# Patient Record
Sex: FEMALE | Race: WHITE | Employment: PART TIME | URBAN - METROPOLITAN AREA
[De-identification: names, ages, dates, MRNs, and addresses within clinical notes are randomized per-mention and may not be internally consistent; named-entity substitution may affect disease eponyms.]

---

## 2017-02-16 ENCOUNTER — HOSPITAL ENCOUNTER (OUTPATIENT)
Dept: VASCULAR SURGERY | Age: 62
Discharge: HOME | End: 2017-02-16
Attending: PODIATRIST
Payer: COMMERCIAL

## 2017-02-16 DIAGNOSIS — I73.9 PERIPHERAL VASCULAR DISEASE, UNSPECIFIED (HCC): ICD-10-CM

## 2017-02-16 PROCEDURE — 93923 UPR/LXTR ART STDY 3+ LVLS: CPT

## 2017-02-16 NOTE — PROCEDURES
[facility]  *** FINAL REPORT ***    Name: Anna Roque  MRN: [id number]    Outpatient  : 05 Aug 1955  HIS Order #: [id number]  [id number] [facility] Visit #: [id number]  Date: 2017    TYPE OF TEST: Peripheral Arterial Testing    REASON FOR TEST  Peripheral vascular disease. Right Leg  Segmentals: Normal                     mmHg  Brachial         150  High thigh  Low thigh        179  Calf             213  Posterior tibial 191  Dorsalis pedis  Peroneal  Metatarsal  Toe pressure     112  Doppler:  PVR:        Normal  Ankle/Brachial: 1.24    Left Leg  Segmentals:                     mmHg  Brachial         154  High thigh  Low thigh        177  Calf  Posterior tibial  Dorsalis pedis  Peroneal  Metatarsal  Toe pressure     106  Doppler:  PVR:        Normal  Ankle/Brachial:    INTERPRETATION/FINDINGS  PROCEDURE:  Evaluation of lower extremity arteries with multilevel  systolic blood pressure measurements and pulse volume recording (PVR)  plethysmography. Includes calculation of the ankle/brachial pressure  indices (MARIAH's). FINDINGS:  The right ankle anterior tibial is not compressible and  arteries at the left calf and ankle are not compressible, consistent  with arterial calcification. Right MARIAH, based on the posterior  tibial, is normal.  Left MARIAH is indeterminate. PVR waveforms on the  right are normal at the thigh, calf, and ankle. PVR waveforms on the  left are normal at the thigh, calf, and ankle. Photoplethysmography  waveforms are normal at the great toe bilaterally. Great toe -  brachial index is 0.73 on the right and 0.69 on the left. Waveform  amplitude at the left calf, ankle, and great toe is high in comparison   to the right, suggesting hyperemia. IMPRESSION:  No evidence of hemodynamically significant lower  extremity arterial obstruction. ADDITIONAL COMMENTS    I have personally reviewed the data relevant to the interpretation of  this  study.     TECHNOLOGIST: Angelina Randle, RVT  Signed: 02/16/2017 04:34 PM    PHYSICIAN: Maxine Robles MD  Signed: 02/17/2017 08:18 AM

## 2017-02-17 ENCOUNTER — HOSPITAL ENCOUNTER (OUTPATIENT)
Dept: VASCULAR SURGERY | Age: 62
Discharge: HOME | End: 2017-02-17
Attending: PODIATRIST

## 2017-02-17 DIAGNOSIS — I73.9 PERIPHERAL VASCULAR DISEASE, UNSPECIFIED (HCC): ICD-10-CM

## 2017-04-20 PROBLEM — N18.30 CKD (CHRONIC KIDNEY DISEASE), STAGE III (HCC): Status: ACTIVE | Noted: 2017-04-20

## 2017-08-21 PROBLEM — E11.59 HYPERTENSION COMPLICATING DIABETES (HCC): Status: ACTIVE | Noted: 2017-08-21

## 2017-08-21 PROBLEM — I15.2 HYPERTENSION COMPLICATING DIABETES (HCC): Status: ACTIVE | Noted: 2017-08-21

## 2017-10-19 ENCOUNTER — OFFICE VISIT (OUTPATIENT)
Dept: SLEEP MEDICINE | Age: 62
End: 2017-10-19

## 2017-10-19 VITALS
BODY MASS INDEX: 33.97 KG/M2 | DIASTOLIC BLOOD PRESSURE: 84 MMHG | SYSTOLIC BLOOD PRESSURE: 155 MMHG | HEART RATE: 71 BPM | OXYGEN SATURATION: 99 % | WEIGHT: 237.3 LBS | HEIGHT: 70 IN

## 2017-10-19 DIAGNOSIS — E11.59 HYPERTENSION COMPLICATING DIABETES (HCC): ICD-10-CM

## 2017-10-19 DIAGNOSIS — G47.33 OSA (OBSTRUCTIVE SLEEP APNEA): Primary | ICD-10-CM

## 2017-10-19 DIAGNOSIS — I15.2 HYPERTENSION COMPLICATING DIABETES (HCC): ICD-10-CM

## 2017-10-19 NOTE — PROGRESS NOTES
[address] [facility] [place]. [place], [address]  Tel.  [phone]  Fax. [address] [facility] 60  [place], [address] [facility]  Tel.  [phone]  Fax. [phone] [address]   Tel.  [phone]  Fax. [phone]         Subjective:      Waylon Ortiz is an 65 y.o. female referred for evaluation for a sleep disorder. She complains of snoring associated with snorting, excessive daytime sleepiness. Symptoms began several years ago, unchanged since that time. She usually can fall asleep in 5 minutes. Family or house members note snoring. She denies completely or partially paralyzed while falling asleep or waking up. Waylon Ortiz does wake up frequently at night. She   bothered by waking up too early and left unable to get back to sleep. She actually sleeps about   hours at night and wakes up about 3 times during the night. She does work shifts:  First Shift. Lola Lopez indicates she   get too little sleep at night. Her bedtime is  . She awakens at  . She does not take naps. She has the following observed behaviors: Loud snoring, Sleep talking, Grinding teeth;  . Other remarks: waking with a gasp or snort    [place] Sleepiness Score: 24 which reflect severe daytime drowsiness. No Known Allergies      Current Outpatient Prescriptions:     atorvastatin (LIPITOR) 40 mg tablet, Take 1 Tab by mouth daily. , Disp: 90 Tab, Rfl: 11    bumetanide (BUMEX) 1 mg tablet, Take 2 Tabs by mouth daily. , Disp: 180 Tab, Rfl: 11    traMADol (ULTRAM) 50 mg tablet, Take 1 Tab by mouth every six (6) hours as needed for Pain. Max Daily Amount: 200 mg., Disp: 100 Tab, Rfl: 5    ONE TOUCH DELICA 33 gauge misc, USE TO TEST BLOOD SUGAR EVERY DAY, Disp: 30 Lancet, Rfl: 11    metFORMIN (GLUCOPHAGE) 500 mg tablet, Take 500 mg by mouth daily (with breakfast). , Disp: , Rfl:     ONETOUCH ULTRA TEST strip, TEST BLOOD SUGAR TWICE A DAY, Disp: 50 Strip, Rfl: 10   levomefolate-B6-meB12-algal oil 3 mg-35 mg-2 mg -90.314 mg cap, Take 2 Caps by mouth daily. , Disp: , Rfl:     CHILDREN'S ASPIRIN 81 mg chewable tablet, CHEW 1 TABLET BY MOUTH EVERY DAY, Disp: 30 Tab, Rfl: 10    acetaminophen (TYLENOL) 325 mg tablet, Take 2 Tabs by mouth every four (4) hours as needed. , Disp: 30 Tab, Rfl: 11     She  has a past medical history of Cancer ([facility] 75.); CKD (chronic kidney disease), stage III (3/1/2016); CKD (chronic kidney disease), stage IV ([facility] (3/1/2016); Diabetes ([facility].); HTN (hypertension) (11/2/2015); Hypertension; LBP (low back pain) (11/2/2015); Microalbuminuria (11/21/2015); Obesity (11/2/2015); PAD (peripheral artery disease) ([facility].) (9/29/2016); and Venous insufficiency (11/2/2015). She  has a past surgical history that includes back surgery; appendectomy; and tonsillectomy. She family history includes Arthritis-rheumatoid in her mother; Dementia in her father. She  reports that she has quit smoking. She has never used smokeless tobacco. She reports that she drinks alcohol. Review of Systems:  Constitutional:  No significant weight loss or weight gain. Eyes:  No blurred vision. CVS:  No significant chest pain  Pulm:  No significant shortness of breath  GI:  No significant nausea or vomiting  :  No significant nocturia  Musculoskeletal:  No significant joint pain at night  Skin:  No significant rashes  Neuro:  No significant dizziness   Psych:  No active mood issues    Sleep Review of Systems: notable for no difficulty falling asleep; frequent awakenings at night;  regular dreaming noted; no nightmares ; no early morning headaches; memory problems; no concentration issues; no history of any automobile or occupational accidents due to daytime drowsiness.       Objective:     Visit Vitals    /84    Pulse 71    Ht 5' 9.8\" (1.773 m)    Wt 237 lb 4.8 oz (107.6 kg)    SpO2 99%    BMI 34.24 kg/m2         General:   Not in acute distress   Eyes:  Anicteric sclerae, no obvious strabismus   Nose:  No obvious nasal septum deviation    Oropharynx:   Class 4 oropharyngeal outlet, thick tongue base, uvula could not be seen due to low-lying soft palate, narrow tonsilo-pharyngeal pilars   Tonsils:   tonsils are not seen due to low-lying soft palate   Neck:   Neck circ. in \"inches\": 16; midline trachea   Chest/Lungs:  Equal lung expansion, clear on auscultation    CVS:  Normal rate, regular rhythm; no JVD   Skin:  Warm to touch; no obvious rashes   Neuro:  No focal deficits ; no obvious tremor    Psych:  Normal affect,  normal countenance;          Assessment:       ICD-10-CM ICD-9-CM    1. LUIZA (obstructive sleep apnea) G47.33 327.23 SLEEP STUDY UNATTENDED, 4 CHANNEL   2. Hypertension complicating diabetes ([facility] E11.59 250.80     I10 401.9    3. BMI 34.0-34.9,adult Z68.34 V85.34          Plan:     * The patient currently has a Moderate Risk for having sleep apnea. STOP-BANG score 5.  * Sleep testing was ordered for initial evaluation. * She was provided information on sleep apnea including coresponding risk factors and the importance of proper treatment. * Treatment options if indicated were reviewed today. Patient agrees to a trial of OAT Dahl SARIKA José) / PAP therapy if indicated. * Counseling was provided regarding proper sleep hygiene (including effect of light on sleep), paradoxical intention, stimulus control, sleep environment safety and safe driving. * Effect of sleep disturbance on weight was reviewed. We have recommended a dedicated weight loss through appropriate diet and an exercise regiment as significant weight reduction has been shown to reduce severity of obstructive sleep apnea.      * Patient agrees to telephone [phone]  follow-up by myself or lead sleep technologist shortly after sleep study to review results and plan final management.     (patient has given permission for a message to be left regarding test results and further management if patient cannot be cannot be reached directly). Thank you for allowing us to participate in your patient's medical care. We'll keep you updated on these investigations. Lauren Rosales MD, FAASM  Electronically signed.  10/19/17

## 2017-10-19 NOTE — PATIENT INSTRUCTIONS
[address] [facility] [place]. [place], [address]  Tel.  [phone]  Fax. 100 [facility] 60  [place], [address] [facility]  Tel.  [phone]  Fax. [phone] [address]  Tel.  [phone]  Fax. [phone]     Sleep Apnea: After Your Visit  Your Care Instructions  Sleep apnea occurs when you frequently stop breathing for 10 seconds or longer during sleep. It can be mild to severe, based on the number of times per hour that you stop breathing or have slowed breathing. Blocked or narrowed airways in your nose, mouth, or throat can cause sleep apnea. Your airway can become blocked when your throat muscles and tongue relax during sleep. Sleep apnea is common, occurring in 1 out of 20 individuals. Individuals having any of the following characteristics should be evaluated and treated right away due to high risk and detrimental consequences from untreated sleep apnea:  1. Obesity  2. Congestive Heart failure  3. Atrial Fibrillation  4. Uncontrolled Hypertension  5. Type II Diabetes  6. Night-time Arrhythmias  7. Stroke  8. Pulmonary Hypertension  9. High-risk Driving Populations (pilots, truck drivers, etc.)  10. Patients Considering Weight-loss Surgery    How do you know you have sleep apnea? You probably have sleep apnea if you answer 'yes' to 3 or more of the following questions:  S - Have you been told that you Snore? T - Are you often Tired during the day? O - Has anyone Observed you stop breathing while sleeping? P- Do you have (or are being treated for) high blood Pressure? B - Are you obese (Body Mass Index > 35)? A - Is your Age 48years old or older? N - Is your Neck size greater than 16 inches? G - Are you male Gender? A sleep physician can prescribe a breathing device that prevents tissues in the throat from blocking your airway.  Or your doctor may recommend using a dental device (oral breathing device) to help keep your airway open. In some cases, surgery may be needed to remove enlarged tissues in the throat. Follow-up care is a key part of your treatment and safety. Be sure to make and go to all appointments, and call your doctor if you are having problems. It's also a good idea to know your test results and keep a list of the medicines you take. How can you care for yourself at home? · Lose weight, if needed. It may reduce the number of times you stop breathing or have slowed breathing. · Go to bed at the same time every night. · Sleep on your side. It may stop mild apnea. If you tend to roll onto your back, sew a pocket in the back of your pajama top. Put a tennis ball into the pocket, and stitch the pocket shut. This will help keep you from sleeping on your back. · Avoid alcohol and medicines such as sleeping pills and sedatives before bed. · Do not smoke. Smoking can make sleep apnea worse. If you need help quitting, talk to your doctor about stop-smoking programs and medicines. These can increase your chances of quitting for good. · Prop up the head of your bed 4 to 6 inches by putting bricks under the legs of the bed. · Treat breathing problems, such as a stuffy nose, caused by a cold or allergies. · Use a continuous positive airway pressure (CPAP) breathing machine if lifestyle changes do not help your apnea and your doctor recommends it. The machine keeps your airway from closing when you sleep. · If CPAP does not help you, ask your doctor whether you should try other breathing machines. A bilevel positive airway pressure machine has two types of air pressureâone for breathing in and one for breathing out. Another device raises or lowers air pressure as needed while you breathe. · If your nose feels dry or bleeds when using one of these machines, talk with your doctor about increasing moisture in the air. A humidifier may help.   · If your nose is runny or stuffy from using a breathing machine, talk with your doctor about using decongestants or a corticosteroid nasal spray. When should you call for help? Watch closely for changes in your health, and be sure to contact your doctor if:  · You still have sleep apnea even though you have made lifestyle changes. · You are thinking of trying a device such as CPAP. · You are having problems using a CPAP or similar machine. Where can you learn more? Go to RETAIL PRO. Enter O341 in the search box to learn more about \"Sleep Apnea: After Your Visit. \"   © 6065-0410 Healthwise, Incorporated. Care instructions adapted under license by [facility] [employer] (which disclaims liability or warranty for this information). This care instruction is for use with your licensed healthcare professional. If you have questions about a medical condition or this instruction, always ask your healthcare professional. Marsha Rice any warranty or liability for your use of this information. PROPER SLEEP HYGIENE    What to avoid  · Do not have drinks with caffeine, such as coffee or black tea, for 8 hours before bed. · Do not smoke or use other types of tobacco near bedtime. Nicotine is a stimulant and can keep you awake. · Avoid drinking alcohol late in the evening, because it can cause you to wake in the middle of the night. · Do not eat a big meal close to bedtime. If you are hungry, eat a light snack. · Do not drink a lot of water close to bedtime, because the need to urinate may wake you up during the night. · Do not read or watch TV in bed. Use the bed only for sleeping and sexual activity. What to try  · Go to bed at the same time every night, and wake up at the same time every morning. Do not take naps during the day. · Keep your bedroom quiet, dark, and cool. · Get regular exercise, but not within 3 to 4 hours of your bedtime. .  · Sleep on a comfortable pillow and mattress.   · If watching the clock makes you anxious, turn it facing away from you so you cannot see the time. · If you worry when you lie down, start a worry book. Well before bedtime, write down your worries, and then set the book and your concerns aside. · Try meditation or other relaxation techniques before you go to bed. · If you cannot fall asleep, get up and go to another room until you feel sleepy. Do something relaxing. Repeat your bedtime routine before you go to bed again. · Make your house quiet and calm about an hour before bedtime. Turn down the lights, turn off the TV, log off the computer, and turn down the volume on music. This can help you relax after a busy day. Drowsy Driving  The [address] [place] [address] Road Traffic Safety Administration cites drowsiness as a causing factor in more than 603,693 police reported crashes annually, resulting in 76,000 injuries and 1,500 deaths. Other surveys suggest 55% of people polled have driven while drowsy in the past year, 23% had fallen asleep but not crashed, 3% crashed, and 2% had and accident due to drowsy driving. Who is at risk? Young Drivers: One study of drowsy driving accidents states that 55% of the drivers were under 25 years. Of those, 75% were male. Shift Workers and Travelers: People who work overnight or travel across time zones frequently are at higher risk of experiencing Circadian Rhythm Disorders. They are trying to work and function when their body is programed to sleep. Sleep Deprived: Lack of sleep has a serious impact on your ability to pay attention or focus on a task. Consistently getting less than the average of 8 hours your body needs creates partial or cumulative sleep deprivation. Untreated Sleep Disorders: Sleep Apnea, Narcolepsy, R.L.S., and other sleep disorders (untreated) prevent a person from getting enough restful sleep. This leads to excessive daytime sleepiness and increases the risk for drowsy driving accidents by up to 7 times.   Medications / Alcohol: Even over the counter medications can cause drowsiness. Medications that impair a drivers attention should have a warning label. Alcohol naturally makes you sleepy and on its own can cause accidents. Combined with excessive drowsiness its effects are amplified. Signs of Drowsy Driving:   * You don't remember driving the last few miles   * You may drift out of your zacarias   * You are unable to focus and your thoughts wander   * You may yawn more often than normal   * You have difficulty keeping your eyes open / nodding off   * Missing traffic signs, speeding, or tailgating  Prevention-   Good sleep hygiene, lifestyle and behavioral choices have the most impact on drowsy driving. There is no substitute for sleep and the average person requires 8 hours nightly. If you find yourself driving drowsy, stop and sleep. Consider the sleep hygiene tips provided during your visit as well. Medication Refill Policy: Refills for all medications require 1 week advance notice. Please have your pharmacy fax a refill request. We are unable to fax, or call in \"controled substance\" medications and you will need to pick these prescriptions up from our office. [employer] Activation    Thank you for requesting access to [employer]. Please follow the instructions below to securely access and download your online medical record. [employer] allows you to send messages to your doctor, view your test results, renew your prescriptions, schedule appointments, and more. How Do I Sign Up? 1. In your internet browser, go to [email or web address]. 2. Click on the First Time User? Click Here link in the Sign In box. You will see the New Member Sign Up page. 3. Enter your [employer] Access Code exactly as it appears below. You will not need to use this code after youve completed the sign-up process. If you do not sign up before the expiration date, you must request a new code. [employer] Access Code:  Activation code not generated  Current [employer] Status: Active (This is the date your [employer] access code will )    4. Enter the last four digits of your Social Security Number (xxxx) and Date of Birth (mm/dd/yyyy) as indicated and click Submit. You will be taken to the next sign-up page. 5. Create a StoneRivert ID. This will be your [employer] login ID and cannot be changed, so think of one that is secure and easy to remember. 6. Create a [employer] password. You can change your password at any time. 7. Enter your Password Reset Question and Answer. This can be used at a later time if you forget your password. 8. Enter your e-mail address. You will receive e-mail notification when new information is available in [address] 9. Click Sign Up. You can now view and download portions of your medical record. 10. Click the Download Summary menu link to download a portable copy of your medical information. Additional Information    If you have questions, please call [phone]. Remember, [employer] is NOT to be used for urgent needs. For medical emergencies, dial 911.

## 2017-10-25 ENCOUNTER — TELEPHONE (OUTPATIENT)
Dept: SLEEP MEDICINE | Age: 62
End: 2017-10-25

## 2017-10-25 DIAGNOSIS — G47.419 NARCOLEPSY WITHOUT CATAPLEXY: Primary | ICD-10-CM

## 2017-10-25 NOTE — TELEPHONE ENCOUNTER
HSAT Returned - [facility]    Date of Study: 10/19/17    Patient did not complete study log

## 2017-10-26 NOTE — TELEPHONE ENCOUNTER
Patient is to be contacted by lead sleep technologist regarding results of Sleep Testing which was indicative of an average AHI of 0.7 per hour with an SpO2 carl of 92% and SpO2 of < 88% being 0 minutes. Attended night and day testing is recommended to assess the complaint of excessive daytime sleepiness reported by the patient at the time of initial visit and as implied by an [place] Sleepiness Score of 24. Encounter Diagnosis   Name Primary?     Narcolepsy without cataplexy Yes       Orders Placed This Encounter    MULTIPLE SLEEP LATENCY TEST     Standing Status:   Future     Standing Expiration Date:   4/24/2018    7-DRUG SCREEN, UR     Standing Status:   Future     Standing Expiration Date:   4/24/2018    POLYSOMNOGRAPHY 1 NIGHT     Standing Status:   Future     Standing Expiration Date:   4/25/2018     Order Specific Question:   Reason for Exam     Answer:   Narcolepsy

## 2017-10-27 NOTE — TELEPHONE ENCOUNTER
Reviewed sleep study results with patient. She expressed understanding. Patient requests to verify out of pocket costs for PSG / MSLT study with insurance prior to scheduling. She will call back to schedule.

## 2018-02-19 PROBLEM — E11.21 TYPE 2 DIABETES WITH NEPHROPATHY (HCC): Status: ACTIVE | Noted: 2018-02-19

## 2018-03-02 ENCOUNTER — HOSPITAL ENCOUNTER (OUTPATIENT)
Dept: MAMMOGRAPHY | Age: 63
Discharge: HOME | End: 2018-03-02
Attending: INTERNAL MEDICINE
Payer: COMMERCIAL

## 2018-03-02 DIAGNOSIS — Z12.31 VISIT FOR SCREENING MAMMOGRAM: ICD-10-CM

## 2018-03-02 PROCEDURE — 77067 SCR MAMMO BI INCL CAD: CPT

## 2018-07-10 ENCOUNTER — HOSPITAL ENCOUNTER (OUTPATIENT)
Dept: GENERAL RADIOLOGY | Age: 63
Discharge: HOME | End: 2018-07-10
Attending: INTERNAL MEDICINE
Payer: COMMERCIAL

## 2018-07-10 DIAGNOSIS — R05.9 COUGH: ICD-10-CM

## 2018-07-10 PROCEDURE — 71046 X-RAY EXAM CHEST 2 VIEWS: CPT

## 2018-09-18 ENCOUNTER — HOSPITAL ENCOUNTER (OUTPATIENT)
Dept: CT IMAGING | Age: 63
Discharge: HOME | End: 2018-09-18
Attending: OTOLARYNGOLOGY
Payer: COMMERCIAL

## 2018-09-18 DIAGNOSIS — H90.41 SENSORINEURAL HEARING LOSS (SNHL) OF RIGHT EAR WITH UNRESTRICTED HEARING OF LEFT EAR: ICD-10-CM

## 2018-09-18 DIAGNOSIS — H91.21 SUDDEN HEARING LOSS, RIGHT: ICD-10-CM

## 2018-09-18 PROCEDURE — 74011636320 HC RX REV CODE- 636/320: Performed by: OTOLARYNGOLOGY

## 2018-09-18 PROCEDURE — 74011000258 HC RX REV CODE- 258: Performed by: OTOLARYNGOLOGY

## 2018-09-18 PROCEDURE — 70470 CT HEAD/BRAIN W/O & W/DYE: CPT

## 2018-09-18 RX ORDER — SODIUM CHLORIDE 0.9 % (FLUSH) 0.9 %
10 SYRINGE (ML) INJECTION
Status: COMPLETED | OUTPATIENT
Start: 2018-09-18 | End: 2018-09-18

## 2018-09-18 RX ADMIN — Medication 10 ML: at 09:43

## 2018-09-18 RX ADMIN — SODIUM CHLORIDE 100 ML: 900 INJECTION, SOLUTION INTRAVENOUS at 09:43

## 2018-09-18 RX ADMIN — IOPAMIDOL 100 ML: 612 INJECTION, SOLUTION INTRAVENOUS at 09:43

## 2019-03-19 ENCOUNTER — OFFICE VISIT (OUTPATIENT)
Dept: CARDIOLOGY CLINIC | Age: 64
End: 2019-03-19

## 2019-03-19 VITALS
WEIGHT: 231.6 LBS | BODY MASS INDEX: 33.16 KG/M2 | SYSTOLIC BLOOD PRESSURE: 130 MMHG | HEART RATE: 66 BPM | HEIGHT: 70 IN | RESPIRATION RATE: 16 BRPM | DIASTOLIC BLOOD PRESSURE: 80 MMHG

## 2019-03-19 DIAGNOSIS — R07.89 OTHER CHEST PAIN: ICD-10-CM

## 2019-03-19 DIAGNOSIS — E78.2 MIXED HYPERLIPIDEMIA: ICD-10-CM

## 2019-03-19 DIAGNOSIS — E11.8 DM TYPE 2, CONTROLLED, WITH COMPLICATION (HCC): ICD-10-CM

## 2019-03-19 DIAGNOSIS — I20.0 UNSTABLE ANGINA (HCC): Primary | ICD-10-CM

## 2019-03-19 DIAGNOSIS — Z87.891 HISTORY OF TOBACCO USE: ICD-10-CM

## 2019-03-19 DIAGNOSIS — I10 BENIGN ESSENTIAL HTN: ICD-10-CM

## 2019-03-19 DIAGNOSIS — N18.30 CKD (CHRONIC KIDNEY DISEASE) STAGE 3, GFR 30-59 ML/MIN (HCC): ICD-10-CM

## 2019-03-19 RX ORDER — POTASSIUM CHLORIDE 750 MG/1
1 TABLET, FILM COATED, EXTENDED RELEASE ORAL DAILY
COMMUNITY

## 2019-03-19 NOTE — LETTER
3/20/2019 11:02 AM 
 
Patient:  Kaylen Cruz YOB: 1955 Date of Visit: 3/19/2019 Dear Jennifer Mcnair MD 
[address] [place] [id number] VIA Facsimile: [phone] 
 : Thank you for referring Ms. Neda Sandifer to me for evaluation/treatment. Below are the relevant portions of my assessment and plan of care. Ms. Elisa Baxter is a 60 yo F with essential hypertension, type 2 diabetes, mixed hyperlipidemia, history of tobacco use, chronic kidney disease stage 3, referred by Dr. Altagracia Perkins for cardiac evaluation. She is here due to recent chest pain and shortness of breath over the last few months, can happen for a few days at a time, sometimes lasting minutes to hours, can happen with rest or exertion. She walks with two canes, so she is limited with her activity. She denies any palpitations, lightheadedness, dizziness or syncope. She did have issues with hearing loss in her right ear back in 06/2018 and was on steroids and blood sugar has been hard to control since then. She had an EKG at Dr. Altagracia Perkins' office that noted normal sinus rhythm. There was a question of ectopic atrial rhythm, but this was due to baseline artifact. She did have Q-waves in the inferior leads, possibly old inferior myocardial infarction and we discussed this. She is compensated on exam with clear lungs. She has trace lower extremity edema. Blood pressure is 130/80 with a heart rate of 66. Soc hx. Tobacco use from 15 to 32 yo; quit many yrs ago. Fam hx. Mom passed 45 arthritis and complications. Paternal GM, possible with father. Assessment and Plan: 1. Unstable angina. Chest pain and shortness of breath with typical and atypical features and multiple risk factors; will proceed with an echocardiogram and a stress test for further evaluation. She walks with a cane and cannot fully complete a treadmill and will do this Lexiscan. 2. History of tobacco use. Quit many years ago. 3. Essential hypertension. Blood pressure is controlled. 4. Type 2 diabetes. 5. Chronic kidney disease, stage 3. She is going to see nephrology. 6. Essential hypertension. Blood pressure is controlled. 7. Abnormal EKG. Echocardiogram and stress test.   
 
If you have questions, please do not hesitate to call me. Sincerely, Scooby Bran MD

## 2019-03-19 NOTE — PROGRESS NOTES
[facility] [place]: Nickie Leija  [id number]    History of Present Illness:   Ms. Lazarus Llamas is a 58 yo F with essential hypertension, type 2 diabetes, mixed hyperlipidemia, history of tobacco use, chronic kidney disease stage 3, referred by Dr. Rich Brown for cardiac evaluation. She is here due to recent chest pain and shortness of breath over the last few months, can happen for a few days at a time, sometimes lasting minutes to hours, can happen with rest or exertion. She walks with two canes, so she is limited with her activity. She denies any palpitations, lightheadedness, dizziness or syncope. She did have issues with hearing loss in her right ear back in 06/2018 and was on steroids and blood sugar has been hard to control since then. She had an EKG at Dr. Rich Brown' office that noted normal sinus rhythm. There was a question of ectopic atrial rhythm, but this was due to baseline artifact. She did have Q-waves in the inferior leads, possibly old inferior myocardial infarction and we discussed this. She is compensated on exam with clear lungs. She has trace lower extremity edema. Blood pressure is 130/80 with a heart rate of 66. Soc hx. Tobacco use from 15 to 32 yo; quit many yrs ago. Fam hx. Mom passed 45 arthritis and complications. Paternal GM, possible with father. Assessment and Plan:   1. Unstable angina. Chest pain and shortness of breath with typical and atypical features and multiple risk factors; will proceed with an echocardiogram and a stress test for further evaluation. She walks with a cane and cannot fully complete a treadmill and will do this Lexiscan. 2. History of tobacco use. Quit many years ago. 3. Essential hypertension. Blood pressure is controlled. 4. Type 2 diabetes. 5. Chronic kidney disease, stage 3. She is going to see nephrology. 6. Essential hypertension. Blood pressure is controlled. 7. Abnormal EKG.   Echocardiogram and stress test.        She  has a past medical history of Cancer [facility]), CKD (chronic kidney disease), stage III ([facility] (3/1/2016), CKD (chronic kidney disease), stage IV ([facility] (3/1/2016), Diabetes ([facility] HTN (hypertension) (11/2/2015), Hypertension, LBP (low back pain) (11/2/2015), Menopause, Microalbuminuria (11/21/2015), Obesity (11/2/2015), PAD (peripheral artery disease) ([facility] (9/29/2016), and Venous insufficiency (11/2/2015). All other systems negative except as above. PE  Vitals:    03/19/19 1447   BP: 130/80   Pulse: 66   Resp: 16   Weight: 231 lb 9.6 oz (105.1 kg)   Height: 5' 9.8\" (1.773 m)    Body mass index is 33.42 kg/m².    General appearance - alert, well appearing, and in no distress  Mental status - affect appropriate to mood  Eyes - sclera anicteric, moist mucous membranes  Neck - supple, no JVD  Chest - clear to auscultation, no wheezes, rales or rhonchi  Heart - normal rate, regular rhythm, normal S1, S2, I/VI systolic murmur LUSB  Abdomen - soft, nontender, nondistended, no masses or organomegaly  Neurological -no focal deficit  Extremities - peripheral pulses normal, no pedal edema      Recent Labs:  Lab Results   Component Value Date/Time    Cholesterol, total 165 09/29/2016 10:02 AM    HDL Cholesterol 34 09/29/2016 10:02 AM    LDL, calculated 91.2 09/29/2016 10:02 AM    Triglyceride 199 (H) 09/29/2016 10:02 AM    CHOL/HDL Ratio 4.9 09/29/2016 10:02 AM     Lab Results   Component Value Date/Time    Creatinine 1.29 (H) 09/21/2018 10:20 AM     Lab Results   Component Value Date/Time    BUN 19 09/21/2018 10:20 AM     Lab Results   Component Value Date/Time    Potassium 4.4 09/21/2018 10:20 AM     Lab Results   Component Value Date/Time    Hemoglobin A1c 5.9 09/29/2016 10:02 AM     Lab Results   Component Value Date/Time    HGB (POC) 12.0 07/10/2018 03:14 PM    HGB 9.6 (L) 09/29/2016 10:02 AM     Lab Results   Component Value Date/Time    PLATELET 959 31/11/0405 10:02 AM       Reviewed:  Past Medical History:   Diagnosis Date    Cancer ([facility]     melanoma back stage II 2005    CKD (chronic kidney disease), stage III ([facility] 3/1/2016    CKD (chronic kidney disease), stage IV ([facility] 3/1/2016    Diabetes ([facility]     HTN (hypertension) 11/2/2015    Hypertension     LBP (low back pain) 11/2/2015    Menopause     Microalbuminuria 11/21/2015    Obesity 11/2/2015    PAD (peripheral artery disease) ([facility] 9/29/2016    Venous insufficiency 11/2/2015     Social History     Tobacco Use   Smoking Status Former Smoker   Smokeless Tobacco Never Used     Social History     Substance and Sexual Activity   Alcohol Use Yes    Alcohol/week: 0.0 oz    Frequency: Monthly or less    Drinks per session: 1 or 2    Binge frequency: Never    Comment: occassion     No Known Allergies    Current Outpatient Medications   Medication Sig    potassium chloride SR (KLOR-CON 10) 10 mEq tablet Take 1 mEq by mouth daily.  atorvastatin (LIPITOR) 40 mg tablet TAKE 1 TABLET BY MOUTH EVERY DAY    bumetanide (BUMEX) 2 mg tablet Take 1 Tab by mouth two (2) times daily as needed.  metFORMIN (GLUCOPHAGE) 500 mg tablet TAKE 1 TABLET BY MOUTH TWICE A DAY WITH MEALS    ONETOUCH ULTRA TEST strip TEST BLOOD SUGAR TWICE A DAY    ONE TOUCH DELICA 33 gauge misc USE TO TEST BLOOD SUGAR EVERY DAY    ONETOUCH ULTRA TEST strip TEST BLOOD SUGAR TWICE A DAY    levomefolate-B6-meB12-algal oil 3 mg-35 mg-2 mg -90.314 mg cap Take 2 Caps by mouth daily.  CHILDREN'S ASPIRIN 81 mg chewable tablet CHEW 1 TABLET BY MOUTH EVERY DAY    acetaminophen (TYLENOL) 325 mg tablet Take 2 Tabs by mouth every four (4) hours as needed.  traMADol (ULTRAM) 50 mg tablet Take 1 Tab by mouth every six (6) hours as needed for Pain. Max Daily Amount: 200 mg. No current facility-administered medications for this visit.         Ana Guillory MD  [employer] [facility] [place]  [address]  [facility], [address]

## 2019-04-03 ENCOUNTER — TELEPHONE (OUTPATIENT)
Dept: CARDIOLOGY CLINIC | Age: 64
End: 2019-04-03

## 2019-04-03 NOTE — TELEPHONE ENCOUNTER
2 pt identifiers used  Pt. Reports she spoke to nurse yesterday concerning her meds but can not remember if she is to hold any. Pt. Informed writer would confirm and call her back. Pt. Verbalized understanding. 2 pt identifiers used  Pt. Called & informed she is OK to take all her meds.  She verbalized her understanding

## 2019-04-04 ENCOUNTER — TELEPHONE (OUTPATIENT)
Dept: CARDIOLOGY CLINIC | Age: 64
End: 2019-04-04

## 2019-04-04 NOTE — TELEPHONE ENCOUNTER
----- Message from Renato Tang MD sent at 4/3/2019  5:34 PM EDT -----  Please let pt know echo was overall normal, mild aortic stenosis. I'll read the stress test momentarily.  thx  ----- Message -----  From: Pedro Shipley MD  Sent: 4/3/2019   5:34 PM  To: Renato Tang MD

## 2019-04-04 NOTE — TELEPHONE ENCOUNTER
----- Message from Purnima Carrizales MD sent at 4/3/2019  5:59 PM EDT -----  Please let pt know stress test was also normal. thx  ----- Message -----  From: Bryant Boudreaux MD  Sent: 4/3/2019   5:59 PM  To: Purnima Carrizales MD

## 2019-04-04 NOTE — TELEPHONE ENCOUNTER
Called patient. Verified patient's identity with two identifiers. Notified patient of stress test and echo results, including Dr. Carroll Garcia comments. Patient verbalized understanding and denied further questions or concerns.

## 2019-04-10 ENCOUNTER — HOSPITAL ENCOUNTER (OUTPATIENT)
Dept: MAMMOGRAPHY | Age: 64
Discharge: HOME | End: 2019-04-10
Attending: FAMILY MEDICINE
Payer: COMMERCIAL

## 2019-04-10 DIAGNOSIS — Z12.39 BREAST SCREENING: ICD-10-CM

## 2019-04-10 PROCEDURE — 77067 SCR MAMMO BI INCL CAD: CPT

## 2019-11-06 ENCOUNTER — HOSPITAL ENCOUNTER (OUTPATIENT)
Dept: GENERAL RADIOLOGY | Age: 64
Discharge: HOME | End: 2019-11-06
Attending: INTERNAL MEDICINE
Payer: COMMERCIAL

## 2019-11-06 DIAGNOSIS — D64.9 ANEMIA, UNSPECIFIED: ICD-10-CM

## 2019-11-06 PROCEDURE — 77075 RADEX OSSEOUS SURVEY COMPL: CPT

## 2020-06-03 ENCOUNTER — HOSPITAL ENCOUNTER (OUTPATIENT)
Dept: MAMMOGRAPHY | Age: 65
Discharge: HOME | End: 2020-06-03
Attending: FAMILY MEDICINE
Payer: COMMERCIAL

## 2020-06-03 ENCOUNTER — HOSPITAL ENCOUNTER (OUTPATIENT)
Dept: VASCULAR SURGERY | Age: 65
Discharge: HOME | End: 2020-06-03
Attending: FAMILY MEDICINE
Payer: COMMERCIAL

## 2020-06-03 DIAGNOSIS — R09.89 RIGHT CAROTID BRUIT: ICD-10-CM

## 2020-06-03 DIAGNOSIS — Z12.31 ENCOUNTER FOR SCREENING MAMMOGRAM FOR BREAST CANCER: ICD-10-CM

## 2020-06-03 PROCEDURE — 93880 EXTRACRANIAL BILAT STUDY: CPT

## 2020-06-03 PROCEDURE — 77067 SCR MAMMO BI INCL CAD: CPT

## 2020-06-04 LAB
LEFT CCA DIST DIAS: 18.5 CM/S
LEFT CCA DIST SYS: 68.6 CM/S
LEFT CCA PROX DIAS: 16.6 CM/S
LEFT CCA PROX SYS: 74.2 CM/S
LEFT ECA DIAS: 4.3 CM/S
LEFT ECA SYS: 49.7 CM/S
LEFT ICA DIST DIAS: 36.4 CM/S
LEFT ICA DIST SYS: 105.1 CM/S
LEFT ICA MID DIAS: 21.6 CM/S
LEFT ICA MID SYS: 75.7 CM/S
LEFT ICA PROX DIAS: 22.5 CM/S
LEFT ICA PROX SYS: 74.5 CM/S
LEFT ICA/CCA SYS: 1.5
LEFT VERTEBRAL DIAS: 14.9 CM/S
LEFT VERTEBRAL SYS: 41.3 CM/S
RIGHT CCA DIST DIAS: 16.4 CM/S
RIGHT CCA DIST SYS: 77.6 CM/S
RIGHT CCA PROX DIAS: 15.1 CM/S
RIGHT CCA PROX SYS: 82.9 CM/S
RIGHT ECA DIAS: 11.2 CM/S
RIGHT ECA SYS: 84.2 CM/S
RIGHT ICA DIST DIAS: 32 CM/S
RIGHT ICA DIST SYS: 88.1 CM/S
RIGHT ICA MID DIAS: 26.8 CM/S
RIGHT ICA MID SYS: 85.4 CM/S
RIGHT ICA PROX DIAS: 17.7 CM/S
RIGHT ICA PROX SYS: 66.6 CM/S
RIGHT ICA/CCA SYS: 1.1
RIGHT VERTEBRAL DIAS: 12.8 CM/S
RIGHT VERTEBRAL SYS: 47.8 CM/S

## 2021-01-13 ENCOUNTER — HOSPITAL ENCOUNTER (OUTPATIENT)
Dept: LAB | Age: 66
Discharge: HOME | End: 2021-01-13
Payer: COMMERCIAL

## 2021-01-16 LAB
BACTERIA SPEC CULT: ABNORMAL
BACTERIA SPEC CULT: ABNORMAL
GRAM STN SPEC: ABNORMAL
SERVICE CMNT-IMP: ABNORMAL

## 2021-02-03 ENCOUNTER — HOSPITAL ENCOUNTER (INPATIENT)
Age: 66
LOS: 15 days | Discharge: NURSING, REHAB OR LONG-TERM CARE | DRG: 378 | End: 2021-02-19
Attending: EMERGENCY MEDICINE | Admitting: INTERNAL MEDICINE
Payer: COMMERCIAL

## 2021-02-03 DIAGNOSIS — K52.9 PROCTOCOLITIS: ICD-10-CM

## 2021-02-03 DIAGNOSIS — N17.9 AKI (ACUTE KIDNEY INJURY) (HCC): Primary | ICD-10-CM

## 2021-02-03 DIAGNOSIS — M79.2 NEUROPATHIC PAIN: ICD-10-CM

## 2021-02-03 DIAGNOSIS — R19.7 DIARRHEA, UNSPECIFIED TYPE: ICD-10-CM

## 2021-02-03 DIAGNOSIS — D72.825 BANDEMIA: ICD-10-CM

## 2021-02-03 DIAGNOSIS — E86.0 DEHYDRATION: ICD-10-CM

## 2021-02-03 PROCEDURE — 94760 N-INVAS EAR/PLS OXIMETRY 1: CPT

## 2021-02-03 PROCEDURE — 51701 INSERT BLADDER CATHETER: CPT

## 2021-02-03 PROCEDURE — 99285 EMERGENCY DEPT VISIT HI MDM: CPT

## 2021-02-04 ENCOUNTER — APPOINTMENT (OUTPATIENT)
Dept: MRI IMAGING | Age: 66
DRG: 378 | End: 2021-02-04
Attending: INTERNAL MEDICINE
Payer: COMMERCIAL

## 2021-02-04 ENCOUNTER — APPOINTMENT (OUTPATIENT)
Dept: CT IMAGING | Age: 66
DRG: 378 | End: 2021-02-04
Attending: PHYSICIAN ASSISTANT
Payer: COMMERCIAL

## 2021-02-04 ENCOUNTER — APPOINTMENT (OUTPATIENT)
Dept: GENERAL RADIOLOGY | Age: 66
DRG: 378 | End: 2021-02-04
Attending: PHYSICIAN ASSISTANT
Payer: COMMERCIAL

## 2021-02-04 ENCOUNTER — APPOINTMENT (OUTPATIENT)
Dept: VASCULAR SURGERY | Age: 66
DRG: 378 | End: 2021-02-04
Attending: INTERNAL MEDICINE
Payer: COMMERCIAL

## 2021-02-04 ENCOUNTER — APPOINTMENT (OUTPATIENT)
Dept: CT IMAGING | Age: 66
DRG: 378 | End: 2021-02-04
Attending: INTERNAL MEDICINE
Payer: COMMERCIAL

## 2021-02-04 PROBLEM — R55 SYNCOPE: Status: ACTIVE | Noted: 2021-02-04

## 2021-02-04 LAB
ALBUMIN SERPL-MCNC: 3.4 G/DL (ref 3.5–5)
ALBUMIN/GLOB SERPL: 0.8 {RATIO} (ref 1.1–2.2)
ALP SERPL-CCNC: 112 U/L (ref 45–117)
ALT SERPL-CCNC: 79 U/L (ref 12–78)
ANION GAP SERPL CALC-SCNC: 17 MMOL/L (ref 5–15)
APPEARANCE UR: ABNORMAL
AST SERPL-CCNC: 267 U/L (ref 15–37)
ATRIAL RATE: 91 BPM
BACTERIA URNS QL MICRO: NEGATIVE /HPF
BASOPHILS # BLD: 0 K/UL (ref 0–0.1)
BASOPHILS NFR BLD: 0 % (ref 0–1)
BILIRUB SERPL-MCNC: 0.6 MG/DL (ref 0.2–1)
BILIRUB UR QL CFM: NEGATIVE
BUN SERPL-MCNC: 97 MG/DL (ref 6–20)
BUN/CREAT SERPL: 23 (ref 12–20)
C DIFF GDH STL QL: NEGATIVE
C DIFF TOX A+B STL QL IA: NEGATIVE
CALCIUM SERPL-MCNC: 9.9 MG/DL (ref 8.5–10.1)
CALCULATED R AXIS, ECG10: -11 DEGREES
CALCULATED T AXIS, ECG11: 47 DEGREES
CHLORIDE SERPL-SCNC: 110 MMOL/L (ref 97–108)
CK SERPL-CCNC: 6603 U/L (ref 26–192)
CK SERPL-CCNC: 7042 U/L (ref 26–192)
CO2 SERPL-SCNC: 13 MMOL/L (ref 21–32)
COLOR UR: ABNORMAL
COMMENT, HOLDF: NORMAL
COVID-19 RAPID TEST, COVR: NOT DETECTED
CREAT SERPL-MCNC: 4.26 MG/DL (ref 0.55–1.02)
DIAGNOSIS, 93000: NORMAL
DIFFERENTIAL METHOD BLD: ABNORMAL
EOSINOPHIL # BLD: 0 K/UL (ref 0–0.4)
EOSINOPHIL NFR BLD: 0 % (ref 0–7)
EPITH CASTS URNS QL MICRO: ABNORMAL /LPF
ERYTHROCYTE [DISTWIDTH] IN BLOOD BY AUTOMATED COUNT: 17.3 % (ref 11.5–14.5)
ETHANOL SERPL-MCNC: <10 MG/DL
GLOBULIN SER CALC-MCNC: 4.2 G/DL (ref 2–4)
GLUCOSE BLD STRIP.AUTO-MCNC: 101 MG/DL (ref 65–100)
GLUCOSE BLD STRIP.AUTO-MCNC: 103 MG/DL (ref 65–100)
GLUCOSE BLD STRIP.AUTO-MCNC: 105 MG/DL (ref 65–100)
GLUCOSE BLD STRIP.AUTO-MCNC: 88 MG/DL (ref 65–100)
GLUCOSE SERPL-MCNC: 118 MG/DL (ref 65–100)
GLUCOSE UR STRIP.AUTO-MCNC: NEGATIVE MG/DL
HCT VFR BLD AUTO: 30.8 % (ref 35–47)
HGB BLD-MCNC: 10 G/DL (ref 11.5–16)
HGB UR QL STRIP: ABNORMAL
IMM GRANULOCYTES # BLD AUTO: 0 K/UL
IMM GRANULOCYTES NFR BLD AUTO: 0 %
INTERPRETATION: NORMAL
KETONES UR QL STRIP.AUTO: NEGATIVE MG/DL
LACTATE SERPL-SCNC: 1.6 MMOL/L (ref 0.4–2)
LEUKOCYTE ESTERASE UR QL STRIP.AUTO: NEGATIVE
LIPASE SERPL-CCNC: 201 U/L (ref 73–393)
LYMPHOCYTES # BLD: 0.6 K/UL (ref 0.8–3.5)
LYMPHOCYTES NFR BLD: 3 % (ref 12–49)
MAGNESIUM SERPL-MCNC: 2.9 MG/DL (ref 1.6–2.4)
MCH RBC QN AUTO: 31.1 PG (ref 26–34)
MCHC RBC AUTO-ENTMCNC: 32.5 G/DL (ref 30–36.5)
MCV RBC AUTO: 95.7 FL (ref 80–99)
MONOCYTES # BLD: 1.8 K/UL (ref 0–1)
MONOCYTES NFR BLD: 9 % (ref 5–13)
NEUTS BAND NFR BLD MANUAL: 21 % (ref 0–6)
NEUTS SEG # BLD: 17.6 K/UL (ref 1.8–8)
NEUTS SEG NFR BLD: 67 % (ref 32–75)
NITRITE UR QL STRIP.AUTO: NEGATIVE
NRBC # BLD: 0 K/UL (ref 0–0.01)
NRBC BLD-RTO: 0 PER 100 WBC
PH UR STRIP: 5 [PH] (ref 5–8)
PHOSPHATE SERPL-MCNC: 6.5 MG/DL (ref 2.6–4.7)
PLATELET # BLD AUTO: 145 K/UL (ref 150–400)
PMV BLD AUTO: 10.6 FL (ref 8.9–12.9)
POTASSIUM SERPL-SCNC: 5.2 MMOL/L (ref 3.5–5.1)
PROT SERPL-MCNC: 7.6 G/DL (ref 6.4–8.2)
PROT UR STRIP-MCNC: 30 MG/DL
Q-T INTERVAL, ECG07: 358 MS
QRS DURATION, ECG06: 70 MS
QTC CALCULATION (BEZET), ECG08: 440 MS
RBC # BLD AUTO: 3.22 M/UL (ref 3.8–5.2)
RBC #/AREA URNS HPF: ABNORMAL /HPF (ref 0–5)
RBC MORPH BLD: ABNORMAL
SAMPLES BEING HELD,HOLD: NORMAL
SARS-COV-2, COV2: NORMAL
SERVICE CMNT-IMP: ABNORMAL
SERVICE CMNT-IMP: NORMAL
SODIUM SERPL-SCNC: 140 MMOL/L (ref 136–145)
SOURCE, COVRS: NORMAL
SP GR UR REFRACTOMETRY: 1.02 (ref 1–1.03)
TROPONIN I SERPL-MCNC: <0.05 NG/ML
TROPONIN I SERPL-MCNC: <0.05 NG/ML
UR CULT HOLD, URHOLD: NORMAL
UROBILINOGEN UR QL STRIP.AUTO: 0.2 EU/DL (ref 0.2–1)
VENTRICULAR RATE, ECG03: 91 BPM
WBC # BLD AUTO: 20 K/UL (ref 3.6–11)
WBC URNS QL MICRO: ABNORMAL /HPF (ref 0–4)

## 2021-02-04 PROCEDURE — 70551 MRI BRAIN STEM W/O DYE: CPT

## 2021-02-04 PROCEDURE — 71045 X-RAY EXAM CHEST 1 VIEW: CPT

## 2021-02-04 PROCEDURE — 72125 CT NECK SPINE W/O DYE: CPT

## 2021-02-04 PROCEDURE — 84484 ASSAY OF TROPONIN QUANT: CPT

## 2021-02-04 PROCEDURE — 74011250636 HC RX REV CODE- 250/636: Performed by: PHYSICIAN ASSISTANT

## 2021-02-04 PROCEDURE — 83690 ASSAY OF LIPASE: CPT

## 2021-02-04 PROCEDURE — 87324 CLOSTRIDIUM AG IA: CPT

## 2021-02-04 PROCEDURE — 74011250636 HC RX REV CODE- 250/636: Performed by: INTERNAL MEDICINE

## 2021-02-04 PROCEDURE — 74011250636 HC RX REV CODE- 250/636: Performed by: FAMILY MEDICINE

## 2021-02-04 PROCEDURE — 85025 COMPLETE CBC W/AUTO DIFF WBC: CPT

## 2021-02-04 PROCEDURE — 82550 ASSAY OF CK (CPK): CPT

## 2021-02-04 PROCEDURE — 87635 SARS-COV-2 COVID-19 AMP PRB: CPT

## 2021-02-04 PROCEDURE — 80053 COMPREHEN METABOLIC PANEL: CPT

## 2021-02-04 PROCEDURE — 93005 ELECTROCARDIOGRAM TRACING: CPT

## 2021-02-04 PROCEDURE — 84100 ASSAY OF PHOSPHORUS: CPT

## 2021-02-04 PROCEDURE — 96374 THER/PROPH/DIAG INJ IV PUSH: CPT

## 2021-02-04 PROCEDURE — 65660000001 HC RM ICU INTERMED STEPDOWN

## 2021-02-04 PROCEDURE — 96361 HYDRATE IV INFUSION ADD-ON: CPT

## 2021-02-04 PROCEDURE — 77010033678 HC OXYGEN DAILY

## 2021-02-04 PROCEDURE — 74011250637 HC RX REV CODE- 250/637: Performed by: INTERNAL MEDICINE

## 2021-02-04 PROCEDURE — 36415 COLL VENOUS BLD VENIPUNCTURE: CPT

## 2021-02-04 PROCEDURE — 81001 URINALYSIS AUTO W/SCOPE: CPT

## 2021-02-04 PROCEDURE — 83605 ASSAY OF LACTIC ACID: CPT

## 2021-02-04 PROCEDURE — 82077 ASSAY SPEC XCP UR&BREATH IA: CPT

## 2021-02-04 PROCEDURE — 70450 CT HEAD/BRAIN W/O DYE: CPT

## 2021-02-04 PROCEDURE — 87040 BLOOD CULTURE FOR BACTERIA: CPT

## 2021-02-04 PROCEDURE — 70544 MR ANGIOGRAPHY HEAD W/O DYE: CPT

## 2021-02-04 PROCEDURE — 74011000258 HC RX REV CODE- 258: Performed by: PHYSICIAN ASSISTANT

## 2021-02-04 PROCEDURE — 71250 CT THORAX DX C-: CPT

## 2021-02-04 PROCEDURE — 82962 GLUCOSE BLOOD TEST: CPT

## 2021-02-04 PROCEDURE — 74176 CT ABD & PELVIS W/O CONTRAST: CPT

## 2021-02-04 PROCEDURE — 83735 ASSAY OF MAGNESIUM: CPT

## 2021-02-04 PROCEDURE — 93880 EXTRACRANIAL BILAT STUDY: CPT

## 2021-02-04 RX ORDER — GUAIFENESIN 100 MG/5ML
81 LIQUID (ML) ORAL DAILY
Status: DISCONTINUED | OUTPATIENT
Start: 2021-02-04 | End: 2021-02-19 | Stop reason: HOSPADM

## 2021-02-04 RX ORDER — ACETAMINOPHEN 650 MG/1
650 SUPPOSITORY RECTAL
Status: DISCONTINUED | OUTPATIENT
Start: 2021-02-04 | End: 2021-02-19 | Stop reason: HOSPADM

## 2021-02-04 RX ORDER — SODIUM CHLORIDE 0.9 % (FLUSH) 0.9 %
5-40 SYRINGE (ML) INJECTION EVERY 8 HOURS
Status: DISCONTINUED | OUTPATIENT
Start: 2021-02-04 | End: 2021-02-19 | Stop reason: HOSPADM

## 2021-02-04 RX ORDER — ONDANSETRON 2 MG/ML
4 INJECTION INTRAMUSCULAR; INTRAVENOUS
Status: DISCONTINUED | OUTPATIENT
Start: 2021-02-04 | End: 2021-02-19 | Stop reason: HOSPADM

## 2021-02-04 RX ORDER — METRONIDAZOLE 500 MG/100ML
500 INJECTION, SOLUTION INTRAVENOUS EVERY 8 HOURS
Status: DISCONTINUED | OUTPATIENT
Start: 2021-02-04 | End: 2021-02-05

## 2021-02-04 RX ORDER — LEVOFLOXACIN 5 MG/ML
750 INJECTION, SOLUTION INTRAVENOUS ONCE
Status: COMPLETED | OUTPATIENT
Start: 2021-02-04 | End: 2021-02-04

## 2021-02-04 RX ORDER — INSULIN LISPRO 100 [IU]/ML
INJECTION, SOLUTION INTRAVENOUS; SUBCUTANEOUS
Status: DISCONTINUED | OUTPATIENT
Start: 2021-02-04 | End: 2021-02-19 | Stop reason: HOSPADM

## 2021-02-04 RX ORDER — METRONIDAZOLE 500 MG/100ML
500 INJECTION, SOLUTION INTRAVENOUS EVERY 12 HOURS
Status: DISCONTINUED | OUTPATIENT
Start: 2021-02-04 | End: 2021-02-04

## 2021-02-04 RX ORDER — ACETAMINOPHEN 325 MG/1
650 TABLET ORAL
Status: DISCONTINUED | OUTPATIENT
Start: 2021-02-04 | End: 2021-02-19 | Stop reason: HOSPADM

## 2021-02-04 RX ORDER — LEVOFLOXACIN 5 MG/ML
500 INJECTION, SOLUTION INTRAVENOUS
Status: DISCONTINUED | OUTPATIENT
Start: 2021-02-06 | End: 2021-02-07

## 2021-02-04 RX ORDER — MAGNESIUM SULFATE 100 %
4 CRYSTALS MISCELLANEOUS AS NEEDED
Status: DISCONTINUED | OUTPATIENT
Start: 2021-02-04 | End: 2021-02-19 | Stop reason: HOSPADM

## 2021-02-04 RX ORDER — HEPARIN SODIUM 5000 [USP'U]/ML
5000 INJECTION, SOLUTION INTRAVENOUS; SUBCUTANEOUS EVERY 8 HOURS
Status: DISCONTINUED | OUTPATIENT
Start: 2021-02-04 | End: 2021-02-19 | Stop reason: HOSPADM

## 2021-02-04 RX ORDER — SODIUM CHLORIDE 0.9 % (FLUSH) 0.9 %
5-40 SYRINGE (ML) INJECTION AS NEEDED
Status: DISCONTINUED | OUTPATIENT
Start: 2021-02-04 | End: 2021-02-19 | Stop reason: HOSPADM

## 2021-02-04 RX ORDER — DEXTROSE 50 % IN WATER (D50W) INTRAVENOUS SYRINGE
12.5-25 AS NEEDED
Status: DISCONTINUED | OUTPATIENT
Start: 2021-02-04 | End: 2021-02-19 | Stop reason: HOSPADM

## 2021-02-04 RX ORDER — PROMETHAZINE HYDROCHLORIDE 25 MG/1
12.5 TABLET ORAL
Status: DISCONTINUED | OUTPATIENT
Start: 2021-02-04 | End: 2021-02-19 | Stop reason: HOSPADM

## 2021-02-04 RX ORDER — SODIUM CHLORIDE 9 MG/ML
150 INJECTION, SOLUTION INTRAVENOUS CONTINUOUS
Status: DISCONTINUED | OUTPATIENT
Start: 2021-02-04 | End: 2021-02-05

## 2021-02-04 RX ORDER — SODIUM POLYSTYRENE SULFONATE 15 G/60ML
15 SUSPENSION ORAL; RECTAL
Status: COMPLETED | OUTPATIENT
Start: 2021-02-04 | End: 2021-02-04

## 2021-02-04 RX ADMIN — PIPERACILLIN AND TAZOBACTAM 3.38 G: 3; .375 INJECTION, POWDER, LYOPHILIZED, FOR SOLUTION INTRAVENOUS at 02:13

## 2021-02-04 RX ADMIN — Medication 1 CAPSULE: at 08:50

## 2021-02-04 RX ADMIN — Medication 10 ML: at 22:18

## 2021-02-04 RX ADMIN — HEPARIN SODIUM 5000 UNITS: 5000 INJECTION INTRAVENOUS; SUBCUTANEOUS at 07:31

## 2021-02-04 RX ADMIN — HEPARIN SODIUM 5000 UNITS: 5000 INJECTION INTRAVENOUS; SUBCUTANEOUS at 22:05

## 2021-02-04 RX ADMIN — LEVOFLOXACIN 750 MG: 5 INJECTION, SOLUTION INTRAVENOUS at 07:31

## 2021-02-04 RX ADMIN — Medication 10 ML: at 06:00

## 2021-02-04 RX ADMIN — METRONIDAZOLE 500 MG: 500 INJECTION, SOLUTION INTRAVENOUS at 16:02

## 2021-02-04 RX ADMIN — SODIUM POLYSTYRENE SULFONATE 15 G: 15 SUSPENSION ORAL; RECTAL at 09:37

## 2021-02-04 RX ADMIN — SODIUM CHLORIDE 1000 ML: 9 INJECTION, SOLUTION INTRAVENOUS at 01:25

## 2021-02-04 RX ADMIN — SODIUM CHLORIDE 150 ML/HR: 9 INJECTION, SOLUTION INTRAVENOUS at 09:38

## 2021-02-04 RX ADMIN — Medication 10 ML: at 16:03

## 2021-02-04 RX ADMIN — METRONIDAZOLE 500 MG: 500 INJECTION, SOLUTION INTRAVENOUS at 07:31

## 2021-02-04 RX ADMIN — ACETAMINOPHEN 650 MG: 325 TABLET ORAL at 22:10

## 2021-02-04 RX ADMIN — SODIUM CHLORIDE 1000 ML: 9 INJECTION, SOLUTION INTRAVENOUS at 02:36

## 2021-02-04 RX ADMIN — HEPARIN SODIUM 5000 UNITS: 5000 INJECTION INTRAVENOUS; SUBCUTANEOUS at 16:02

## 2021-02-04 RX ADMIN — ASPIRIN 81 MG: 81 TABLET, CHEWABLE ORAL at 08:44

## 2021-02-04 RX ADMIN — ACETAMINOPHEN 650 MG: 325 TABLET ORAL at 16:02

## 2021-02-04 NOTE — CONSULTS
[address]    Name:  Steven Franco  MR#:  [id number]  :  1955  ACCOUNT #:  [de-identified]  DATE OF SERVICE:  2021      REASON FOR CONSULTATION:  Seen for PRABHU. Thanks for the consult. HISTORY OF PRESENT ILLNESS:  We had the pleasure of seeing the patient. She was seen briefly in the vascular lab. History was taken from the chart and from the RN on the floor. She is a 80-year-old female admitted yesterday to the emergency room. She had many medical problems, chronic kidney disease. We are looking at the older records for the patient. Her creatinine in 2018 was 1.2. We do not have anything in between. Came to the emergency room with abdominal pain, massive diarrhea, confused. On the floor, the patient has, as per RN, nonstop diarrhea. Started on IV fluids. As far as looking at her labs, her CK is elevated on admission at 7000, very acidotic. Her albumin level is 3.4; her gap is 17, corrected will be 18, 19, so it is mixed metabolic acidosis. Started on 150 mL of normal saline. CAT scan was done on the patient. Chest CT is still pending. CT scan abdomen and pelvis on admission showed moderate-to-severe proctocolitis. As per CAT scan report, no hydronephrosis for the kidneys. Looking at her CBC, WBC was 20,000, hemoglobin 10, platelets 019 on admission. PAST MEDICAL HISTORY:  Includes:  1. History of melanoma. 2.  CKD, at worst stage IIIA from previous labs in 2018. 3.  Hypertension. 4. PAD. 5.  Venous insufficiency. 6.  Multiple abdominal surgeries including appendectomy. SOCIAL HISTORY:  Reviewed. FAMILY HISTORY:  Reviewed. REVIEW OF SYSTEMS:  Look at the HPI, rest of it is negative. MEDICATIONS AS INPATIENT:  Reviewed, are as follow:  1. Aspirin. 2.  Hep subcu. 3.  Insulin. 4.  Levaquin. 5.  Flagyl. 6.  Zosyn. 7.  Normal saline 150 mL an hour. PHYSICAL EXAMINATION:  As per chart, BP 94/59, satting 97%.   Urine output not recorded. No Romero catheter. The patient seen in the Radiology Department in the [place]. LABORATORY DATA:  As follow, WBC 20,000, hemoglobin 10, platelets 225. UA:  High specific gravity, +1 protein, no rbc, no wbc. Sodium 140, potassium 5.2, bicarb 13. Anion gap 17, not corrected; corrected will be close to 18. BUN 97, creatinine 4.2, calcium is 9.9, phosphorus 6.5, magnesium is 2.9. Globulin is elevated. CK 7000, improved to 6,600, repeated after 12 hours. Brain MRI was done, no intracranial process. IMPRESSION:  1. Acute kidney injury in the setting of rhabdomyolysis and profound diarrhea, definitely at risk of an acute tubular necrosis but cannot rule out prerenal.  2.  Rhabdomyolysis. The patient was on the floor for many hours at home. 3.  Mixed metabolic acidosis, high anion gap, and non-anion gap acidosis in the setting of diarrhea and acute kidney injury. 4.  Severe profound diarrhea with CAT scan showing severe proctocolitis. 5.  Hypotension. RECOMMENDATIONS:  As follow:  1. Aggressive fluid resuscitation. She needs fluid in view of her lower blood pressure. 2.  Repeat labs. 3.  CK improving. 4.  Antibiotics. 5.  If her blood pressure better and still acidotic, might give her some IV bicarb. 6.  Hopefully, we will avoid talking about dialysis in case her kidney function will not improve. 7.  We will follow up with you jad.       MD LLOYD [id number]  D:  02/04/2021 14:16  T:  02/04/2021 17:17  JOB #:  [id number]

## 2021-02-04 NOTE — ED PROVIDER NOTES
69-year-old female history of CKD stage III/IV, hypertension, type 2 diabetes not on medication, PAD presenting to the ED for generalized weakness. Patient reports that at around 2 or 3 AM on Tuesday morning she woke up from sleep with generalized abdominal cramping and fecal urgency, notes that she got up and tried to quickly go to the bathroom but was very lightheaded and passed out, hit her head. Patient is then somewhat confused on the events of the next 24 to 36 hours. EMS noted that her brother found her at home on the floor covered in urine and stool, patient openly admits that she is unclear on how long she was there. States that she has felt warm at times, denies fever. Has had nausea but no vomiting. Reports mild, generalized abdominal cramping. Specifically denies chest pain or shortness of breath. States that she has had a cough for about 2 months, notes that she has been tested for Covid multiple times and has always been negative. Patient is status post partial toe amputation early in January as well as some oral surgery at the end of January. Patient is not anticoagulated. Past medical history: As above    Full history, PE, ROS difficult to obtain due to confusion    The history is provided by the patient. Fall  Pertinent negatives include no fever and no vomiting. Diarrhea   Associated symptoms include diarrhea and back pain. Pertinent negatives include no fever, no vomiting and no chest pain.         Past Medical History:   Diagnosis Date    Cancer [facility])     melanoma back stage II 2005    CKD (chronic kidney disease), stage III 3/1/2016    CKD (chronic kidney disease), stage IV ([address] 3/1/2016    [facility] ([facility]     HTN (hypertension) 11/2/2015    Hypertension     LBP (low back pain) 11/2/2015    Menopause     Microalbuminuria 11/21/2015    Obesity 11/2/2015    PAD (peripheral artery disease) ([address] 9/29/2016    Venous insufficiency 11/2/2015       Past Surgical History: Procedure Laterality Date    HX APPENDECTOMY      HX BACK SURGERY      HX BREAST BIOPSY      unsure of which breast or when. Negative results    HX TONSILLECTOMY           Family History:   Problem Relation Age of Onset   [address] [facility] [address] Arthritis-rheumatoid Mother     Dementia Father        Social History     Socioeconomic History    Marital status: SINGLE     Spouse name: Not on file    Number of children: Not on file    Years of education: Not on file    Highest education level: Not on file   Occupational History    Not on file   Social Needs    Financial resource strain: Not on file    Food insecurity     Worry: Not on file     Inability: Not on file    Transportation needs     Medical: Not on file     Non-medical: Not on file   Tobacco Use    Smoking status: Former Smoker    Smokeless tobacco: Never Used   Substance and Sexual Activity    Alcohol use: Yes     Alcohol/week: 0.0 standard drinks     Frequency: Monthly or less     Drinks per session: 1 or 2     Binge frequency: Never     Comment: occassion    Drug use: No    Sexual activity: Not on file   Lifestyle    Physical activity     Days per week: Not on file     Minutes per session: Not on file    Stress: Not on file   Relationships    Social connections     Talks on phone: Not on file     Gets together: Not on file     Attends Sikhism service: Not on file     Active member of club or organization: Not on file     Attends meetings of clubs or organizations: Not on file     Relationship status: Not on file    Intimate partner violence     Fear of current or ex partner: Not on file     Emotionally abused: Not on file     Physically abused: Not on file     Forced sexual activity: Not on file   Other Topics Concern    Not on file   Social History Narrative    Not on file         ALLERGIES: Patient has no known allergies. Review of Systems   Unable to perform ROS: Mental status change   Constitutional: Negative for fever.    Respiratory: Negative for shortness of breath. Cardiovascular: Negative for chest pain. Gastrointestinal: Positive for diarrhea. Negative for vomiting. Musculoskeletal: Positive for back pain and neck pain. Neurological: Positive for syncope. Vitals:    02/04/21 0000   BP: 104/65   Pulse: 91   Temp: 96.9 °F (36.1 °C)   SpO2: 100%            Physical Exam  Vitals signs and nursing note reviewed. Constitutional:       General: She is not in acute distress. Appearance: She is well-developed. Comments: Pleasant white female, alert, talkative, oriented to person, day of the week, location, however very unclear on the events of the last 48 hours   HENT:      Head: Normocephalic. Comments: Large bruise noted to the left forehead     Right Ear: External ear normal.      Left Ear: External ear normal.      Mouth/Throat:      Mouth: Mucous membranes are dry. Eyes:      General: No scleral icterus. Conjunctiva/sclera: Conjunctivae normal.   Neck:      Musculoskeletal: Neck supple. Trachea: No tracheal deviation. Comments: Midline and also paraspinal cervical tenderness  No midline thoracic tenderness  + Tenderness across the lower back  Cardiovascular:      Rate and Rhythm: Normal rate and regular rhythm. Heart sounds: Normal heart sounds. No murmur. No friction rub. No gallop. Pulmonary:      Effort: Pulmonary effort is normal. No respiratory distress. Breath sounds: Normal breath sounds. No stridor. No wheezing. Abdominal:      General: There is no distension. Palpations: Abdomen is soft. Comments: Mild generalized tenderness   Musculoskeletal: Normal range of motion. Comments: No pelvis tenderness  Left foot: Second toe with well healed partial amputation, no open wound or signs of infection   Skin:     General: Skin is warm and dry. Neurological:      Mental Status: She is alert and oriented to person, place, and time.       Cranial Nerves: No cranial nerve deficit (grossly intact). Comments: + RLE weakness   Psychiatric:         Behavior: Behavior normal.          MDM  Number of Diagnoses or Management Options  PRABHU (acute kidney injury) ([facility]  Bandemia  Dehydration  Diarrhea, unspecified type  Proctocolitis  Diagnosis management comments: 71-year-old female presenting to the ED for generalized weakness, somewhat poor historian over the last 48 hours but it sounds like she may be had a fall around 2 or 3 AM on Tuesday, hit her head, has been lying on the floor since then. Patient reports diarrhea at home. EMS found patient covered in urine and stool. Mucous membranes dry. Differential diagnosis is broad including infectious/metabolic/cardiac/neurologic etiology. Will initiate sepsis work-up given weakness and confusion as well as possible diarrhea as source of infection, start hydration, anticipate admission. Labs remarkable for a white blood cell count of 20 with 21% bands, creatinine of 4.26, CK greater than 7000. CT of the abdomen/pelvis showing severe proctocolitis. Given leukocytosis and CT findings, will empirically start Zosyn. Medicine consulted for admission. Amount and/or Complexity of Data Reviewed  Clinical lab tests: ordered and reviewed  Tests in the radiology section of CPT®: ordered and reviewed  Discuss the patient with other providers: yes (Dr. Fab Chi, ED attending. Hospitalist.)    Risk of Complications, Morbidity, and/or Mortality  General comments: Total critical care time spent exclusive of procedures:  50 minutes -patient confused, history obtained from brother, acute kidney injury, metabolic acidosis, rhabdomyolysis    Patient Progress  Patient progress: stable         Procedures               Had lengthy discussion with patients brother on the phone. Brother notes that patient had a follow-up appointment with her podiatrist on Monday, was cleared. Texted with her that night and she was fine.   Notes that for the last 48 hours or so no one has heard from her, a coworker try to get in touch with her and was unable to, neighbors noted that lights were off in the house. When he went over to see her he found the patient lying in the hallway just outside of the bathroom covered in stool, somewhat confused. LEMUEL Ivory  12:54 AM    Patient reassessed, reports overall feeling better. Examined with RN at bedside, areas of skin breakdown noted in between legs and also under the pannus. LEMUEL Ivory  1:55 AM        Perfect Serve Consult for Admission  2:05 AM    ED Room Number: ER12/12  Patient Name and age:  Efren Canada 60 y.o.  female  Working Diagnosis:   1. PRABHU (acute kidney injury) ([facility]    2. Proctocolitis    3. Diarrhea, unspecified type    4. Dehydration    5.  Bandemia        COVID-19 Suspicion:  yes  Sepsis present:  yes Reassessment needed: no  Code Status:  Full Code  Readmission: no  Isolation Requirements:  yes  Recommended Level of Care:  telemetry  Department:[facility] Adult ED - 21   Other:  60year old - family found her down on the ground after not hearing from her since Monday night - covered in stool - hit her head - she thinks she got up Tuesday AM and had syncopal event - she is oriented to person/place/time but is confused about the last 48 hours - labs remarkable for WBC 20 with 21% bands - Cr 4.26 - lactic, BP, HR are okay - also in rhabdo, CK>7000 - CT showing proctocolitis - started zosyn

## 2021-02-04 NOTE — PROGRESS NOTES
Day #1 of Levaquin  Indication:  intra-abdominal infection  Current regimen:  750 mg iv q24h  Abx regimen: metronidazole and levaquin IV  Recent Labs     21  0022   WBC 20.0*   CREA 4.26*   BUN 97*     Est CrCl: 15 ml/min  Temp (24hrs), Av.3 °F (36.3 °C), Min:96.9 °F (36.1 °C), Max:97.6 °F (36.4 °C)    Cultures: blood, urine on hold    Plan: Change to 750 mg IV x1 then 500 mg IV q48h

## 2021-02-04 NOTE — ED NOTES
Bedside shift change report given to [address] by Mckayla Mahoney. Report included the following information SBAR, ED Summary, MAR and Recent Results.

## 2021-02-04 NOTE — ED TRIAGE NOTES
Patient arrives via EMS from home. Patient was found on the floor in her home by her brother today. Unknown how long patient was down for. Patient had a large amount of urine and stool on an around patient per EMS. Patient was unable to stand for EMS. Patient states she fell and hit her head on Monday \"but I'm not sure what happened today\". Bruising noted to forehead.

## 2021-02-04 NOTE — PROGRESS NOTES
[address] [facility] Adult  Hospitalist Group                                                                                          Hospitalist Progress Note  Rell Liang MD  Answering service: [phone] OR [phone] from in house phone              Progress Note    Patient: Stevenson Perez MRN: [id number]  SSN: [id number]    YOB: 1955  Age: 60 y.o. Sex: female      Admit Date: 2/3/2021    LOS: 0 days     Subjective:     Patient admitted after a syncopal episode. She has no acute complaint today. Objective:     Vitals:    02/04/21 0545 02/04/21 0800 02/04/21 1035 02/04/21 1442   BP: (!) 111/94  (!) 94/59 93/64   Pulse: 100  92 94   Resp: 21 19 22   Temp: 98.1 °F (36.7 °C)  98.2 °F (36.8 °C) 98.1 °F (36.7 °C)   SpO2: 97% 97% 97% 95%   Weight: 92.9 kg (204 lb 12.9 oz)      Height: 5' 5.5\" (1.664 m)           Intake and Output:  Current Shift: No intake/output data recorded. Last three shifts: 02/02 1901 - 02/04 0700  In: 1000 [I.V.:1000]  Out: -     Physical Exam:   GENERAL: alert, cooperative, no distress, appears stated age  THROAT & NECK: normal and no erythema or exudates noted. LUNG: clear to auscultation bilaterally  HEART: regular rate and rhythm, S1, S2 normal, no murmur, click, rub or gallop  ABDOMEN: soft, non-tender. Bowel sounds normal. No masses,  no organomegaly  EXTREMITIES:  extremities normal, atraumatic, no cyanosis or edema  SKIN: no rash or abnormalities  NEUROLOGIC: AOx3. PSYCHIATRIC: non focal    Lab/Data Review: All lab results for the last 24 hours reviewed.      Recent Results (from the past 24 hour(s))   EKG, 12 LEAD, INITIAL    Collection Time: 02/04/21 12:10 AM   Result Value Ref Range    Ventricular Rate 91 BPM    Atrial Rate 91 BPM    QRS Duration 70 ms    Q-T Interval 358 ms    QTC Calculation (Bezet) 440 ms    Calculated R Axis -11 degrees    Calculated T Axis 47 degrees    Diagnosis       ** Poor data quality, interpretation may be adversely affected  sinus rhythm  Low voltage QRS  No previous ECGs available  Confirmed by Wilma Deleon MD, Suzy Ferrera ([id number]) on 2/4/2021 11:41:41 AM     CBC WITH AUTOMATED DIFF    Collection Time: 02/04/21 12:22 AM   Result Value Ref Range    WBC 20.0 (H) 3.6 - 11.0 K/uL    RBC 3.22 (L) 3.80 - 5.20 M/uL    HGB 10.0 (L) 11.5 - 16.0 g/dL    HCT 30.8 (L) 35.0 - 47.0 %    MCV 95.7 80.0 - 99.0 FL    MCH 31.1 26.0 - 34.0 PG    MCHC 32.5 30.0 - 36.5 g/dL    RDW 17.3 (H) 11.5 - 14.5 %    PLATELET 824 (L) 421 - 400 K/uL    MPV 10.6 8.9 - 12.9 FL    NRBC 0.0 0  WBC    ABSOLUTE NRBC 0.00 0.00 - 0.01 K/uL    NEUTROPHILS 67 32 - 75 %    BAND NEUTROPHILS 21 (H) 0 - 6 %    LYMPHOCYTES 3 (L) 12 - 49 %    MONOCYTES 9 5 - 13 %    EOSINOPHILS 0 0 - 7 %    BASOPHILS 0 0 - 1 %    IMMATURE GRANULOCYTES 0 %    ABS. NEUTROPHILS 17.6 (H) 1.8 - 8.0 K/UL    ABS. LYMPHOCYTES 0.6 (L) 0.8 - 3.5 K/UL    ABS. MONOCYTES 1.8 (H) 0.0 - 1.0 K/UL    ABS. EOSINOPHILS 0.0 0.0 - 0.4 K/UL    ABS. BASOPHILS 0.0 0.0 - 0.1 K/UL    ABS. IMM. GRANS. 0.0 K/UL    DF MANUAL      RBC COMMENTS ANISOCYTOSIS  1+        RBC COMMENTS MACROCYTOSIS  1+        RBC COMMENTS OVALOCYTES  1+        RBC COMMENTS POLYCHROMASIA  PRESENT       METABOLIC PANEL, COMPREHENSIVE    Collection Time: 02/04/21 12:22 AM   Result Value Ref Range    Sodium 140 136 - 145 mmol/L    Potassium 5.2 (H) 3.5 - 5.1 mmol/L    Chloride 110 (H) 97 - 108 mmol/L    CO2 13 (LL) 21 - 32 mmol/L    Anion gap 17 (H) 5 - 15 mmol/L    Glucose 118 (H) 65 - 100 mg/dL    BUN 97 (H) 6 - 20 MG/DL    Creatinine 4.26 (H) 0.55 - 1.02 MG/DL    BUN/Creatinine ratio 23 (H) 12 - 20      GFR est AA 13 (L) >60 ml/min/1.73m2    GFR est non-AA 10 (L) >60 ml/min/1.73m2    Calcium 9.9 8.5 - 10.1 MG/DL    Bilirubin, total 0.6 0.2 - 1.0 MG/DL    ALT (SGPT) 79 (H) 12 - 78 U/L    AST (SGOT) 267 (H) 15 - 37 U/L    Alk.  phosphatase 112 45 - 117 U/L    Protein, total 7.6 6.4 - 8.2 g/dL    Albumin 3.4 (L) 3.5 - 5.0 g/dL    Globulin 4.2 (H) 2.0 - 4.0 g/dL    A-G Ratio 0.8 (L) 1.1 - 2.2     SAMPLES BEING HELD    Collection Time: 02/04/21 12:22 AM   Result Value Ref Range    SAMPLES BEING HELD 1RED,1BLU     COMMENT        Add-on orders for these samples will be processed based on acceptable specimen integrity and analyte stability, which may vary by analyte. LACTIC ACID    Collection Time: 02/04/21 12:22 AM   Result Value Ref Range    Lactic acid 1.6 0.4 - 2.0 MMOL/L   TROPONIN I    Collection Time: 02/04/21 12:22 AM   Result Value Ref Range    Troponin-I, Qt. <0.05 <0.05 ng/mL   CK    Collection Time: 02/04/21 12:22 AM   Result Value Ref Range    CK 7,042 (H) 26 - 192 U/L   ETHYL ALCOHOL    Collection Time: 02/04/21 12:22 AM   Result Value Ref Range    ALCOHOL(ETHYL),SERUM <10 <10 MG/DL   LIPASE    Collection Time: 02/04/21 12:22 AM   Result Value Ref Range    Lipase 201 73 - 393 U/L   MAGNESIUM    Collection Time: 02/04/21 12:22 AM   Result Value Ref Range    Magnesium 2.9 (H) 1.6 - 2.4 mg/dL   PHOSPHORUS    Collection Time: 02/04/21 12:22 AM   Result Value Ref Range    Phosphorus 6.5 (H) 2.6 - 4.7 MG/DL   URINALYSIS W/MICROSCOPIC    Collection Time: 02/04/21  2:16 AM   Result Value Ref Range    Color DARK YELLOW      Appearance CLOUDY (A) CLEAR      Specific gravity 1.021 1.003 - 1.030      pH (UA) 5.0 5.0 - 8.0      Protein 30 (A) NEG mg/dL    Glucose Negative NEG mg/dL    Ketone Negative NEG mg/dL    Blood SMALL (A) NEG      Urobilinogen 0.2 0.2 - 1.0 EU/dL    Nitrites Negative NEG      Leukocyte Esterase Negative NEG      WBC 0-4 0 - 4 /hpf    RBC 0-5 0 - 5 /hpf    Epithelial cells FEW FEW /lpf    Bacteria Negative NEG /hpf   URINE CULTURE HOLD SAMPLE    Collection Time: 02/04/21  2:16 AM    Specimen: Serum; Urine   Result Value Ref Range    Urine culture hold        Urine on hold in Microbiology dept for 2 days. If unpreserved urine is submitted, it cannot be used for addtional testing after 24 hours, recollection will be required.    BILIRUBIN, CONFIRM    Collection Time: 02/04/21  2:16 AM   Result Value Ref Range    Bilirubin UA, confirm Negative NEG     SARS-COV-2    Collection Time: 02/04/21  2:52 AM   Result Value Ref Range    SARS-CoV-2 Please find results under separate order     COVID-19 RAPID TEST    Collection Time: 02/04/21  2:52 AM   Result Value Ref Range    Specimen source Nasopharyngeal      COVID-19 rapid test Not detected NOTD     GLUCOSE, POC    Collection Time: 02/04/21  8:43 AM   Result Value Ref Range    Glucose (POC) 88 65 - 100 mg/dL    Performed by ADRIANO Jones    TROPONIN I    Collection Time: 02/04/21 12:19 PM   Result Value Ref Range    Troponin-I, Qt. <0.05 <0.05 ng/mL   CK    Collection Time: 02/04/21 12:19 PM   Result Value Ref Range    CK 6,603 (H) 26 - 192 U/L   GLUCOSE, POC    Collection Time: 02/04/21 12:34 PM   Result Value Ref Range    Glucose (POC) 105 (H) 65 - 100 mg/dL    Performed by ADRIANO Jones    DUPLEX CAROTID BILATERAL    Collection Time: 02/04/21  2:08 PM   Result Value Ref Range    Right cca dist sys 79.0 cm/s    Right CCA dist mcrae 13.5 cm/s    Right CCA prox sys 106.7 cm/s    Right CCA prox mcrae 15.0 cm/s    Right eca sys 114.3 cm/s    RIGHT EXTERNAL CAROTID ARTERY D 12.03 cm/s    Right ICA dist sys 74.8 cm/s    Right ICA dist mcrae 24.0 cm/s    Right ICA mid sys 59.2 cm/s    Right ICA mid mcrae 18.2 cm/s    Right ICA prox sys 70.3 cm/s    Right ICA prox mcrae 14.6 cm/s    Right vertebral sys 49.7 cm/s    RIGHT VERTEBRAL ARTERY D 12.39 cm/s    Right ICA/CCA sys 1.0     Left CCA dist sys 91.0 cm/s    Left CCA dist mcrae 12.0 cm/s    Left CCA prox sys 97.3 cm/s    Left CCA prox mcrae 17.4 cm/s    Left ECA sys 57.5 cm/s    LEFT EXTERNAL CAROTID ARTERY D 4.73 cm/s    Left ICA dist sys 49.6 cm/s    Left ICA dist mcrae 14.6 cm/s    Left ICA mid sys 67.7 cm/s    Left ICA mid mcrae 17.2 cm/s    Left ICA prox sys 154.0 cm/s    Left ICA prox mcrae 33.2 cm/s    Left vertebral sys 72.9 cm/s    LEFT VERTEBRAL ARTERY D 17.21 cm/s     Left ICA/CCA sys 1.69    GLUCOSE, POC    Collection Time: 02/04/21  4:43 PM   Result Value Ref Range    Glucose (POC) 103 (H) 65 - 100 mg/dL    Performed by Debi Jack         Imaging:    Mra Brain Wo Cont    Result Date: 2/4/2021  CLINICAL HISTORY: Syncope INDICATION: syncope. COMPARISON:  CT 9/8/2018 TECHNIQUE: MR examination of the brain includes axial and sagittal T1, axial T2, axial FLAIR, axial gradient echo, axial DWI, coronal T2. Coronal T2 Next, 3-D time-of-flight MRA of the brain was performed. Multiplanar reconstructions were obtained. FINDINGS: There is no intracranial mass, hemorrhage or evidence of acute infarction. Left maxillary sinus disease. There is ethmoid and frontal sinus disease as well. IACs are symmetric in size. The left vertebral artery is dominant. No skull base mass. Minimal scattered foci of increased T2 signal intensity in the cerebral white matter. There is no Chiari or syrinx. The pituitary and infundibulum are grossly unremarkable. There is no skull base mass. Cerebellopontine angles are grossly unremarkable. The major intracranial vascular flow-voids are unremarkable. The cavernous sinuses are symmetric. Optic chiasm and infundibulum grossly unremarkable. Orbits are grossly symmetric. Dural venous sinuses are grossly patent. The brain architecture is normal. There is no evidence of midline shift or mass-effect. There are no extra-axial fluid collections. A 2 A2 segments are within normal limits. There are A1 segments bilaterally. M1 segments are patent. M2 segments demonstrate symmetric arborization. Posterior communicating artery on the left P1 vessels are patent proximally. Posterior communicating artery on the left. The basilar artery and its branches are normal. The internal carotid, anterior cerebral, and middle cerebral arteries are patent. There is no flow-limiting intracranial stenosis. There is no aneurysm.  .     No intracranial mass, hemorrhage or evidence of acute infarction. No aneurysm, dissection or evidence of hemodynamically significant stenosis. Left maxillary sinus disease with ethmoid and frontal sinus disease as well. Mri Brain Wo Cont    Result Date: 2/4/2021  CLINICAL HISTORY: Syncope INDICATION: syncope. COMPARISON:  CT 9/8/2018 TECHNIQUE: MR examination of the brain includes axial and sagittal T1, axial T2, axial FLAIR, axial gradient echo, axial DWI, coronal T2. Coronal T2 Next, 3-D time-of-flight MRA of the brain was performed. Multiplanar reconstructions were obtained. FINDINGS: There is no intracranial mass, hemorrhage or evidence of acute infarction. Left maxillary sinus disease. There is ethmoid and frontal sinus disease as well. IACs are symmetric in size. The left vertebral artery is dominant. No skull base mass. Minimal scattered foci of increased T2 signal intensity in the cerebral white matter. There is no Chiari or syrinx. The pituitary and infundibulum are grossly unremarkable. There is no skull base mass. Cerebellopontine angles are grossly unremarkable. The major intracranial vascular flow-voids are unremarkable. The cavernous sinuses are symmetric. Optic chiasm and infundibulum grossly unremarkable. Orbits are grossly symmetric. Dural venous sinuses are grossly patent. The brain architecture is normal. There is no evidence of midline shift or mass-effect. There are no extra-axial fluid collections. A 2 A2 segments are within normal limits. There are A1 segments bilaterally. M1 segments are patent. M2 segments demonstrate symmetric arborization. Posterior communicating artery on the left P1 vessels are patent proximally. Posterior communicating artery on the left. The basilar artery and its branches are normal. The internal carotid, anterior cerebral, and middle cerebral arteries are patent. There is no flow-limiting intracranial stenosis. There is no aneurysm. .     No intracranial mass, hemorrhage or evidence of acute infarction.  No aneurysm, dissection or evidence of hemodynamically significant stenosis. Left maxillary sinus disease with ethmoid and frontal sinus disease as well. Ct Head Wo Cont    Result Date: 2/4/2021  CLINICAL HISTORY: fall head trauma INDICATION: fall head trauma COMPARISON: 9/18/2018. CT dose reduction was achieved through use of a standardized protocol tailored for this examination and automatic exposure control for dose modulation. TECHNIQUE: Serial axial images with a collimation of 5 mm were obtained from the skull base through the vertex  FINDINGS: The sulci and ventricles are within normal limits for patient age. There is no evidence of an acute infarction, hemorrhage, or mass-effect. There is no evidence of midline shift or hydrocephalus. Posterior fossa structures are unremarkable. No extra-axial collections are seen. Mastoid air cells are well pneumatized and clear. Left maxillary sinus disease     No acute intracranial process. Ct Chest Wo Cont    Result Date: 2/4/2021  INDICATION: Generalized weakness, syncope, fall, acute rhabdomyolysis, diabetes, acute on chronic kidney disease. COMPARISON: Portable chest earlier today. Chest views on 7/10/2018. No comparison CT chest. CONTRAST: None. TECHNIQUE:  5 mm axial images were obtained through the chest. Coronal and sagittal reformats were generated. CT dose reduction was achieved through use of a standardized protocol tailored for this examination and automatic exposure control for dose modulation. The absence of intravenous contrast reduces the sensitivity for evaluation of the mediastinum, constance, vasculature, and upper abdominal organs. FINDINGS: CHEST WALL: No mass or axillary lymphadenopathy. THYROID: No nodule. MEDIASTINUM: No lymphadenopathy. 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 CONSTANCE: No evidence of mass. THORACIC AORTA: Atherosclerosis without aneurysm. MAIN PULMONARY ARTERY: Normal in caliber.  TRACHEA/BRONCHI: Patent. ESOPHAGUS: Small sliding-type hiatal hernia. HEART: Normal cardiac size. Extensive mitral valve calcifications. Coronary artery calcific atherosclerosis is at least mild. PLEURA: No effusion or pneumothorax. LUNGS: No nodule, mass, or airspace disease. INCIDENTALLY IMAGED UPPER ABDOMEN: No significant abnormality in the incidentally imaged upper abdomen. BONES: No destructive bone lesion. No acute process on noncontrast CT chest.    Ct Spine Cerv Wo Cont    Result Date: 2/4/2021  EXAM: CT SPINE CERV WO CONT CLINICAL HISTORY: fall neck pain INDICATION: fall neck pain COMPARISON:  None TECHNIQUE:  Axial neck CT was performed. Noncontrast imaging obtained. Coronal and sagittal reconstructions were performed. CT dose reduction was achieved through use of a standardized protocol tailored for this examination and automatic exposure control for dose modulation. Osseous/bone algorithm was utilized. FINDINGS: The vertebral bodies are anatomically aligned. There is no evidence of fracture or subluxation. The prevertebral soft tissues are grossly within normal limits. The atlantodental interval is within normal limits. The craniocervical junction is intact. Multilevel loss of disc height. Multilevel foraminal stenoses. No pneumothorax. No apical nodule. Carotid atherosclerotic change. There is no acute fracture or dislocation identified. Ct Abd Pelv Wo Cont    Result Date: 2/4/2021  CLINICAL HISTORY: WBC 20, diarrhea, PRABHU INDICATION: WBC 20, diarrhea, PRABHU COMPARISON: None CONTRAST:  None. TECHNIQUE: Thin axial images were obtained through the abdomen and pelvis. Coronal and sagittal reformats were generated. Oral contrast was not administered. CT dose reduction was achieved through use of a standardized protocol tailored for this examination and automatic exposure control for dose modulation.  The absence of intravenous contrast material reduces the sensitivity for evaluation of visceral organs and vasculature including presence of small mass lesions, hemodynamically significant stenoses, dissections, mucosal abnormalities etc. FINDINGS: LOWER THORAX: Coronary artery disease. Small hiatal hernia. LIVER/GALLBLADDER: No mass. Cholelithiasis is suggested CBD is not dilated. SPLEEN/PANCREAS:  within normal limits. ADRENALS/KIDNEYS: Unremarkable. No calculus or hydronephrosis. STOMACH: Unremarkable. SMALL BOWEL/COLON: There is circumferential rectal wall thickening. There is inflammatory stranding adjacent to the rectum. There is presacral edema. Sigmoid colonic wall thickening as well. Sigmoid colonic diverticula. There is no obstruction. APPENDIX: Absent PERITONEUM: Trace ascites. RETROPERITONEUM: No lymphadenopathy or aortic aneurysm. REPRODUCTIVE ORGANS: Unremarkable URINARY BLADDER: No mass or calculus. BONES: Extensive multilevel degenerative change. ADDITIONAL COMMENTS: N/A     Imaging findings consistent with a moderate to severe proctocolitis. Consider infectious, inflammatory and vascular etiologies. Other incidental/nonemergent findings are as described above. Xr Chest Port    Result Date: 2/4/2021  Clinical history: Eval for Infiltrate INDICATION:   Eval for Infiltrate COMPARISON: 7/10/2018 FINDINGS: AP portable upright view of the chest demonstrates a stable  cardiopericardial silhouette. There is no pleural effusion. .There is no focal consolidation. .There is no pneumothorax. .     No acute process identified. Assessment and Plan:     Syncope  -MRI shows no acute hemorrhage or infarction.   MRA shows no aneurysm, dissection or evidence of hemodynamically significant stenosis  -Carotic duplex shows 56 9% stenosis of left internal carotic artery  -Will order echo in a.m. to complete work-up  -Her syncope is likely due to probable orthostasis given her volume contracted status    Fall  -As a result of her syncope  -Negative work-up for acute fracture or injury    Proctocolitis  -Evident on CT abdomen and pelvis  -Follow stool studies  -continue empiric Zosyn and Flagyl    Acute kidney injury  -In the setting of rhabdo and volume depletion from diarrhea  -Nephrology evaluating the patient  -Continue IV fluid and monitor renal function    Rhabdomyolysis  -As a result of fall  -Continue IV fluid    Metabolic acidosis  -Due to alcoholic loss from GI tract as well as acute kidney injury  -Nephrology evaluating patient    Thrombocytosis  -Likely due to acute reaction to infectious process  -Monitor CBC    Abnormal LFTs  -Also likely related to infectious process  -CT abdomen and pelvis without any acute liver or gallbladder pathology    Anemia  -Continue monitor H&H  -Unclear chronicity    Hypertension  -Hold antihypertensives for now as patient initially hypotensive  -Monitor blood pressure    Diabetes  -Continue insulin sliding scale coverage    Dyslipidemia  -Holding statin due to rhabdomyolysis    Discharge plan: Still needs inpatient treatment and monitoring. Plan PT evaluation in the next 1 to 2 days. Anticipated disposition is likely more than 48 hours.     Signed By: Kayce Woodruff MD     February 4, 2021

## 2021-02-04 NOTE — CONSULTS
Seen   Thanks for the consult  A/P;  PRABHU,suspicion of prerenal vs ATN(rhabdo,sepsis)  rhabdo  Mixed AG and non AG met acidosis  Hypotension  Severe diarrheA ,proctocolitis on CTscan    IVF NS needed for low BP  Urine testing  CK improving  Repeat labs   At risk of decompensation

## 2021-02-04 NOTE — ROUTINE PROCESS
Bedside and Verbal shift change report given to [address] [place] [address] (oncoming nurse) by Brandan Chi (offgoing nurse). Report included the following information SBAR, Kardex, ED Summary, Intake/Output, MAR, Accordion, Recent Results, Cardiac Rhythm NSR, Alarm Parameters , Quality Measures and Dual Neuro Assessment.

## 2021-02-04 NOTE — H&P
[address]  HISTORY AND PHYSICAL    Name:  Shelia Islas  MR#:  [id number]  :  1955  ACCOUNT #:  [de-identified]  ADMIT DATE:  2021      The patient was seen, evaluated and admitted by me on 2021. PRIMARY CARE PHYSICIAN:  Raven Osborne MD    SOURCE OF INFORMATION:  The patient and review of ED and old electronic medical records. CHIEF COMPLAINT:  Fall. HISTORY OF PRESENT ILLNESS:  This is a 71-year-old woman with a past medical history significant for chronic kidney disease, dyslipidemia, type 2 diabetes, hypertension, peripheral artery disease, was in her usual state of health until the day of her presentation at the emergency room when the patient stated that she fell at home. The patient complained of generalized weakness, which is progressive. Also complained of diarrhea. According to the patient, she woke up from sleep, was trying to go to the bathroom, when all of a sudden, the patient felt dizzy and passed out. She has no recollection of the event. Family member was unable to reach the patient on the phone. Her brother finally went into the house to check on her and found the patient on the floor close to the bathroom covered with feces and urine. EMS was called. When the EMS arrived at the scene, the patient was not able to stand on her own. The patient was brought to the emergency room for further evaluation. When the patient arrived at the emergency room, the patient was found to have significant leukocytosis. CT scan of the abdomen and pelvis was obtained that shows acute proctocolitis. She was started on antibiotics and referred to the hospitalist service for evaluation for admission. The patient was last admitted to the hospital from 2016 to 10/05/2016. The patient was admitted and treated for osteoarthritis of the left first toe. The left first toe was subsequently amputated. The patient denies fever, rigors, or chills.     PAST MEDICAL HISTORY:  Chronic kidney disease, dyslipidemia, type 2 diabetes, hypertension, peripheral artery disease. ALLERGIES:  NO KNOWN DRUG ALLERGIES. MEDICATIONS:  Tylenol 325 mg 2 tablets every 4 hours as needed for pain, Lipitor 40 mg daily, Bumex 2 mg twice daily as needed, Glucophage 500 mg twice daily, potassium chloride 10 mEq daily, tramadol 50 mg every 6 hours as needed for pain. FAMILY HISTORY:  This was reviewed. Her mother had rheumatoid arthritis. Her father had dementia. PAST SURGICAL HISTORY:  This is significant for appendectomy, back surgery, tonsillectomy, resection of left first toe. SOCIAL HISTORY:  No history of alcohol or tobacco abuse. REVIEW OF SYSTEMS:  HEAD, EYES, EARS, NOSE AND THROAT:  No headache, no dizziness, no blurring of vision, no photophobia. RESPIRATORY SYSTEM:  No cough, no shortness of breath, no hemoptysis. CARDIOVASCULAR SYSTEM:  No chest pain, no orthopnea, no palpitation. GASTROINTESTINAL SYSTEM:  No nausea or vomiting, no diarrhea, no constipation. GENITOURINARY SYSTEM:  No dysuria, no urgency, and no frequency. All other systems are reviewed and they are negative. PHYSICAL EXAMINATION:  GENERAL APPEARANCE:  The patient appeared ill, in moderate distress. VITAL SIGNS:  On arrival at the emergency room, temperature 96.9, pulse 91, respiratory rate 20, blood pressure 104/65, oxygen saturation 100% on room air. HEENT:  Head:  Normocephalic, atraumatic. Eyes:  Normal eye movement. No redness, no drainage, no discharge. Ears:  Normal external ears with no evidence of drainage. Nose:  No deformity, no drainage. Mouth and Throat:  No visible oral lesion. Dry oral mucosa. NECK:  Neck is supple. No JVD, no thyromegaly. CHEST:  Clear breath sounds. No wheezing, no crackles. HEART:  Normal S1 and S2, regular. No clinically appreciable murmur. ABDOMEN:  Soft, obese, nontender. Normal bowel sounds. CNS:  Alert, oriented x3.   No gross focal neurological deficit. EXTREMITIES:  No edema. Pulses 2+ bilaterally. MUSCULOSKELETAL SYSTEM:  No evidence of joint deformity or swelling. SKIN:  No active skin lesions seen in the exposed part of the body. PSYCHIATRY:  Normal mood and affect. LYMPHATIC SYSTEM:  No cervical lymphadenopathy. DIAGNOSTIC DATA:  EKG shows sinus rhythm, no significant ST or T-wave abnormalities. Chest x-ray, no acute process identified. CT scan of the cervical spine without contrast negative for fracture. CT scan of the head without contrast, no acute intracranial process. CT scan of the abdomen and pelvis without contrast shows findings consistent with moderate-to-severe proctocolitis. LABORATORY DATA:  Lactic acid level 1.6. Chemistry:  Sodium 140, potassium 5.2, chloride 110, CO2 13, glucose 118, BUN 97, creatinine 4.26, calcium 9.9, total bilirubin 0.6, ALT 79, , alkaline phosphatase 112, total protein 7.6, albumin level 3.4, globulin 4.2. Hematology:  WBC 20.0, hemoglobin 10.0, hematocrit 30.8, platelets 155. CK level 7042. Urinalysis: This is significant for small blood, negative nitrite, negative leukocyte esterase, negative bacteria. ASSESSMENT:  1. Syncope. 2.  Fall. 3.  Acute rhabdomyolysis. 4.  Acute-on-chronic kidney disease, stage V.  5.  Acute proctocolitis. 6.  Dyslipidemia. 7.  Type 2 diabetes. 8.  Hypertension. 9.  Anemia. 10.  Thrombocytosis. 11.  Metabolic acidosis. 12.  Hyperkalemia. 13.  Abnormal liver function tests. 14.  Leukocytosis. PLAN:  1. Syncope. We will admit the patient for further evaluation and treatment. We will carry out orthostatic vital signs. We will obtain MRI/MRA of the brain, carotid Doppler of the neck and echocardiogram to evaluate the patient for stroke as a possible cause of syncope. We will check cardiac markers to rule out acute myocardial infarction as a possible cause of syncope.   We will check D-dimer to evaluate the patient for thromboembolism as a possible cause of syncope. 2.  Fall at home. CT scan of the head is negative. CT scan of the cervical spine, abdomen and pelvis did not show any evidence of fracture. The patient will most likely require PT, OT evaluation and treatment once she is medically stable. 3.  Acute rhabdomyolysis. This is as a result of the patient lying down on the floor of her house for many hours. We will carry out fluid therapy and trend the patient's CK level. We will also check a TSH level. 4.  Acute-on-chronic kidney disease, stage V. There is an element of volume depletion contributing to the patient's acute-on-chronic kidney disease. We will carry out fluid therapy. CT scan of the abdomen and pelvis did not show any obstructive lesion. Nephrology consult will be requested to assist in further evaluation and treatment. 5.  Acute proctocolitis. We will start the patient on Flagyl and Levaquin. Gastroenterology consult will be requested to assist in further evaluation and treatment. We will also carry out stool studies including checking stool for C. Diff antigen. 6.  Dyslipidemia. We will hold home medication because of the abnormal liver function tests and check a lipid profile. 7.  Type 2 diabetes. The patient will be placed on sliding scale with insulin coverage. We will check hemoglobin A1c level. 8.  Hypertension. We will hold antihypertensive medication in the setting of significant volume depletion and monitor the patient's blood pressure closely. 9.  Anemia. This is most likely due to chronic disease. We will carry out anemia workup including checking a stool guaiac to rule out occult GI bleed. 10.  Thrombocytosis. This is most likely reactive thrombocytosis. We will monitor the patient's platelet counts. 11.  Metabolic acidosis. This is most likely due to the patient's rhabdomyolysis and acute-on-chronic kidney disease. We will carry out fluid therapy.   We will await further recommendation from the nephrologist.  12.  Hyperkalemia. We will treat the patient with Kayexalate and repeat potassium level. 13.  Abnormal liver function tests. We will check lipase level. We will monitor the patient's liver function tests. We will await further recommendation from the gastroenterologist.  14.  Leukocytosis. This is most likely due to the acute proctocolitis. We will check CT scan of the chest to evaluate the patient for any other sources of infection. Lactic acid level is within normal limits. OTHER ISSUES:  Code status: The patient is a full code. We will place the patient on heparin for DVT prophylaxis. FUNCTIONAL STATUS PRIOR TO ADMISSION:  The patient came from home. The patient is ambulatory with a walker. COVID PRECAUTION:  The patient was wearing a face mask. I was wearing a face mask and gloves for this patient's encounter. The patient was tested for COVID-19 virus infection in the emergency room. We will await test results.         Carolina Martin MD      [id number]  D:  02/04/2021 5:47  T:  02/04/2021 7:28  JOB #:  [id number]  CC:  Carlton Rayn MD

## 2021-02-04 NOTE — ROUTINE PROCESS
TRANSFER - OUT REPORT: 
 
Verbal report given to Bert Hansen RN (name) on Elysia Contreras  being transferred to [address] [place] [address] (unit) for routine progression of care Report consisted of patients Situation, Background, Assessment and  
Recommendations(SBAR). Information from the following report(s) SBAR, ED Summary, Intake/Output, MAR and Recent Results was reviewed with the receiving nurse. Lines:  
Venous Access Device Medcomp Matthew Catheter 25 cm  implanted, external length 1 cm  10/03/16 Upper chest (subclavicular area, right (Active) Peripheral IV 02/04/21 Left Antecubital (Active) Opportunity for questions and clarification was provided. Patient transported with: 
 Rhomania

## 2021-02-04 NOTE — CONSULTS
[facility].  [address] [address] [address], [address]  [phone]                     GI CONSULTATION NOTE      NAME:  Silvia Randolph   :   1955   MRN:   [id number]     Consult Date: 2021     Chief Complaint: left sided colitis     History of Present Illness:  Patient is a 60 y.o. F with history of PAD, CKD, DM admitted after she had a ground level fall while feeling dizzy and fatigued. She tried to go to the restroom in the middle of the night and felt dizzy and had a syncopal event. She laid on the floor for over 36 hours as she was too weak to get up. Pt notes she has intermittent diarrhea at baseline for many years : BMs vary - she has 0-3 BMs per day, sometimes constipated and sometimes loose. On admission WBC 20, Hb 10, Cr 4.2, CK 7000, lactate 1.6. + Metabolic acidosis. Rapid COVID negative. CT scan abd pelvis revealed left sided colonic wall thickening. Blood cx and C diff pending. Pt is stable and afebrile. She is receiving flagyl and levofloxacin. PT does note having a maroon tinged BM today. She has had colonoscopies previously with \"large polyps\" with Dr Selene Nunn. She reports she is not due for a colonoscopy for a few more years. She has also seen Dr Jorge Godoy of [facility] for history of esophageal ulcers and PUD. She has also seen GI at [address] [place] [address] (Dr. Sarita Kulkarni) for esophageal ulcers.      PMH:  Past Medical History:   Diagnosis Date    Cancer [facility])     melanoma back stage II     CKD (chronic kidney disease), stage III 3/1/2016    CKD (chronic kidney disease), stage IV ([address] 3/1/2016    Diabetes [facility] [address]     HTN (hypertension) 2015    Hypertension     LBP (low back pain) 2015    Menopause     Microalbuminuria 2015    Obesity 2015    PAD (peripheral artery disease) ([facility] [address] 2016    Venous insufficiency 2015       PSH:  Past Surgical History:   Procedure Laterality Date    HX APPENDECTOMY      HX BACK SURGERY      HX BREAST BIOPSY      unsure of which breast or when. Negative results    HX TONSILLECTOMY         Allergies:  No Known Allergies    Home Medications:  Prior to Admission Medications   Prescriptions Last Dose Informant Patient Reported? Taking? CHILDREN'S ASPIRIN 81 mg chewable tablet Not Taking at Unknown time  No No   Sig: CHEW 1 TABLET BY MOUTH EVERY DAY   ONE TOUCH DELICA 33 gauge misc   No No   Sig: USE TO TEST BLOOD SUGAR EVERY DAY   ONETOUCH ULTRA TEST strip   No No   Sig: TEST BLOOD SUGAR TWICE A DAY   ONETOUCH ULTRA TEST strip   No No   Sig: TEST BLOOD SUGAR TWICE A DAY   acetaminophen (TYLENOL) 325 mg tablet 2/2  No No   Sig: Take 2 Tabs by mouth every four (4) hours as needed. atorvastatin (LIPITOR) 40 mg tablet 2/2  No No   Sig: TAKE 1 TABLET BY MOUTH EVERY DAY   bumetanide (BUMEX) 2 mg tablet   No No   Sig: Take 1 Tab by mouth two (2) times daily as needed. Patient taking differently: Take  by mouth daily. Per patient  Indications: visible water retention   levomefolate-B6-meB12-algal oil 3 mg-35 mg-2 mg -90.314 mg cap 1/28/2021 at Unknown time  Yes Yes   Sig: Take 2 Caps by mouth daily. metFORMIN (GLUCOPHAGE) 500 mg tablet Not Taking at Unknown time  No No   Sig: TAKE 1 TABLET BY MOUTH TWICE A DAY WITH MEALS   metFORMIN (GLUCOPHAGE) 500 mg tablet Not Taking at Unknown time  No No   Sig: TAKE 1 TABLET BY MOUTH TWICE A DAY WITH MEALS   potassium chloride SR (KLOR-CON 10) 10 mEq tablet Not Taking at Unknown time  Yes No   Sig: Take 1 mEq by mouth daily. traMADol (ULTRAM) 50 mg tablet Unknown at Unknown time  No No   Sig: Take 1 Tab by mouth every six (6) hours as needed for Pain. Max Daily Amount: 200 mg.       Facility-Administered Medications: None       Hospital Medications:  Current Facility-Administered Medications   Medication Dose Route Frequency    aspirin chewable tablet 81 mg  81 mg Oral DAILY    sodium chloride (NS) flush 5-40 mL  5-40 mL IntraVENous Q8H    sodium chloride (NS) flush 5-40 mL  5-40 mL IntraVENous PRN    acetaminophen (TYLENOL) tablet 650 mg  650 mg Oral Q6H PRN    Or    acetaminophen (TYLENOL) suppository 650 mg  650 mg Rectal Q6H PRN    promethazine (PHENERGAN) tablet 12.5 mg  12.5 mg Oral Q6H PRN    Or    ondansetron (ZOFRAN) injection 4 mg  4 mg IntraVENous Q6H PRN    heparin (porcine) injection 5,000 Units  5,000 Units SubCUTAneous Q8H    L.acidophilus-paracasei-S.thermophil-bifidobacter (RISAQUAD) 8 billion cell capsule  1 Cap Oral DAILY    metroNIDAZOLE (FLAGYL) IVPB premix 500 mg  500 mg IntraVENous Q12H    insulin lispro (HUMALOG) injection   SubCUTAneous AC&HS    glucose chewable tablet 16 g  4 Tab Oral PRN    dextrose (D50W) injection syrg 12.5-25 g  12.5-25 g IntraVENous PRN    glucagon (GLUCAGEN) injection 1 mg  1 mg IntraMUSCular PRN    0.9% sodium chloride infusion  150 mL/hr IntraVENous CONTINUOUS    [START ON 2/6/2021] levoFLOXacin (LEVAQUIN) 500 mg in D5W IVPB  500 mg IntraVENous Q48H       Social History:  Social History     Tobacco Use    Smoking status: Former Smoker    Smokeless tobacco: Never Used   Substance Use Topics    Alcohol use:  Yes     Alcohol/week: 0.0 standard drinks     Frequency: Monthly or less     Drinks per session: 1 or 2     Binge frequency: Never     Comment: occassion       Family History:  Family History   Problem Relation Age of Onset   White Arthritis-rheumatoid Mother     Dementia Father        Review of Systems:  Constitutional: Negative for fever,  chills, or weight loss  Eyes:   Negative for visual changes  ENT:   Negative for sore throat, tongue or lip swelling  Respiratory:  Negative for cough, negative dyspnea  Cards:   Negative for chest pain, palpitations, lower extremity edema  GI:   See HPI  :  Negative for frequency, dysuria  Integument:  Negative for rash and pruritus  Heme:  Negative for easy bruising and epistaxis  Musculoskel: + weakness  Neuro:  Negative for headaches, dizziness, vertigo  Psych:  Negative for feelings of anxiety, depression      Objective:     Patient Vitals for the past 8 hrs:   BP Temp Pulse Resp SpO2   02/04/21 1035 (!) 94/59 98.2 °F (36.8 °C) 92 19 97 %   02/04/21 0800     97 %     No intake/output data recorded. PHYSICAL EXAM:  Gen: NAD, obese F  HEENT: PEERLA, EOMI  Neck: supple  Chest: CTA bilaterally  CV: RRR, no m/g/c/r  Abd: soft, obese, nontender    Data Review   Recent Labs     02/04/21 0022   WBC 20.0*   HGB 10.0*   HCT 30.8*   *     Recent Labs     02/04/21 0022      K 5.2*   *   CO2 13*   BUN 97*   CREA 4.26*   *   PHOS 6.5*   CA 9.9     Recent Labs     02/04/21 0022      TP 7.6   ALB 3.4*   GLOB 4.2*   LPSE 201     No results for input(s): INR, PTP, APTT, INREXT in the last 72 hours. Assessment:   61 yo F with history of PAD, CKD, DM admitted with rhabdo and PRABHU after being down for 36 hours. We are asked to see her re: L sided colonic wall thickening on CT. Plan:     I suspect this is ischemic colitis. She is currently stable. Continue flagyl/levoflox and IVF.      She should follow up with Dr Radha Fried or Dr Janette Contreras (primary GIs) after discharge

## 2021-02-04 NOTE — PROGRESS NOTES
Attempted to see the patient, currently off the floor. Will try again as able.      [facility], MSW

## 2021-02-04 NOTE — ED NOTES
Patient left unit for MRI. Called [facility] to give patient report, spoke with charge FRANK Fonseca. She stated that she cannot take patient report without patient having covid PCR results available. She will speak with [place] April and will call back. Armando indicated rapid covid was negative and PCR results will not be available right now.

## 2021-02-05 LAB
ALBUMIN SERPL-MCNC: 2.6 G/DL (ref 3.5–5)
ALBUMIN SERPL-MCNC: 2.9 G/DL (ref 3.5–5)
ALBUMIN/GLOB SERPL: 0.7 {RATIO} (ref 1.1–2.2)
ALBUMIN/GLOB SERPL: 0.8 {RATIO} (ref 1.1–2.2)
ALP SERPL-CCNC: 78 U/L (ref 45–117)
ALP SERPL-CCNC: 85 U/L (ref 45–117)
ALT SERPL-CCNC: 79 U/L (ref 12–78)
ALT SERPL-CCNC: 90 U/L (ref 12–78)
ANION GAP SERPL CALC-SCNC: 15 MMOL/L (ref 5–15)
ANION GAP SERPL CALC-SCNC: 17 MMOL/L (ref 5–15)
AST SERPL-CCNC: 191 U/L (ref 15–37)
AST SERPL-CCNC: 219 U/L (ref 15–37)
BASOPHILS # BLD: 0 K/UL (ref 0–0.1)
BASOPHILS NFR BLD: 0 % (ref 0–1)
BILIRUB SERPL-MCNC: 0.3 MG/DL (ref 0.2–1)
BILIRUB SERPL-MCNC: 0.4 MG/DL (ref 0.2–1)
BUN SERPL-MCNC: 116 MG/DL (ref 6–20)
BUN SERPL-MCNC: 121 MG/DL (ref 6–20)
BUN/CREAT SERPL: 29 (ref 12–20)
BUN/CREAT SERPL: 29 (ref 12–20)
CALCIUM SERPL-MCNC: 9 MG/DL (ref 8.5–10.1)
CALCIUM SERPL-MCNC: 9.2 MG/DL (ref 8.5–10.1)
CHLORIDE SERPL-SCNC: 112 MMOL/L (ref 97–108)
CHLORIDE SERPL-SCNC: 114 MMOL/L (ref 97–108)
CK SERPL-CCNC: 2652 U/L (ref 26–192)
CO2 SERPL-SCNC: 10 MMOL/L (ref 21–32)
CO2 SERPL-SCNC: 13 MMOL/L (ref 21–32)
CREAT SERPL-MCNC: 4.01 MG/DL (ref 0.55–1.02)
CREAT SERPL-MCNC: 4.16 MG/DL (ref 0.55–1.02)
DIFFERENTIAL METHOD BLD: ABNORMAL
EOSINOPHIL # BLD: 0 K/UL (ref 0–0.4)
EOSINOPHIL NFR BLD: 0 % (ref 0–7)
ERYTHROCYTE [DISTWIDTH] IN BLOOD BY AUTOMATED COUNT: 17.1 % (ref 11.5–14.5)
GLOBULIN SER CALC-MCNC: 3.5 G/DL (ref 2–4)
GLOBULIN SER CALC-MCNC: 3.6 G/DL (ref 2–4)
GLUCOSE BLD STRIP.AUTO-MCNC: 107 MG/DL (ref 65–100)
GLUCOSE BLD STRIP.AUTO-MCNC: 115 MG/DL (ref 65–100)
GLUCOSE BLD STRIP.AUTO-MCNC: 122 MG/DL (ref 65–100)
GLUCOSE BLD STRIP.AUTO-MCNC: 97 MG/DL (ref 65–100)
GLUCOSE BLD STRIP.AUTO-MCNC: 98 MG/DL (ref 65–100)
GLUCOSE SERPL-MCNC: 134 MG/DL (ref 65–100)
GLUCOSE SERPL-MCNC: 92 MG/DL (ref 65–100)
HCT VFR BLD AUTO: 24.7 % (ref 35–47)
HGB BLD-MCNC: 7.9 G/DL (ref 11.5–16)
IMM GRANULOCYTES # BLD AUTO: 0 K/UL
IMM GRANULOCYTES NFR BLD AUTO: 0 %
LEFT CCA DIST DIAS: 12 CM/S
LEFT CCA DIST SYS: 91 CM/S
LEFT CCA PROX DIAS: 17.4 CM/S
LEFT CCA PROX SYS: 97.3 CM/S
LEFT ECA DIAS: 4.73 CM/S
LEFT ECA SYS: 57.5 CM/S
LEFT ICA DIST DIAS: 14.6 CM/S
LEFT ICA DIST SYS: 49.6 CM/S
LEFT ICA MID DIAS: 17.2 CM/S
LEFT ICA MID SYS: 67.7 CM/S
LEFT ICA PROX DIAS: 33.2 CM/S
LEFT ICA PROX SYS: 154 CM/S
LEFT ICA/CCA SYS: 1.69
LEFT VERTEBRAL DIAS: 17.21 CM/S
LEFT VERTEBRAL SYS: 72.9 CM/S
LYMPHOCYTES # BLD: 0.7 K/UL (ref 0.8–3.5)
LYMPHOCYTES NFR BLD: 6 % (ref 12–49)
MAGNESIUM SERPL-MCNC: 3.1 MG/DL (ref 1.6–2.4)
MCH RBC QN AUTO: 31.3 PG (ref 26–34)
MCHC RBC AUTO-ENTMCNC: 32 G/DL (ref 30–36.5)
MCV RBC AUTO: 98 FL (ref 80–99)
MONOCYTES # BLD: 0.6 K/UL (ref 0–1)
MONOCYTES NFR BLD: 5 % (ref 5–13)
NEUTS BAND NFR BLD MANUAL: 13 % (ref 0–6)
NEUTS SEG # BLD: 10.9 K/UL (ref 1.8–8)
NEUTS SEG NFR BLD: 76 % (ref 32–75)
NRBC # BLD: 0 K/UL (ref 0–0.01)
NRBC BLD-RTO: 0 PER 100 WBC
PHOSPHATE SERPL-MCNC: 6.9 MG/DL (ref 2.6–4.7)
PLATELET # BLD AUTO: 129 K/UL (ref 150–400)
POTASSIUM SERPL-SCNC: 4 MMOL/L (ref 3.5–5.1)
POTASSIUM SERPL-SCNC: 4.3 MMOL/L (ref 3.5–5.1)
PROT SERPL-MCNC: 6.1 G/DL (ref 6.4–8.2)
PROT SERPL-MCNC: 6.5 G/DL (ref 6.4–8.2)
RBC # BLD AUTO: 2.52 M/UL (ref 3.8–5.2)
RBC MORPH BLD: ABNORMAL
RIGHT CCA DIST DIAS: 13.5 CM/S
RIGHT CCA DIST SYS: 79 CM/S
RIGHT CCA PROX DIAS: 15 CM/S
RIGHT CCA PROX SYS: 106.7 CM/S
RIGHT ECA DIAS: 12.03 CM/S
RIGHT ECA SYS: 114.3 CM/S
RIGHT ICA DIST DIAS: 24 CM/S
RIGHT ICA DIST SYS: 74.8 CM/S
RIGHT ICA MID DIAS: 18.2 CM/S
RIGHT ICA MID SYS: 59.2 CM/S
RIGHT ICA PROX DIAS: 14.6 CM/S
RIGHT ICA PROX SYS: 70.3 CM/S
RIGHT ICA/CCA SYS: 1
RIGHT VERTEBRAL DIAS: 12.39 CM/S
RIGHT VERTEBRAL SYS: 49.7 CM/S
SERVICE CMNT-IMP: ABNORMAL
SERVICE CMNT-IMP: NORMAL
SERVICE CMNT-IMP: NORMAL
SODIUM SERPL-SCNC: 139 MMOL/L (ref 136–145)
SODIUM SERPL-SCNC: 142 MMOL/L (ref 136–145)
WBC # BLD AUTO: 12.2 K/UL (ref 3.6–11)

## 2021-02-05 PROCEDURE — 84100 ASSAY OF PHOSPHORUS: CPT

## 2021-02-05 PROCEDURE — 87506 IADNA-DNA/RNA PROBE TQ 6-11: CPT

## 2021-02-05 PROCEDURE — 97161 PT EVAL LOW COMPLEX 20 MIN: CPT

## 2021-02-05 PROCEDURE — 36415 COLL VENOUS BLD VENIPUNCTURE: CPT

## 2021-02-05 PROCEDURE — 74011250637 HC RX REV CODE- 250/637: Performed by: INTERNAL MEDICINE

## 2021-02-05 PROCEDURE — 82550 ASSAY OF CK (CPK): CPT

## 2021-02-05 PROCEDURE — 82962 GLUCOSE BLOOD TEST: CPT

## 2021-02-05 PROCEDURE — 85025 COMPLETE CBC W/AUTO DIFF WBC: CPT

## 2021-02-05 PROCEDURE — P9045 ALBUMIN (HUMAN), 5%, 250 ML: HCPCS | Performed by: INTERNAL MEDICINE

## 2021-02-05 PROCEDURE — 74011250636 HC RX REV CODE- 250/636: Performed by: INTERNAL MEDICINE

## 2021-02-05 PROCEDURE — 80053 COMPREHEN METABOLIC PANEL: CPT

## 2021-02-05 PROCEDURE — 97530 THERAPEUTIC ACTIVITIES: CPT

## 2021-02-05 PROCEDURE — P9047 ALBUMIN (HUMAN), 25%, 50ML: HCPCS | Performed by: INTERNAL MEDICINE

## 2021-02-05 PROCEDURE — 97116 GAIT TRAINING THERAPY: CPT

## 2021-02-05 PROCEDURE — 74011000250 HC RX REV CODE- 250: Performed by: NURSE PRACTITIONER

## 2021-02-05 PROCEDURE — 97165 OT EVAL LOW COMPLEX 30 MIN: CPT

## 2021-02-05 PROCEDURE — 74011250636 HC RX REV CODE- 250/636: Performed by: FAMILY MEDICINE

## 2021-02-05 PROCEDURE — 83735 ASSAY OF MAGNESIUM: CPT

## 2021-02-05 PROCEDURE — 74011000250 HC RX REV CODE- 250: Performed by: INTERNAL MEDICINE

## 2021-02-05 PROCEDURE — 74011000258 HC RX REV CODE- 258: Performed by: NURSE PRACTITIONER

## 2021-02-05 PROCEDURE — 97535 SELF CARE MNGMENT TRAINING: CPT

## 2021-02-05 PROCEDURE — 65660000001 HC RM ICU INTERMED STEPDOWN

## 2021-02-05 RX ORDER — ALBUMIN HUMAN 50 G/1000ML
12.5 SOLUTION INTRAVENOUS ONCE
Status: COMPLETED | OUTPATIENT
Start: 2021-02-05 | End: 2021-02-05

## 2021-02-05 RX ORDER — METRONIDAZOLE 500 MG/100ML
500 INJECTION, SOLUTION INTRAVENOUS EVERY 12 HOURS
Status: COMPLETED | OUTPATIENT
Start: 2021-02-05 | End: 2021-02-13

## 2021-02-05 RX ORDER — ALBUMIN HUMAN 250 G/1000ML
25 SOLUTION INTRAVENOUS EVERY 6 HOURS
Status: COMPLETED | OUTPATIENT
Start: 2021-02-05 | End: 2021-02-06

## 2021-02-05 RX ORDER — CHOLESTYRAMINE 4 G/4.8G
4 POWDER, FOR SUSPENSION ORAL
Status: DISCONTINUED | OUTPATIENT
Start: 2021-02-05 | End: 2021-02-19 | Stop reason: HOSPADM

## 2021-02-05 RX ADMIN — ALBUMIN (HUMAN) 12.5 G: 12.5 INJECTION, SOLUTION INTRAVENOUS at 18:31

## 2021-02-05 RX ADMIN — HEPARIN SODIUM 5000 UNITS: 5000 INJECTION INTRAVENOUS; SUBCUTANEOUS at 06:55

## 2021-02-05 RX ADMIN — METRONIDAZOLE 500 MG: 500 INJECTION, SOLUTION INTRAVENOUS at 01:25

## 2021-02-05 RX ADMIN — HEPARIN SODIUM 5000 UNITS: 5000 INJECTION INTRAVENOUS; SUBCUTANEOUS at 14:43

## 2021-02-05 RX ADMIN — ALBUMIN (HUMAN) 25 G: 0.25 INJECTION, SOLUTION INTRAVENOUS at 18:31

## 2021-02-05 RX ADMIN — CHOLESTYRAMINE 4 G: 4 POWDER, FOR SUSPENSION ORAL at 18:31

## 2021-02-05 RX ADMIN — Medication 10 ML: at 06:58

## 2021-02-05 RX ADMIN — SODIUM BICARBONATE: 84 INJECTION, SOLUTION INTRAVENOUS at 19:06

## 2021-02-05 RX ADMIN — ASPIRIN 81 MG: 81 TABLET, CHEWABLE ORAL at 09:00

## 2021-02-05 RX ADMIN — HEPARIN SODIUM 5000 UNITS: 5000 INJECTION INTRAVENOUS; SUBCUTANEOUS at 21:10

## 2021-02-05 RX ADMIN — Medication 10 ML: at 21:10

## 2021-02-05 RX ADMIN — SODIUM BICARBONATE: 84 INJECTION, SOLUTION INTRAVENOUS at 06:56

## 2021-02-05 RX ADMIN — ACETAMINOPHEN 650 MG: 325 TABLET ORAL at 15:24

## 2021-02-05 RX ADMIN — Medication 1 CAPSULE: at 09:00

## 2021-02-05 RX ADMIN — METRONIDAZOLE 500 MG: 500 INJECTION, SOLUTION INTRAVENOUS at 20:32

## 2021-02-05 NOTE — PROGRESS NOTES
[facility]  PROGRESS NOTE        Subjective:   STILL ++ diarrhea;switch to IVF + bic            Objective:   VITALS SIGNS:    Visit Vitals  /61 (BP 1 Location: Right upper arm, BP Patient Position: At rest)   Pulse 80   Temp 98.5 °F (36.9 °C)   Resp 16   Ht 5' 5.5\" (1.664 m)   Wt 94.3 kg (207 lb 14.3 oz)   SpO2 100%   BMI 34.07 kg/m²       O2 Device: Room air       Temp (24hrs), Av.3 °F (36.8 °C), Min:98.1 °F (36.7 °C), Max:98.5 °F (36.9 °C)         PHYSICAL EXAM:  NAD  Alert  Trace edema    DATA REVIEW:     INTAKE / OUTPUT:   Last shift:      No intake/output data recorded. Last 3 shifts:  190 -  0700  In: 1000 [I.V.:1000]  Out: -   No intake or output data in the 24 hours ending 21 1412      LABS:   Recent Labs     21  0130 21  0022   WBC 12.2* 20.0*   HGB 7.9* 10.0*   HCT 24.7* 30.8*   * 145*     Recent Labs     21  0130 21  0022    140   K 4.3 5.2*   * 110*   CO2 10* 13*   GLU 92 118*   * 97*   CREA 4.16* 4.26*   CA 9.0 9.9   MG  --  2.9*   PHOS  --  6.5*   ALB 2.6* 3.4*   TBILI 0.3 0.6   ALT 79* 79*           Assessment:   PRABHU,suspicion of prerenal vs ATN(rhabdo,sepsis)  CKD-?  Recent baseline  rhabdo  Mixed AG and non AG met acidosis  Hypotension  High BUN,anemia;worry about GI bleed  Severe diarrheA ,proctocolitis on CTscan      Plan:   Clinically better than her labs  On IV bic  Repeat labs today,repeat CK  Hope to see improvement  She told me today she sees dr Rodriguez Ran as OP,i told dr Kianna Munoz about the patient and they will round on her tomorrow  Discussed wuth her and RN  Shantal Vale MD

## 2021-02-05 NOTE — PROGRESS NOTES
Problem: Self Care Deficits Care Plan (Adult)  Goal: *Acute Goals and Plan of Care (Insert Text)  Description:   FUNCTIONAL STATUS PRIOR TO ADMISSION: Patient was modified independent using a veronique SPCs for functional mobility, was modified independent for basic and most instrumental ADLs, working at [employer] in [employer]. HOME SUPPORT: The patient lived alone with family in the area to provide assistance. Occupational Therapy Goals  Initiated 2/5/2021  1. Patient will perform grooming at the sink with supervision/set-up within 7 day(s). 2.  Patient will perform bathing with minimal assistance/contact guard assist within 7 day(s). 3.  Patient will perform lower body dressing with minimal assistance/contact guard assist and AE PRN within 7 day(s). 4.  Patient will perform toilet transfers with supervision/set-up within 7 day(s). 5.  Patient will perform all aspects of toileting with minimal assistance/contact guard assist within 7 day(s). 6.  Patient will participate in upper extremity therapeutic exercise/activities with supervision/set-up for 5 minutes within 7 day(s). 7.  Patient will utilize energy conservation techniques during functional activities with verbal and visual cues within 7 day(s). Outcome: Not Met     OCCUPATIONAL THERAPY EVALUATION  Patient: Hemal Rebolledo (67 y.o. female)  Date: 2/5/2021  Primary Diagnosis: Syncope [R55]       Precautions: Fall    ASSESSMENT  Based on the objective data described below, the patient presents with decreased balance, strength, activity tolerance, bowel/bladder continence, safety awareness, lower body access, BLE ROM, and coordination s/p admission for GLF, down 2-3 days, admitted and found to have rhabdomyeolosis. PTA, patient Mod I with veronique SPCs, IND-Mod I for ADLs and most IADLs, living alone, working.  She now presents well below her baseline, requiring up to [address] A for mobility with RW support, however poor lower body access and BLE ROM d/t pain, therefore requiring up to Max A for ADLs with SOB noted with brief bathroom mobility. Recommend d/c to SNF as she is unsafe to return home alone. Current Level of Function Impacting Discharge (ADLs/self-care): Min-Max A for ADLs, CGA-Min A for mobility with RW    Functional Outcome Measure: The patient scored Total: 45/100 on the Barthel Index outcome measure which is indicative of 55% impaired ability to care for basic self needs/dependency on others; inferred 100% dependency on others for instrumental ADLs. Other factors to consider for discharge: fall risk, lives alone, high PLOF     Patient will benefit from skilled therapy intervention to address the above noted impairments. PLAN :  Recommendations and Planned Interventions: self care training, functional mobility training, therapeutic exercise, balance training, therapeutic activities, endurance activities, patient education, home safety training and family training/education    Frequency/Duration: Patient will be followed by occupational therapy 5 times a week to address goals. Recommendation for discharge: (in order for the patient to meet his/her long term goals)  Therapy up to 5 days/week in SNF setting    This discharge recommendation:  A follow-up discussion with the attending provider and/or case management is planned    IF patient discharges home will need the following DME: TBD pending progress--may benefit from sock aid and LH shoe horn, new RW?, will continue to assess but would require physical assist of 1       SUBJECTIVE:   Patient stated A walker won't fit in my bathroom.     OBJECTIVE DATA SUMMARY:   HISTORY:   Past Medical History:   Diagnosis Date    Cancer ([facility]     melanoma back stage II 2005    CKD (chronic kidney disease), stage III 3/1/2016    CKD (chronic kidney disease), stage IV ([address] 3/1/2016    Diabetes ([facility]     HTN (hypertension) 11/2/2015    Hypertension     LBP (low back pain) 11/2/2015    Menopause     Microalbuminuria 11/21/2015    Obesity 11/2/2015    PAD (peripheral artery disease) ([facility] 9/29/2016    Venous insufficiency 11/2/2015     Past Surgical History:   Procedure Laterality Date    HX APPENDECTOMY      HX BACK SURGERY      HX BREAST BIOPSY      unsure of which breast or when. Negative results    HX TONSILLECTOMY         Expanded or extensive additional review of patient history:     Home Situation  Home Environment: Private residence  # Steps to Enter: 4  Rails to Enter: Yes  One/Two Story Residence: Two story, live on 1st floor  Living Alone: Yes  Support Systems: Family member(s)  Current DME Used/Available at Home: Cane, straight, Walker, rolling, Tub transfer bench, Raised toilet seat(reacher, dressing stick)  Tub or Shower Type: Tub/Shower combination    Hand dominance: Right    EXAMINATION OF PERFORMANCE DEFICITS:  Cognitive/Behavioral Status:  Neurologic State: Alert  Orientation Level: Oriented X4  Cognition: Appropriate for age attention/concentration; Follows commands  Perception: Appears intact  Perseveration: No perseveration noted  Safety/Judgement: Awareness of environment; Fall prevention    Skin: Appears intact, some bruising    Edema: None    Hearing:       Vision/Perceptual:    Tracking: Able to track stimulus in all quadrants w/o difficulty                 Diplopia: No    Acuity: Impaired near vision    Corrective Lenses: Reading glasses    Range of Motion:  AROM: Generally decreased, functional  PROM: Within functional limits                      Strength:  Strength: Generally decreased, functional                Coordination:  Coordination: Within functional limits  Fine Motor Skills-Upper: Left Intact; Right Intact(slight shaking, not limiting activity)    Gross Motor Skills-Upper: Left Intact; Right Intact    Tone & Sensation:  Tone: Normal  Sensation: Impaired(neuropathy at baseline)                      Balance:  Sitting: Intact  Standing: Impaired; With support(RW)  Standing - Static: Good;Constant support  Standing - Dynamic : Fair;Constant support    Functional Mobility and Transfers for ADLs:  Bed Mobility:  Supine to Sit: Moderate assistance; Additional time  Sit to Supine: Moderate assistance  Scooting: Contact guard assistance;Minimum assistance    Transfers:  Sit to Stand: Minimum assistance  Stand to Sit: Minimum assistance  Bathroom Mobility: Contact guard assistance  Toilet Transfer : Minimum assistance  Assistive Device : Gait Belt;Walker, rolling    ADL Assessment:  Feeding: Modified independent    Oral Facial Hygiene/Grooming: Contact guard assistance    Bathing: Maximum assistance    Upper Body Dressing: Supervision    Lower Body Dressing: Maximum assistance    Toileting: Moderate assistance                ADL Intervention and task modifications:       Grooming  Grooming Assistance: Contact guard assistance  Position Performed: Standing(at sink)  Washing Hands: Contact guard assistance  Cues: Verbal cues provided                   Lower Body Dressing Assistance  Dressing Assistance: Maximum assistance  Pants With Elastic Waist: Maximum assistance(simulated)  Socks: Total assistance (dependent)  Leg Crossed Method Used: No(unable d/t pain, baseline tailor sits for lower body ADLs)  Position Performed: Seated edge of bed;Standing  Cues: Tactile cues provided;Physical assistance;Verbal cues provided;Visual cues provided    Toileting  Toileting Assistance: Moderate assistance(attempted then no urgency in bathroom)  Bowel Hygiene: Minimum assistance  Clothing Management: Maximum assistance(simulated)  Cues: Tactile cues provided;Verbal cues provided;Visual cues provided  Adaptive Equipment: Grab bars; Walker    Cognitive Retraining  Safety/Judgement: Awareness of environment; Fall prevention    Functional Measure:  Barthel Index:    Bathin  Bladder: 5  Bowels: 5  Groomin  Dressin  Feeding: 10  Mobility: 0  Stairs: 0  Toilet Use: 5  Transfer (Bed to Chair and Back): 10  Total: 45/100        The Barthel ADL Index: Guidelines  1. The index should be used as a record of what a patient does, not as a record of what a patient could do. 2. The main aim is to establish degree of independence from any help, physical or verbal, however minor and for whatever reason. 3. The need for supervision renders the patient not independent. 4. A patient's performance should be established using the best available evidence. Asking the patient, friends/relatives and nurses are the usual sources, but direct observation and common sense are also important. However direct testing is not needed. 5. Usually the patient's performance over the preceding 24-48 hours is important, but occasionally longer periods will be relevant. 6. Middle categories imply that the patient supplies over 50 per cent of the effort. 7. Use of aids to be independent is allowed. Jonathon Power., Barthel, D.W. (2060). Functional evaluation: the Barthel Index. [facility] (14)2. NICOLETTE Severino, Varinder Vora., Krista Oseguera., [address] (1999). Measuring the change indisability after inpatient rehabilitation; comparison of the responsiveness of the Barthel Index and Functional [place] Measure. Journal of Neurology, Neurosurgery, and Psychiatry, 66(4), 210-186. SHE Vargas.A, AMBAR Sy, & Lawanda Vanegas M.A. (2004.) Assessment of post-stroke quality of life in cost-effectiveness studies: The usefulness of the Barthel Index and the EuroQoL-5D.  Quality of Life Research, 15, 334-80         Occupational Therapy Evaluation Charge Determination   History Examination Decision-Making   LOW Complexity : Brief history review  LOW Complexity : 1-3 performance deficits relating to physical, cognitive , or psychosocial skils that result in activity limitations and / or participation restrictions  LOW Complexity : No comorbidities that affect functional and no verbal or physical assistance needed to complete eval tasks       Based on the above components, the patient evaluation is determined to be of the following complexity level: LOW   Pain Rating:  Generalized pain reported    Activity Tolerance:   Fair and requires rest breaks    After treatment patient left in no apparent distress:    Supine in bed, Call bell within reach and Side rails x 3    COMMUNICATION/EDUCATION:   The patients plan of care was discussed with: Physical therapist and Registered nurse. Home safety education was provided and the patient/caregiver indicated understanding., Patient/family have participated as able in goal setting and plan of care. and Patient/family agree to work toward stated goals and plan of care. This patients plan of care is appropriate for delegation to JIMMY.     Thank you for this referral.  ARIEL Marquez, OTR/L  Time Calculation: 33 mins

## 2021-02-05 NOTE — CONSULTS
CKD3b patient of Dr Andre Mojica w/ baseline GFR of 37 ml/min   CKD 2/2 HTN and DM and has macro albuminuria at baseline   Dr Khalida Jensen has seen the patient for today   My colleagues will see her over the weekend  Changed Bicarb gtt to Isotonic Bicarb gtt   Labs in          [facility]  Office :[phone]  Fax: [phone]

## 2021-02-05 NOTE — PROGRESS NOTES
Problem: Mobility Impaired (Adult and Pediatric)  Goal: *Acute Goals and Plan of Care (Insert Text)  Description: FUNCTIONAL STATUS PRIOR TO ADMISSION: Patient was modified independent using bilateral single point cane for functional mobility. HOME SUPPORT PRIOR TO ADMISSION: The patient lived alone with family to provide assistance. Physical Therapy Goals  Initiated 2/5/2021  1. Patient will move from supine to sit and sit to supine  and scoot up and down in bed with modified independence within 7 day(s). 2.  Patient will transfer from bed to chair and chair to bed with modified independence using the least restrictive device within 7 day(s). 3.  Patient will perform sit to stand with modified independence within 7 day(s). 4.  Patient will ambulate with modified independence for 150 feet with the least restrictive device within 7 day(s). 5.  Patient will ascend/descend 4 stairs with handrail(s) with modified independence within 7 day(s). Outcome: Progressing Towards Goal     PHYSICAL THERAPY EVALUATION  Patient: Adonis Willard (67 y.o. female)  Date: 2/5/2021  Primary Diagnosis: Syncope [R55]        Precautions:        ASSESSMENT  Based on the objective data described below, the patient presents with impaired functional mobility as compared to baseline level 2* generalized weakness, c/o pain/stiffness, and impaired balance following admission for GLF, down for 2-3 days, and now dx with rhabdomyolysis. Head CT and MRI negative for acute process. Abdominal CT did show proctocolitis. At baseline, she reports that she lived at home alone and was Mod I with ADLs/mobility using bilateral SPCs. Received pt sitting EOB - alert, appropriate, and agreeable to participation. She was able to complete several sit<>stands from EOB and BSC with increased time and up to min A - cues for hand placement sequencing.  Able to transfer to [facility] with min A and then progress fwd gait training in room for ~25ft with RW and CGA/min A - demos slow and cautious pattern but no major LOB or knee buckle. Remained sitting EOB and set up for lunch at end of session, NAD. At this time, she is below her baseline level and reports that she does not feel able to care for herself at home alone - recommend discharge to SNF level rehab once medically cleared - pt in agreement. Will follow. For the weekend, recommend patient to complete as able in order to maintain strength, endurance and independence with nursing: OOB to chair 3x/day with assist x1 and ambulating with Rw and assist x1. PT to follow-up with patient after the weekend. Current Level of Function Impacting Discharge (mobility/balance): min A with RW    Functional Outcome Measure: The patient scored 12/28 on the Tinetti outcome measure which is indicative of high falls risk. Other factors to consider for discharge: lives alone     Patient will benefit from skilled therapy intervention to address the above noted impairments. PLAN :  Recommendations and Planned Interventions: bed mobility training, transfer training, gait training, therapeutic exercises, neuromuscular re-education, patient and family training/education, and therapeutic activities      Frequency/Duration: Patient will be followed by physical therapy:  5 times a week to address goals. Recommendation for discharge: (in order for the patient to meet his/her long term goals)  Therapy up to 5 days/week in SNF setting    This discharge recommendation:  A follow-up discussion with the attending provider and/or case management is planned    IF patient discharges home will need the following DME: possibly a RW         SUBJECTIVE:   Patient stated No I need to go to rehab before I go home.     OBJECTIVE DATA SUMMARY:   HISTORY:    Past Medical History:   Diagnosis Date    Cancer ([facility] [address]     melanoma back stage II 2005    CKD (chronic kidney disease), stage III 3/1/2016    CKD (chronic kidney disease), stage IV ([facility] 3/1/2016    Diabetes ([facility]     HTN (hypertension) 11/2/2015    Hypertension     LBP (low back pain) 11/2/2015    Menopause     Microalbuminuria 11/21/2015    Obesity 11/2/2015    PAD (peripheral artery disease) ([facility] 9/29/2016    Venous insufficiency 11/2/2015     Past Surgical History:   Procedure Laterality Date    HX APPENDECTOMY      HX BACK SURGERY      HX BREAST BIOPSY      unsure of which breast or when.  Negative results    HX TONSILLECTOMY         Personal factors and/or comorbidities impacting plan of care: PMHx    Home Situation  Home Environment: Private residence  # Steps to Enter: 4  Rails to Enter: Yes  One/Two Story Residence: Two story, live on 1st floor  Living Alone: Yes  Support Systems: Family member(s)  Current DME Used/Available at Home: [employer], straight, Walker, rolling    EXAMINATION/PRESENTATION/DECISION MAKING:   Critical Behavior:  Neurologic State: Alert  Orientation Level: Oriented X4  Cognition: Appropriate decision making, Follows commands  Safety/Judgement: Awareness of environment, Insight into deficits  Hearing:     Skin:    Edema:   Range Of Motion:  AROM: Generally decreased, functional  PROM: Within functional limits     Strength:    Strength: Generally decreased, functional        Tone & Sensation:   Tone: Normal  Sensation: Impaired(neuropathy at baseline)      Coordination:  Coordination: Within functional limits  Vision:      Functional Mobility:  Bed Mobility:  Scooting: Supervision    Transfers:  Sit to Stand: Minimum assistance  Stand to Sit: Minimum assistance     Balance:   Sitting: Intact  Standing: Impaired  Standing - Static: Good;Constant support  Standing - Dynamic : Fair;Constant support  Ambulation/Gait Training:  Distance (ft): 25 Feet (ft)  Assistive Device: Gait belt;Walker, rolling  Ambulation - Level of Assistance: Minimal assistance;Contact guard assistance  Gait Description (WDL): Exceptions to WDL  Gait Abnormalities: Decreased step clearance;Shuffling gait  Base of Support: Widened  Speed/Karen: Slow;Shuffled  Step Length: Right shortened;Left shortened    Functional Measure:  Tinetti test:    Sitting Balance: 1  Arises: 0  Attempts to Rise: 0  Immediate Standing Balance: 1  Standing Balance: 1  Nudged: 0  Eyes Closed: 0  Turn 360 Degrees - Continuous/Discontinuous: 0  Turn 360 Degrees - Steady/Unsteady: 0  Sitting Down: 1  Balance Score: 4 Balance total score  Indication of Gait: 1  R Step Length/Height: 1  L Step Length/Height: 1  R Foot Clearance: 1  L Foot Clearance: 1  Step Symmetry: 1  Step Continuity: 1  Path: 1  Trunk: 0  Walking Time: 0  Gait Score: 8 Gait total score  Total Score: 12/28 Overall total score         Tinetti Tool Score Risk of Falls  <19 = High Fall Risk  19-24 = Moderate Fall Risk  25-28 = Low Fall Risk  Tinetti ME. Performance-Oriented Assessment of Mobility Problems in Elderly Patients. Hart 66; [id number].  (Scoring Description: PT Bulletin Feb. 10, 1993)    Older adults: Rosalva Stock et al, 2009; n = 1000 [facility] elderly evaluated with ABC, SOPHIA, ADL, and IADL)  · Mean SOPHIA score for males aged 69-68 years = 26.21(3.40)  · Mean SOPHIA score for females age 69-68 years = 25.16(4.30)  · Mean SOPHIA score for males over 80 years = 23.29(6.02)  · Mean SOPHIA score for females over 80 years = 17.20(8.32)            Physical Therapy Evaluation Charge Determination   History Examination Presentation Decision-Making   HIGH Complexity :3+ comorbidities / personal factors will impact the outcome/ POC  MEDIUM Complexity : 3 Standardized tests and measures addressing body structure, function, activity limitation and / or participation in recreation  LOW Complexity : Stable, uncomplicated  HIGH Complexity : FOTO score of 1- 25       Based on the above components, the patient evaluation is determined to be of the following complexity level: LOW     Activity Tolerance:   Good and requires rest breaks    After treatment patient left in no apparent distress:   Call bell within reach and Side rails x 3    COMMUNICATION/EDUCATION:   The patients plan of care was discussed with: Registered nurse. Fall prevention education was provided and the patient/caregiver indicated understanding., Patient/family have participated as able in goal setting and plan of care. , and Patient/family agree to work toward stated goals and plan of care.     Thank you for this referral.  Nubia Grove, PT, DPT   Time Calculation: 28 mins

## 2021-02-05 NOTE — PROGRESS NOTES
Transition of Care Plan   RUR- 16 % Low  Risks    DISPOSITION: TBD, likely home     Transport: friends      Reason for Admission: admitted from home via EMS, patient was found on the floor in her home by her brother. RUR Score:  16 %                    Plan for utilizing home health:   TBD pending recommendation      PCP: First and Last name:  Rolo Green MD    Name of Practice: [address] [address]    Are you a current patient: Yes/No: yes    Approximate date of last visit: December 2020    Can you participate in a virtual visit with your PCP: YES                     Current Advanced Directive/Advance Care Plan: Full Code                          Transition of Care Plan: TBD, likely home when ready, pending therapy recommendation. Reviewed chart for transitions of care,and discussed in rounds. CM met with patient at bedside to explain role and offer support. Patient is alert and oriented x4, and confirmed demographics. Baseline: ambulates with a cane   ADLs/IDALS: independent   Previous Home Health: none   Previous SNF: [facility] in 2016   ER Contact: brother- Ga Osborne [phone]    Patient lives in a single house with 4 steps to enter. Patient is independent with ADLs/IDALs. Patient uses DMEs a cane at baseline to ambulate. Patient stated that she has two canes and uses mostly for imbalance issue. Patient's preferred pharmacy is [facility] located on [address] [address]. Patient's friends/family is expected to transport at discharge. Care Management Interventions  PCP Verified by CM: Yes(Dec. 2020)  Palliative Care Criteria Met (RRAT>21 & CHF Dx)?: No  Mode of Transport at Discharge:  Other (see comment)  Transition of Care Consult (CM Consult): Discharge Planning  MyChart Signup: Yes  Discharge Durable Medical Equipment: No  Health Maintenance Reviewed: Yes  Physical Therapy Consult: Yes  Occupational Therapy Consult: Yes  Speech Therapy Consult: No  Current Support Network: Own Home, Lives Alone  Confirm Follow Up Transport: Friends  The Patient and/or Patient Representative was Provided with a Choice of Provider and Agrees with the Discharge Plan?: Yes  Freedom of Choice List was Provided with Basic Dialogue that Supports the Patient's Individualized Plan of Care/Goals, Treatment Preferences and Shares the Quality Data Associated with the Providers?: Yes  [employer] Information Provided?: No  Discharge Location  Discharge Placement: Home    HOLLY Isidro

## 2021-02-05 NOTE — PROGRESS NOTES
Hospitalist Cross Coverage NP     Name: Susanne De Paz  YOB: 1955  MRN: [id number]  Admission Date: 2/3/2021 11:49 PM    Date of service: 2/5/2021 3:31 AM                                Overnight update:        Paged by RN - serum bicarb 10, down from 15 yesterday.   - BP improving with hydration, 105/55  - sCr 4.16, minimal improvement from yesterday  - Bicarb gtt ordered     Nahid Born FNP-C, PAIvánC  [phone] or TigerText

## 2021-02-05 NOTE — PROGRESS NOTES
Clinical Pharmacy Note: Metronidazole Dosing    Please note that the metronidazole dose for Aimee Tabor has been changed to 500 mg q12h per [facility]-approved protocol. Please contact the pharmacy with any questions.     Zohaib Machuca

## 2021-02-05 NOTE — PROGRESS NOTES
[address] [facility] Adult  Hospitalist Group                                                                                          Hospitalist Progress Note  Milan Almanzar MD  Answering service: [phone] OR [phone] from in house phone              Progress Note    Patient: Chapis Collins MRN: [id number]  SSN: [id number]    YOB: 1955  Age: 60 y.o. Sex: female      Admit Date: 2/3/2021    LOS: 1 day     Subjective:     Patient admitted after a syncopal episode. She is still having diarrhea. Denies abdominal pain no vomiting. Objective:     Vitals:    02/04/21 2200 02/05/21 0200 02/05/21 0600 02/05/21 1025   BP: 127/64 (!) 105/55 (!) 95/56 107/61   Pulse: 89 81 84 80   Resp: 21 17 18 16   Temp: 98.3 °F (36.8 °C) 98.2 °F (36.8 °C) 98.2 °F (36.8 °C) 98.5 °F (36.9 °C)   SpO2: 99% 97% 98% 100%   Weight:  94.3 kg (207 lb 14.3 oz)     Height:            Intake and Output:  Current Shift: No intake/output data recorded. Last three shifts: 02/03 1901 - 02/05 0700  In: 1000 [I.V.:1000]  Out: -     Physical Exam:   GENERAL: alert, cooperative, no distress, appears stated age  THROAT & NECK: normal and no erythema or exudates noted. LUNG: clear to auscultation bilaterally  HEART: regular rate and rhythm, S1, S2 normal, no murmur, click, rub or gallop  ABDOMEN: soft, non-tender. Bowel sounds normal. No masses,  no organomegaly  EXTREMITIES:  extremities normal, atraumatic, no cyanosis or edema  SKIN: no rash or abnormalities  NEUROLOGIC: AOx3. PSYCHIATRIC: non focal    Lab/Data Review: All lab results for the last 24 hours reviewed.      Recent Results (from the past 24 hour(s))   DUPLEX CAROTID BILATERAL    Collection Time: 02/04/21  2:08 PM   Result Value Ref Range    Right cca dist sys 79.0 cm/s    Right CCA dist mcrae 13.5 cm/s    Right CCA prox sys 106.7 cm/s    Right CCA prox mcrae 15.0 cm/s    Right eca sys 114.3 cm/s    RIGHT EXTERNAL CAROTID ARTERY D 12.03 cm/s    Right ICA dist sys 74.8 cm/s    Right ICA dist mcrae 24.0 cm/s    Right ICA mid sys 59.2 cm/s    Right ICA mid mcrae 18.2 cm/s    Right ICA prox sys 70.3 cm/s    Right ICA prox mcrae 14.6 cm/s    Right vertebral sys 49.7 cm/s    RIGHT VERTEBRAL ARTERY D 12.39 cm/s    Right ICA/CCA sys 1.0     Left CCA dist sys 91.0 cm/s    Left CCA dist mcrae 12.0 cm/s    Left CCA prox sys 97.3 cm/s    Left CCA prox mcrae 17.4 cm/s    Left ECA sys 57.5 cm/s    LEFT EXTERNAL CAROTID ARTERY D 4.73 cm/s    Left ICA dist sys 49.6 cm/s    Left ICA dist mcrae 14.6 cm/s    Left ICA mid sys 67.7 cm/s    Left ICA mid mcrae 17.2 cm/s    Left ICA prox sys 154.0 cm/s    Left ICA prox mcrae 33.2 cm/s    Left vertebral sys 72.9 cm/s    LEFT VERTEBRAL ARTERY D 17.21 cm/s    Left ICA/CCA sys 1.69    GLUCOSE, POC    Collection Time: 02/04/21  4:43 PM   Result Value Ref Range    Glucose (POC) 103 (H) 65 - 100 mg/dL    Performed by Darwin Raphael    GLUCOSE, POC    Collection Time: 02/04/21 10:14 PM   Result Value Ref Range    Glucose (POC) 101 (H) 65 - 100 mg/dL    Performed by Renae Vega    METABOLIC PANEL, COMPREHENSIVE    Collection Time: 02/05/21  1:30 AM   Result Value Ref Range    Sodium 139 136 - 145 mmol/L    Potassium 4.3 3.5 - 5.1 mmol/L    Chloride 114 (H) 97 - 108 mmol/L    CO2 10 (LL) 21 - 32 mmol/L    Anion gap 15 5 - 15 mmol/L    Glucose 92 65 - 100 mg/dL     (H) 6 - 20 MG/DL    Creatinine 4.16 (H) 0.55 - 1.02 MG/DL    BUN/Creatinine ratio 29 (H) 12 - 20      GFR est AA 13 (L) >60 ml/min/1.73m2    GFR est non-AA 11 (L) >60 ml/min/1.73m2    Calcium 9.0 8.5 - 10.1 MG/DL    Bilirubin, total 0.3 0.2 - 1.0 MG/DL    ALT (SGPT) 79 (H) 12 - 78 U/L    AST (SGOT) 219 (H) 15 - 37 U/L    Alk.  phosphatase 78 45 - 117 U/L    Protein, total 6.1 (L) 6.4 - 8.2 g/dL    Albumin 2.6 (L) 3.5 - 5.0 g/dL    Globulin 3.5 2.0 - 4.0 g/dL    A-G Ratio 0.7 (L) 1.1 - 2.2     CBC WITH AUTOMATED DIFF    Collection Time: 02/05/21  1:30 AM   Result Value Ref Range    WBC 12.2 (H) 3.6 - 11.0 K/uL    RBC 2.52 (L) 3.80 - 5.20 M/uL    HGB 7.9 (L) 11.5 - 16.0 g/dL    HCT 24.7 (L) 35.0 - 47.0 %    MCV 98.0 80.0 - 99.0 FL    MCH 31.3 26.0 - 34.0 PG    MCHC 32.0 30.0 - 36.5 g/dL    RDW 17.1 (H) 11.5 - 14.5 %    PLATELET 918 (L) 346 - 400 K/uL    NRBC 0.0 0  WBC    ABSOLUTE NRBC 0.00 0.00 - 0.01 K/uL    NEUTROPHILS 76 (H) 32 - 75 %    BAND NEUTROPHILS 13 (H) 0 - 6 %    LYMPHOCYTES 6 (L) 12 - 49 %    MONOCYTES 5 5 - 13 %    EOSINOPHILS 0 0 - 7 %    BASOPHILS 0 0 - 1 %    IMMATURE GRANULOCYTES 0 %    ABS. NEUTROPHILS 10.9 (H) 1.8 - 8.0 K/UL    ABS. LYMPHOCYTES 0.7 (L) 0.8 - 3.5 K/UL    ABS. MONOCYTES 0.6 0.0 - 1.0 K/UL    ABS. EOSINOPHILS 0.0 0.0 - 0.4 K/UL    ABS. BASOPHILS 0.0 0.0 - 0.1 K/UL    ABS. IMM. GRANS. 0.0 K/UL    DF MANUAL      RBC COMMENTS ANISOCYTOSIS  1+        RBC COMMENTS MACROCYTOSIS  1+        RBC COMMENTS OVALOCYTES  1+       GLUCOSE, POC    Collection Time: 02/05/21  7:01 AM   Result Value Ref Range    Glucose (POC) 98 65 - 100 mg/dL    Performed by Dima Mcfarlane, POC    Collection Time: 02/05/21  8:58 AM   Result Value Ref Range    Glucose (POC) 97 65 - 100 mg/dL    Performed by [address] [place] [address], POC    Collection Time: 02/05/21 11:51 AM   Result Value Ref Range    Glucose (POC) 107 (H) 65 - 100 mg/dL    Performed by Radha Mclain         Imaging:    Duplex Carotid Bilateral    Result Date: 2/5/2021  · No evidence of right internal carotid artery stenosis. · Moderate, 50 to 69 percent, stenosis of the left internal carotid artery. · Vertebral arteries are patent with antegrade flow. · There is an incidental finding of a right Thyroid nodule measuring 9 by 13 millimeters. Assessment and Plan:     Syncope  -MRI shows no acute hemorrhage or infarction.   MRA shows no aneurysm, dissection or evidence of hemodynamically significant stenosis  -Carotic duplex shows 56 9% stenosis of left internal carotic artery  -Echo ordered  -Her syncope is likely due to probable orthostasis given her volume contracted status    Fall  -As a result of her syncope  -Negative work-up for acute fracture or injury    Proctocolitis  -Evident on CT abdomen and pelvis  -Follow stool studies  -continue empiric Zosyn and Flagyl    Acute kidney injury  -In the setting of rhabdo and volume depletion from diarrhea  -Patient with baseline CKD stage III/IV  -Nephrology evaluating the patient  -Continue IV fluid and monitor renal function    Rhabdomyolysis  -As a result of fall  -Continue IV fluid    Metabolic acidosis  -Due to alkaline loss from GI tract as well as acute kidney injury  -Nephrology evaluating patient  -On bicarb drip    Thrombocytosis  -Likely due to acute reaction to infectious process  -Monitor CBC    Abnormal LFTs  -Also likely related to infectious process  -CT abdomen and pelvis without any acute liver or gallbladder pathology    Anemia  -Continue monitor H&H  -Unclear chronicity    Hypertension  -Hold antihypertensives for now as patient initially hypotensive  -Monitor blood pressure    Diabetes  -Continue insulin sliding scale coverage    Dyslipidemia  -Holding statin due to rhabdomyolysis    Discharge plan: Still needs inpatient treatment and monitoring. Plan PT evaluation in the next 1 to 2 days. Anticipated disposition is likely more than 48 hours.     Signed By: Bryan Metamark GeneticsMD chao     February 5, 2021

## 2021-02-05 NOTE — PROGRESS NOTES
Bedside and Verbal shift change report given to Pati Page RN (oncoming nurse) by Christen Ye RN (offgoing nurse). Report included the following information SBAR, Kardex, Intake/Output, MAR, Recent Results, Cardiac Rhythm NSR and Quality Measures.

## 2021-02-05 NOTE — PROGRESS NOTES
[address]  [address] [facility], [address]  [phone]                GI PROGRESS NOTE      NAME:   Stevenson Perez   :    1955   MRN:    [id number]     Subjective:     Afebrile. , Cr 4.1. WBC 12 (from 20). Hb 7.9 (from 10). I spoke with RN - she is having liquid brown stools. Pt states she is having multiple watery BMs without warning today. She does not think they contain blood, though she is unsure, they have a copper color. No abdominal pain. Objective:     VITALS:   Last 24hrs VS reviewed since prior hospitalist progress note. Most recent are:  Visit Vitals  /61 (BP 1 Location: Right upper arm, BP Patient Position: At rest)   Pulse 80   Temp 98.5 °F (36.9 °C)   Resp 16   Ht 5' 5.5\" (1.664 m)   Wt 94.3 kg (207 lb 14.3 oz)   SpO2 100%   BMI 34.07 kg/m²     No intake or output data in the 24 hours ending 21 1436     PHYSICAL EXAM:  General   NAD  Pt seen walking with RN in room    Lab Data Reviewed     Medications: Reviewed    Assessment/Plan     Stevenson Perez is a 60 y.o.  female with history of PAD, CKD, DM admitted with rhabdo and PRABHU after being down for 36 hours. We are asked to see her re: L sided colonic wall thickening on CT. She has had diarrhea today, unclear if it contains blood. This is likely either ischemic or infectious colitis. Patient denies any pain or overt GI bleeding. C diff neg. Enteric panel pending.      Renal following closely  Trend CBC, transfuse as needed  Will start questran scheduled  F/u enteric panel  Continue empiric Ab    Attending Physician: Arelis Rod MD   Date/Time:  2021

## 2021-02-06 LAB
ANION GAP SERPL CALC-SCNC: 16 MMOL/L (ref 5–15)
BASOPHILS # BLD: 0 K/UL (ref 0–0.1)
BASOPHILS NFR BLD: 0 % (ref 0–1)
BUN SERPL-MCNC: 113 MG/DL (ref 6–20)
BUN/CREAT SERPL: 32 (ref 12–20)
CALCIUM SERPL-MCNC: 8.7 MG/DL (ref 8.5–10.1)
CAMPYLOBACTER SPECIES, DNA: NEGATIVE
CHLORIDE SERPL-SCNC: 110 MMOL/L (ref 97–108)
CO2 SERPL-SCNC: 15 MMOL/L (ref 21–32)
COMMENT, HOLDF: NORMAL
COMMENT, HOLDF: NORMAL
CREAT SERPL-MCNC: 3.54 MG/DL (ref 0.55–1.02)
CREAT UR-MCNC: 47.9 MG/DL
DIFFERENTIAL METHOD BLD: ABNORMAL
ENTEROTOXIGEN E COLI, DNA: NEGATIVE
EOSINOPHIL # BLD: 0 K/UL (ref 0–0.4)
EOSINOPHIL NFR BLD: 0 % (ref 0–7)
ERYTHROCYTE [DISTWIDTH] IN BLOOD BY AUTOMATED COUNT: 16.6 % (ref 11.5–14.5)
GLUCOSE BLD STRIP.AUTO-MCNC: 107 MG/DL (ref 65–100)
GLUCOSE BLD STRIP.AUTO-MCNC: 118 MG/DL (ref 65–100)
GLUCOSE BLD STRIP.AUTO-MCNC: 142 MG/DL (ref 65–100)
GLUCOSE BLD STRIP.AUTO-MCNC: 145 MG/DL (ref 65–100)
GLUCOSE SERPL-MCNC: 101 MG/DL (ref 65–100)
HCT VFR BLD AUTO: 23.5 % (ref 35–47)
HGB BLD-MCNC: 7.8 G/DL (ref 11.5–16)
IMM GRANULOCYTES # BLD AUTO: 0.1 K/UL (ref 0–0.04)
IMM GRANULOCYTES NFR BLD AUTO: 1 % (ref 0–0.5)
LYMPHOCYTES # BLD: 0.9 K/UL (ref 0.8–3.5)
LYMPHOCYTES NFR BLD: 8 % (ref 12–49)
MCH RBC QN AUTO: 31.5 PG (ref 26–34)
MCHC RBC AUTO-ENTMCNC: 33.2 G/DL (ref 30–36.5)
MCV RBC AUTO: 94.8 FL (ref 80–99)
MONOCYTES # BLD: 0.7 K/UL (ref 0–1)
MONOCYTES NFR BLD: 6 % (ref 5–13)
NEUTS SEG # BLD: 9.1 K/UL (ref 1.8–8)
NEUTS SEG NFR BLD: 85 % (ref 32–75)
NRBC # BLD: 0 K/UL (ref 0–0.01)
NRBC BLD-RTO: 0 PER 100 WBC
P SHIGELLOIDES DNA STL QL NAA+PROBE: NEGATIVE
PLATELET # BLD AUTO: 142 K/UL (ref 150–400)
PMV BLD AUTO: 10.5 FL (ref 8.9–12.9)
POTASSIUM SERPL-SCNC: 3.4 MMOL/L (ref 3.5–5.1)
RBC # BLD AUTO: 2.48 M/UL (ref 3.8–5.2)
SALMONELLA SPECIES, DNA: NEGATIVE
SAMPLES BEING HELD,HOLD: NORMAL
SAMPLES BEING HELD,HOLD: NORMAL
SERVICE CMNT-IMP: ABNORMAL
SHIGA TOXIN PRODUCING, DNA: NEGATIVE
SHIGELLA SP+EIEC IPAH STL QL NAA+PROBE: NEGATIVE
SODIUM SERPL-SCNC: 141 MMOL/L (ref 136–145)
SODIUM UR-SCNC: 29 MMOL/L
VIBRIO SPECIES, DNA: NEGATIVE
WBC # BLD AUTO: 10.7 K/UL (ref 3.6–11)
Y. ENTEROCOLITICA, DNA: NEGATIVE

## 2021-02-06 PROCEDURE — 74011250636 HC RX REV CODE- 250/636: Performed by: FAMILY MEDICINE

## 2021-02-06 PROCEDURE — 74011250636 HC RX REV CODE- 250/636: Performed by: INTERNAL MEDICINE

## 2021-02-06 PROCEDURE — P9047 ALBUMIN (HUMAN), 25%, 50ML: HCPCS | Performed by: INTERNAL MEDICINE

## 2021-02-06 PROCEDURE — 80048 BASIC METABOLIC PNL TOTAL CA: CPT

## 2021-02-06 PROCEDURE — 84300 ASSAY OF URINE SODIUM: CPT

## 2021-02-06 PROCEDURE — 85025 COMPLETE CBC W/AUTO DIFF WBC: CPT

## 2021-02-06 PROCEDURE — 74011000250 HC RX REV CODE- 250: Performed by: INTERNAL MEDICINE

## 2021-02-06 PROCEDURE — 74011250637 HC RX REV CODE- 250/637: Performed by: INTERNAL MEDICINE

## 2021-02-06 PROCEDURE — 82570 ASSAY OF URINE CREATININE: CPT

## 2021-02-06 PROCEDURE — 82962 GLUCOSE BLOOD TEST: CPT

## 2021-02-06 PROCEDURE — 74011636637 HC RX REV CODE- 636/637: Performed by: INTERNAL MEDICINE

## 2021-02-06 PROCEDURE — 65660000001 HC RM ICU INTERMED STEPDOWN

## 2021-02-06 PROCEDURE — 36415 COLL VENOUS BLD VENIPUNCTURE: CPT

## 2021-02-06 RX ADMIN — ACETAMINOPHEN 650 MG: 325 TABLET ORAL at 22:44

## 2021-02-06 RX ADMIN — ASPIRIN 81 MG: 81 TABLET, CHEWABLE ORAL at 09:08

## 2021-02-06 RX ADMIN — Medication 10 ML: at 14:40

## 2021-02-06 RX ADMIN — HEPARIN SODIUM 5000 UNITS: 5000 INJECTION INTRAVENOUS; SUBCUTANEOUS at 13:39

## 2021-02-06 RX ADMIN — ACETAMINOPHEN 650 MG: 325 TABLET ORAL at 14:40

## 2021-02-06 RX ADMIN — SODIUM BICARBONATE: 84 INJECTION, SOLUTION INTRAVENOUS at 09:08

## 2021-02-06 RX ADMIN — CHOLESTYRAMINE 4 G: 4 POWDER, FOR SUSPENSION ORAL at 13:39

## 2021-02-06 RX ADMIN — SODIUM BICARBONATE: 84 INJECTION, SOLUTION INTRAVENOUS at 01:42

## 2021-02-06 RX ADMIN — HEPARIN SODIUM 5000 UNITS: 5000 INJECTION INTRAVENOUS; SUBCUTANEOUS at 21:47

## 2021-02-06 RX ADMIN — ALBUMIN (HUMAN) 25 G: 0.25 INJECTION, SOLUTION INTRAVENOUS at 00:06

## 2021-02-06 RX ADMIN — Medication 10 ML: at 06:03

## 2021-02-06 RX ADMIN — METRONIDAZOLE 500 MG: 500 INJECTION, SOLUTION INTRAVENOUS at 09:10

## 2021-02-06 RX ADMIN — Medication 1 CAPSULE: at 09:11

## 2021-02-06 RX ADMIN — ALBUMIN (HUMAN) 25 G: 0.25 INJECTION, SOLUTION INTRAVENOUS at 06:01

## 2021-02-06 RX ADMIN — METRONIDAZOLE 500 MG: 500 INJECTION, SOLUTION INTRAVENOUS at 21:12

## 2021-02-06 RX ADMIN — CHOLESTYRAMINE 4 G: 4 POWDER, FOR SUSPENSION ORAL at 09:09

## 2021-02-06 RX ADMIN — CHOLESTYRAMINE 4 G: 4 POWDER, FOR SUSPENSION ORAL at 17:09

## 2021-02-06 RX ADMIN — Medication 10 ML: at 21:48

## 2021-02-06 RX ADMIN — HEPARIN SODIUM 5000 UNITS: 5000 INJECTION INTRAVENOUS; SUBCUTANEOUS at 06:01

## 2021-02-06 RX ADMIN — SODIUM BICARBONATE: 84 INJECTION, SOLUTION INTRAVENOUS at 22:44

## 2021-02-06 RX ADMIN — INSULIN LISPRO 2 UNITS: 100 INJECTION, SOLUTION INTRAVENOUS; SUBCUTANEOUS at 17:09

## 2021-02-06 RX ADMIN — LEVOFLOXACIN 500 MG: 5 INJECTION, SOLUTION INTRAVENOUS at 06:01

## 2021-02-06 NOTE — PROGRESS NOTES
[address] [facility] Adult  Hospitalist Group                                                                                          Hospitalist Progress Note  Leonie Andre MD  Answering service: [phone] OR [phone] from in house phone              Progress Note    Patient: Jayna Aguirre MRN: [id number]  [id number]: [id number]    YOB: 1955  Age: 60 y.o. Sex: female      Admit Date: 2/3/2021    LOS: 2 days     Subjective:     Patient admitted after a syncopal episode. Patient stools more formed. Denies abdominal pain no vomiting. Objective:     Vitals:    02/06/21 0200 02/06/21 0400 02/06/21 0600 02/06/21 0947   BP: 104/63 (!) 119/59 116/60 134/66   Pulse: 75 68 66 77   Resp: 15 18 20 19   Temp: 98.7 °F (37.1 °C) 98.7 °F (37.1 °C) 98.6 °F (37 °C) 98.1 °F (36.7 °C)   SpO2: 98% 98% 100% 100%   Weight: 98.7 kg (217 lb 9.5 oz)      Height:            Intake and Output:  Current Shift: No intake/output data recorded. Last three shifts: No intake/output data recorded. Physical Exam:   GENERAL: alert, cooperative, no distress, appears stated age  THROAT & NECK: normal and no erythema or exudates noted. LUNG: clear to auscultation bilaterally  HEART: regular rate and rhythm, S1, S2 normal, no murmur, click, rub or gallop  ABDOMEN: soft, non-tender. Bowel sounds normal. No masses,  no organomegaly  EXTREMITIES:  extremities normal, atraumatic, no cyanosis or edema  SKIN: no rash or abnormalities  NEUROLOGIC: AOx3. PSYCHIATRIC: non focal    Lab/Data Review: All lab results for the last 24 hours reviewed.      Recent Results (from the past 24 hour(s))   ENTERIC BACTERIA PANEL, DNA    Collection Time: 02/05/21  2:57 PM   Result Value Ref Range    Shigella species, DNA Negative NEG      Campylobacter species, DNA Negative NEG      Vibrio species, DNA Negative NEG      Enterotoxigen E Coli, DNA Negative NEG      Shiga toxin producing, DNA Negative NEG      Salmonella species, DNA Negative NEG P. shigelloides, DNA Negative NEG      Y. enterocolitica, DNA Negative NEG     METABOLIC PANEL, COMPREHENSIVE    Collection Time: 02/05/21  2:57 PM   Result Value Ref Range    Sodium 142 136 - 145 mmol/L    Potassium 4.0 3.5 - 5.1 mmol/L    Chloride 112 (H) 97 - 108 mmol/L    CO2 13 (LL) 21 - 32 mmol/L    Anion gap 17 (H) 5 - 15 mmol/L    Glucose 134 (H) 65 - 100 mg/dL     (H) 6 - 20 MG/DL    Creatinine 4.01 (H) 0.55 - 1.02 MG/DL    BUN/Creatinine ratio 29 (H) 12 - 20      GFR est AA 14 (L) >60 ml/min/1.73m2    GFR est non-AA 11 (L) >60 ml/min/1.73m2    Calcium 9.2 8.5 - 10.1 MG/DL    Bilirubin, total 0.4 0.2 - 1.0 MG/DL    ALT (SGPT) 90 (H) 12 - 78 U/L    AST (SGOT) 191 (H) 15 - 37 U/L    Alk.  phosphatase 85 45 - 117 U/L    Protein, total 6.5 6.4 - 8.2 g/dL    Albumin 2.9 (L) 3.5 - 5.0 g/dL    Globulin 3.6 2.0 - 4.0 g/dL    A-G Ratio 0.8 (L) 1.1 - 2.2     MAGNESIUM    Collection Time: 02/05/21  2:57 PM   Result Value Ref Range    Magnesium 3.1 (H) 1.6 - 2.4 mg/dL   PHOSPHORUS    Collection Time: 02/05/21  2:57 PM   Result Value Ref Range    Phosphorus 6.9 (H) 2.6 - 4.7 MG/DL   CK    Collection Time: 02/05/21  2:57 PM   Result Value Ref Range    CK 2,652 (H) 26 - 192 U/L   GLUCOSE, POC    Collection Time: 02/05/21  4:28 PM   Result Value Ref Range    Glucose (POC) 115 (H) 65 - 100 mg/dL    Performed by [id number] [place] [address], POC    Collection Time: 02/05/21  9:16 PM   Result Value Ref Range    Glucose (POC) 122 (H) 65 - 100 mg/dL    Performed by [address], BASIC    Collection Time: 02/06/21 12:02 AM   Result Value Ref Range    Sodium 141 136 - 145 mmol/L    Potassium 3.4 (L) 3.5 - 5.1 mmol/L    Chloride 110 (H) 97 - 108 mmol/L    CO2 15 (LL) 21 - 32 mmol/L    Anion gap 16 (H) 5 - 15 mmol/L    Glucose 101 (H) 65 - 100 mg/dL     (H) 6 - 20 MG/DL    Creatinine 3.54 (H) 0.55 - 1.02 MG/DL    BUN/Creatinine ratio 32 (H) 12 - 20      GFR est AA 16 (L) >60 ml/min/1.73m2    GFR est non-AA 13 (L) >60 ml/min/1.73m2    Calcium 8.7 8.5 - 10.1 MG/DL   CBC WITH AUTOMATED DIFF    Collection Time: 02/06/21 12:02 AM   Result Value Ref Range    WBC 10.7 3.6 - 11.0 K/uL    RBC 2.48 (L) 3.80 - 5.20 M/uL    HGB 7.8 (L) 11.5 - 16.0 g/dL    HCT 23.5 (L) 35.0 - 47.0 %    MCV 94.8 80.0 - 99.0 FL    MCH 31.5 26.0 - 34.0 PG    MCHC 33.2 30.0 - 36.5 g/dL    RDW 16.6 (H) 11.5 - 14.5 %    PLATELET 166 (L) 960 - 400 K/uL    MPV 10.5 8.9 - 12.9 FL    NRBC 0.0 0  WBC    ABSOLUTE NRBC 0.00 0.00 - 0.01 K/uL    NEUTROPHILS 85 (H) 32 - 75 %    LYMPHOCYTES 8 (L) 12 - 49 %    MONOCYTES 6 5 - 13 %    EOSINOPHILS 0 0 - 7 %    BASOPHILS 0 0 - 1 %    IMMATURE GRANULOCYTES 1 (H) 0.0 - 0.5 %    ABS. NEUTROPHILS 9.1 (H) 1.8 - 8.0 K/UL    ABS. LYMPHOCYTES 0.9 0.8 - 3.5 K/UL    ABS. MONOCYTES 0.7 0.0 - 1.0 K/UL    ABS. EOSINOPHILS 0.0 0.0 - 0.4 K/UL    ABS. BASOPHILS 0.0 0.0 - 0.1 K/UL    ABS. IMM. GRANS. 0.1 (H) 0.00 - 0.04 K/UL    DF AUTOMATED     SAMPLES BEING HELD    Collection Time: 02/06/21 12:02 AM   Result Value Ref Range    SAMPLES BEING HELD  1 blue     COMMENT        Add-on orders for these samples will be processed based on acceptable specimen integrity and analyte stability, which may vary by analyte. GLUCOSE, POC    Collection Time: 02/06/21  7:59 AM   Result Value Ref Range    Glucose (POC) 107 (H) 65 - 100 mg/dL    Performed by Avery Perez    GLUCOSE, POC    Collection Time: 02/06/21 11:37 AM   Result Value Ref Range    Glucose (POC) 118 (H) 65 - 100 mg/dL    Performed by Segundo Lizarraga         Imaging:    No results found. Assessment and Plan:     Syncope  -MRI shows no acute hemorrhage or infarction.   MRA shows no aneurysm, dissection or evidence of hemodynamically significant stenosis  -Carotic duplex shows 56 9% stenosis of left internal carotic artery  -Echo pending  -Her syncope is likely due to probable orthostasis given her volume contracted status    Fall  -As a result of her syncope  -Negative work-up for acute fracture or injury    Proctocolitis  -Evident on CT abdomen and pelvis  -Follow stool studies negative  -continue empiric Zosyn and Flagyl    Hematochezia  -Likely from proctocolitis  -Monitor H&H  -GI following    Acute kidney injury  -In the setting of rhabdo and volume depletion from diarrhea  -Patient with baseline CKD stage III/IV  -Nephrology evaluating the patient  -Continue IV fluid and monitor renal function    Rhabdomyolysis  -As a result of fall  -Continue IV fluid    Metabolic acidosis  -Due to alkaline loss from GI tract as well as acute kidney injury  -Nephrology evaluating patient  -On bicarb drip    Thrombocytosis  -Likely due to acute reaction to infectious process  -Monitor CBC    Abnormal LFTs  -Also likely related to infectious process  -CT abdomen and pelvis without any acute liver or gallbladder pathology    Anemia  -Continue monitor H&H  -Unclear chronicity    Hypertension  -Hold antihypertensives for now as patient initially hypotensive  -Monitor blood pressure    Diabetes  -Continue insulin sliding scale coverage    Dyslipidemia  -Holding statin due to rhabdomyolysis    Discharge plan: Still needs inpatient treatment and monitoring. Plan PT evaluation in the next 1 to 2 days. Anticipated disposition is likely more than 48 hours.     Signed By: Bryan Verdin MD     February 6, 2021

## 2021-02-06 NOTE — PROGRESS NOTES
Bedside and Verbal shift change report given to Marco A Morris RN (oncoming nurse) by Leeann Loyd RN (offgoing nurse). Report included the following information SBAR, Kardex, Intake/Output, MAR, Recent Results, Cardiac Rhythm NSR and Quality Measures.

## 2021-02-06 NOTE — PROGRESS NOTES
Progress Note    Assessment:   Principal Problem:    Syncope (2/4/2021)    ella and ckd  oligoanuria  Acidemia  Diarrhea    - Uo may be better difficult to doc given diarrhea  - SCr better    - cont ivf  - document I/o as best as possible   - hold RRT at present         Plan:        Time spent with patient during dialysis no      Subjective:       Complaint:  diarrhea. no nv or sob. tells me urine out is better? Current Facility-Administered Medications   Medication Dose Route Frequency    metroNIDAZOLE (FLAGYL) IVPB premix 500 mg  500 mg IntraVENous Q12H    cholestyramine-aspartame (QUESTRAN LIGHT) packet 4 g  4 g Oral TID WITH MEALS    sodium bicarbonate (8.4%) 150 mEq in sterile water 1,000 mL infusion   IntraVENous CONTINUOUS    aspirin chewable tablet 81 mg  81 mg Oral DAILY    sodium chloride (NS) flush 5-40 mL  5-40 mL IntraVENous Q8H    sodium chloride (NS) flush 5-40 mL  5-40 mL IntraVENous PRN    acetaminophen (TYLENOL) tablet 650 mg  650 mg Oral Q6H PRN    Or    acetaminophen (TYLENOL) suppository 650 mg  650 mg Rectal Q6H PRN    promethazine (PHENERGAN) tablet 12.5 mg  12.5 mg Oral Q6H PRN    Or    ondansetron (ZOFRAN) injection 4 mg  4 mg IntraVENous Q6H PRN    heparin (porcine) injection 5,000 Units  5,000 Units SubCUTAneous Q8H    L.acidophilus-paracasei-S.thermophil-bifidobacter (RISAQUAD) 8 billion cell capsule  1 Cap Oral DAILY    insulin lispro (HUMALOG) injection   SubCUTAneous AC&HS    glucose chewable tablet 16 g  4 Tab Oral PRN    dextrose (D50W) injection syrg 12.5-25 g  12.5-25 g IntraVENous PRN    glucagon (GLUCAGEN) injection 1 mg  1 mg IntraMUSCular PRN    levoFLOXacin (LEVAQUIN) 500 mg in D5W IVPB  500 mg IntraVENous Q48H       Review of Systems  Pertinent items are noted in HPI.     Objective:     Visit Vitals  /60 (BP 1 Location: Right upper arm, BP Patient Position: At rest)   Pulse 66   Temp 98.6 °F (37 °C)   Resp 20   Ht 5' 5.5\" (1.664 m)   Wt 98.7 kg (217 lb 9.5 oz)   SpO2 100%   BMI 35.66 kg/m²     Temp (24hrs), Av.5 °F (36.9 °C), Min:98.2 °F (36.8 °C), Max:98.7 °F (37.1 °C)      No intake/output data recorded. Physical Exam:    General [    ] healthy     [x] acutely ill [    ] chronically ill   [    ] critical      Skin  [x] Nl color/turgor [    ]   Eyes  [x] EOMI [    ]    ENT  [x] clear/moist [    ]     Neck  [x] supple  [    ]    Pulm  [x] clear to A/P [    ]    CV  [x] RRR  [    ]   ABD  [] Soft  [    ]      [] Romero  [    ]    MS  [no LE] Edema  [    ]     Neuro             [x] nonfocal  [    ]    Psyche           [x] Nl   [    ]       Data Review:     LABS:   Recent Results (from the past 24 hour(s))   GLUCOSE, POC    Collection Time: 21 11:51 AM   Result Value Ref Range    Glucose (POC) 107 (H) 65 - 100 mg/dL    Performed by Louis Farrell    METABOLIC PANEL, COMPREHENSIVE    Collection Time: 21  2:57 PM   Result Value Ref Range    Sodium 142 136 - 145 mmol/L    Potassium 4.0 3.5 - 5.1 mmol/L    Chloride 112 (H) 97 - 108 mmol/L    CO2 13 (LL) 21 - 32 mmol/L    Anion gap 17 (H) 5 - 15 mmol/L    Glucose 134 (H) 65 - 100 mg/dL     (H) 6 - 20 MG/DL    Creatinine 4.01 (H) 0.55 - 1.02 MG/DL    BUN/Creatinine ratio 29 (H) 12 - 20      GFR est AA 14 (L) >60 ml/min/1.73m2    GFR est non-AA 11 (L) >60 ml/min/1.73m2    Calcium 9.2 8.5 - 10.1 MG/DL    Bilirubin, total 0.4 0.2 - 1.0 MG/DL    ALT (SGPT) 90 (H) 12 - 78 U/L    AST (SGOT) 191 (H) 15 - 37 U/L    Alk.  phosphatase 85 45 - 117 U/L    Protein, total 6.5 6.4 - 8.2 g/dL    Albumin 2.9 (L) 3.5 - 5.0 g/dL    Globulin 3.6 2.0 - 4.0 g/dL    A-G Ratio 0.8 (L) 1.1 - 2.2     MAGNESIUM    Collection Time: 21  2:57 PM   Result Value Ref Range    Magnesium 3.1 (H) 1.6 - 2.4 mg/dL   PHOSPHORUS    Collection Time: 21  2:57 PM   Result Value Ref Range    Phosphorus 6.9 (H) 2.6 - 4.7 MG/DL   CK    Collection Time: 21  2:57 PM   Result Value Ref Range    CK 2,652 (H) 26 - 192 U/L GLUCOSE, POC    Collection Time: 02/05/21  4:28 PM   Result Value Ref Range    Glucose (POC) 115 (H) 65 - 100 mg/dL    Performed by Maddi Haney    GLUCOSE, POC    Collection Time: 02/05/21  9:16 PM   Result Value Ref Range    Glucose (POC) 122 (H) 65 - 100 mg/dL    Performed by Corey Ansari , BASIC    Collection Time: 02/06/21 12:02 AM   Result Value Ref Range    Sodium 141 136 - 145 mmol/L    Potassium 3.4 (L) 3.5 - 5.1 mmol/L    Chloride 110 (H) 97 - 108 mmol/L    CO2 15 (LL) 21 - 32 mmol/L    Anion gap 16 (H) 5 - 15 mmol/L    Glucose 101 (H) 65 - 100 mg/dL     (H) 6 - 20 MG/DL    Creatinine 3.54 (H) 0.55 - 1.02 MG/DL    BUN/Creatinine ratio 32 (H) 12 - 20      GFR est AA 16 (L) >60 ml/min/1.73m2    GFR est non-AA 13 (L) >60 ml/min/1.73m2    Calcium 8.7 8.5 - 10.1 MG/DL   CBC WITH AUTOMATED DIFF    Collection Time: 02/06/21 12:02 AM   Result Value Ref Range    WBC 10.7 3.6 - 11.0 K/uL    RBC 2.48 (L) 3.80 - 5.20 M/uL    HGB 7.8 (L) 11.5 - 16.0 g/dL    HCT 23.5 (L) 35.0 - 47.0 %    MCV 94.8 80.0 - 99.0 FL    MCH 31.5 26.0 - 34.0 PG    MCHC 33.2 30.0 - 36.5 g/dL    RDW 16.6 (H) 11.5 - 14.5 %    PLATELET 100 (L) 157 - 400 K/uL    MPV 10.5 8.9 - 12.9 FL    NRBC 0.0 0  WBC    ABSOLUTE NRBC 0.00 0.00 - 0.01 K/uL    NEUTROPHILS 85 (H) 32 - 75 %    LYMPHOCYTES 8 (L) 12 - 49 %    MONOCYTES 6 5 - 13 %    EOSINOPHILS 0 0 - 7 %    BASOPHILS 0 0 - 1 %    IMMATURE GRANULOCYTES 1 (H) 0.0 - 0.5 %    ABS. NEUTROPHILS 9.1 (H) 1.8 - 8.0 K/UL    ABS. LYMPHOCYTES 0.9 0.8 - 3.5 K/UL    ABS. MONOCYTES 0.7 0.0 - 1.0 K/UL    ABS. EOSINOPHILS 0.0 0.0 - 0.4 K/UL    ABS. BASOPHILS 0.0 0.0 - 0.1 K/UL    ABS. IMM.  GRANS. 0.1 (H) 0.00 - 0.04 K/UL    DF AUTOMATED     SAMPLES BEING HELD    Collection Time: 02/06/21 12:02 AM   Result Value Ref Range    SAMPLES BEING HELD  1 blue     COMMENT        Add-on orders for these samples will be processed based on acceptable specimen integrity and analyte stability, which may vary by analyte. GLUCOSE, POC    Collection Time: 02/06/21  7:59 AM   Result Value Ref Range    Glucose (POC) 107 (H) 65 - 100 mg/dL    Performed by Jose Helton                  Signed By: Rogelio Romero MD, [place]                      February 6, 2021                      [email or web address]

## 2021-02-07 LAB
ANION GAP SERPL CALC-SCNC: 10 MMOL/L (ref 5–15)
BASOPHILS # BLD: 0 K/UL (ref 0–0.1)
BASOPHILS NFR BLD: 0 % (ref 0–1)
BUN SERPL-MCNC: 98 MG/DL (ref 6–20)
BUN/CREAT SERPL: 36 (ref 12–20)
CALCIUM SERPL-MCNC: 8.5 MG/DL (ref 8.5–10.1)
CHLORIDE SERPL-SCNC: 105 MMOL/L (ref 97–108)
CO2 SERPL-SCNC: 25 MMOL/L (ref 21–32)
CREAT SERPL-MCNC: 2.7 MG/DL (ref 0.55–1.02)
DIFFERENTIAL METHOD BLD: ABNORMAL
EOSINOPHIL # BLD: 0.1 K/UL (ref 0–0.4)
EOSINOPHIL NFR BLD: 1 % (ref 0–7)
ERYTHROCYTE [DISTWIDTH] IN BLOOD BY AUTOMATED COUNT: 16.2 % (ref 11.5–14.5)
GLUCOSE BLD STRIP.AUTO-MCNC: 115 MG/DL (ref 65–100)
GLUCOSE BLD STRIP.AUTO-MCNC: 132 MG/DL (ref 65–100)
GLUCOSE BLD STRIP.AUTO-MCNC: 142 MG/DL (ref 65–100)
GLUCOSE BLD STRIP.AUTO-MCNC: 93 MG/DL (ref 65–100)
GLUCOSE SERPL-MCNC: 111 MG/DL (ref 65–100)
HCT VFR BLD AUTO: 22.4 % (ref 35–47)
HGB BLD-MCNC: 7.4 G/DL (ref 11.5–16)
IMM GRANULOCYTES # BLD AUTO: 0.1 K/UL (ref 0–0.04)
IMM GRANULOCYTES NFR BLD AUTO: 2 % (ref 0–0.5)
LYMPHOCYTES # BLD: 1.6 K/UL (ref 0.8–3.5)
LYMPHOCYTES NFR BLD: 19 % (ref 12–49)
MCH RBC QN AUTO: 31.1 PG (ref 26–34)
MCHC RBC AUTO-ENTMCNC: 33 G/DL (ref 30–36.5)
MCV RBC AUTO: 94.1 FL (ref 80–99)
MONOCYTES # BLD: 0.9 K/UL (ref 0–1)
MONOCYTES NFR BLD: 11 % (ref 5–13)
NEUTS SEG # BLD: 5.7 K/UL (ref 1.8–8)
NEUTS SEG NFR BLD: 67 % (ref 32–75)
NRBC # BLD: 0 K/UL (ref 0–0.01)
NRBC BLD-RTO: 0 PER 100 WBC
PLATELET # BLD AUTO: 125 K/UL (ref 150–400)
PMV BLD AUTO: 10.3 FL (ref 8.9–12.9)
POTASSIUM SERPL-SCNC: 3.2 MMOL/L (ref 3.5–5.1)
RBC # BLD AUTO: 2.38 M/UL (ref 3.8–5.2)
SERVICE CMNT-IMP: ABNORMAL
SERVICE CMNT-IMP: NORMAL
SODIUM SERPL-SCNC: 140 MMOL/L (ref 136–145)
WBC # BLD AUTO: 8.4 K/UL (ref 3.6–11)

## 2021-02-07 PROCEDURE — 74011250637 HC RX REV CODE- 250/637: Performed by: INTERNAL MEDICINE

## 2021-02-07 PROCEDURE — 74011636637 HC RX REV CODE- 636/637: Performed by: INTERNAL MEDICINE

## 2021-02-07 PROCEDURE — 74011250637 HC RX REV CODE- 250/637: Performed by: FAMILY MEDICINE

## 2021-02-07 PROCEDURE — 74011250636 HC RX REV CODE- 250/636: Performed by: FAMILY MEDICINE

## 2021-02-07 PROCEDURE — 82962 GLUCOSE BLOOD TEST: CPT

## 2021-02-07 PROCEDURE — 74011250636 HC RX REV CODE- 250/636: Performed by: INTERNAL MEDICINE

## 2021-02-07 PROCEDURE — 74011000250 HC RX REV CODE- 250: Performed by: INTERNAL MEDICINE

## 2021-02-07 PROCEDURE — 80048 BASIC METABOLIC PNL TOTAL CA: CPT

## 2021-02-07 PROCEDURE — 65660000001 HC RM ICU INTERMED STEPDOWN

## 2021-02-07 PROCEDURE — 36415 COLL VENOUS BLD VENIPUNCTURE: CPT

## 2021-02-07 PROCEDURE — 77030037877 HC DRSG MEPILEX >48IN BORD MOLN -A

## 2021-02-07 PROCEDURE — 85025 COMPLETE CBC W/AUTO DIFF WBC: CPT

## 2021-02-07 RX ORDER — LEVOFLOXACIN 5 MG/ML
750 INJECTION, SOLUTION INTRAVENOUS
Status: COMPLETED | OUTPATIENT
Start: 2021-02-08 | End: 2021-02-12

## 2021-02-07 RX ORDER — LOPERAMIDE HYDROCHLORIDE 2 MG/1
4 CAPSULE ORAL
Status: COMPLETED | OUTPATIENT
Start: 2021-02-07 | End: 2021-02-08

## 2021-02-07 RX ORDER — POTASSIUM CHLORIDE 7.45 MG/ML
10 INJECTION INTRAVENOUS
Status: COMPLETED | OUTPATIENT
Start: 2021-02-07 | End: 2021-02-07

## 2021-02-07 RX ADMIN — SODIUM BICARBONATE: 84 INJECTION, SOLUTION INTRAVENOUS at 10:22

## 2021-02-07 RX ADMIN — POTASSIUM CHLORIDE 10 MEQ: 7.46 INJECTION, SOLUTION INTRAVENOUS at 10:22

## 2021-02-07 RX ADMIN — Medication 1 CAPSULE: at 08:50

## 2021-02-07 RX ADMIN — POTASSIUM CHLORIDE 10 MEQ: 7.46 INJECTION, SOLUTION INTRAVENOUS at 11:50

## 2021-02-07 RX ADMIN — Medication 10 ML: at 14:30

## 2021-02-07 RX ADMIN — Medication 10 ML: at 07:23

## 2021-02-07 RX ADMIN — LOPERAMIDE HYDROCHLORIDE 4 MG: 2 CAPSULE ORAL at 18:17

## 2021-02-07 RX ADMIN — Medication 10 ML: at 22:00

## 2021-02-07 RX ADMIN — INSULIN LISPRO 2 UNITS: 100 INJECTION, SOLUTION INTRAVENOUS; SUBCUTANEOUS at 11:50

## 2021-02-07 RX ADMIN — METRONIDAZOLE 500 MG: 500 INJECTION, SOLUTION INTRAVENOUS at 20:37

## 2021-02-07 RX ADMIN — HEPARIN SODIUM 5000 UNITS: 5000 INJECTION INTRAVENOUS; SUBCUTANEOUS at 07:23

## 2021-02-07 RX ADMIN — METRONIDAZOLE 500 MG: 500 INJECTION, SOLUTION INTRAVENOUS at 08:50

## 2021-02-07 RX ADMIN — LOPERAMIDE HYDROCHLORIDE 4 MG: 2 CAPSULE ORAL at 10:29

## 2021-02-07 RX ADMIN — ACETAMINOPHEN 650 MG: 325 TABLET ORAL at 22:47

## 2021-02-07 RX ADMIN — CHOLESTYRAMINE 4 G: 4 POWDER, FOR SUSPENSION ORAL at 08:50

## 2021-02-07 RX ADMIN — HEPARIN SODIUM 5000 UNITS: 5000 INJECTION INTRAVENOUS; SUBCUTANEOUS at 22:10

## 2021-02-07 RX ADMIN — SODIUM BICARBONATE: 84 INJECTION, SOLUTION INTRAVENOUS at 07:23

## 2021-02-07 RX ADMIN — CHOLESTYRAMINE 4 G: 4 POWDER, FOR SUSPENSION ORAL at 11:51

## 2021-02-07 RX ADMIN — HEPARIN SODIUM 5000 UNITS: 5000 INJECTION INTRAVENOUS; SUBCUTANEOUS at 14:30

## 2021-02-07 RX ADMIN — CHOLESTYRAMINE 4 G: 4 POWDER, FOR SUSPENSION ORAL at 17:07

## 2021-02-07 RX ADMIN — ACETAMINOPHEN 650 MG: 325 TABLET ORAL at 14:36

## 2021-02-07 RX ADMIN — ASPIRIN 81 MG: 81 TABLET, CHEWABLE ORAL at 08:50

## 2021-02-07 NOTE — PROGRESS NOTES
Problem: Falls - Risk of  Goal: *Absence of Falls  Description: Document Ector Cea Fall Risk and appropriate interventions in the flowsheet.   Outcome: Progressing Towards Goal  Note: Fall Risk Interventions:  Mobility Interventions: Bed/chair exit alarm, Communicate number of staff needed for ambulation/transfer, OT consult for ADLs, Patient to call before getting OOB, PT Consult for mobility concerns, Utilize walker, cane, or other assistive device              Elimination Interventions: Call light in reach, Patient to call for help with toileting needs, Toileting schedule/hourly rounds    History of Falls Interventions: Bed/chair exit alarm, Door open when patient unattended, Room close to nurse's station, Vital signs minimum Q4HRs X 24 hrs (comment for end date)         Problem: Patient Education: Go to Patient Education Activity  Goal: Patient/Family Education  Outcome: Progressing Towards Goal

## 2021-02-07 NOTE — PROGRESS NOTES
Problem: Falls - Risk of  Goal: *Absence of Falls  Description: Document Suresh Schrader Fall Risk and appropriate interventions in the flowsheet. Outcome: Progressing Towards Goal  Note: Fall Risk Interventions:  Mobility Interventions: Bed/chair exit alarm, Patient to call before getting OOB    Elimination Interventions: Call light in reach, Elevated toilet seat, Stay With Me (per policy)    History of Falls Interventions: Consult care management for discharge planning, Investigate reason for fall         Problem: Pressure Injury - Risk of  Goal: *Prevention of pressure injury  Description: Document Ra Scale and appropriate interventions in the flowsheet.   Outcome: Progressing Towards Goal  Note: Pressure Injury Interventions:       Moisture Interventions: Apply protective barrier, creams and emollients, Minimize layers, Maintain skin hydration (lotion/cream)    Activity Interventions: Chair cushion, Increase time out of bed, Pressure redistribution bed/mattress(bed type)    Mobility Interventions: Float heels, HOB 30 degrees or less, Pressure redistribution bed/mattress (bed type)    Nutrition Interventions: Document food/fluid/supplement intake    Friction and Shear Interventions: Lift sheet, Minimize layers, Apply protective barrier, creams and emollients

## 2021-02-07 NOTE — PROGRESS NOTES
[address], [address] [facility]    GI PROGRESS NOTE    NAME: Dionisio Jo   :  1955   MRN:  [id number]       Subjective:     Doing better      Objective:     VITALS:   Last 24hrs VS reviewed since prior progress note. Most recent are:  Visit Vitals  BP 90/60 (BP 1 Location: Right upper arm, BP Patient Position: At rest)   Pulse 67   Temp 98.8 °F (37.1 °C)   Resp 11   Ht 5' 5.5\" (1.664 m)   Wt 98.7 kg (217 lb 9.5 oz)   SpO2 99%   BMI 35.66 kg/m²       Intake/Output Summary (Last 24 hours) at 2021 1709  Last data filed at 2021 0120  Gross per 24 hour   Intake 750 ml   Output 800 ml   Net -50 ml     PHYSICAL EXAM:  General: WD, WN. Alert, cooperative, no acute distress    HEENT: NC, Atraumatic. PERRLA, EOMI. Anicteric sclerae. Lungs:  CTA Bilaterally. No Wheezing/Rhonchi/Rales. Heart:  Regular  rhythm,  No murmur (), No Rubs, No Gallops  Abdomen: Soft, Non distended, Non tender.  +Bowel sounds, no HSM  Extremities: No c/c/e  Neurologic:  CN 2-12 gi, Alert and oriented X 3. No acute neurological distress   Psych:   Good insight. Not anxious nor agitated.     Lab Data Reviewed:   Recent Labs     21  0104 21  0002   WBC 8.4 10.7   HGB 7.4* 7.8*   HCT 22.4* 23.5*   * 142*     Recent Labs     21  0104 21  0002 21  1457    141 142   K 3.2* 3.4* 4.0    110* 112*   CO2 25 15* 13*   BUN 98* 113* 116*   CREA 2.70* 3.54* 4.01*   * 101* 134*   PHOS  --   --  6.9*   CA 8.5 8.7 9.2     Recent Labs     21  1457 21  0130   AP 85 78   TP 6.5 6.1*   ALB 2.9* 2.6*   GLOB 3.6 3.5       ________________________________________________________________________       Assessment:        Patient Active Problem List   Diagnosis Code    Obesity E66.9    Venous insufficiency I87.2    LBP (low back pain) M54.5    Type 2 diabetes mellitus with neurologic complication (HCC) A32.64    Diabetic foot ulcer (HCC) E11.621, L97.509    Hypokalemia E87.6    PAD (peripheral artery disease) (HCC) I73.9    Osteomyelitis of foot, left, acute (HCC) M86.172    CKD (chronic kidney disease), stage III N18.30    [facility]ating [facility] ([facility] E11.59, I10    Type 2 diabetes with nephropathy ([facility] E11.21    Syncope R55     Plan:   · Continue on supportive care  · Dr Arnulfo Khan will follow in am      Signed By: Duarte Avalos MD     2/7/2021  5:09 PM

## 2021-02-07 NOTE — ROUTINE PROCESS
Bedside shift change report given to [address] (oncoming nurse) by Yisel Hare RN (offgoing nurse). Report included the following information SBAR, Kardex, ED Summary, Intake/Output, MAR, Cardiac Rhythm NSR and Dual Neuro Assessment.

## 2021-02-07 NOTE — PROGRESS NOTES
[facility] Adult  Hospitalist Group                                                                                          Hospitalist Progress Note  Lesia Davila MD  Answering service: [phone] OR [phone] from in house phone              Progress Note    Patient: Brendan Pickett MRN: [id number]  SSN: [id number]    YOB: 1955  Age: 60 y.o. Sex: female      Admit Date: 2/3/2021    LOS: 3 days     Subjective:     Patient admitted after a syncopal episode. Patient stools more formed. Denies abdominal pain no vomiting. Objective:     Vitals:    02/06/21 1436 02/06/21 1822 02/06/21 2200 02/07/21 0600   BP: 126/86 (!) 109/56 118/60 (!) 122/53   Pulse: 64 70 63 66   Resp: 16 14 20 18   Temp: 98.1 °F (36.7 °C) 98.2 °F (36.8 °C) 98.1 °F (36.7 °C) 98 °F (36.7 °C)   SpO2: 100% 100% 99% 98%   Weight:       Height:            Intake and Output:  Current Shift: No intake/output data recorded. Last three shifts: 02/05 1901 - 02/07 0700  In: 750 [P.O.:360; I.V.:390]  Out: 1225 [Urine:1225]    Physical Exam:   GENERAL: alert, cooperative, no distress, appears stated age  THROAT & NECK: normal and no erythema or exudates noted. LUNG: clear to auscultation bilaterally  HEART: regular rate and rhythm, S1, S2 normal, no murmur, click, rub or gallop  ABDOMEN: soft, non-tender. Bowel sounds normal. No masses,  no organomegaly  EXTREMITIES:  extremities normal, atraumatic, no cyanosis or edema  SKIN: no rash or abnormalities  NEUROLOGIC: AOx3. PSYCHIATRIC: non focal    Lab/Data Review: All lab results for the last 24 hours reviewed.      Recent Results (from the past 24 hour(s))   GLUCOSE, POC    Collection Time: 02/06/21 11:37 AM   Result Value Ref Range    Glucose (POC) 118 (H) 65 - 100 mg/dL    Performed by SHANNA Crews, RANDOM    Collection Time: 02/06/21  2:45 PM   Result Value Ref Range    Creatinine, urine 47.90 mg/dL   SAMPLES BEING HELD    Collection Time: 02/06/21 2:45 PM   Result Value Ref Range    SAMPLES BEING HELD 1[facility]     COMMENT        Add-on orders for these samples will be processed based on acceptable specimen integrity and analyte stability, which may vary by analyte. SODIUM, UR, RANDOM    Collection Time: 02/06/21  2:45 PM   Result Value Ref Range    Sodium,urine random 29 MMOL/L   GLUCOSE, POC    Collection Time: 02/06/21  4:11 PM   Result Value Ref Range    Glucose (POC) 145 (H) 65 - 100 mg/dL    Performed by Citlaly, POC    Collection Time: 02/06/21  9:46 PM   Result Value Ref Range    Glucose (POC) 142 (H) 65 - 100 mg/dL    Performed by [facility]    METABOLIC PANEL, BASIC    Collection Time: 02/07/21  1:04 AM   Result Value Ref Range    Sodium 140 136 - 145 mmol/L    Potassium 3.2 (L) 3.5 - 5.1 mmol/L    Chloride 105 97 - 108 mmol/L    CO2 25 21 - 32 mmol/L    Anion gap 10 5 - 15 mmol/L    Glucose 111 (H) 65 - 100 mg/dL    BUN 98 (H) 6 - 20 MG/DL    Creatinine 2.70 (H) 0.55 - 1.02 MG/DL    BUN/Creatinine ratio 36 (H) 12 - 20      GFR est AA 22 (L) >60 ml/min/1.73m2    GFR est non-AA 18 (L) >60 ml/min/1.73m2    Calcium 8.5 8.5 - 10.1 MG/DL   CBC WITH AUTOMATED DIFF    Collection Time: 02/07/21  1:04 AM   Result Value Ref Range    WBC 8.4 3.6 - 11.0 K/uL    RBC 2.38 (L) 3.80 - 5.20 M/uL    HGB 7.4 (L) 11.5 - 16.0 g/dL    HCT 22.4 (L) 35.0 - 47.0 %    MCV 94.1 80.0 - 99.0 FL    MCH 31.1 26.0 - 34.0 PG    MCHC 33.0 30.0 - 36.5 g/dL    RDW 16.2 (H) 11.5 - 14.5 %    PLATELET 257 (L) 580 - 400 K/uL    MPV 10.3 8.9 - 12.9 FL    NRBC 0.0 0  WBC    ABSOLUTE NRBC 0.00 0.00 - 0.01 K/uL    NEUTROPHILS 67 32 - 75 %    LYMPHOCYTES 19 12 - 49 %    MONOCYTES 11 5 - 13 %    EOSINOPHILS 1 0 - 7 %    BASOPHILS 0 0 - 1 %    IMMATURE GRANULOCYTES 2 (H) 0.0 - 0.5 %    ABS. NEUTROPHILS 5.7 1.8 - 8.0 K/UL    ABS. LYMPHOCYTES 1.6 0.8 - 3.5 K/UL    ABS. MONOCYTES 0.9 0.0 - 1.0 K/UL    ABS. EOSINOPHILS 0.1 0.0 - 0.4 K/UL    ABS.  BASOPHILS 0.0 0.0 - 0.1 K/UL    ABS. IMM. GRANS. 0.1 (H) 0.00 - 0.04 K/UL    DF AUTOMATED     GLUCOSE, POC    Collection Time: 02/07/21  7:21 AM   Result Value Ref Range    Glucose (POC) 93 65 - 100 mg/dL    Performed by Eleonora Montalvo         Imaging:    No results found. Assessment and Plan:     Syncope  -MRI shows no acute hemorrhage or infarction. MRA shows no aneurysm, dissection or evidence of hemodynamically significant stenosis  -Carotic duplex shows 56 9% stenosis of left internal carotic artery  -Echo pending  -Her syncope is likely due to probable orthostasis given her volume contracted status    Fall  -As a result of her syncope  -Negative work-up for acute fracture or injury    Proctocolitis  -Evident on CT abdomen and pelvis  -Follow stool studies negative  -continue empiric Zosyn and Flagyl  -Add immodium prn for loose stool    Hematochezia  -Likely from proctocolitis  -Monitor H&H  -GI following    Acute kidney injury  -In the setting of rhabdo and volume depletion from diarrhea  -Patient with baseline CKD stage III/IV  -Nephrology evaluating the patient  -Continue IV fluid and monitor renal function, overall improving    Rhabdomyolysis  -As a result of fall  -Continue IV fluid    Metabolic acidosis  -Due to alkaline loss from GI tract as well as acute kidney injury  -Nephrology evaluating patient  -On bicarb drip    Thrombocytosis  -Likely due to acute reaction to infectious process  -Monitor CBC    Abnormal LFTs  -Also likely related to infectious process  -CT abdomen and pelvis without any acute liver or gallbladder pathology    Anemia  -Continue monitor H&H  -Unclear chronicity    Hypertension  -Hold antihypertensives for now as patient initially hypotensive  -Monitor blood pressure    Diabetes  -Continue insulin sliding scale coverage    Dyslipidemia  -Holding statin due to rhabdomyolysis    Discharge plan: Still needs inpatient treatment and monitoring.   Plan PT evaluation in the next 1 to 2 days.    Anticipated disposition is likely more than 48 hours.     Signed By: Lex Paul MD     February 7, 2021

## 2021-02-07 NOTE — PROGRESS NOTES
Progress Note    Assessment:   Principal Problem:    Syncope (2/4/2021)        ella and ckd  oligoanuria  Acidemia  Diarrhea     Feels better  Urine >1000ml  tCO2 ok on NaHCO3  K+ a little low  Still w/ diarrhea    - KCl peripherally - does not need CVC for this alone  - decr NaHCO3 iv  Creat better as is Urine out          Plan:        Time spent with patient during dialysis no      Subjective:       Complaint:  as above      Current Facility-Administered Medications   Medication Dose Route Frequency    sodium bicarbonate (8.4%) 100 mEq in sterile water 1,000 mL infusion   IntraVENous CONTINUOUS    potassium chloride 20 mEq in 50 ml IVPB  20 mEq IntraVENous ONCE    metroNIDAZOLE (FLAGYL) IVPB premix 500 mg  500 mg IntraVENous Q12H    cholestyramine-aspartame (QUESTRAN LIGHT) packet 4 g  4 g Oral TID WITH MEALS    aspirin chewable tablet 81 mg  81 mg Oral DAILY    sodium chloride (NS) flush 5-40 mL  5-40 mL IntraVENous Q8H    sodium chloride (NS) flush 5-40 mL  5-40 mL IntraVENous PRN    acetaminophen (TYLENOL) tablet 650 mg  650 mg Oral Q6H PRN    Or    acetaminophen (TYLENOL) suppository 650 mg  650 mg Rectal Q6H PRN    promethazine (PHENERGAN) tablet 12.5 mg  12.5 mg Oral Q6H PRN    Or    ondansetron (ZOFRAN) injection 4 mg  4 mg IntraVENous Q6H PRN    heparin (porcine) injection 5,000 Units  5,000 Units SubCUTAneous Q8H    L.acidophilus-paracasei-S.thermophil-bifidobacter (RISAQUAD) 8 billion cell capsule  1 Cap Oral DAILY    insulin lispro (HUMALOG) injection   SubCUTAneous AC&HS    glucose chewable tablet 16 g  4 Tab Oral PRN    dextrose (D50W) injection syrg 12.5-25 g  12.5-25 g IntraVENous PRN    glucagon (GLUCAGEN) injection 1 mg  1 mg IntraMUSCular PRN    levoFLOXacin (LEVAQUIN) 500 mg in D5W IVPB  500 mg IntraVENous Q48H       Review of Systems  Pertinent items are noted in HPI. Objective:     Visit Vitals  BP (!) 122/53 (BP 1 Location: Right upper arm, BP Patient Position:  At rest)   Pulse 66   Temp 98 °F (36.7 °C)   Resp 18   Ht 5' 5.5\" (1.664 m)   Wt 98.7 kg (217 lb 9.5 oz)   SpO2 98%   BMI 35.66 kg/m²     Temp (24hrs), Av.1 °F (36.7 °C), Min:98 °F (36.7 °C), Max:98.2 °F (36.8 °C)      No intake/output data recorded. Physical Exam:    General [    ] healthy     [x] acutely ill [    ] chronically ill   [    ] critical      Skin  [x] Nl color/turgor [    ]   Eyes  [x] EOMI [    ]    ENT  [x] clear/moist [    ]     Neck  [x] supple  [    ]    Pulm  [x] no distress [    ]    CV  [] RRR  [    ]   ABD  [] Soft  [    ]      [] Romero  [    ]    MS  [] Edema  [    ]     Neuro             [x] nonfocal  [    ]    Psyche           [x] Nl   [    ]       Data Review:     LABS:   Recent Results (from the past 24 hour(s))   GLUCOSE, POC    Collection Time: 21 11:37 AM   Result Value Ref Range    Glucose (POC) 118 (H) 65 - 100 mg/dL    Performed by SHANNA Ramos, RANDOM    Collection Time: 21  2:45 PM   Result Value Ref Range    Creatinine, urine 47.90 mg/dL   SAMPLES BEING HELD    Collection Time: 21  2:45 PM   Result Value Ref Range    SAMPLES BEING HELD 1[facility]     COMMENT        Add-on orders for these samples will be processed based on acceptable specimen integrity and analyte stability, which may vary by analyte.    SODIUM, UR, RANDOM    Collection Time: 21  2:45 PM   Result Value Ref Range    Sodium,urine random 29 MMOL/L   GLUCOSE, POC    Collection Time: 21  4:11 PM   Result Value Ref Range    Glucose (POC) 145 (H) 65 - 100 mg/dL    Performed by Citlaly, POC    Collection Time: 21  9:46 PM   Result Value Ref Range    Glucose (POC) 142 (H) 65 - 100 mg/dL    Performed by  E   BASIC    Collection Time: 21  1:04 AM   Result Value Ref Range    Sodium 140 136 - 145 mmol/L    Potassium 3.2 (L) 3.5 - 5.1 mmol/L    Chloride 105 97 - 108 mmol/L    CO2 25 21 - 32 mmol/L    Anion gap 10 5 - 15 mmol/L    Glucose 111 (H) 65 - 100 mg/dL    BUN 98 (H) 6 - 20 MG/DL    Creatinine 2.70 (H) 0.55 - 1.02 MG/DL    BUN/Creatinine ratio 36 (H) 12 - 20      GFR est AA 22 (L) >60 ml/min/1.73m2    GFR est non-AA 18 (L) >60 ml/min/1.73m2    Calcium 8.5 8.5 - 10.1 MG/DL   CBC WITH AUTOMATED DIFF    Collection Time: 02/07/21  1:04 AM   Result Value Ref Range    WBC 8.4 3.6 - 11.0 K/uL    RBC 2.38 (L) 3.80 - 5.20 M/uL    HGB 7.4 (L) 11.5 - 16.0 g/dL    HCT 22.4 (L) 35.0 - 47.0 %    MCV 94.1 80.0 - 99.0 FL    MCH 31.1 26.0 - 34.0 PG    MCHC 33.0 30.0 - 36.5 g/dL    RDW 16.2 (H) 11.5 - 14.5 %    PLATELET 972 (L) 762 - 400 K/uL    MPV 10.3 8.9 - 12.9 FL    NRBC 0.0 0  WBC    ABSOLUTE NRBC 0.00 0.00 - 0.01 K/uL    NEUTROPHILS 67 32 - 75 %    LYMPHOCYTES 19 12 - 49 %    MONOCYTES 11 5 - 13 %    EOSINOPHILS 1 0 - 7 %    BASOPHILS 0 0 - 1 %    IMMATURE GRANULOCYTES 2 (H) 0.0 - 0.5 %    ABS. NEUTROPHILS 5.7 1.8 - 8.0 K/UL    ABS. LYMPHOCYTES 1.6 0.8 - 3.5 K/UL    ABS. MONOCYTES 0.9 0.0 - 1.0 K/UL    ABS. EOSINOPHILS 0.1 0.0 - 0.4 K/UL    ABS. BASOPHILS 0.0 0.0 - 0.1 K/UL    ABS. IMM. GRANS. 0.1 (H) 0.00 - 0.04 K/UL    DF AUTOMATED     GLUCOSE, POC    Collection Time: 02/07/21  7:21 AM   Result Value Ref Range    Glucose (POC) 93 65 - 100 mg/dL    Performed by Eleonora Montalvo                  Signed By: Solis Arriaga MD, LISSAN                      February 7, 2021                      [email or web address]

## 2021-02-07 NOTE — PROGRESS NOTES
Pharmacy Renal Dosing/Monitoring  Medication: levofloxacin   Current regimen:  500 mg every 48 hr  Recent Labs     02/07/21  0104 02/06/21  0002 02/05/21  1457   CREA 2.70* 3.54* 4.01*   BUN 98* 113* 116*     Estimated CrCl:  25 ml/min  Plan: Change to 750 mg q48h for crcl 20-49 ml/min

## 2021-02-08 ENCOUNTER — APPOINTMENT (OUTPATIENT)
Dept: NON INVASIVE DIAGNOSTICS | Age: 66
DRG: 378 | End: 2021-02-08
Attending: FAMILY MEDICINE
Payer: COMMERCIAL

## 2021-02-08 LAB
ANION GAP SERPL CALC-SCNC: 8 MMOL/L (ref 5–15)
BUN SERPL-MCNC: 78 MG/DL (ref 6–20)
BUN/CREAT SERPL: 34 (ref 12–20)
CALCIUM SERPL-MCNC: 8.2 MG/DL (ref 8.5–10.1)
CHLORIDE SERPL-SCNC: 102 MMOL/L (ref 97–108)
CO2 SERPL-SCNC: 30 MMOL/L (ref 21–32)
CREAT SERPL-MCNC: 2.3 MG/DL (ref 0.55–1.02)
GLUCOSE BLD STRIP.AUTO-MCNC: 103 MG/DL (ref 65–100)
GLUCOSE BLD STRIP.AUTO-MCNC: 119 MG/DL (ref 65–100)
GLUCOSE BLD STRIP.AUTO-MCNC: 122 MG/DL (ref 65–100)
GLUCOSE BLD STRIP.AUTO-MCNC: 129 MG/DL (ref 65–100)
GLUCOSE SERPL-MCNC: 94 MG/DL (ref 65–100)
POTASSIUM SERPL-SCNC: 3.6 MMOL/L (ref 3.5–5.1)
SERVICE CMNT-IMP: ABNORMAL
SODIUM SERPL-SCNC: 140 MMOL/L (ref 136–145)

## 2021-02-08 PROCEDURE — 74011250636 HC RX REV CODE- 250/636: Performed by: INTERNAL MEDICINE

## 2021-02-08 PROCEDURE — 80048 BASIC METABOLIC PNL TOTAL CA: CPT

## 2021-02-08 PROCEDURE — 74011250637 HC RX REV CODE- 250/637: Performed by: INTERNAL MEDICINE

## 2021-02-08 PROCEDURE — 74011250636 HC RX REV CODE- 250/636: Performed by: FAMILY MEDICINE

## 2021-02-08 PROCEDURE — 94760 N-INVAS EAR/PLS OXIMETRY 1: CPT

## 2021-02-08 PROCEDURE — 97530 THERAPEUTIC ACTIVITIES: CPT

## 2021-02-08 PROCEDURE — 74011250637 HC RX REV CODE- 250/637: Performed by: FAMILY MEDICINE

## 2021-02-08 PROCEDURE — 36415 COLL VENOUS BLD VENIPUNCTURE: CPT

## 2021-02-08 PROCEDURE — 82962 GLUCOSE BLOOD TEST: CPT

## 2021-02-08 PROCEDURE — 97535 SELF CARE MNGMENT TRAINING: CPT

## 2021-02-08 PROCEDURE — 97116 GAIT TRAINING THERAPY: CPT

## 2021-02-08 PROCEDURE — 74011000250 HC RX REV CODE- 250: Performed by: INTERNAL MEDICINE

## 2021-02-08 PROCEDURE — 65660000001 HC RM ICU INTERMED STEPDOWN

## 2021-02-08 RX ORDER — SODIUM CHLORIDE 9 MG/ML
100 INJECTION, SOLUTION INTRAVENOUS CONTINUOUS
Status: DISCONTINUED | OUTPATIENT
Start: 2021-02-08 | End: 2021-02-09

## 2021-02-08 RX ADMIN — LOPERAMIDE HYDROCHLORIDE 4 MG: 2 CAPSULE ORAL at 15:53

## 2021-02-08 RX ADMIN — LOPERAMIDE HYDROCHLORIDE 4 MG: 2 CAPSULE ORAL at 08:44

## 2021-02-08 RX ADMIN — SODIUM BICARBONATE: 84 INJECTION, SOLUTION INTRAVENOUS at 01:22

## 2021-02-08 RX ADMIN — ACETAMINOPHEN 650 MG: 325 TABLET ORAL at 11:21

## 2021-02-08 RX ADMIN — METRONIDAZOLE 500 MG: 500 INJECTION, SOLUTION INTRAVENOUS at 08:44

## 2021-02-08 RX ADMIN — ONDANSETRON 4 MG: 2 INJECTION INTRAMUSCULAR; INTRAVENOUS at 08:44

## 2021-02-08 RX ADMIN — ASPIRIN 81 MG: 81 TABLET, CHEWABLE ORAL at 08:44

## 2021-02-08 RX ADMIN — SODIUM CHLORIDE 500 ML: 9 INJECTION, SOLUTION INTRAVENOUS at 12:20

## 2021-02-08 RX ADMIN — CHOLESTYRAMINE 4 G: 4 POWDER, FOR SUSPENSION ORAL at 11:21

## 2021-02-08 RX ADMIN — CHOLESTYRAMINE 4 G: 4 POWDER, FOR SUSPENSION ORAL at 08:44

## 2021-02-08 RX ADMIN — Medication 10 ML: at 06:00

## 2021-02-08 RX ADMIN — HEPARIN SODIUM 5000 UNITS: 5000 INJECTION INTRAVENOUS; SUBCUTANEOUS at 06:19

## 2021-02-08 RX ADMIN — ACETAMINOPHEN 650 MG: 325 TABLET ORAL at 17:35

## 2021-02-08 RX ADMIN — LEVOFLOXACIN 750 MG: 5 INJECTION, SOLUTION INTRAVENOUS at 06:18

## 2021-02-08 RX ADMIN — ACETAMINOPHEN 650 MG: 325 TABLET ORAL at 04:27

## 2021-02-08 RX ADMIN — Medication 1 CAPSULE: at 08:44

## 2021-02-08 RX ADMIN — SODIUM CHLORIDE 100 ML/HR: 9 INJECTION, SOLUTION INTRAVENOUS at 11:21

## 2021-02-08 RX ADMIN — CHOLESTYRAMINE 4 G: 4 POWDER, FOR SUSPENSION ORAL at 17:00

## 2021-02-08 RX ADMIN — HEPARIN SODIUM 5000 UNITS: 5000 INJECTION INTRAVENOUS; SUBCUTANEOUS at 21:46

## 2021-02-08 RX ADMIN — METRONIDAZOLE 500 MG: 500 INJECTION, SOLUTION INTRAVENOUS at 20:43

## 2021-02-08 RX ADMIN — HEPARIN SODIUM 5000 UNITS: 5000 INJECTION INTRAVENOUS; SUBCUTANEOUS at 14:03

## 2021-02-08 RX ADMIN — ONDANSETRON 4 MG: 2 INJECTION INTRAMUSCULAR; INTRAVENOUS at 17:02

## 2021-02-08 NOTE — PROGRESS NOTES
Transition of Care Plan   RUR- 20 % Medium Risks    DISPOSITION: SNF- pending referral at [facility] and [address] Transport: [facility]       Reviewed chart for transitions of care,and discussed in rounds. Discussed the case with Dr. Shante Plata. Transition of Care Plan:     The Plan for Transition of Care is related to the following treatment goals: SNF     The Patient and/or patient representative was provided with a choice of provider and agrees  with the discharge plan. Yes [x] No []    A Freedom of choice list was provided with basic dialogue that supports the patient's individualized plan of care/goals and shares the quality data associated with the providers. Yes [x] No []     A referral has been sent to [facility] via cc link, pending acceptance and auth. [facility] accepts rapid covid test on the day of discharge.      HOLLY Carbajal

## 2021-02-08 NOTE — PROGRESS NOTES
[address] Adult  Hospitalist Group                                                                                          Hospitalist Progress Note  Rolando Cisneros MD  Answering service: [phone] OR [phone] from in house phone              Progress Note    Patient: Gaurav Dempsey MRN: [id number]  SSN: [id number]    YOB: 1955  Age: 60 y.o. Sex: female      Admit Date: 2/3/2021    LOS: 4 days     Subjective:     Patient admitted after a syncopal episode. Patient stools more formed. Denies abdominal pain no vomiting. Objective:     Vitals:    02/07/21 2211 02/08/21 0600 02/08/21 1015 02/08/21 1220   BP: 107/65 (!) 92/47 (!) 80/52 (!) 94/58   Pulse: 71 64 63 63   Resp: 16 10 18    Temp: 98.2 °F (36.8 °C) 98 °F (36.7 °C) 98.1 °F (36.7 °C)    SpO2: 97% 97% 100%    Weight:       Height:            Intake and Output:  Current Shift: 02/08 0701 - 02/08 1900  In: 220 [P.O.:220]  Out: -   Last three shifts: 02/06 1901 - 02/08 0700  In: 750 [P.O.:360; I.V.:390]  Out: 800 [Urine:800]    Physical Exam:   GENERAL: alert, cooperative, no distress, appears stated age  THROAT & NECK: normal and no erythema or exudates noted. LUNG: clear to auscultation bilaterally  HEART: regular rate and rhythm, S1, S2 normal, no murmur, click, rub or gallop  ABDOMEN: soft, non-tender. Bowel sounds normal. No masses,  no organomegaly  EXTREMITIES:  extremities normal, atraumatic, no cyanosis or edema  SKIN: no rash or abnormalities  NEUROLOGIC: AOx3. PSYCHIATRIC: non focal    Lab/Data Review: All lab results for the last 24 hours reviewed.      Recent Results (from the past 24 hour(s))   GLUCOSE, POC    Collection Time: 02/07/21  4:59 PM   Result Value Ref Range    Glucose (POC) 132 (H) 65 - 100 mg/dL    Performed by Johan Caldwell, POC    Collection Time: 02/07/21 10:26 PM   Result Value Ref Range    Glucose (POC) 115 (H) 65 - 100 mg/dL    Performed by [address] [place] [address] Collection Time: 02/08/21  3:01 AM   Result Value Ref Range    Sodium 140 136 - 145 mmol/L    Potassium 3.6 3.5 - 5.1 mmol/L    Chloride 102 97 - 108 mmol/L    CO2 30 21 - 32 mmol/L    Anion gap 8 5 - 15 mmol/L    Glucose 94 65 - 100 mg/dL    BUN 78 (H) 6 - 20 MG/DL    Creatinine 2.30 (H) 0.55 - 1.02 MG/DL    BUN/Creatinine ratio 34 (H) 12 - 20      GFR est AA 26 (L) >60 ml/min/1.73m2    GFR est non-AA 21 (L) >60 ml/min/1.73m2    Calcium 8.2 (L) 8.5 - 10.1 MG/DL   GLUCOSE, POC    Collection Time: 02/08/21  6:15 AM   Result Value Ref Range    Glucose (POC) 103 (H) 65 - 100 mg/dL    Performed by Hector Riojas    GLUCOSE, POC    Collection Time: 02/08/21 11:13 AM   Result Value Ref Range    Glucose (POC) 129 (H) 65 - 100 mg/dL    Performed by Chelly Moran         Imaging:    No results found. Assessment and Plan:     Syncope  -MRI shows no acute hemorrhage or infarction.   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  -Echo pending  -Her syncope is likely due to probable orthostasis given her volume contracted status    Fall  -As a result of her syncope  -Negative work-up for acute fracture or injury    Proctocolitis  -Evident on CT abdomen and pelvis  -Follow stool studies negative  -continue empiric Zosyn and Flagyl  -Continue immodium prn for loose stool    Hematochezia  -Likely from proctocolitis  -Monitor H&H  -GI following    Acute kidney injury  -In the setting of rhabdo and volume depletion from diarrhea  -Patient with baseline CKD stage III/IV  -Nephrology evaluating the patient  -Continue IV fluid and monitor renal function, overall improving    Rhabdomyolysis  -As a result of fall  -Continue IV fluid    Metabolic acidosis  -Due to alkaline loss from GI tract as well as acute kidney injury  -Now resolved  -Nephrology evaluating patient    Thrombocytosis  -Likely due to acute reaction to infectious process  -Monitor CBC    Abnormal LFTs  -Also likely related to infectious process  -CT abdomen and pelvis without any acute liver or gallbladder pathology    Anemia  -Continue monitor H&H  -Unclear chronicity    Hypertension  -Hold antihypertensives for now as patient initially hypotensive  -Patient is borderline hypotensive but asymptomatic this a.m.  -Ordered normal saline 500 mL bolus    Diabetes  -Continue insulin sliding scale coverage    Dyslipidemia  -Holding statin due to rhabdomyolysis    Discharge plan: Still needs inpatient treatment and monitoring. Plan PT evaluation in the next 1 to 2 days. Anticipated disposition is likely more than 48 hours.     Signed By: Karmen Mayorga MD     February 8, 2021

## 2021-02-08 NOTE — PROGRESS NOTES
Problem: Mobility Impaired (Adult and Pediatric)  Goal: *Acute Goals and Plan of Care (Insert Text)  Description: FUNCTIONAL STATUS PRIOR TO ADMISSION: Patient was modified independent using bilateral single point cane for functional mobility. HOME SUPPORT PRIOR TO ADMISSION: The patient lived alone with family to provide assistance. Physical Therapy Goals  Initiated 2/5/2021  1. Patient will move from supine to sit and sit to supine  and scoot up and down in bed with modified independence within 7 day(s). 2.  Patient will transfer from bed to chair and chair to bed with modified independence using the least restrictive device within 7 day(s). 3.  Patient will perform sit to stand with modified independence within 7 day(s). 4.  Patient will ambulate with modified independence for 150 feet with the least restrictive device within 7 day(s). 5.  Patient will ascend/descend 4 stairs with handrail(s) with modified independence within 7 day(s). Outcome: Progressing Towards Goal     PHYSICAL THERAPY TREATMENT  Patient: Chapis Collins (47 y.o. female)  Date: 2/8/2021  Diagnosis: Syncope [R55] Syncope       Precautions: Fall  Chart, physical therapy assessment, plan of care and goals were reviewed. ASSESSMENT  Patient continues with skilled PT services and is progressing towards goals - remains most limited by decreased tolerance to activity, c/o neck pain/stiffness, and frequent uncontrolled loose stools leading to impaired gait/balance. Received pt up in chair, agreeable to participation in session. Participated in progressive activity, requiring less physical assist - including several sit<>stands and ambulation x40ft in room with RW. Reagan very slow marie and minor instabilities with turns/obstacle avoidance. Demos fair standing balance while performing functional based tasks at sink. Remained up in chair at end of session, NAD.     She remains below her baseline level and a high falls risk - continue to recommend discharge to SNF. Will follow. Current Level of Function Impacting Discharge (mobility/balance): CGA with RW    Other factors to consider for discharge: lives alone; below baseline; high falls risk         PLAN :  Patient continues to benefit from skilled intervention to address the above impairments. Continue treatment per established plan of care. to address goals. Recommendation for discharge: (in order for the patient to meet his/her long term goals)  Therapy up to 5 days/week in SNF setting    This discharge recommendation:  Has been made in collaboration with the attending provider and/or case management    IF patient discharges home will need the following DME: rolling walker       SUBJECTIVE:   Patient stated I'm still so weak.     OBJECTIVE DATA SUMMARY:   Critical Behavior:  Neurologic State: Alert  Orientation Level: Oriented to person, Oriented to place, Oriented to situation, Oriented to time  Cognition: Appropriate decision making, Appropriate for age attention/concentration, Appropriate safety awareness  Safety/Judgement: Awareness of environment, Insight into deficits  Functional Mobility Training:  Bed Mobility:     Transfers:  Sit to Stand: Contact guard assistance  Stand to Sit: Contact guard assistance        Balance:  Sitting: Intact  Standing: Impaired; With support  Standing - Static: Good;Constant support  Standing - Dynamic : Fair;Constant support  Ambulation/Gait Training:  Distance (ft): 40 Feet (ft)  Assistive Device: Gait belt;Walker, rolling  Ambulation - Level of Assistance: Contact guard assistance  Gait Abnormalities: Decreased step clearance;Shuffling gait  Base of Support: Widened  Speed/Karen: Slow;Shuffled      Activity Tolerance:   Good and requires frequent rest breaks    After treatment patient left in no apparent distress:   Sitting in chair and Call bell within reach    COMMUNICATION/COLLABORATION:   The patients plan of care was discussed with: Registered nurse.      Rekha Melchor PT, DPT   Time Calculation: 23 mins

## 2021-02-08 NOTE — PROGRESS NOTES
Problem: Self Care Deficits Care Plan (Adult)  Goal: *Acute Goals and Plan of Care (Insert Text)  Description:   FUNCTIONAL STATUS PRIOR TO ADMISSION: Patient was modified independent using a veronique SPCs for functional mobility, was modified independent for basic and most instrumental ADLs, working at [address] [place] [address] in [employer]. HOME SUPPORT: The patient lived alone with family in the area to provide assistance. Occupational Therapy Goals  Initiated 2/5/2021  1. Patient will perform grooming at the sink with supervision/set-up within 7 day(s). 2.  Patient will perform bathing with minimal assistance/contact guard assist within 7 day(s). 3.  Patient will perform lower body dressing with minimal assistance/contact guard assist and AE PRN within 7 day(s). 4.  Patient will perform toilet transfers with supervision/set-up within 7 day(s). 5.  Patient will perform all aspects of toileting with minimal assistance/contact guard assist within 7 day(s). 6.  Patient will participate in upper extremity therapeutic exercise/activities with supervision/set-up for 5 minutes within 7 day(s). 7.  Patient will utilize energy conservation techniques during functional activities with verbal and visual cues within 7 day(s). Outcome: Progressing Towards Goal    OCCUPATIONAL THERAPY TREATMENT  Patient: Danielle Stone (56 y.o. female)  Date: 2/8/2021  Diagnosis: Syncope [R55] Syncope       Precautions: Fall  Chart, occupational therapy assessment, plan of care, and goals were reviewed. ASSESSMENT  Patient continues with skilled OT services and is progressing towards goals. Continues to be limited by decreased balance, strength, activity tolerance, bowel/bladder continence, safety awareness, lower body access, BLE ROM, and coordination this session. Requiring up to [facility] for mobility and grooming at sink this session. /59 post sit>stand, 110/64  post ambulation.  Continue to recommend d/c to SNF as she is unsafe to return home. Current Level of Function Impacting Discharge (ADLs): Up to min A for mobility, up to mod A for ADLs    Other factors to consider for discharge: Lives alone, fall risk         PLAN :  Patient continues to benefit from skilled intervention to address the above impairments. Continue treatment per established plan of care. to address goals. Recommend with staff: Recommend with nursing, ADLs with setup-mod A, OOB to chair 3x/day and toileting via functional mobility to and from bathroom pending BP, CGA assist and walker. Thank you for completing as able in order to maintain patient strength, endurance and independence. Recommendation for discharge: (in order for the patient to meet his/her long term goals)  Therapy up to 5 days/week in SNF setting    This discharge recommendation:  Has been made in collaboration with the attending provider and/or case management    IF patient discharges home will need the following DME: TBD pending progress - may benefit from sock aid and LH shoe horn, new RW?, will continue to assess but would require physical assist of 1       SUBJECTIVE:   Patient stated once I get up I have to go immediately.     OBJECTIVE DATA SUMMARY:   Cognitive/Behavioral Status:  Neurologic State: Alert  Orientation Level: Oriented to person;Oriented to place;Oriented to situation;Oriented to time  Cognition: Appropriate decision making; Appropriate for age attention/concentration; Appropriate safety awareness  Perception: Appears intact  Perseveration: No perseveration noted  Safety/Judgement: Awareness of environment; Insight into deficits    Functional Mobility and Transfers for ADLs:  Bed Mobility:       Transfers:  Sit to Stand: Contact guard assistance  Functional Transfers  Bathroom Mobility: Contact guard assistance  Toilet Transfer : Contact guard assistance  Cues: Verbal cues provided       Balance:  Sitting: Intact  Standing: Impaired; With support  Standing - Static: Good;Constant support  Standing - Dynamic : Fair;Constant support    ADL Intervention:       Grooming  Grooming Assistance: Contact guard assistance  Washing Hands: Contact guard assistance  Brushing/Combing Hair: Contact guard assistance                        Toileting  Toileting Assistance: Minimum assistance  Bowel Hygiene: Minimum assistance  Cues: Verbal cues provided  Adaptive Equipment: (BSC)    Cognitive Retraining  Safety/Judgement: Awareness of environment; Insight into deficits    Pain:  Patient reports neck pain - applying heat to manage    Activity Tolerance:   Good    After treatment patient left in no apparent distress:   Sitting in chair, Call bell within reach, and Bed / chair alarm activated    COMMUNICATION/COLLABORATION:   The patients plan of care was discussed with: Physical therapist, Registered Nurse    BERNA Hartley  Time Calculation: 30 mins    Regarding student involvement in patient care:  A student participated in this treatment session. Per CMS Medicare statements and AOTA guidelines I certify that the following was true:  1. I was present and directly observed the entire session. 2. I made all skilled judgments and clinical decisions regarding care. 3. I am the practitioner responsible for assessment, treatment, and documentation.

## 2021-02-08 NOTE — WOUND CARE
Wound Care Note:  
 
New consult placed by nurse request for left foot wounds and bottom excoriated Chart shows: 
Admitted for syncope Past Medical History:  
Diagnosis Date  Cancer ([facility] [address] melanoma back stage II 2005  CKD (chronic kidney disease), stage III 3/1/2016  CKD (chronic kidney disease), stage IV ([facility] 3/1/2016  Diabetes ([facility]  HTN (hypertension) 11/2/2015  Hypertension  LBP (low back pain) 11/2/2015  Menopause  Microalbuminuria 11/21/2015  Obesity 11/2/2015  PAD (peripheral artery disease) ([facility] 9/29/2016  Venous insufficiency 11/2/2015 WBC = 8.4 on 2/7/21 Admitted from home Assessment:  
Patient is A&O x 4, communicative, incontinent with no assistance needed in repositioning. [facility] Diet: Diabetic consistent carb regular Patient reports neck pain, no number given using warm compress. Bilateral heels,right buttock, and sacral skin intact and without erythema. Palpable DP pulses bilaterally. 1. POA left 2nd toe amputation site with small crusted area on the tip of the toe, no erythema, no drainage, sepideh-wound intact. Left open to air. Patient saw Dr. Stew Avila on Monday and he was happy with recent partial toe amputation site. 2.  POA left lateral foot with small abrasion approximately 0.6 cm x 0.8 cm, crusted, no drainage, sepideh-wound without erythema. Left open to air. 3.  POA left lower buttock with an abrasion approximately 4 cm x 4 cm x 0.1 cm, wound bed is pink, scant serous drainage, wound edges are open. sepideh-wound intact. Z guard paste and Optifoam Gentle applied. 4.  Patient have frequent loose stools. Z guard paste applied to sepideh-anal skin. Patient up in chair. Recommendations:   
Left buttock and sepideh-anal skin- Every 12 hours and as needed apply Z guard paste. Skin Care & Pressure Prevention: Minimize layers of linen/pads under patient to optimize support surface. Turn/reposition approximately every 2 hours and offload heels. Manage incontinence / promote continence Nourishing Skin Cream to dry skin, minimize use of briefs when able Discussed above plan with patient & Edrick Halsted, RN Transition of Care: Plan to follow as needed while admitted to hospital. 
 
DONTRELL Harper, RN, [facility], [facility]. 
office [phone] 
pager [phone] or call  to page

## 2021-02-08 NOTE — PROGRESS NOTES
Nephrology Progress Note  Al Last  Date of Admission : 2/3/2021    CC: Follow up for PRABHU on CKD       Assessment and Plan     PRABHU on CKD:  - presumed 2/2 volume depletion  - Cr improving  - d/c bicarb drip and change to NS  - daily labs    CKD IIIb:  - f/b Dr. Nicolasa Cohn  - recent baseline 1.5    Hypokalemia:  - replete PRN    Hypovolemia:  - improving  - reduce IVF rate    Acidosis:  - improving  - d/c bicarb infusion    Colitis:  - on levo and flagyl    Diarrhea:  - improving    HTN:  - BP stable           Interval History:  Seen and examined. Ongoing nausea, diarrhea improving. BP stable, borderline low. No cp or sob reported. Current Medications: all current  Medications have been eviewed in EPIC  Review of Systems: Pertinent items are noted in HPI. Objective:  Vitals:    Vitals:    02/07/21 1415 02/07/21 1815 02/07/21 2211 02/08/21 0600   BP: 90/60 (!) 104/59 107/65 (!) 92/47   Pulse: 67 67 71 64   Resp: 11 18 16 10   Temp: 98.8 °F (37.1 °C) 98 °F (36.7 °C) 98.2 °F (36.8 °C) 98 °F (36.7 °C)   SpO2: 99% 99% 97% 97%   Weight:       Height:         Intake and Output:  No intake/output data recorded. 02/06 1901 - 02/08 0700  In: 750 [P.O.:360; I.V.:390]  Out: 800 [Urine:800]    Physical Examination:  General: NAD,Conversant   Neck:  Supple, no mass  Resp:  Lungs CTA B/L, no wheezing , normal respiratory effort  CV:  RRR,  no murmur or rub, no LE edema  GI:  Soft, NT, + Bowel sounds, no hepatosplenomegaly  Neurologic:  Non focal  Psych:             AAO x 3 appropriate affect   Skin:  No Rash  :  No austin    []    High complexity decision making was performed  []    Patient is at high-risk of decompensation with multiple organ involvement    Lab Data Personally Reviewed: I have reviewed all the pertinent labs, microbiology data and radiology studies during assessment.     Recent Labs     02/08/21  0301 02/07/21  0104 02/06/21  0002 02/05/21  1457    140 141 142   K 3.6 3.2* 3.4* 4.0    105 110* 112*   CO2 30 25 15* 13*   GLU 94 111* 101* 134*   BUN 78* 98* 113* 116*   CREA 2.30* 2.70* 3.54* 4.01*   CA 8.2* 8.5 8.7 9.2   MG  --   --   --  3.1*   PHOS  --   --   --  6.9*   ALB  --   --   --  2.9*   ALT  --   --   --  90*     Recent Labs     02/07/21  0104 02/06/21  0002   WBC 8.4 10.7   HGB 7.4* 7.8*   HCT 22.4* 23.5*   * 142*     No results found for: SDES  Lab Results   Component Value Date/Time    Culture result: NO GROWTH 4 DAYS 02/04/2021 12:22 AM    Culture result: NO GROWTH ON SOLID MEDIA AT 2 DAYS 01/13/2021 10:00 AM    Culture result: (A) 01/13/2021 10:00 AM     STAPHYLOCOCCUS SPECIES, COAGULASE NEGATIVE ISOLATED FROM THIO BROTH ONLY     Recent Results (from the past 24 hour(s))   GLUCOSE, POC    Collection Time: 02/07/21 11:45 AM   Result Value Ref Range    Glucose (POC) 142 (H) 65 - 100 mg/dL    Performed by [id number] [facility], POC    Collection Time: 02/07/21  4:59 PM   Result Value Ref Range    Glucose (POC) 132 (H) 65 - 100 mg/dL    Performed by Ravin Araujo    GLUCOSE, POC    Collection Time: 02/07/21 10:26 PM   Result Value Ref Range    Glucose (POC) 115 (H) 65 - 100 mg/dL    Performed by Tammi Cardoza    METABOLIC PANEL, BASIC    Collection Time: 02/08/21  3:01 AM   Result Value Ref Range    Sodium 140 136 - 145 mmol/L    Potassium 3.6 3.5 - 5.1 mmol/L    Chloride 102 97 - 108 mmol/L    CO2 30 21 - 32 mmol/L    Anion gap 8 5 - 15 mmol/L    Glucose 94 65 - 100 mg/dL    BUN 78 (H) 6 - 20 MG/DL    Creatinine 2.30 (H) 0.55 - 1.02 MG/DL    BUN/Creatinine ratio 34 (H) 12 - 20      GFR est AA 26 (L) >60 ml/min/1.73m2    GFR est non-AA 21 (L) >60 ml/min/1.73m2    Calcium 8.2 (L) 8.5 - 10.1 MG/DL   GLUCOSE, POC    Collection Time: 02/08/21  6:15 AM   Result Value Ref Range    Glucose (POC) 103 (H) 65 - 100 mg/dL    Performed by Hollace Seip, MD  [facility]   [address] [address]  Phone - [phone]   Fax - [phone]  [email or web address]

## 2021-02-08 NOTE — ROUTINE PROCESS
Bedside and Verbal shift change report given to Cheyenne Lugo RN (oncoming nurse) by Jayde Jose (offgoing nurse). Report included the following information SBAR, Kardex, ED Summary, Intake/Output, MAR, Accordion, Recent Results, Cardiac Rhythm NSR, Quality Measures and Dual Neuro Assessment.

## 2021-02-08 NOTE — PROGRESS NOTES
[address]  [address] [facility] [address], [address]  [phone]                GI PROGRESS NOTE      NAME:   Pricila Guzman   :    1955   MRN:    [id number]     Subjective:     She reports having less frequent BMs, less urgency, but still with some incontinence episodes when she stands up. She has seen small amount of BRB but thinks may be hemorrhoids. Denies rectal pain and abdominal pain, but described episodic periumbilical pain PTA, as well as what sounds like IBS-D. Her primary GI is Dr. Aron Robles at [facility]. Objective:     VITALS:   Last 24hrs VS reviewed since prior hospitalist progress note. Most recent are:  Visit Vitals  BP (!) 94/58   Pulse 63   Temp 98.1 °F (36.7 °C)   Resp 18   Ht 5' 5.5\" (1.664 m)   Wt 98.7 kg (217 lb 9.5 oz)   SpO2 100%   BMI 35.66 kg/m²       Intake/Output Summary (Last 24 hours) at 2021 1230  Last data filed at 2021 0800  Gross per 24 hour   Intake 220 ml   Output    Net 220 ml        PHYSICAL EXAM:  General   NAD, seems comfortable sitting in bedside chair  Skin          No visible rashes, no jaundice or pallor  Resp        Comfortable breathing   Abd     Non-distended, soft, non-tender  Psych       Pleasant, no agitation or anxiety    Lab Data Reviewed   Medications: Reviewed    Assessment/Plan     Colitis - moderate to severe proctocolitis on CT 21  - Ischemic vs infectious colitis (rhabdomyolysis on admission)  - Negative enteric stool panel and C. Diff  - Complete empiric antibiotics and monitor BMs  - Continue Questran (component of chronic IBS-D)  - Plan for OP f/u with her primary GI Dr. Aron Robles at [facility]    At this time, GI is signing off. Please call if needed again prior to discharge.      PRABHU  - Renal following  Rhabdomyolysis  - Following a fall, down for 36 hrs

## 2021-02-08 NOTE — PROGRESS NOTES
Bedside and Verbal shift change report given to Nayeli Leung RN (oncoming nurse) by Janes Le RN (offgoing nurse). Report included the following information SBAR, Kardex, MAR, Cardiac Rhythm NSR and Dual Neuro Assessment.

## 2021-02-09 ENCOUNTER — APPOINTMENT (OUTPATIENT)
Dept: NON INVASIVE DIAGNOSTICS | Age: 66
DRG: 378 | End: 2021-02-09
Attending: FAMILY MEDICINE
Payer: COMMERCIAL

## 2021-02-09 LAB
ANION GAP SERPL CALC-SCNC: 5 MMOL/L (ref 5–15)
BACTERIA SPEC CULT: NORMAL
BASOPHILS # BLD: 0 K/UL (ref 0–0.1)
BASOPHILS NFR BLD: 0 % (ref 0–1)
BUN SERPL-MCNC: 64 MG/DL (ref 6–20)
BUN/CREAT SERPL: 30 (ref 12–20)
CALCIUM SERPL-MCNC: 8.4 MG/DL (ref 8.5–10.1)
CHLORIDE SERPL-SCNC: 102 MMOL/L (ref 97–108)
CO2 SERPL-SCNC: 26 MMOL/L (ref 21–32)
CREAT SERPL-MCNC: 2.1 MG/DL (ref 0.55–1.02)
DIFFERENTIAL METHOD BLD: ABNORMAL
EOSINOPHIL # BLD: 0.4 K/UL (ref 0–0.4)
EOSINOPHIL NFR BLD: 5 % (ref 0–7)
ERYTHROCYTE [DISTWIDTH] IN BLOOD BY AUTOMATED COUNT: 15.9 % (ref 11.5–14.5)
GLUCOSE BLD STRIP.AUTO-MCNC: 100 MG/DL (ref 65–100)
GLUCOSE BLD STRIP.AUTO-MCNC: 101 MG/DL (ref 65–100)
GLUCOSE BLD STRIP.AUTO-MCNC: 107 MG/DL (ref 65–100)
GLUCOSE BLD STRIP.AUTO-MCNC: 119 MG/DL (ref 65–100)
GLUCOSE BLD STRIP.AUTO-MCNC: 87 MG/DL (ref 65–100)
GLUCOSE SERPL-MCNC: 89 MG/DL (ref 65–100)
HCT VFR BLD AUTO: 23.7 % (ref 35–47)
HGB BLD-MCNC: 7.5 G/DL (ref 11.5–16)
IMM GRANULOCYTES # BLD AUTO: 0.3 K/UL (ref 0–0.04)
IMM GRANULOCYTES NFR BLD AUTO: 3 % (ref 0–0.5)
LYMPHOCYTES # BLD: 1.7 K/UL (ref 0.8–3.5)
LYMPHOCYTES NFR BLD: 20 % (ref 12–49)
MCH RBC QN AUTO: 31.3 PG (ref 26–34)
MCHC RBC AUTO-ENTMCNC: 31.6 G/DL (ref 30–36.5)
MCV RBC AUTO: 98.8 FL (ref 80–99)
MONOCYTES # BLD: 0.6 K/UL (ref 0–1)
MONOCYTES NFR BLD: 7 % (ref 5–13)
NEUTS SEG # BLD: 5.7 K/UL (ref 1.8–8)
NEUTS SEG NFR BLD: 65 % (ref 32–75)
NRBC # BLD: 0 K/UL (ref 0–0.01)
NRBC BLD-RTO: 0 PER 100 WBC
PLATELET # BLD AUTO: 116 K/UL (ref 150–400)
PMV BLD AUTO: 10.6 FL (ref 8.9–12.9)
POTASSIUM SERPL-SCNC: 4.5 MMOL/L (ref 3.5–5.1)
RBC # BLD AUTO: 2.4 M/UL (ref 3.8–5.2)
RBC MORPH BLD: ABNORMAL
SERVICE CMNT-IMP: ABNORMAL
SERVICE CMNT-IMP: NORMAL
SODIUM SERPL-SCNC: 133 MMOL/L (ref 136–145)
WBC # BLD AUTO: 8.7 K/UL (ref 3.6–11)

## 2021-02-09 PROCEDURE — 74011250637 HC RX REV CODE- 250/637: Performed by: INTERNAL MEDICINE

## 2021-02-09 PROCEDURE — 80048 BASIC METABOLIC PNL TOTAL CA: CPT

## 2021-02-09 PROCEDURE — 97535 SELF CARE MNGMENT TRAINING: CPT

## 2021-02-09 PROCEDURE — 74011250636 HC RX REV CODE- 250/636: Performed by: FAMILY MEDICINE

## 2021-02-09 PROCEDURE — 36415 COLL VENOUS BLD VENIPUNCTURE: CPT

## 2021-02-09 PROCEDURE — 74011000250 HC RX REV CODE- 250: Performed by: FAMILY MEDICINE

## 2021-02-09 PROCEDURE — 93306 TTE W/DOPPLER COMPLETE: CPT | Performed by: SPECIALIST

## 2021-02-09 PROCEDURE — 74011250636 HC RX REV CODE- 250/636: Performed by: INTERNAL MEDICINE

## 2021-02-09 PROCEDURE — 74011250637 HC RX REV CODE- 250/637: Performed by: FAMILY MEDICINE

## 2021-02-09 PROCEDURE — 65660000001 HC RM ICU INTERMED STEPDOWN

## 2021-02-09 PROCEDURE — C8929 TTE W OR WO FOL WCON,DOPPLER: HCPCS

## 2021-02-09 PROCEDURE — 82962 GLUCOSE BLOOD TEST: CPT

## 2021-02-09 PROCEDURE — 85025 COMPLETE CBC W/AUTO DIFF WBC: CPT

## 2021-02-09 RX ORDER — CHOLESTYRAMINE 4 G/4.8G
4 POWDER, FOR SUSPENSION ORAL
Status: DISCONTINUED | OUTPATIENT
Start: 2021-02-09 | End: 2021-02-09

## 2021-02-09 RX ORDER — LOPERAMIDE HYDROCHLORIDE 2 MG/1
4 CAPSULE ORAL
Status: DISCONTINUED | OUTPATIENT
Start: 2021-02-09 | End: 2021-02-19 | Stop reason: HOSPADM

## 2021-02-09 RX ORDER — TRAMADOL HYDROCHLORIDE 50 MG/1
50 TABLET ORAL
Status: DISCONTINUED | OUTPATIENT
Start: 2021-02-09 | End: 2021-02-19 | Stop reason: HOSPADM

## 2021-02-09 RX ADMIN — METRONIDAZOLE 500 MG: 500 INJECTION, SOLUTION INTRAVENOUS at 09:05

## 2021-02-09 RX ADMIN — ASPIRIN 81 MG: 81 TABLET, CHEWABLE ORAL at 09:05

## 2021-02-09 RX ADMIN — TRAMADOL HYDROCHLORIDE 50 MG: 50 TABLET, FILM COATED ORAL at 16:40

## 2021-02-09 RX ADMIN — HEPARIN SODIUM 5000 UNITS: 5000 INJECTION INTRAVENOUS; SUBCUTANEOUS at 13:59

## 2021-02-09 RX ADMIN — ACETAMINOPHEN 650 MG: 325 TABLET ORAL at 09:12

## 2021-02-09 RX ADMIN — ACETAMINOPHEN 650 MG: 325 TABLET ORAL at 01:01

## 2021-02-09 RX ADMIN — CHOLESTYRAMINE 4 G: 4 POWDER, FOR SUSPENSION ORAL at 09:05

## 2021-02-09 RX ADMIN — HEPARIN SODIUM 5000 UNITS: 5000 INJECTION INTRAVENOUS; SUBCUTANEOUS at 21:28

## 2021-02-09 RX ADMIN — HEPARIN SODIUM 5000 UNITS: 5000 INJECTION INTRAVENOUS; SUBCUTANEOUS at 06:30

## 2021-02-09 RX ADMIN — ONDANSETRON 4 MG: 2 INJECTION INTRAMUSCULAR; INTRAVENOUS at 00:42

## 2021-02-09 RX ADMIN — LOPERAMIDE HYDROCHLORIDE 4 MG: 2 CAPSULE ORAL at 13:59

## 2021-02-09 RX ADMIN — SODIUM CHLORIDE 1.5 ML: 9 INJECTION INTRAMUSCULAR; INTRAVENOUS; SUBCUTANEOUS at 09:48

## 2021-02-09 RX ADMIN — ONDANSETRON 4 MG: 2 INJECTION INTRAMUSCULAR; INTRAVENOUS at 18:04

## 2021-02-09 RX ADMIN — Medication 10 ML: at 21:40

## 2021-02-09 RX ADMIN — Medication 1 CAPSULE: at 09:05

## 2021-02-09 RX ADMIN — Medication 10 ML: at 05:52

## 2021-02-09 RX ADMIN — METRONIDAZOLE 500 MG: 500 INJECTION, SOLUTION INTRAVENOUS at 21:28

## 2021-02-09 RX ADMIN — CHOLESTYRAMINE 4 G: 4 POWDER, FOR SUSPENSION ORAL at 12:27

## 2021-02-09 RX ADMIN — CHOLESTYRAMINE 4 G: 4 POWDER, FOR SUSPENSION ORAL at 16:40

## 2021-02-09 RX ADMIN — LOPERAMIDE HYDROCHLORIDE 4 MG: 2 CAPSULE ORAL at 10:42

## 2021-02-09 RX ADMIN — Medication 10 ML: at 14:00

## 2021-02-09 RX ADMIN — Medication 10 ML: at 02:35

## 2021-02-09 RX ADMIN — TRAMADOL HYDROCHLORIDE 50 MG: 50 TABLET, FILM COATED ORAL at 10:42

## 2021-02-09 RX ADMIN — TRAMADOL HYDROCHLORIDE 50 MG: 50 TABLET, FILM COATED ORAL at 22:58

## 2021-02-09 NOTE — PROGRESS NOTES
[facility]                                                                                          Hospitalist Progress Note  Bryan Verdin MD  Answering service: [phone] OR [phone] from in house phone              Progress Note    Patient: Karine Colby MRN: [id number]  SSN: [id number]    YOB: 1955  Age: 60 y.o. Sex: female      Admit Date: 2/3/2021    LOS: 5 days     Subjective:     Patient admitted after a syncopal episode. Patient still with loose stools. Denies abdominal pain no vomiting. Objective:     Vitals:    02/09/21 0200 02/09/21 0600 02/09/21 0924 02/09/21 1011   BP: (!) 92/52 (!) 97/56 (!) 97/56 (!) 92/56   Pulse: 72 66  64   Resp: 15 10  12   Temp: 98.4 °F (36.9 °C) 98.2 °F (36.8 °C)  98.6 °F (37 °C)   SpO2: 95% 99%  99%   Weight:   98.4 kg (217 lb)    Height:   5' 6\" (1.676 m)         Intake and Output:  Current Shift: No intake/output data recorded. Last three shifts: 02/07 1901 - 02/09 0700  In: 220 [P.O.:220]  Out: -     Physical Exam:   GENERAL: alert, cooperative, no distress, appears stated age  THROAT & NECK: normal and no erythema or exudates noted. LUNG: clear to auscultation bilaterally  HEART: regular rate and rhythm, S1, S2 normal, no murmur, click, rub or gallop  ABDOMEN: soft, non-tender. Bowel sounds normal. No masses,  no organomegaly  EXTREMITIES:  extremities normal, atraumatic, no cyanosis or edema  SKIN: no rash or abnormalities  NEUROLOGIC: AOx3. PSYCHIATRIC: non focal    Lab/Data Review: All lab results for the last 24 hours reviewed.      Recent Results (from the past 24 hour(s))   GLUCOSE, POC    Collection Time: 02/08/21  4:27 PM   Result Value Ref Range    Glucose (POC) 119 (H) 65 - 100 mg/dL    Performed by Siobhan Martin    GLUCOSE, POC    Collection Time: 02/08/21  9:19 PM   Result Value Ref Range    Glucose (POC) 122 (H) 65 - 100 mg/dL    Performed by [address] [place] [address], [facility] Collection Time: 02/09/21  2:39 AM   Result Value Ref Range    Sodium 133 (L) 136 - 145 mmol/L    Potassium 4.5 3.5 - 5.1 mmol/L    Chloride 102 97 - 108 mmol/L    CO2 26 21 - 32 mmol/L    Anion gap 5 5 - 15 mmol/L    Glucose 89 65 - 100 mg/dL    BUN 64 (H) 6 - 20 MG/DL    Creatinine 2.10 (H) 0.55 - 1.02 MG/DL    BUN/Creatinine ratio 30 (H) 12 - 20      GFR est AA 29 (L) >60 ml/min/1.73m2    GFR est non-AA 24 (L) >60 ml/min/1.73m2    Calcium 8.4 (L) 8.5 - 10.1 MG/DL   CBC WITH AUTOMATED DIFF    Collection Time: 02/09/21  2:39 AM   Result Value Ref Range    WBC 8.7 3.6 - 11.0 K/uL    RBC 2.40 (L) 3.80 - 5.20 M/uL    HGB 7.5 (L) 11.5 - 16.0 g/dL    HCT 23.7 (L) 35.0 - 47.0 %    MCV 98.8 80.0 - 99.0 FL    MCH 31.3 26.0 - 34.0 PG    MCHC 31.6 30.0 - 36.5 g/dL    RDW 15.9 (H) 11.5 - 14.5 %    PLATELET 601 (L) 427 - 400 K/uL    MPV 10.6 8.9 - 12.9 FL    NRBC 0.0 0  WBC    ABSOLUTE NRBC 0.00 0.00 - 0.01 K/uL    NEUTROPHILS 65 32 - 75 %    LYMPHOCYTES 20 12 - 49 %    MONOCYTES 7 5 - 13 %    EOSINOPHILS 5 0 - 7 %    BASOPHILS 0 0 - 1 %    IMMATURE GRANULOCYTES 3 (H) 0.0 - 0.5 %    ABS. NEUTROPHILS 5.7 1.8 - 8.0 K/UL    ABS. LYMPHOCYTES 1.7 0.8 - 3.5 K/UL    ABS. MONOCYTES 0.6 0.0 - 1.0 K/UL    ABS. EOSINOPHILS 0.4 0.0 - 0.4 K/UL    ABS. BASOPHILS 0.0 0.0 - 0.1 K/UL    ABS. IMM.  GRANS. 0.3 (H) 0.00 - 0.04 K/UL    DF SMEAR SCANNED      RBC COMMENTS ANISOCYTOSIS  1+        RBC COMMENTS OVALOCYTES  PRESENT        RBC COMMENTS MACROCYTOSIS  1+       GLUCOSE, POC    Collection Time: 02/09/21  6:27 AM   Result Value Ref Range    Glucose (POC) 107 (H) 65 - 100 mg/dL    Performed by Mandie Gallego    GLUCOSE, POC    Collection Time: 02/09/21  7:42 AM   Result Value Ref Range    Glucose (POC) 101 (H) 65 - 100 mg/dL    Performed by Shirley Robbins    ECHO ADULT COMPLETE    Collection Time: 02/09/21  9:47 AM   Result Value Ref Range    IVSd 1.00 (A) 0.6 - 0.9 cm    LVIDd 4.82 3.9 - 5.3 cm    LVIDs 3.20 cm    LVOT d 2.16 cm    LVPWd 1.01 (A) 0.6 - 0.9 cm    LVOT Peak Gradient 3.16 mmHg    LVOT Peak Velocity 88.82 cm/s    Left Atrium Major Axis 4.89 cm    LA Volume 133.05 22 - 52 mL    LA Area 4C 31.26 cm2    LA Vol 2C 131.02 (A) 22 - 52 mL    LA Vol 4C 114.34 (A) 22 - 52 mL    Aortic Valve Area by Continuity of Peak Velocity 1.61 cm2    AoV PG 16.16 mmHg    Aortic Valve Systolic Peak Velocity 663.88 cm/s    MV A Car 114.92 cm/s    Mitral Valve E Wave Deceleration Time 342.06 ms    MV E Car 107.85 cm/s    E/E' lateral 10.15     E/E' septal 14.55     Mitral Valve Pressure Half-time 99.20 ms    MVA (PHT) 2.22 cm2    Triscuspid Valve Regurgitation Peak Gradient 16.16 mmHg    TR Max Velocity 200.99 cm/s    Ao Root D 3.45 cm   GLUCOSE, POC    Collection Time: 02/09/21 11:29 AM   Result Value Ref Range    Glucose (POC) 100 65 - 100 mg/dL    Performed by [facility] [employer]         Imaging:    No results found. Assessment and Plan:     Syncope  -MRI shows no acute hemorrhage or infarction.   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  -Echo pending  -Her syncope is likely due to probable orthostasis given her volume contracted status    Fall  -As a result of her syncope  -Negative work-up for acute fracture or injury    Proctocolitis  -Evident on CT abdomen and pelvis  -Stool studies negative  -Continue empiric Zosyn and Flagyl  -Continue questran and immodium prn for loose stool    Hematochezia  -Likely from proctocolitis  -Monitor H&H  -GI following    Acute kidney injury  -In the setting of rhabdo and volume depletion from diarrhea  -Patient with baseline CKD stage III/IV  -Nephrology evaluating the patient  -Continue IV fluid and monitor renal function, overall improving    Rhabdomyolysis  -As a result of fall  -Continue IV fluid    Metabolic acidosis  -Due to alkaline loss from GI tract as well as acute kidney injury  -Now resolved  -Nephrology evaluating patient    Thrombocytosis  -Likely due to acute reaction to infectious process  -Monitor CBC    Abnormal LFTs  -Also likely related to infectious process  -CT abdomen and pelvis without any acute liver or gallbladder pathology    Anemia  -Continue monitor H&H  -Unclear chronicity    Hypertension  -Hold antihypertensives for now as patient initially hypotensive    Diabetes  -Continue insulin sliding scale coverage    Dyslipidemia  -Holding statin due to rhabdomyolysis    Discharge plan: Still needs inpatient treatment and monitoring. To rehab when medically stable. Anticipated disposition is likely more than 48 hours.     Signed By: Tyler Mckenzie MD     February 9, 2021

## 2021-02-09 NOTE — PROGRESS NOTES
Transition of Care Plan   RUR- 18 % Low Risks    DISPOSITION: SNF- accepted by [facility], pending [employer] Transport: [facility]      Reviewed chart for transitions of care,and discussed in rounds. Patient has been accepted by [facility], pending [facility].      HOLLY Wright

## 2021-02-09 NOTE — ROUTINE PROCESS
Bedside and Verbal shift change report given to Aristides Paz (oncoming nurse) by Cuba Alvarado (offgoing nurse). Report included the following information SBAR, Kardex, Intake/Output, MAR, Accordion, Recent Results, Cardiac Rhythm NSR, Alarm Parameters , Quality Measures and Dual Neuro Assessment.

## 2021-02-09 NOTE — PROGRESS NOTES
Physical Therapy Note  02/09/2021    Chart reviewed and attempted to see pt twice for PT tx session. Pt requesting to defer on both occasions - first due to eating and second citing fatigue after working with OT. Will defer and continue to follow up later this PM vs tomorrow as able/appropriate.     Ranjan Ma, PT, DPT

## 2021-02-09 NOTE — PROGRESS NOTES
Problem: Self Care Deficits Care Plan (Adult)  Goal: *Acute Goals and Plan of Care (Insert Text)  Description:   FUNCTIONAL STATUS PRIOR TO ADMISSION: Patient was modified independent using a veronique SPCs for functional mobility, was modified independent for basic and most instrumental ADLs, working at [facility] in [employer]. HOME SUPPORT: The patient lived alone with family in the area to provide assistance. Occupational Therapy Goals  Initiated 2/5/2021  1. Patient will perform grooming at the sink with supervision/set-up within 7 day(s). 2.  Patient will perform bathing with minimal assistance/contact guard assist within 7 day(s). 3.  Patient will perform lower body dressing with minimal assistance/contact guard assist and AE PRN within 7 day(s). 4.  Patient will perform toilet transfers with supervision/set-up within 7 day(s). 5.  Patient will perform all aspects of toileting with minimal assistance/contact guard assist within 7 day(s). 6.  Patient will participate in upper extremity therapeutic exercise/activities with supervision/set-up for 5 minutes within 7 day(s). 7.  Patient will utilize energy conservation techniques during functional activities with verbal and visual cues within 7 day(s). Outcome: Progressing Towards Goal     OCCUPATIONAL THERAPY TREATMENT  Patient: Dionisio Jo (2 y.o. female)  Date: 2/9/2021  Diagnosis: Syncope [R55] Syncope       Precautions: Fall  Chart, occupational therapy assessment, plan of care, and goals were reviewed. ASSESSMENT  Patient continues with skilled OT services and is progressing towards goals. Continues to be limited by decreased balance, strength, activity tolerance, bowel/bladder continence, safety awareness, lower body access, BLE ROM, and coordination this session, requiring up to min A for mobility and up to mod A w/ LB dressing this session.  Patient continues to report decreased LB access 2/2 hip pain, but notes that it has improved since admission. Able to complete LB dressing w/ increased independence this session - noted edema in BLE w/ weeping on dorsum of L foot. Patient BP noted to increase w/ activity (see below). Continue to recommend d/c to SNF as patient is far from baseline. Patient Vitals for the past 8 hrs:   BP Position   02/09/21 1343 (!) 103/48 Sitting post activity   02/09/21 1328 (!) 112/48 Standing after 1 minute   02/09/21 1326 97/63 Standing post sit>stand   02/09/21 1324 (!) 84/55 Seated at rest          Current Level of Function Impacting Discharge (ADLs): Up to min A for mobility and mod A for self-care    Other factors to consider for discharge: Lives alone         PLAN :  Patient continues to benefit from skilled intervention to address the above impairments. Continue treatment per established plan of care. to address goals. Recommend with staff: Recommend with nursing, ADLs with set-up to mod A, once Egress Test completed then OOB to chair 3x/day and toileting via functional mobility to and from bathroom as BP allows w/ CGA assist and walker. Thank you for completing as able in order to maintain patient strength, endurance and independence. Recommendation for discharge: (in order for the patient to meet his/her long term goals)  Therapy up to 5 days/week in SNF setting    This discharge recommendation:  Has been made in collaboration with the attending provider and/or case management    IF patient discharges home will need the following DME: TBD       SUBJECTIVE:   Patient stated I never know when It's gonna start coming.     OBJECTIVE DATA SUMMARY:   Cognitive/Behavioral Status:  Neurologic State: Alert  Orientation Level: Oriented X4  Cognition: Appropriate decision making; Appropriate for age attention/concentration; Appropriate safety awareness; Follows commands             Functional Mobility and Transfers for ADLs:  Bed Mobility:       Transfers:  Sit to Stand: Contact guard assistance  Functional Transfers  Bathroom Mobility: Contact guard assistance  Toilet Transfer : Minimum assistance       Balance:  Standing - Static: Good;Constant support  Standing - Dynamic : Fair;Constant support    ADL Intervention:       Grooming  Grooming Assistance: Contact guard assistance  Position Performed: Standing  Brushing/Combing Hair: Contact guard assistance                   Lower Body Dressing Assistance  Dressing Assistance: Moderate assistance  Protective Undergarmet: Moderate assistance  Socks: Moderate assistance    Toileting  Toileting Assistance: Minimum assistance  Bowel Hygiene: Contact guard assistance  Clothing Management: Moderate assistance  Cues: Verbal cues provided;Physical assistance for pants down;Physical assistance for pants up    Pain:  None noted    Activity Tolerance:   Good    After treatment patient left in no apparent distress:   Sitting in chair and Call bell within reach    COMMUNICATION/COLLABORATION:   The patients plan of care was discussed with: Physical therapist and Registered nurse. Kenyon Sanders, BERNA  Time Calculation: 39 mins    Regarding student involvement in patient care:  A student participated in this treatment session. Per CMS Medicare statements and AOTA guidelines I certify that the following was true:  1. I was present and directly observed the entire session. 2. I made all skilled judgments and clinical decisions regarding care. 3. I am the practitioner responsible for assessment, treatment, and documentation.

## 2021-02-09 NOTE — PROGRESS NOTES
Nephrology Progress Note  Susanne De Paz  Date of Admission : 2/3/2021    CC: Follow up for PRABHU on CKD       Assessment and Plan     PRABHU on CKD:  - presumed 2/2 volume depletion  - Cr improving  - d/c fluids and monitor    CKD IIIb:  - f/b Dr. Cindi Mendoza  - recent baseline 1.5    Hypokalemia:  - replete PRN    Hypovolemia:  - improving  - reduce IVF rate    Acidosis:  - improving    Colitis:  - on levo and flagyl    Diarrhea:  - improving    HTN:  - BP stable           Interval History:  Seen and examined. Feeling better today. diarrhea improving. BP stable, borderline low. No cp or sob reported. Current Medications: all current  Medications have been eviewed in EPIC  Review of Systems: Pertinent items are noted in HPI. Objective:  Vitals:    Vitals:    02/09/21 0200 02/09/21 0600 02/09/21 0924 02/09/21 1011   BP: (!) 92/52 (!) 97/56 (!) 97/56 (!) 92/56   Pulse: 72 66  64   Resp: 15 10  12   Temp: 98.4 °F (36.9 °C) 98.2 °F (36.8 °C)  98.6 °F (37 °C)   SpO2: 95% 99%  99%   Weight:   98.4 kg (217 lb)    Height:   5' 6\" (1.676 m)      Intake and Output:  No intake/output data recorded. 02/07 1901 - 02/09 0700  In: 220 [P.O.:220]  Out: -     Physical Examination:  General: NAD,Conversant   Neck:  Supple, no mass  Resp:  Lungs CTA B/L, no wheezing , normal respiratory effort  CV:  RRR,  no murmur or rub, no LE edema  GI:  Soft, NT, + Bowel sounds, no hepatosplenomegaly  Neurologic:  Non focal  Psych:             AAO x 3 appropriate affect   Skin:  No Rash  :  No austin    []    High complexity decision making was performed  []    Patient is at high-risk of decompensation with multiple organ involvement    Lab Data Personally Reviewed: I have reviewed all the pertinent labs, microbiology data and radiology studies during assessment.     Recent Labs     02/09/21  0239 02/08/21  0301 02/07/21  0104   * 140 140   K 4.5 3.6 3.2*    102 105   CO2 26 30 25   GLU 89 94 111*   BUN 64* 78* 98*   CREA 2.10* 2.30* 2.70*   CA 8.4* 8.2* 8.5     Recent Labs     02/09/21  0239 02/07/21  0104   WBC 8.7 8.4   HGB 7.5* 7.4*   HCT 23.7* 22.4*   * 125*     No results found for: SDES  Lab Results   Component Value Date/Time    Culture result: NO GROWTH 5 DAYS 02/04/2021 12:22 AM    Culture result: NO GROWTH ON SOLID MEDIA AT 2 DAYS 01/13/2021 10:00 AM    Culture result: (A) 01/13/2021 10:00 AM     STAPHYLOCOCCUS SPECIES, COAGULASE NEGATIVE ISOLATED FROM [facility] ONLY     Recent Results (from the past 24 hour(s))   GLUCOSE, POC    Collection Time: 02/08/21  4:27 PM   Result Value Ref Range    Glucose (POC) 119 (H) 65 - 100 mg/dL    Performed by Jazz Bradford    GLUCOSE, POC    Collection Time: 02/08/21  9:19 PM   Result Value Ref Range    Glucose (POC) 122 (H) 65 - 100 mg/dL    Performed by [employer]tera    METABOLIC PANEL, BASIC    Collection Time: 02/09/21  2:39 AM   Result Value Ref Range    Sodium 133 (L) 136 - 145 mmol/L    Potassium 4.5 3.5 - 5.1 mmol/L    Chloride 102 97 - 108 mmol/L    CO2 26 21 - 32 mmol/L    Anion gap 5 5 - 15 mmol/L    Glucose 89 65 - 100 mg/dL    BUN 64 (H) 6 - 20 MG/DL    Creatinine 2.10 (H) 0.55 - 1.02 MG/DL    BUN/Creatinine ratio 30 (H) 12 - 20      GFR est AA 29 (L) >60 ml/min/1.73m2    GFR est non-AA 24 (L) >60 ml/min/1.73m2    Calcium 8.4 (L) 8.5 - 10.1 MG/DL   CBC WITH AUTOMATED DIFF    Collection Time: 02/09/21  2:39 AM   Result Value Ref Range    WBC 8.7 3.6 - 11.0 K/uL    RBC 2.40 (L) 3.80 - 5.20 M/uL    HGB 7.5 (L) 11.5 - 16.0 g/dL    HCT 23.7 (L) 35.0 - 47.0 %    MCV 98.8 80.0 - 99.0 FL    MCH 31.3 26.0 - 34.0 PG    MCHC 31.6 30.0 - 36.5 g/dL    RDW 15.9 (H) 11.5 - 14.5 %    PLATELET 280 (L) 800 - 400 K/uL    MPV 10.6 8.9 - 12.9 FL    NRBC 0.0 0  WBC    ABSOLUTE NRBC 0.00 0.00 - 0.01 K/uL    NEUTROPHILS 65 32 - 75 %    LYMPHOCYTES 20 12 - 49 %    MONOCYTES 7 5 - 13 %    EOSINOPHILS 5 0 - 7 %    BASOPHILS 0 0 - 1 %    IMMATURE GRANULOCYTES 3 (H) 0.0 - 0.5 %    ABS. NEUTROPHILS 5.7 1.8 - 8.0 K/UL    ABS. LYMPHOCYTES 1.7 0.8 - 3.5 K/UL    ABS. MONOCYTES 0.6 0.0 - 1.0 K/UL    ABS. EOSINOPHILS 0.4 0.0 - 0.4 K/UL    ABS. BASOPHILS 0.0 0.0 - 0.1 K/UL    ABS. IMM. GRANS. 0.3 (H) 0.00 - 0.04 K/UL    DF SMEAR SCANNED      RBC COMMENTS ANISOCYTOSIS  1+        RBC COMMENTS OVALOCYTES  PRESENT        RBC COMMENTS MACROCYTOSIS  1+       GLUCOSE, POC    Collection Time: 02/09/21  6:27 AM   Result Value Ref Range    Glucose (POC) 107 (H) 65 - 100 mg/dL    Performed by Isaac Figueroa    GLUCOSE, POC    Collection Time: 02/09/21  7:42 AM   Result Value Ref Range    Glucose (POC) 101 (H) 65 - 100 mg/dL    Performed by AppZeroayse EdCourager    ECHO ADULT COMPLETE    Collection Time: 02/09/21  9:47 AM   Result Value Ref Range    IVSd 1.00 (A) 0.6 - 0.9 cm    LVIDd 4.82 3.9 - 5.3 cm    LVIDs 3.20 cm    LVOT d 2.16 cm    LVPWd 1.01 (A) 0.6 - 0.9 cm    LVOT Peak Gradient 3.16 mmHg    LVOT Peak Velocity 88.82 cm/s    Left Atrium Major Axis 4.89 cm    LA Volume 133.05 22 - 52 mL    LA Area 4C 31.26 cm2    LA Vol 2C 131.02 (A) 22 - 52 mL    LA Vol 4C 114.34 (A) 22 - 52 mL    Aortic Valve Area by Continuity of Peak Velocity 1.61 cm2    AoV PG 16.16 mmHg    Aortic Valve Systolic Peak Velocity 721.33 cm/s    MV A Car 114.92 cm/s    Mitral Valve E Wave Deceleration Time 342.06 ms    MV E Car 107.85 cm/s    E/E' lateral 10.15     E/E' septal 14.55     Mitral Valve Pressure Half-time 99.20 ms    MVA (PHT) 2.22 cm2    Triscuspid Valve Regurgitation Peak Gradient 16.16 mmHg    TR Max Velocity 200.99 cm/s    Ao Root D 3.45 cm   GLUCOSE, POC    Collection Time: 02/09/21 11:29 AM   Result Value Ref Range    Glucose (POC) 100 65 - 100 mg/dL    Performed by Sebastien [facility], MD  [facility]   [address] [address]  Phone - [phone]   Fax - [phone]  [email or web address]

## 2021-02-10 LAB
ANION GAP SERPL CALC-SCNC: 9 MMOL/L (ref 5–15)
BUN SERPL-MCNC: 58 MG/DL (ref 6–20)
BUN/CREAT SERPL: 29 (ref 12–20)
CALCIUM SERPL-MCNC: 8.9 MG/DL (ref 8.5–10.1)
CHLORIDE SERPL-SCNC: 100 MMOL/L (ref 97–108)
CO2 SERPL-SCNC: 25 MMOL/L (ref 21–32)
COMMENT, HOLDF: NORMAL
CREAT SERPL-MCNC: 2.02 MG/DL (ref 0.55–1.02)
ECHO AO ROOT DIAM: 3.45 CM
ECHO AV AREA PEAK VELOCITY: 1.61 CM2
ECHO AV AREA/BSA PEAK VELOCITY: 0.8 CM2/M2
ECHO AV PEAK GRADIENT: 16.16 MMHG
ECHO AV PEAK VELOCITY: 200.99 CM/S
ECHO LA AREA 4C: 31.26 CM2
ECHO LA MAJOR AXIS: 4.89 CM
ECHO LA MINOR AXIS: 2.36 CM
ECHO LA VOL 2C: 131.02 ML (ref 22–52)
ECHO LA VOL 4C: 114.34 ML (ref 22–52)
ECHO LA VOL BP: 133.05 ML (ref 22–52)
ECHO LA VOL/BSA BIPLANE: 64.25 ML/M2 (ref 16–28)
ECHO LA VOLUME INDEX A2C: 63.27 ML/M2 (ref 16–28)
ECHO LA VOLUME INDEX A4C: 55.22 ML/M2 (ref 16–28)
ECHO LV INTERNAL DIMENSION DIASTOLIC: 4.82 CM (ref 3.9–5.3)
ECHO LV INTERNAL DIMENSION SYSTOLIC: 3.2 CM
ECHO LV IVSD: 1 CM (ref 0.6–0.9)
ECHO LV MASS 2D: 172.5 G (ref 67–162)
ECHO LV MASS INDEX 2D: 83.3 G/M2 (ref 43–95)
ECHO LV POSTERIOR WALL DIASTOLIC: 1.01 CM (ref 0.6–0.9)
ECHO LVOT DIAM: 2.16 CM
ECHO LVOT PEAK GRADIENT: 3.16 MMHG
ECHO LVOT PEAK VELOCITY: 88.82 CM/S
ECHO MV A VELOCITY: 114.92 CM/S
ECHO MV AREA PHT: 2.22 CM2
ECHO MV E DECELERATION TIME (DT): 342.06 MS
ECHO MV E VELOCITY: 107.85 CM/S
ECHO MV E/A RATIO: 0.94
ECHO MV PRESSURE HALF TIME (PHT): 99.2 MS
ECHO TV REGURGITANT MAX VELOCITY: 200.99 CM/S
ECHO TV REGURGITANT PEAK GRADIENT: 16.16 MMHG
GLUCOSE BLD STRIP.AUTO-MCNC: 106 MG/DL (ref 65–100)
GLUCOSE BLD STRIP.AUTO-MCNC: 118 MG/DL (ref 65–100)
GLUCOSE BLD STRIP.AUTO-MCNC: 79 MG/DL (ref 65–100)
GLUCOSE BLD STRIP.AUTO-MCNC: 86 MG/DL (ref 65–100)
GLUCOSE SERPL-MCNC: 76 MG/DL (ref 65–100)
POTASSIUM SERPL-SCNC: 3.9 MMOL/L (ref 3.5–5.1)
SAMPLES BEING HELD,HOLD: NORMAL
SARS-COV-2, COV2: NORMAL
SERVICE CMNT-IMP: ABNORMAL
SERVICE CMNT-IMP: ABNORMAL
SERVICE CMNT-IMP: NORMAL
SERVICE CMNT-IMP: NORMAL
SODIUM SERPL-SCNC: 134 MMOL/L (ref 136–145)

## 2021-02-10 PROCEDURE — P9047 ALBUMIN (HUMAN), 25%, 50ML: HCPCS | Performed by: INTERNAL MEDICINE

## 2021-02-10 PROCEDURE — 80048 BASIC METABOLIC PNL TOTAL CA: CPT

## 2021-02-10 PROCEDURE — 65270000029 HC RM PRIVATE

## 2021-02-10 PROCEDURE — 74011000250 HC RX REV CODE- 250: Performed by: PHYSICIAN ASSISTANT

## 2021-02-10 PROCEDURE — 74011250637 HC RX REV CODE- 250/637: Performed by: INTERNAL MEDICINE

## 2021-02-10 PROCEDURE — 74011250636 HC RX REV CODE- 250/636: Performed by: INTERNAL MEDICINE

## 2021-02-10 PROCEDURE — 36415 COLL VENOUS BLD VENIPUNCTURE: CPT

## 2021-02-10 PROCEDURE — 94760 N-INVAS EAR/PLS OXIMETRY 1: CPT

## 2021-02-10 PROCEDURE — 82962 GLUCOSE BLOOD TEST: CPT

## 2021-02-10 PROCEDURE — 74011250636 HC RX REV CODE- 250/636: Performed by: FAMILY MEDICINE

## 2021-02-10 PROCEDURE — 97530 THERAPEUTIC ACTIVITIES: CPT

## 2021-02-10 PROCEDURE — 74011250637 HC RX REV CODE- 250/637: Performed by: FAMILY MEDICINE

## 2021-02-10 PROCEDURE — U0003 INFECTIOUS AGENT DETECTION BY NUCLEIC ACID (DNA OR RNA); SEVERE ACUTE RESPIRATORY SYNDROME CORONAVIRUS 2 (SARS-COV-2) (CORONAVIRUS DISEASE [COVID-19]), AMPLIFIED PROBE TECHNIQUE, MAKING USE OF HIGH THROUGHPUT TECHNOLOGIES AS DESCRIBED BY CMS-2020-01-R: HCPCS

## 2021-02-10 RX ORDER — POLYETHYLENE GLYCOL 3350, SODIUM SULFATE, SODIUM CHLORIDE, POTASSIUM CHLORIDE, SODIUM ASCORBATE, AND ASCORBIC ACID 7.5-2.691G
2 KIT ORAL ONCE
Status: COMPLETED | OUTPATIENT
Start: 2021-02-10 | End: 2021-02-10

## 2021-02-10 RX ORDER — ALBUMIN HUMAN 250 G/1000ML
25 SOLUTION INTRAVENOUS EVERY 6 HOURS
Status: COMPLETED | OUTPATIENT
Start: 2021-02-10 | End: 2021-02-11

## 2021-02-10 RX ADMIN — CHOLESTYRAMINE 4 G: 4 POWDER, FOR SUSPENSION ORAL at 12:00

## 2021-02-10 RX ADMIN — POLYETHYLENE GLYCOL 3350, SODIUM SULFATE, SODIUM CHLORIDE, POTASSIUM CHLORIDE, ASCORBIC ACID, SODIUM ASCORBATE 2 L: KIT at 17:45

## 2021-02-10 RX ADMIN — HEPARIN SODIUM 5000 UNITS: 5000 INJECTION INTRAVENOUS; SUBCUTANEOUS at 13:58

## 2021-02-10 RX ADMIN — Medication 10 ML: at 06:20

## 2021-02-10 RX ADMIN — ACETAMINOPHEN 650 MG: 325 TABLET ORAL at 06:14

## 2021-02-10 RX ADMIN — HEPARIN SODIUM 5000 UNITS: 5000 INJECTION INTRAVENOUS; SUBCUTANEOUS at 20:26

## 2021-02-10 RX ADMIN — LOPERAMIDE HYDROCHLORIDE 4 MG: 2 CAPSULE ORAL at 06:14

## 2021-02-10 RX ADMIN — TRAMADOL HYDROCHLORIDE 50 MG: 50 TABLET, FILM COATED ORAL at 17:45

## 2021-02-10 RX ADMIN — ALBUMIN (HUMAN) 25 G: 0.25 INJECTION, SOLUTION INTRAVENOUS at 10:57

## 2021-02-10 RX ADMIN — METRONIDAZOLE 500 MG: 500 INJECTION, SOLUTION INTRAVENOUS at 20:14

## 2021-02-10 RX ADMIN — CHOLESTYRAMINE 4 G: 4 POWDER, FOR SUSPENSION ORAL at 08:21

## 2021-02-10 RX ADMIN — METRONIDAZOLE 500 MG: 500 INJECTION, SOLUTION INTRAVENOUS at 09:25

## 2021-02-10 RX ADMIN — TRAMADOL HYDROCHLORIDE 50 MG: 50 TABLET, FILM COATED ORAL at 06:14

## 2021-02-10 RX ADMIN — HEPARIN SODIUM 5000 UNITS: 5000 INJECTION INTRAVENOUS; SUBCUTANEOUS at 06:00

## 2021-02-10 RX ADMIN — TRAMADOL HYDROCHLORIDE 50 MG: 50 TABLET, FILM COATED ORAL at 12:00

## 2021-02-10 RX ADMIN — Medication 10 ML: at 20:27

## 2021-02-10 RX ADMIN — ASPIRIN 81 MG: 81 TABLET, CHEWABLE ORAL at 08:21

## 2021-02-10 RX ADMIN — SODIUM CHLORIDE 500 ML: 9 INJECTION, SOLUTION INTRAVENOUS at 09:25

## 2021-02-10 RX ADMIN — CHOLESTYRAMINE 4 G: 4 POWDER, FOR SUSPENSION ORAL at 16:58

## 2021-02-10 RX ADMIN — ALBUMIN (HUMAN) 25 G: 0.25 INJECTION, SOLUTION INTRAVENOUS at 22:10

## 2021-02-10 RX ADMIN — Medication 1 CAPSULE: at 08:23

## 2021-02-10 RX ADMIN — LEVOFLOXACIN 750 MG: 5 INJECTION, SOLUTION INTRAVENOUS at 06:01

## 2021-02-10 RX ADMIN — ALBUMIN (HUMAN) 25 G: 0.25 INJECTION, SOLUTION INTRAVENOUS at 16:57

## 2021-02-10 RX ADMIN — Medication 10 ML: at 13:58

## 2021-02-10 NOTE — PROGRESS NOTES
[address] [facility] Adult  Hospitalist Group                                                                                          Hospitalist Progress Note  Yahir Jeffers MD  Answering service: [phone] OR [phone] from in house phone              Progress Note    Patient: Angel Carrasco MRN: [id number]  SSN: [id number]    YOB: 1955  Age: 60 y.o. Sex: female      Admit Date: 2/3/2021    LOS: 6 days     Subjective:     Patient admitted after a syncopal episode. Patient still with loose stools. Denies abdominal pain no vomiting. Objective:     Vitals:    02/10/21 1118 02/10/21 1322 02/10/21 1325 02/10/21 1328   BP:  95/62 (!) 76/53 (!) 118/56   Pulse:       Resp:       Temp:       SpO2:       Weight:       Height: 5' 6\" (1.676 m)           Intake and Output:  Current Shift: No intake/output data recorded. Last three shifts: No intake/output data recorded. Physical Exam:   GENERAL: alert, cooperative, no distress, appears stated age  THROAT & NECK: normal and no erythema or exudates noted. LUNG: clear to auscultation bilaterally  HEART: regular rate and rhythm, S1, S2 normal, no murmur, click, rub or gallop  ABDOMEN: soft, non-tender. Bowel sounds normal. No masses,  no organomegaly  EXTREMITIES:  extremities normal, atraumatic, no cyanosis or edema  SKIN: no rash or abnormalities  NEUROLOGIC: AOx3. PSYCHIATRIC: non focal    Lab/Data Review: All lab results for the last 24 hours reviewed.      Recent Results (from the past 24 hour(s))   GLUCOSE, POC    Collection Time: 02/09/21  4:19 PM   Result Value Ref Range    Glucose (POC) 119 (H) 65 - 100 mg/dL    Performed by Doni Saucedo, POC    Collection Time: 02/09/21  9:57 PM   Result Value Ref Range    Glucose (POC) 87 65 - 100 mg/dL    Performed by Samara Machuca, BASIC    Collection Time: 02/10/21  1:01 AM   Result Value Ref Range    Sodium 134 (L) 136 - 145 mmol/L    Potassium 3.9 3.5 - 5.1 mmol/L Chloride 100 97 - 108 mmol/L    CO2 25 21 - 32 mmol/L    Anion gap 9 5 - 15 mmol/L    Glucose 76 65 - 100 mg/dL    BUN 58 (H) 6 - 20 MG/DL    Creatinine 2.02 (H) 0.55 - 1.02 MG/DL    BUN/Creatinine ratio 29 (H) 12 - 20      GFR est AA 30 (L) >60 ml/min/1.73m2    GFR est non-AA 25 (L) >60 ml/min/1.73m2    Calcium 8.9 8.5 - 10.1 MG/DL   SAMPLES BEING HELD    Collection Time: 02/10/21  1:01 AM   Result Value Ref Range    SAMPLES BEING HELD  1 lav     COMMENT        Add-on orders for these samples will be processed based on acceptable specimen integrity and analyte stability, which may vary by analyte. GLUCOSE, POC    Collection Time: 02/10/21  6:17 AM   Result Value Ref Range    Glucose (POC) 86 65 - 100 mg/dL    Performed by Kika Casarez    GLUCOSE, POC    Collection Time: 02/10/21 11:51 AM   Result Value Ref Range    Glucose (POC) 106 (H) 65 - 100 mg/dL    Performed by Lucy Gayle         Imaging:    No results found. Assessment and Plan:     Syncope  -MRI shows no acute hemorrhage or infarction.   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  -Echo pending  -Her syncope is likely due to probable orthostasis given her volume contracted status    Fall  -As a result of her syncope  -Negative work-up for acute fracture or injury    Proctocolitis  -Evident on CT abdomen and pelvis  -Stool studies negative  -Continue empiric Zosyn and Flagyl  -Continue questran and immodium prn for loose stool  -Reconsult GI as patient's diarrhea has not improved    Hematochezia  -Likely from proctocolitis  -Monitor H&H  -GI following    Acute kidney injury  -In the setting of rhabdo and volume depletion from diarrhea  -Patient with baseline CKD stage III/IV  -Nephrology evaluating the patient  -Continue IV fluid and monitor renal function, overall improving    Rhabdomyolysis  -As a result of fall  -Continue IV fluid    Metabolic acidosis  -Due to alkaline loss from GI tract as well as acute kidney injury  -Now resolved  -Nephrology evaluating patient    Thrombocytosis  -Likely due to acute reaction to infectious process  -Monitor CBC    Abnormal LFTs  -Also likely related to infectious process  -CT abdomen and pelvis without any acute liver or gallbladder pathology    Anemia  -Continue monitor H&H  -Unclear chronicity    Hypertension  -Hold antihypertensives for now as patient initially hypotensive    Diabetes  -Continue insulin sliding scale coverage    Dyslipidemia  -Holding statin due to rhabdomyolysis    Discharge plan: Still needs inpatient treatment and monitoring. To rehab when medically stable. COVID ordered. Anticipated disposition is likely more than 48 hours.     Signed By: Christina Quintero MD     February 10, 2021

## 2021-02-10 NOTE — PROGRESS NOTES
Transition of Care Plan   RUR- 16 % Low Risks    DISPOSITION: SNF- [facility] when ready, [facility] approved     Transport: [facility] - on WILL CALL     Reviewed chart for transitions of care,and discussed in rounds. Discussed the case with Dr. Mary Moses. [facility] has approved auth for SNF. Likely discharge tomorrow if medically cleared. Updated patient at bedside. [facility] on WILL CALL.      HOLLY Melendez

## 2021-02-10 NOTE — PROGRESS NOTES
Nephrology Progress Note  Efren Canada  Date of Admission : 2/3/2021    CC: Follow up for PRBAHU on CKD       Assessment and Plan     PRABHU on CKD:  - presumed 2/2 volume depletion  - Cr improving  - albumin x 4 doses today  - daily labs    CKD IIIb:  - f/b Dr. Shashank Haas  - recent baseline 1.5    Hypokalemia:  - replete PRN    Hypovolemia:  - improving    Orthostasis:  - NS bolus this AM  - albumin bolus x 4 today    Acidosis:  - improving    Colitis:  - on levo and flagyl    Diarrhea:  - improving    HTN:  - BP stable           Interval History:  Seen and examined. Cr better. Low BPs overnight. Getting NS bolus now. No cp, sob, n/v/d. Some lightheadedness upon standing. Current Medications: all current  Medications have been eviewed in EPIC  Review of Systems: Pertinent items are noted in HPI. Objective:  Vitals:    Vitals:    02/10/21 0600 02/10/21 0612 02/10/21 0652 02/10/21 0954   BP: (!) 92/56 (!) 81/51 (!) 89/65 (!) 91/56   Pulse: 77   66   Resp: 20   18   Temp: 98.6 °F (37 °C)   98.1 °F (36.7 °C)   SpO2: 100%   98%   Weight:       Height:         Intake and Output:  No intake/output data recorded. No intake/output data recorded. Physical Examination:  General: NAD,Conversant   Neck:  Supple, no mass  Resp:  Lungs CTA B/L, no wheezing , normal respiratory effort  CV:  RRR,  no murmur or rub, no LE edema  GI:  Soft, NT, + Bowel sounds, no hepatosplenomegaly  Neurologic:  Non focal  Psych:             AAO x 3 appropriate affect   Skin:  No Rash  :  No austin    []    High complexity decision making was performed  []    Patient is at high-risk of decompensation with multiple organ involvement    Lab Data Personally Reviewed: I have reviewed all the pertinent labs, microbiology data and radiology studies during assessment.     Recent Labs     02/10/21  0101 02/09/21  0239 02/08/21  0301   * 133* 140   K 3.9 4.5 3.6    102 102   CO2 25 26 30   GLU 76 89 94   BUN 58* 64* 78*   CREA 2.02* 2.10* 2.30*   CA 8.9 8.4* 8.2*     Recent Labs     02/09/21  0239   WBC 8.7   HGB 7.5*   HCT 23.7*   *     No results found for: SDES  Lab Results   Component Value Date/Time    Culture result: NO GROWTH 5 DAYS 02/04/2021 12:22 AM    Culture result: NO GROWTH ON SOLID MEDIA AT 2 DAYS 01/13/2021 10:00 AM    Culture result: (A) 01/13/2021 10:00 AM     STAPHYLOCOCCUS SPECIES, COAGULASE NEGATIVE ISOLATED FROM THIO BROTH ONLY     Recent Results (from the past 24 hour(s))   GLUCOSE, POC    Collection Time: 02/09/21 11:29 AM   Result Value Ref Range    Glucose (POC) 100 65 - 100 mg/dL    Performed by Susan, POC    Collection Time: 02/09/21  4:19 PM   Result Value Ref Range    Glucose (POC) 119 (H) 65 - 100 mg/dL    Performed by Rolf Virk    GLUCOSE, POC    Collection Time: 02/09/21  9:57 PM   Result Value Ref Range    Glucose (POC) 87 65 - 100 mg/dL    Performed by Samara Ahumada , BASIC    Collection Time: 02/10/21  1:01 AM   Result Value Ref Range    Sodium 134 (L) 136 - 145 mmol/L    Potassium 3.9 3.5 - 5.1 mmol/L    Chloride 100 97 - 108 mmol/L    CO2 25 21 - 32 mmol/L    Anion gap 9 5 - 15 mmol/L    Glucose 76 65 - 100 mg/dL    BUN 58 (H) 6 - 20 MG/DL    Creatinine 2.02 (H) 0.55 - 1.02 MG/DL    BUN/Creatinine ratio 29 (H) 12 - 20      GFR est AA 30 (L) >60 ml/min/1.73m2    GFR est non-AA 25 (L) >60 ml/min/1.73m2    Calcium 8.9 8.5 - 10.1 MG/DL   SAMPLES BEING HELD    Collection Time: 02/10/21  1:01 AM   Result Value Ref Range    SAMPLES BEING HELD  1 lav     COMMENT        Add-on orders for these samples will be processed based on acceptable specimen integrity and analyte stability, which may vary by analyte.    GLUCOSE, POC    Collection Time: 02/10/21  6:17 AM   Result Value Ref Range    Glucose (POC) 86 65 - 100 mg/dL    Performed by Flaquito Daley MD  [facility]   [address] [facility], [address] [facility]  Phone - [phone]   Fax - [phone]  [email or web address]

## 2021-02-10 NOTE — PROGRESS NOTES
Comprehensive Nutrition Assessment    Type and Reason for Visit: Initial, RD nutrition re-screen/LOS    Nutrition Recommendations/Plan:    Encouraged binding foods - rice, toast, occasional banana (careful with high K+)  Home Probiotics   Encourage protein supplement - pt declined   Consider adding Banatrol trial     Nutrition Assessment:    60year old female admitted for Syncope [R55] who  has a past medical history of Cancer, CKD stage IV (3/1/2016), Diabetes, HTN (11/2/2015), LBP (low back pain) (11/2/2015), Menopause, Microalbuminuria (11/21/2015), Obesity (11/2/2015), PAD (peripheral artery disease) (9/29/2016), and Venous insufficiency (11/2/2015). Pt had recent partial toe amputation, had syncopal event at home and fell, pt reports she has not had control of her bowels since this event. On abx and eating fair to poorly, LE edema, renal function improving with gentle hydration. GI consult noted. Visited with patient and encouraged some bulking foods & will benefit from continued probiotics once abx are finished. May consider trial of Banatrol if open to needing additional help binding. Pt has lost a significant amount of weight however, this was intentional as she is a member of [employer] program. This being said, wt loss could be side effect of another underlying condition and should be monitored. Did not complete nutrition focused physical exam at this time. Malnutrition Assessment:  Malnutrition Status:  Insufficient data      Nutritionally Significant Medications: Questran light, Risaquad, Levaquin, Imodium, Flagyl,       Estimated Daily Nutrient Needs:  Energy (kcal): 1785 - 1933 kcal/d (MSJ xAF 1.2 - 1.3); Weight Used for Energy Requirements: Current  Protein (g): 92-110g (1-1.2g/kg);  Weight Used for Protein Requirements: Current(92 kg - admit/stated wt)  Fluid (ml/day): 2L/d; Method Used for Fluid Requirements: 1 ml/kcal    Nutrition Related Findings:  Last BMs: 2/10 - having diarrhea, uncontrolled since her fall    BM: 2/10  Edema: LLE & RLE 3+ pitting  Wounds:  Surgical incision       Current Nutrition Therapies:   DIET CLEAR LIQUID  DIET NPO  Additional Caloric Sources:      Meal intake:   Patient Vitals for the past 168 hrs:   % Diet Eaten   02/08/21 0800 25 %     Anthropometric Measures:  · Height:  5' 6\" (167.6 cm)  · Current Body Wt:  92.1 kg (203 lb)   · Admission Body Wt:  204 lb 12.9 oz    · Usual Body Wt:        · Ideal Body Wt:   :  156.2 %   · Body mass index is 37.97 kg/m². · BMI Categories:  Obese class 2 (BMI 35.0-39. 9)       Last 3 Recorded Weights in this Encounter    02/06/21 0200 02/09/21 0924 02/10/21 0201   Weight: 98.7 kg (217 lb 9.5 oz) 98.4 kg (217 lb) 106.7 kg (235 lb 3.7 oz)     Wt Readings from Last 10 Encounters:   02/10/21 106.7 kg (235 lb 3.7 oz)   02/04/21 92.5 kg (204 lb)   04/03/19 104.8 kg (231 lb)   04/03/19 104.8 kg (231 lb)   03/19/19 105.1 kg (231 lb 9.6 oz)   09/21/18 106.1 kg (234 lb)   08/20/18 106.1 kg (234 lb)   07/10/18 106.1 kg (234 lb)   02/19/18 106.1 kg (234 lb)   10/19/17 107.6 kg (237 lb 4.8 oz)       Nutrition Diagnosis:   · Altered GI function related to altered GI structure as evidenced by diarrhea    Nutrition Interventions:   Food and/or Nutrient Delivery: Continue current diet  Nutrition Education and Counseling: Education initiated  Coordination of Nutrition Care: Continue to monitor while inpatient    Goals:  intake of 75% of meals with reduced diarrhea to 1-2x daily within 1 week       Nutrition Monitoring and Evaluation:   Behavioral-Environmental Outcomes: Knowledge or skill  Food/Nutrient Intake Outcomes: Food and nutrient intake  Physical Signs/Symptoms Outcomes: Weight, GI status, Diarrhea, Biochemical data    Discharge Planning:    No discharge needs at this time     Lab Results   Component Value Date/Time    Hemoglobin A1c 5.9 09/29/2016 10:02 AM     Lab Results   Component Value Date/Time    Glucose 76 02/10/2021 01:01 AM Glucose (POC) 106 (H) 02/10/2021 11:51 AM   No results found for: Kellie Cruz, [id number]    Lab Results   Component Value Date/Time    Vitamin B12 688 10/02/2016 02:07 AM    Folate 19.8 10/02/2016 02:07 AM     Lab Results   Component Value Date/Time    Iron 71 01/19/2017 11:47 AM    TIBC 307 01/19/2017 11:47 AM    Iron % saturation 23 01/19/2017 11:47 AM    Ferritin 557 (H) 10/02/2016 02:07 AM         Albert Dove RD, MS  Contact via [employer]

## 2021-02-10 NOTE — PROGRESS NOTES
Bedside and Verbal shift change report given to [address] (oncoming nurse) by Catalina Yost (offgoing nurse). Report included the following information SBAR, Kardex, Intake/Output, MAR, Recent Results, Cardiac Rhythm NSR and Dual Neuro Assessment.

## 2021-02-10 NOTE — PROGRESS NOTES
[facility].  [address] [facility] [address], [address]  [phone]                GI PROGRESS NOTE      NAME:   Nicci Jack   :    1955   MRN:    [id number]     Subjective:   21:  She reports having less frequent BMs, less urgency, but still with some incontinence episodes when she stands up. She has seen small amount of BRB but thinks may be hemorrhoids. Denies rectal pain and abdominal pain, but described episodic periumbilical pain PTA, as well as what sounds like IBS-D. Her primary GI is Dr. Janette Contreras at [facility].      2/10/21:  She reports that she continues to have fecal incontinence without any warning - sometimes passes gas and stool leaks out, other times she stands up and has soiling. She is currently needing to wear adult diapers b/c of this problem. The nurses have been cleaning her up, so she is not sure whether she passes a lot of stool each time. She states that she has no urge at all that she needs to defecate. She denies any abdominal pain, nausea, vomiting. She is taking Questran TID with meals, and Imodium is ordered as PRN but she has been taking a 4 mg dose TID on average. She has also been receiving empiric Flagyl and Levaquin since admission on . None of this has helped with the diarrhea - the stool has not bulked up. She denies urinary incontinence. Recall that the CT scan  showed moderate to severe proctocolitis. This was initially felt to represent ischemic changes in the setting of rhabdomyolysis after she had a syncopal event and fall. Stool studies were negative- enteric panel and C. Diff. Objective:     VITALS:   Last 24hrs VS reviewed since prior hospitalist progress note.  Most recent are:  Visit Vitals  BP (!) 100/52 (BP 1 Location: Left upper arm, BP Patient Position: Sitting)   Pulse 64   Temp 98.1 °F (36.7 °C)   Resp 15   Ht 5' 6\" (1.676 m)   Wt 106.7 kg (235 lb 3.7 oz)   SpO2 99%   BMI 37.97 kg/m²       Intake/Output Summary (Last 24 hours) at 2/10/2021 1523  Last data filed at 2/10/2021 1400  Gross per 24 hour   Intake    Output 1 ml   Net -1 ml        PHYSICAL EXAM:  General   NAD, seems comfortable lying in bed  Skin         No visible rashes, no jaundice or pallor  Resp        Comfortable breathing   Abd     Non-distended, soft, non-tender  Psych      Pleasant, no agitation or anxiety    Lab Data Reviewed   Medications: Reviewed    Assessment/Plan     Diarrhea / fecal incontinence:  - Negative enteric stool panel and C.  Diff  - No improvement despite empiric Flagyl and Levaquin x 6 days  - No improvement on TID Questran and Imodium  Colitis - moderate to severe proctocolitis on CT 2/4/21  - PLAN FOR COLONOSCOPY 2/11/21 with Dr. Elysia Guardado    PRABHU  - Renal following  Rhabdomyolysis  - Following a fall, down for 36 hrs    Primary GI is Dr. Dontae Kat at [facility]

## 2021-02-10 NOTE — PROGRESS NOTES
Problem: Self Care Deficits Care Plan (Adult)  Goal: *Acute Goals and Plan of Care (Insert Text)  Description:   FUNCTIONAL STATUS PRIOR TO ADMISSION: Patient was modified independent using a veronique SPCs for functional mobility, was modified independent for basic and most instrumental ADLs, working at [address] [place] [address] in [employer]. HOME SUPPORT: The patient lived alone with family in the area to provide assistance. Occupational Therapy Goals  Initiated 2/5/2021  1. Patient will perform grooming at the sink with supervision/set-up within 7 day(s). 2.  Patient will perform bathing with minimal assistance/contact guard assist within 7 day(s). 3.  Patient will perform lower body dressing with minimal assistance/contact guard assist and AE PRN within 7 day(s). 4.  Patient will perform toilet transfers with supervision/set-up within 7 day(s). 5.  Patient will perform all aspects of toileting with minimal assistance/contact guard assist within 7 day(s). 6.  Patient will participate in upper extremity therapeutic exercise/activities with supervision/set-up for 5 minutes within 7 day(s). 7.  Patient will utilize energy conservation techniques during functional activities with verbal and visual cues within 7 day(s). Outcome: Progressing Towards Goal     OCCUPATIONAL THERAPY TREATMENT  Patient: Danielle Stone (31 y.o. female)  Date: 2/10/2021  Diagnosis: Syncope [R55] Syncope       Precautions: Fall  Chart, occupational therapy assessment, plan of care, and goals were reviewed. ASSESSMENT  Patient continues with skilled OT services and is progressing towards goals. Continues to be limited by hypotension, decreased balance, strength, activity tolerance, bowel/bladder continence, safety awareness, lower body access, BLE ROM, and coordination this session, requiring up to mod A for bed mobility this session.  Patient receive in bed reporting she has been unable to progress to chair today 2/2 hypotension and dizziness. Patient requesting to sit EOB for lunch - required mod A for supine > sit to manage legs, pull to sit. BP taken supine, EOB, and EOB post ankle pumps/marches w/ noted increase in SBP w/ seated activity (see below). Patient inquired about adaptive dressing equipment she used after a prior admission, stating she felt it might be helpful to increase her independence w/ LB dressing tasks given LE edema and decreased LB access. Patient educated on AE (dressing stick, reacher) and options for obtaining w/ patient demonstrating good understanding. Patient left at [facility], RN aware, to eat lunch. Vitals:    02/10/21 1322 02/10/21 1325 02/10/21 1328   BP: 95/62 (!) 76/53 (!) 118/56   BP Patient Position: Supine Sitting  Comment: post supine>sit Sitting  Comment: Post seated ankle pumps, marches        Current Level of Function Impacting Discharge (ADLs): Up to mod A for mobility and mod A for self-care    Other factors to consider for discharge: Lives alone         PLAN :  Patient continues to benefit from skilled intervention to address the above impairments. Continue treatment per established plan of care. to address goals. Recommend with staff: Recommend with nursing, ADLs with spv-mod A, OOB to chair 3x/day and toileting via functional mobility to and from bathroom min assist pending BP. Thank you for completing as able in order to maintain patient strength, endurance and independence.     Recommend next OT session: Adaptive LB dressing w/ dressing stick, reacher    Recommendation for discharge: (in order for the patient to meet his/her long term goals)  Therapy up to 5 days/week in SNF setting    This discharge recommendation:  Has been made in collaboration with the attending provider and/or case management    IF patient discharges home will need the following DME: TBD        SUBJECTIVE:   Patient stated I used to have one with a little semi-[place] at one end and little hook thing on the other - that would probably make it a lot easier to get my socks off.     OBJECTIVE DATA SUMMARY:   Cognitive/Behavioral Status:  Neurologic State: Alert  Orientation Level: Oriented X4  Cognition: Appropriate for age attention/concentration; Appropriate decision making             Functional Mobility and Transfers for ADLs:  Bed Mobility:  Supine to Sit: Moderate assistance  Scooting: Contact guard assistance    Transfers:             Balance:  Sitting: Intact    ADL Intervention:                                          Therapeutic Exercises:   Patient completed ankle pumps and marches at EOB - reports R hip pain limiting full ROM    Pain:  Patient reports R hip pain w/ therapeutic exercise at EOB    Activity Tolerance:   Fair and low BP, increasing w/ activity    After treatment patient left in no apparent distress:   Call bell within reach, Bed / chair alarm activated, and EOB    COMMUNICATION/COLLABORATION:   The patients plan of care was discussed with: Registered nurse. BERNA Baldwin  Time Calculation: 9 mins    Regarding student involvement in patient care:  A student participated in this treatment session. Per CMS Medicare statements and AOTA guidelines I certify that the following was true:  1. I was present and directly observed the entire session. 2. I made all skilled judgments and clinical decisions regarding care. 3. I am the practitioner responsible for assessment, treatment, and documentation.

## 2021-02-10 NOTE — PROGRESS NOTES
Problem: Falls - Risk of  Goal: *Absence of Falls  Description: Document Hans Lechuga Fall Risk and appropriate interventions in the flowsheet. 2/10/2021 0044 by Seema Deleon  Outcome: Progressing Towards Goal  Note: Fall Risk Interventions:  Mobility Interventions: Bed/chair exit alarm, Patient to call before getting OOB         Medication Interventions: Patient to call before getting OOB, Teach patient to arise slowly, Evaluate medications/consider consulting pharmacy    Elimination Interventions: Call light in reach, Patient to call for help with toileting needs, Toilet paper/wipes in reach    History of Falls Interventions: Bed/chair exit alarm      2/10/2021 0044 by Seema Deleon  Outcome: Progressing Towards Goal  Note: Fall Risk Interventions:  Mobility Interventions: Bed/chair exit alarm, Patient to call before getting OOB         Medication Interventions: Patient to call before getting OOB, Teach patient to arise slowly, Evaluate medications/consider consulting pharmacy    Elimination Interventions: Call light in reach, Patient to call for help with toileting needs, Toilet paper/wipes in reach    History of Falls Interventions: Bed/chair exit alarm         Problem: Patient Education: Go to Patient Education Activity  Goal: Patient/Family Education  2/10/2021 0044 by Seema Deleon  Outcome: Progressing Towards Goal  2/10/2021 0044 by Seema Deleon  Outcome: Progressing Towards Goal     Problem: Pressure Injury - Risk of  Goal: *Prevention of pressure injury  Description: Document Ra Scale and appropriate interventions in the flowsheet.   2/10/2021 0044 by Seema Deleon  Outcome: Progressing Towards Goal  Note: Pressure Injury Interventions:       Moisture Interventions: Absorbent underpads, Apply protective barrier, creams and emollients, Minimize layers    Activity Interventions: Assess need for specialty bed, Increase time out of bed    Mobility Interventions: HOB 30 degrees or less, Pressure redistribution bed/mattress (bed type), PT/OT evaluation    Nutrition Interventions: Offer support with meals,snacks and hydration, Document food/fluid/supplement intake    Friction and Shear Interventions: Lift sheet, Minimize layers, Apply protective barrier, creams and emollients             2/10/2021 0044 by Florencia Call  Outcome: Progressing Towards Goal  Note: Pressure Injury Interventions:       Moisture Interventions: Absorbent underpads, Apply protective barrier, creams and emollients, Minimize layers    Activity Interventions: Assess need for specialty bed, Increase time out of bed    Mobility Interventions: HOB 30 degrees or less, Pressure redistribution bed/mattress (bed type), PT/OT evaluation    Nutrition Interventions: Offer support with meals,snacks and hydration, Document food/fluid/supplement intake    Friction and Shear Interventions: Lift sheet, Minimize layers, Apply protective barrier, creams and emollients                Problem: Patient Education: Go to Patient Education Activity  Goal: Patient/Family Education  2/10/2021 0044 by Florencia Call  Outcome: Progressing Towards Goal  2/10/2021 0044 by Florencia Call  Outcome: Progressing Towards Goal     Problem: Risk for Spread of Infection  Goal: Prevent transmission of infectious organism to others  Description: Prevent the transmission of infectious organisms to other patients, staff members, and visitors.   2/10/2021 0044 by Florencia Call  Outcome: Progressing Towards Goal  2/10/2021 0044 by Florencia Call  Outcome: Progressing Towards Goal     Problem: Patient Education:  Go to Education Activity  Goal: Patient/Family Education  2/10/2021 0044 by Florencia Call  Outcome: Progressing Towards Goal  2/10/2021 0044 by Florencia Call  Outcome: Progressing Towards Goal     Problem: Discharge Planning  Goal: *Discharge to safe environment  2/10/2021 0044 by Florencia Call  Outcome: Progressing Towards Goal  2/10/2021 0044 by Florencia Call  Outcome: Progressing Towards Goal  Goal: *Knowledge of medication management  2/10/2021 0044 by Maryhelen Button  Outcome: Progressing Towards Goal  2/10/2021 0044 by Maryhelen Button  Outcome: Progressing Towards Goal  Goal: *Knowledge of discharge instructions  2/10/2021 0044 by Maryveroen Button  Outcome: Progressing Towards Goal  2/10/2021 0044 by Maryhelen Button  Outcome: Progressing Towards Goal     Problem: Patient Education: Go to Patient Education Activity  Goal: Patient/Family Education  2/10/2021 0044 by Maryveroen Button  Outcome: Progressing Towards Goal  2/10/2021 0044 by Maryhelen Button  Outcome: Progressing Towards Goal     Problem: Patient Education: Go to Patient Education Activity  Goal: Patient/Family Education  2/10/2021 0044 by Maryhelen Button  Outcome: Progressing Towards Goal  2/10/2021 0044 by Maryhelen Button  Outcome: Progressing Towards Goal     Problem: Patient Education: Go to Patient Education Activity  Goal: Patient/Family Education  2/10/2021 0044 by Maryhelen Button  Outcome: Progressing Towards Goal  2/10/2021 0044 by Maryhelen Button  Outcome: Progressing Towards Goal

## 2021-02-10 NOTE — PROGRESS NOTES
Physical Therapy Note  02/10/2021    Chart reviewed and attempted to see pt for PT tx session - received pt supine in bed - currently receiving fluid bolus and albumin. Remains hypotensive at rest, 82/53mmHg, and slightly light headed. RN notified. Will defer and follow up later this PM as able/appropriate.     Thank you,  León Kuo, PT, DPT

## 2021-02-10 NOTE — PROGRESS NOTES
Bedside and Verbal shift change report given to Carlos Alberto Ny rn (oncoming nurse) by Joseluis Reich, student nurse and savi nichols (offgoing nurse). Report included the following information SBAR, MAR, Cardiac Rhythm NSR and Dual Neuro Assessment.

## 2021-02-11 ENCOUNTER — ANESTHESIA (OUTPATIENT)
Dept: ENDOSCOPY | Age: 66
DRG: 378 | End: 2021-02-11
Payer: COMMERCIAL

## 2021-02-11 ENCOUNTER — ANESTHESIA EVENT (OUTPATIENT)
Dept: ENDOSCOPY | Age: 66
DRG: 378 | End: 2021-02-11
Payer: COMMERCIAL

## 2021-02-11 PROBLEM — D62 ACUTE BLOOD LOSS ANEMIA: Status: ACTIVE | Noted: 2021-02-11

## 2021-02-11 LAB
ALBUMIN SERPL-MCNC: 3.5 G/DL (ref 3.5–5)
ANION GAP SERPL CALC-SCNC: 8 MMOL/L (ref 5–15)
ANION GAP SERPL CALC-SCNC: 9 MMOL/L (ref 5–15)
BASOPHILS # BLD: 0.1 K/UL (ref 0–0.1)
BASOPHILS NFR BLD: 2 % (ref 0–1)
BUN SERPL-MCNC: 46 MG/DL (ref 6–20)
BUN SERPL-MCNC: 48 MG/DL (ref 6–20)
BUN/CREAT SERPL: 24 (ref 12–20)
BUN/CREAT SERPL: 26 (ref 12–20)
CALCIUM SERPL-MCNC: 8.8 MG/DL (ref 8.5–10.1)
CALCIUM SERPL-MCNC: 9 MG/DL (ref 8.5–10.1)
CHLORIDE SERPL-SCNC: 106 MMOL/L (ref 97–108)
CHLORIDE SERPL-SCNC: 106 MMOL/L (ref 97–108)
CO2 SERPL-SCNC: 19 MMOL/L (ref 21–32)
CO2 SERPL-SCNC: 24 MMOL/L (ref 21–32)
CREAT SERPL-MCNC: 1.8 MG/DL (ref 0.55–1.02)
CREAT SERPL-MCNC: 2 MG/DL (ref 0.55–1.02)
DIFFERENTIAL METHOD BLD: ABNORMAL
EOSINOPHIL # BLD: 0.1 K/UL (ref 0–0.4)
EOSINOPHIL NFR BLD: 2 % (ref 0–7)
ERYTHROCYTE [DISTWIDTH] IN BLOOD BY AUTOMATED COUNT: 15.8 % (ref 11.5–14.5)
GLUCOSE BLD STRIP.AUTO-MCNC: 71 MG/DL (ref 65–100)
GLUCOSE BLD STRIP.AUTO-MCNC: 73 MG/DL (ref 65–100)
GLUCOSE BLD STRIP.AUTO-MCNC: 78 MG/DL (ref 65–100)
GLUCOSE BLD STRIP.AUTO-MCNC: 81 MG/DL (ref 65–100)
GLUCOSE SERPL-MCNC: 81 MG/DL (ref 65–100)
GLUCOSE SERPL-MCNC: 81 MG/DL (ref 65–100)
HCT VFR BLD AUTO: 18.1 % (ref 35–47)
HCT VFR BLD AUTO: 25.1 % (ref 35–47)
HEMOCCULT STL QL: NEGATIVE
HGB BLD-MCNC: 5.6 G/DL (ref 11.5–16)
HGB BLD-MCNC: 7.9 G/DL (ref 11.5–16)
HISTORY CHECKED?,CKHIST: NORMAL
IMM GRANULOCYTES # BLD AUTO: 0 K/UL
IMM GRANULOCYTES NFR BLD AUTO: 0 %
LYMPHOCYTES # BLD: 0.7 K/UL (ref 0.8–3.5)
LYMPHOCYTES NFR BLD: 11 % (ref 12–49)
MCH RBC QN AUTO: 31.6 PG (ref 26–34)
MCHC RBC AUTO-ENTMCNC: 30.9 G/DL (ref 30–36.5)
MCV RBC AUTO: 102.3 FL (ref 80–99)
MONOCYTES # BLD: 0.3 K/UL (ref 0–1)
MONOCYTES NFR BLD: 4 % (ref 5–13)
NEUTS BAND NFR BLD MANUAL: 1 % (ref 0–6)
NEUTS SEG # BLD: 5.6 K/UL (ref 1.8–8)
NEUTS SEG NFR BLD: 80 % (ref 32–75)
NRBC # BLD: 0 K/UL (ref 0–0.01)
NRBC BLD-RTO: 0 PER 100 WBC
PATH REV BLD -IMP: ABNORMAL
PHOSPHATE SERPL-MCNC: 3.6 MG/DL (ref 2.6–4.7)
PLATELET # BLD AUTO: 104 K/UL (ref 150–400)
PMV BLD AUTO: 10.3 FL (ref 8.9–12.9)
POTASSIUM SERPL-SCNC: 4.1 MMOL/L (ref 3.5–5.1)
POTASSIUM SERPL-SCNC: 4.2 MMOL/L (ref 3.5–5.1)
RBC # BLD AUTO: 1.77 M/UL (ref 3.8–5.2)
RBC MORPH BLD: ABNORMAL
SARS-COV-2, XPLCVT: NOT DETECTED
SERVICE CMNT-IMP: NORMAL
SODIUM SERPL-SCNC: 133 MMOL/L (ref 136–145)
SODIUM SERPL-SCNC: 139 MMOL/L (ref 136–145)
SOURCE, COVRS: NORMAL
WBC # BLD AUTO: 6.8 K/UL (ref 3.6–11)
WBC MORPH BLD: ABNORMAL

## 2021-02-11 PROCEDURE — 80048 BASIC METABOLIC PNL TOTAL CA: CPT

## 2021-02-11 PROCEDURE — 82272 OCCULT BLD FECES 1-3 TESTS: CPT

## 2021-02-11 PROCEDURE — 36415 COLL VENOUS BLD VENIPUNCTURE: CPT

## 2021-02-11 PROCEDURE — 74011250636 HC RX REV CODE- 250/636: Performed by: INTERNAL MEDICINE

## 2021-02-11 PROCEDURE — P9047 ALBUMIN (HUMAN), 25%, 50ML: HCPCS | Performed by: INTERNAL MEDICINE

## 2021-02-11 PROCEDURE — 85018 HEMOGLOBIN: CPT

## 2021-02-11 PROCEDURE — 30233N1 TRANSFUSION OF NONAUTOLOGOUS RED BLOOD CELLS INTO PERIPHERAL VEIN, PERCUTANEOUS APPROACH: ICD-10-PCS | Performed by: INTERNAL MEDICINE

## 2021-02-11 PROCEDURE — 77030021593 HC FCPS BIOP ENDOSC BSC -A: Performed by: INTERNAL MEDICINE

## 2021-02-11 PROCEDURE — 86850 RBC ANTIBODY SCREEN: CPT

## 2021-02-11 PROCEDURE — 76040000007: Performed by: INTERNAL MEDICINE

## 2021-02-11 PROCEDURE — 74011250637 HC RX REV CODE- 250/637: Performed by: FAMILY MEDICINE

## 2021-02-11 PROCEDURE — 80069 RENAL FUNCTION PANEL: CPT

## 2021-02-11 PROCEDURE — 88305 TISSUE EXAM BY PATHOLOGIST: CPT

## 2021-02-11 PROCEDURE — 74011000250 HC RX REV CODE- 250: Performed by: HOSPITALIST

## 2021-02-11 PROCEDURE — 93005 ELECTROCARDIOGRAM TRACING: CPT

## 2021-02-11 PROCEDURE — 0DBM8ZX EXCISION OF DESCENDING COLON, VIA NATURAL OR ARTIFICIAL OPENING ENDOSCOPIC, DIAGNOSTIC: ICD-10-PCS | Performed by: INTERNAL MEDICINE

## 2021-02-11 PROCEDURE — 76060000032 HC ANESTHESIA 0.5 TO 1 HR: Performed by: INTERNAL MEDICINE

## 2021-02-11 PROCEDURE — 0DBL8ZX EXCISION OF TRANSVERSE COLON, VIA NATURAL OR ARTIFICIAL OPENING ENDOSCOPIC, DIAGNOSTIC: ICD-10-PCS | Performed by: INTERNAL MEDICINE

## 2021-02-11 PROCEDURE — 74011000250 HC RX REV CODE- 250: Performed by: NURSE ANESTHETIST, CERTIFIED REGISTERED

## 2021-02-11 PROCEDURE — 74011250636 HC RX REV CODE- 250/636: Performed by: NURSE ANESTHETIST, CERTIFIED REGISTERED

## 2021-02-11 PROCEDURE — 0DB98ZX EXCISION OF DUODENUM, VIA NATURAL OR ARTIFICIAL OPENING ENDOSCOPIC, DIAGNOSTIC: ICD-10-PCS | Performed by: INTERNAL MEDICINE

## 2021-02-11 PROCEDURE — 0DBP8ZX EXCISION OF RECTUM, VIA NATURAL OR ARTIFICIAL OPENING ENDOSCOPIC, DIAGNOSTIC: ICD-10-PCS | Performed by: INTERNAL MEDICINE

## 2021-02-11 PROCEDURE — 0DB58ZX EXCISION OF ESOPHAGUS, VIA NATURAL OR ARTIFICIAL OPENING ENDOSCOPIC, DIAGNOSTIC: ICD-10-PCS | Performed by: INTERNAL MEDICINE

## 2021-02-11 PROCEDURE — 74011250636 HC RX REV CODE- 250/636: Performed by: HOSPITALIST

## 2021-02-11 PROCEDURE — 77030039825 HC MSK NSL PAP SUPERNO2VA VYRM -B: Performed by: NURSE ANESTHETIST, CERTIFIED REGISTERED

## 2021-02-11 PROCEDURE — 74011250637 HC RX REV CODE- 250/637: Performed by: INTERNAL MEDICINE

## 2021-02-11 PROCEDURE — C9113 INJ PANTOPRAZOLE SODIUM, VIA: HCPCS | Performed by: HOSPITALIST

## 2021-02-11 PROCEDURE — 82962 GLUCOSE BLOOD TEST: CPT

## 2021-02-11 PROCEDURE — 65270000029 HC RM PRIVATE

## 2021-02-11 PROCEDURE — 74011250636 HC RX REV CODE- 250/636: Performed by: FAMILY MEDICINE

## 2021-02-11 PROCEDURE — 2709999900 HC NON-CHARGEABLE SUPPLY: Performed by: INTERNAL MEDICINE

## 2021-02-11 PROCEDURE — 0DB68ZX EXCISION OF STOMACH, VIA NATURAL OR ARTIFICIAL OPENING ENDOSCOPIC, DIAGNOSTIC: ICD-10-PCS | Performed by: INTERNAL MEDICINE

## 2021-02-11 PROCEDURE — 86923 COMPATIBILITY TEST ELECTRIC: CPT

## 2021-02-11 PROCEDURE — 65660000000 HC RM CCU STEPDOWN

## 2021-02-11 PROCEDURE — 94760 N-INVAS EAR/PLS OXIMETRY 1: CPT

## 2021-02-11 PROCEDURE — P9016 RBC LEUKOCYTES REDUCED: HCPCS

## 2021-02-11 PROCEDURE — 36430 TRANSFUSION BLD/BLD COMPNT: CPT

## 2021-02-11 PROCEDURE — 85025 COMPLETE CBC W/AUTO DIFF WBC: CPT

## 2021-02-11 RX ORDER — EPINEPHRINE 0.1 MG/ML
1 INJECTION INTRACARDIAC; INTRAVENOUS
Status: DISCONTINUED | OUTPATIENT
Start: 2021-02-11 | End: 2021-02-11 | Stop reason: HOSPADM

## 2021-02-11 RX ORDER — FENTANYL CITRATE 50 UG/ML
100 INJECTION, SOLUTION INTRAMUSCULAR; INTRAVENOUS
Status: DISCONTINUED | OUTPATIENT
Start: 2021-02-11 | End: 2021-02-11 | Stop reason: HOSPADM

## 2021-02-11 RX ORDER — PROPOFOL 10 MG/ML
INJECTION, EMULSION INTRAVENOUS AS NEEDED
Status: DISCONTINUED | OUTPATIENT
Start: 2021-02-11 | End: 2021-02-11 | Stop reason: HOSPADM

## 2021-02-11 RX ORDER — SODIUM CHLORIDE 9 MG/ML
250 INJECTION, SOLUTION INTRAVENOUS AS NEEDED
Status: DISCONTINUED | OUTPATIENT
Start: 2021-02-11 | End: 2021-02-19 | Stop reason: HOSPADM

## 2021-02-11 RX ORDER — MIDAZOLAM HYDROCHLORIDE 1 MG/ML
.25-1 INJECTION, SOLUTION INTRAMUSCULAR; INTRAVENOUS
Status: DISCONTINUED | OUTPATIENT
Start: 2021-02-11 | End: 2021-02-11 | Stop reason: HOSPADM

## 2021-02-11 RX ORDER — FLUMAZENIL 0.1 MG/ML
0.2 INJECTION INTRAVENOUS
Status: DISCONTINUED | OUTPATIENT
Start: 2021-02-11 | End: 2021-02-11 | Stop reason: HOSPADM

## 2021-02-11 RX ORDER — NALOXONE HYDROCHLORIDE 0.4 MG/ML
0.4 INJECTION, SOLUTION INTRAMUSCULAR; INTRAVENOUS; SUBCUTANEOUS
Status: DISCONTINUED | OUTPATIENT
Start: 2021-02-11 | End: 2021-02-11 | Stop reason: HOSPADM

## 2021-02-11 RX ORDER — SODIUM CHLORIDE 0.9 % (FLUSH) 0.9 %
5-40 SYRINGE (ML) INJECTION AS NEEDED
Status: DISCONTINUED | OUTPATIENT
Start: 2021-02-11 | End: 2021-02-19 | Stop reason: HOSPADM

## 2021-02-11 RX ORDER — SODIUM CHLORIDE 9 MG/ML
75 INJECTION, SOLUTION INTRAVENOUS CONTINUOUS
Status: DISCONTINUED | OUTPATIENT
Start: 2021-02-11 | End: 2021-02-14

## 2021-02-11 RX ORDER — SODIUM CHLORIDE 9 MG/ML
50 INJECTION, SOLUTION INTRAVENOUS CONTINUOUS
Status: DISCONTINUED | OUTPATIENT
Start: 2021-02-11 | End: 2021-02-11

## 2021-02-11 RX ORDER — SODIUM CHLORIDE 0.9 % (FLUSH) 0.9 %
5-40 SYRINGE (ML) INJECTION EVERY 8 HOURS
Status: DISCONTINUED | OUTPATIENT
Start: 2021-02-11 | End: 2021-02-13

## 2021-02-11 RX ORDER — ATROPINE SULFATE 0.1 MG/ML
0.5 INJECTION INTRAVENOUS
Status: DISCONTINUED | OUTPATIENT
Start: 2021-02-11 | End: 2021-02-11 | Stop reason: HOSPADM

## 2021-02-11 RX ORDER — DEXTROMETHORPHAN/PSEUDOEPHED 2.5-7.5/.8
1.2 DROPS ORAL
Status: DISCONTINUED | OUTPATIENT
Start: 2021-02-11 | End: 2021-02-11 | Stop reason: HOSPADM

## 2021-02-11 RX ORDER — SODIUM CHLORIDE 9 MG/ML
INJECTION, SOLUTION INTRAVENOUS
Status: DISCONTINUED | OUTPATIENT
Start: 2021-02-11 | End: 2021-02-11 | Stop reason: HOSPADM

## 2021-02-11 RX ORDER — LIDOCAINE HYDROCHLORIDE 20 MG/ML
INJECTION, SOLUTION EPIDURAL; INFILTRATION; INTRACAUDAL; PERINEURAL AS NEEDED
Status: DISCONTINUED | OUTPATIENT
Start: 2021-02-11 | End: 2021-02-11 | Stop reason: HOSPADM

## 2021-02-11 RX ADMIN — PROPOFOL 20 MG: 10 INJECTION, EMULSION INTRAVENOUS at 14:25

## 2021-02-11 RX ADMIN — PROPOFOL 20 MG: 10 INJECTION, EMULSION INTRAVENOUS at 14:11

## 2021-02-11 RX ADMIN — PROPOFOL 20 MG: 10 INJECTION, EMULSION INTRAVENOUS at 14:22

## 2021-02-11 RX ADMIN — PANTOPRAZOLE SODIUM 40 MG: 40 INJECTION, POWDER, FOR SOLUTION INTRAVENOUS at 12:08

## 2021-02-11 RX ADMIN — PROPOFOL 20 MG: 10 INJECTION, EMULSION INTRAVENOUS at 14:16

## 2021-02-11 RX ADMIN — TRAMADOL HYDROCHLORIDE 50 MG: 50 TABLET, FILM COATED ORAL at 20:19

## 2021-02-11 RX ADMIN — Medication 10 ML: at 06:26

## 2021-02-11 RX ADMIN — PROPOFOL 20 MG: 10 INJECTION, EMULSION INTRAVENOUS at 14:26

## 2021-02-11 RX ADMIN — METRONIDAZOLE 500 MG: 500 INJECTION, SOLUTION INTRAVENOUS at 19:05

## 2021-02-11 RX ADMIN — METRONIDAZOLE 500 MG: 500 INJECTION, SOLUTION INTRAVENOUS at 07:20

## 2021-02-11 RX ADMIN — PROPOFOL 50 MG: 10 INJECTION, EMULSION INTRAVENOUS at 14:08

## 2021-02-11 RX ADMIN — TRAMADOL HYDROCHLORIDE 50 MG: 50 TABLET, FILM COATED ORAL at 12:27

## 2021-02-11 RX ADMIN — PROPOFOL 20 MG: 10 INJECTION, EMULSION INTRAVENOUS at 14:10

## 2021-02-11 RX ADMIN — Medication 10 ML: at 22:07

## 2021-02-11 RX ADMIN — TRAMADOL HYDROCHLORIDE 50 MG: 50 TABLET, FILM COATED ORAL at 00:05

## 2021-02-11 RX ADMIN — ALBUMIN (HUMAN) 25 G: 0.25 INJECTION, SOLUTION INTRAVENOUS at 04:11

## 2021-02-11 RX ADMIN — Medication 10 ML: at 12:10

## 2021-02-11 RX ADMIN — PROPOFOL 20 MG: 10 INJECTION, EMULSION INTRAVENOUS at 14:18

## 2021-02-11 RX ADMIN — LIDOCAINE HYDROCHLORIDE 60 MG: 20 INJECTION, SOLUTION EPIDURAL; INFILTRATION; INTRACAUDAL; PERINEURAL at 14:08

## 2021-02-11 RX ADMIN — CHOLESTYRAMINE 4 G: 4 POWDER, FOR SUSPENSION ORAL at 16:37

## 2021-02-11 RX ADMIN — TRAMADOL HYDROCHLORIDE 50 MG: 50 TABLET, FILM COATED ORAL at 07:20

## 2021-02-11 RX ADMIN — PROPOFOL 20 MG: 10 INJECTION, EMULSION INTRAVENOUS at 14:13

## 2021-02-11 RX ADMIN — PROPOFOL 20 MG: 10 INJECTION, EMULSION INTRAVENOUS at 14:19

## 2021-02-11 RX ADMIN — SODIUM CHLORIDE 75 ML/HR: 9 INJECTION, SOLUTION INTRAVENOUS at 12:10

## 2021-02-11 RX ADMIN — SODIUM CHLORIDE: 900 INJECTION, SOLUTION INTRAVENOUS at 14:02

## 2021-02-11 RX ADMIN — PROPOFOL 20 MG: 10 INJECTION, EMULSION INTRAVENOUS at 14:24

## 2021-02-11 RX ADMIN — PANTOPRAZOLE SODIUM 40 MG: 40 INJECTION, POWDER, FOR SOLUTION INTRAVENOUS at 20:20

## 2021-02-11 NOTE — H&P
[address]  217 [facility] 140 [facility], [address]   [phone]                                History and Physical     NAME: Lou Guzman   :  1955   MRN:  [id number]     HPI:  The patient was seen and examined. Past Surgical History:   Procedure Laterality Date    HX APPENDECTOMY      HX BACK SURGERY      HX BREAST BIOPSY      unsure of which breast or when. Negative results    HX TONSILLECTOMY       Past Medical History:   Diagnosis Date    Cancer ([facility] [address]     melanoma back stage II     CKD (chronic kidney disease), stage III 3/1/2016    CKD (chronic kidney disease), stage IV ([address] 3/1/2016    Diabetes ([facility] [address]     HTN (hypertension) 2015    Hypertension     LBP (low back pain) 2015    Menopause     Microalbuminuria 2015    Obesity 2015    PAD (peripheral artery disease) ([facility] [address] 2016    Venous insufficiency 2015     Social History     Tobacco Use    Smoking status: Former Smoker    Smokeless tobacco: Never Used   Substance Use Topics    Alcohol use:  Yes     Alcohol/week: 0.0 standard drinks     Frequency: Monthly or less     Drinks per session: 1 or 2     Binge frequency: Never     Comment: occassion    Drug use: No     No Known Allergies  Family History   Problem Relation Age of Onset    Arthritis-rheumatoid Mother     Dementia Father      Current Facility-Administered Medications   Medication Dose Route Frequency    0.9% sodium chloride infusion 250 mL  250 mL IntraVENous PRN    0.9% sodium chloride infusion  75 mL/hr IntraVENous CONTINUOUS    pantoprazole (PROTONIX) 40 mg in 0.9% sodium chloride 10 mL injection  40 mg IntraVENous Q12H    traMADoL (ULTRAM) tablet 50 mg  50 mg Oral Q6H PRN    loperamide (IMODIUM) capsule 4 mg  4 mg Oral Q4H PRN    levoFLOXacin (LEVAQUIN) 750 mg in D5W IVPB  750 mg IntraVENous Q48H    metroNIDAZOLE (FLAGYL) IVPB premix 500 mg  500 mg IntraVENous Q12H    cholestyramine-aspartame (QUESTRAN LIGHT) packet 4 g  4 g Oral TID WITH MEALS    [Held by provider] aspirin chewable tablet 81 mg  81 mg Oral DAILY    sodium chloride (NS) flush 5-40 mL  5-40 mL IntraVENous Q8H    sodium chloride (NS) flush 5-40 mL  5-40 mL IntraVENous PRN    acetaminophen (TYLENOL) tablet 650 mg  650 mg Oral Q6H PRN    Or    acetaminophen (TYLENOL) suppository 650 mg  650 mg Rectal Q6H PRN    promethazine (PHENERGAN) tablet 12.5 mg  12.5 mg Oral Q6H PRN    Or    ondansetron (ZOFRAN) injection 4 mg  4 mg IntraVENous Q6H PRN    [Held by provider] heparin (porcine) injection 5,000 Units  5,000 Units SubCUTAneous Q8H    L.acidophilus-paracasei-S.thermophil-bifidobacter (RISAQUAD) 8 billion cell capsule  1 Cap Oral DAILY    insulin lispro (HUMALOG) injection   SubCUTAneous AC&HS    glucose chewable tablet 16 g  4 Tab Oral PRN    dextrose (D50W) injection syrg 12.5-25 g  12.5-25 g IntraVENous PRN    glucagon (GLUCAGEN) injection 1 mg  1 mg IntraMUSCular PRN         PHYSICAL EXAM:  General: WD, WN. Alert, cooperative, no acute distress    HEENT: NC, Atraumatic. PERRLA, EOMI. Anicteric sclerae. Lungs:  CTA Bilaterally. No Wheezing/Rhonchi/Rales. Heart:  Regular  rhythm,  No murmur, No Rubs, No Gallops  Abdomen: Soft, Non distended, Non tender. +Bowel sounds, no HSM  Extremities: No c/c/e  Neurologic:  CN 2-12 gi, Alert and oriented X 3. No acute neurological distress   Psych:   Good insight. Not anxious nor agitated. The heart, lungs and mental status were satisfactory for the administration of MAC sedation and for the procedure. Mallampati score: 2     The patient was counseled at length about the risks of mariam Covid-19 in the sepideh-operative and post-operative states including the recovery window of their procedure.   The patient was made aware that mariam Covid-19 after a surgical procedure may worsen their prognosis for recovering from the virus and lend to a higher morbidity and or mortality risk. The patient was given the options of postponing their procedure. All of the risks, benefits, and alternatives were discussed. The patient does wish to proceed with the procedure.       Assessment:   · Anemia, colitis     Plan:   · Endoscopic procedure :egd/colonoscopy  · MAC sedation

## 2021-02-11 NOTE — PERIOP NOTES
TRANSFER - OUT REPORT:    Verbal report given to [facility] RN(name) on [place] Fend  being transferred to [facility](unit) for routine post - op       Report consisted of patients Situation, Background, Assessment and   Recommendations(SBAR). Information from the following report(s) SBAR and Procedure Summary was reviewed with the receiving nurse. Lines:   Peripheral IV 02/09/21 Anterior;Right Forearm (Active)   Site Assessment Clean, dry, & intact 02/11/21 0925   Phlebitis Assessment 0 02/11/21 0925   Infiltration Assessment 0 02/11/21 0925   Dressing Status Clean, dry, & intact 02/11/21 0925   Dressing Type Tape;Transparent 02/11/21 0925   Hub Color/Line Status Pink; Infusing 02/11/21 9011   Action Taken Open ports on tubing capped 02/11/21 0925   Alcohol Cap Used Yes 02/11/21 0925       Peripheral IV 02/11/21 Right Antecubital (Active)        Opportunity for questions and clarification was provided.

## 2021-02-11 NOTE — PROGRESS NOTES
Bedside and Verbal shift change report given to Mikey Palacios (oncoming nurse) by Antoinette Dee (offgoing nurse). Report included the following information SBAR, Kardex, Intake/Output, MAR and Dual Neuro Assessment.

## 2021-02-11 NOTE — PROGRESS NOTES
VERA:  RUR: 19%    Disposition:  Re-admit to [place] . Ins auth obtained 2/10/21.    9:30am  CM spoke with Marylu [facility]). Patient has hgb 5.6. Colonoscopy scheduled today. [place]-rectal surgery consulted. CM continuing to follow.     Wong Courtney, [address], [address] [facility]

## 2021-02-11 NOTE — PERIOP NOTES
TRANSFER - IN REPORT:    Verbal report received from [facility] RN(name) on Garfield Virku  being received from 605(unit) for ordered procedure      Report consisted of patients Situation, Background, Assessment and   Recommendations(SBAR). Information from the following report(s) SBAR, Pre Procedure Checklist, Procedure Verification and Quality Measures was reviewed with the receiving nurse. Opportunity for questions and clarification was provided. Assessment completed upon patients arrival to unit and care assumed.

## 2021-02-11 NOTE — ROUTINE PROCESS
Maged Prost 1955 
[id number] Situation: 
Verbal report received from: Wendy Sanchez Procedure: Procedure(s): 
COLONOSCOPY 
ESOPHAGOGASTRODUODENOSCOPY (EGD) ESOPHAGOGASTRODUODENAL (EGD) BIOPSY COLON BIOPSY Background: 
 
Preoperative diagnosis: Diarrhea Postoperative diagnosis: Duodenal Ulcer Hiatel Hernia-1cm Colitis Diverticulosis Grade 1 Esophagitis :  Dr. Juli Bradley Assistant(s): Endoscopy Technician-1: Karan Rainey Endoscopy RN-1: Nevaeh Velazquez RN Specimens:  
ID Type Source Tests Collected by Time Destination 1 : Duodenal ulcer bx Preservative Duodenum  Sydney Kennedy MD 2/11/2021 1412 Pathology 2 : Gastric bx Preservative Gastric  Sydney Kennedy MD 2/11/2021 1412 Pathology 3 : Esophageal bx Preservative   Sydney Kennedy MD 2/11/2021 1415 Pathology 4 : Transverse Colon Bx Preservative Colon, Transverse  Sydney Kennedy MD 2/11/2021 1423 Pathology 5 : Descending Colon Bx Preservative Colon, Descending  Sydney Kennedy MD 2/11/2021 1424 Pathology 6 : Recto-Sigmoid Colon Bx Preservative Rectum  Sydney Kennedy MD 2/11/2021 1423 Pathology H. Pylori  no Assessment: 
Intra-procedure medications Anesthesia gave intra-procedure sedation and medications, see anesthesia flow sheet yes Intravenous fluids: NS@ [facility] Vital signs stable Abdominal assessment: round and soft Recommendation: 
Return to floor.

## 2021-02-11 NOTE — PROGRESS NOTES
Problem: Falls - Risk of  Goal: *Absence of Falls  Description: Document Jasper Fall Risk and appropriate interventions in the flowsheet.  Outcome: Progressing Towards Goal     Problem: Patient Education: Go to Patient Education Activity  Goal: Patient/Family Education  Outcome: Progressing Towards Goal     Problem: Pressure Injury - Risk of  Goal: *Prevention of pressure injury  Description: Document Ra Scale and appropriate interventions in the flowsheet.  Outcome: Progressing Towards Goal     Problem: Patient Education: Go to Patient Education Activity  Goal: Patient/Family Education  Outcome: Progressing Towards Goal     Problem: Risk for Spread of Infection  Goal: Prevent transmission of infectious organism to others  Description: Prevent the transmission of infectious organisms to other patients, staff members, and visitors.  Outcome: Progressing Towards Goal     Problem: Patient Education:  Go to Education Activity  Goal: Patient/Family Education  Outcome: Progressing Towards Goal     Problem: Discharge Planning  Goal: *Discharge to safe environment  Outcome: Progressing Towards Goal  Goal: *Knowledge of medication management  Outcome: Progressing Towards Goal  Goal: *Knowledge of discharge instructions  Outcome: Progressing Towards Goal     Problem: Patient Education: Go to Patient Education Activity  Goal: Patient/Family Education  Outcome: Progressing Towards Goal     Problem: Patient Education: Go to Patient Education Activity  Goal: Patient/Family Education  Outcome: Progressing Towards Goal     Problem: Patient Education: Go to Patient Education Activity  Goal: Patient/Family Education  Outcome: Progressing Towards Goal

## 2021-02-11 NOTE — PERIOP NOTES

## 2021-02-11 NOTE — PROGRESS NOTES
[address] [place]el, [address]  [phone]                GI PROGRESS NOTE      NAME:   Nicci Jack   :    1955   MRN:    [id number]     Subjective:   2/10/21:  She reports that she continues to have fecal incontinence without any warning - sometimes passes gas and stool leaks out, other times she stands up and has soiling. She is currently needing to wear adult diapers b/c of this problem. The nurses have been cleaning her up, so she is not sure whether she passes a lot of stool each time. She states that she has no urge at all that she needs to defecate. She denies any abdominal pain, nausea, vomiting. She is taking Questran TID with meals, and Imodium is ordered as PRN but she has been taking a 4 mg dose TID on average. She has also been receiving empiric Flagyl and Levaquin since admission on . None of this has helped with the diarrhea - the stool has not bulked up. She denies urinary incontinence. Recall that the CT scan  showed moderate to severe proctocolitis. This was initially felt to represent ischemic changes in the setting of rhabdomyolysis after she had a syncopal event and fall. Stool studies were negative- enteric panel and C. Diff.     21:  Hgb dropped overnight to 5.6 <-- 7.5. She has chronic anemia. No overt s/s of GIB, though patient on  had reported seeing small amount BRb that she had attributed to hemorrhoids. Having some lower abd discomfort this morning, but no N/V. Objective:     VITALS:   Last 24hrs VS reviewed since prior hospitalist progress note.  Most recent are:  Visit Vitals  BP (!) 92/54 (BP 1 Location: Right arm, BP Patient Position: At rest)   Pulse 70   Temp 98.1 °F (36.7 °C)   Resp 17   Ht 5' 6\" (1.676 m)   Wt 106.7 kg (235 lb 3.7 oz)   SpO2 100%   BMI 37.97 kg/m²       Intake/Output Summary (Last 24 hours) at 2021 1049  Last data filed at 2021 0925  Gross per 24 hour   Intake 0 ml   Output 1 ml   Net -1 ml        PHYSICAL EXAM:  General   NAD, seems comfortable lying in bed  Skin         No visible rashes, no jaundice or pallor  Resp        Comfortable breathing   Abd     Non-distended  Psych      Pleasant, no agitation or anxiety    Lab Data Reviewed   Medications: Reviewed    Assessment/Plan     Diarrhea / fecal incontinence:  - Negative enteric stool panel and C.  Diff  - No improvement despite empiric Flagyl and Levaquin x 6 days  - No improvement on TID Questran and Imodium  Colitis - moderate to severe proctocolitis on CT 2/4/21  - PLAN FOR COLONOSCOPY 2/11/21 with Dr. Matt Clemente    Anemia, acute on chronic  - Hgb dropped overnight 5.6 <-- 7.5  - To be transfused this morning  - ADD EGD to procedure plan today    PRABHU  - Renal following  Rhabdomyolysis  - Following a fall, down for 36 hrs    (Primary GI is Dr. Perdomo Paci at [address] [place] [address])

## 2021-02-11 NOTE — PROGRESS NOTES
Occupational Therapy: defer    Chart reviewed, noted last HGB 5.6, requiring transfusion. Also noted patient is currently off the floor for EGD and colonoscopy. Will defer OT today and will f/u when patient is available and medically stable to participate.     Evelyne Khoury, OTR/L

## 2021-02-11 NOTE — PROGRESS NOTES
Nephrology Progress Note  Neftali Fox  Date of Admission : 2/3/2021    CC: Follow up for PRABHU on CKD       Assessment and Plan     PRABHU on CKD:  - presumed 2/2 volume depletion  - Cr improving  - resume NS at 75cc/hr given low BP, NPO status  - can d/c fluids once po intake improves  - daily labs    CKD IIIb:  - f/b Dr. Lindsay Niño  - recent baseline 1.5    Hypokalemia:  - replete PRN    Hypovolemia:  - improving    Orthostasis:  - BP low, improving symptomatically  - start NS as above    Acidosis:  - improving    Colitis:  - on levo and flagyl  - for colonoscopy today    Diarrhea:  - improving    HTN:  - BP stable           Interval History:  Seen and examined. Cr better. BPs still low. Feeling a little better. NPO for colonoscopy today. No cp, sob, n/v reported. Current Medications: all current  Medications have been eviewed in EPIC  Review of Systems: Pertinent items are noted in HPI. Objective:  Vitals:    Vitals:    02/10/21 2101 02/10/21 2142 02/11/21 0354 02/11/21 0815   BP: (!) 118/51 125/73 100/63 (!) 92/54   Pulse: 69 63 67 70   Resp:  18 18 17   Temp:  98.3 °F (36.8 °C) 98.5 °F (36.9 °C) 98.1 °F (36.7 °C)   SpO2:  100% 99% 100%   Weight:       Height:         Intake and Output:  No intake/output data recorded. 02/09 1901 - 02/11 0700  In: -   Out: 1 [Urine:1]    Physical Examination:  General: NAD,Conversant   Neck:  Supple, no mass  Resp:  Lungs CTA B/L, no wheezing , normal respiratory effort  CV:  RRR,  no murmur or rub, no LE edema  GI:  Soft, NT, + Bowel sounds, no hepatosplenomegaly  Neurologic:  Non focal  Psych:             AAO x 3 appropriate affect   Skin:  No Rash  :  No austin    []    High complexity decision making was performed  []    Patient is at high-risk of decompensation with multiple organ involvement    Lab Data Personally Reviewed: I have reviewed all the pertinent labs, microbiology data and radiology studies during assessment.     Recent Labs     02/11/21  0731 02/11/21  0007 02/10/21  0101    133* 134*   K 4.1 4.2 3.9    106 100   CO2 24 19* 25   GLU 81 81 76   BUN 46* 48* 58*   CREA 1.80* 2.00* 2.02*   CA 8.8 9.0 8.9   PHOS 3.6  --   --    ALB 3.5  --   --      Recent Labs     02/11/21  0817 02/09/21  0239   WBC 6.8 8.7   HGB 5.6* 7.5*   HCT 18.1* 23.7*   * 116*     No results found for: SDES  Lab Results   Component Value Date/Time    Culture result: NO GROWTH 5 DAYS 02/04/2021 12:22 AM    Culture result: NO GROWTH ON SOLID MEDIA AT 2 DAYS 01/13/2021 10:00 AM    Culture result: (A) 01/13/2021 10:00 AM     STAPHYLOCOCCUS SPECIES, COAGULASE NEGATIVE ISOLATED FROM THIO BROTH ONLY     Recent Results (from the past 24 hour(s))   GLUCOSE, POC    Collection Time: 02/10/21 11:51 AM   Result Value Ref Range    Glucose (POC) 106 (H) 65 - 100 mg/dL    Performed by Marco Antonio Humphrey    GLUCOSE, POC    Collection Time: 02/10/21  4:30 PM   Result Value Ref Range    Glucose (POC) 118 (H) 65 - 100 mg/dL    Performed by Marco Antonio Humphrey    SARS-COV-2    Collection Time: 02/10/21  4:40 PM   Result Value Ref Range    SARS-CoV-2 Please find results under separate order     GLUCOSE, POC    Collection Time: 02/10/21  9:03 PM   Result Value Ref Range    Glucose (POC) 79 65 - 100 mg/dL    Performed by [id number] , BASIC    Collection Time: 02/11/21 12:07 AM   Result Value Ref Range    Sodium 133 (L) 136 - 145 mmol/L    Potassium 4.2 3.5 - 5.1 mmol/L    Chloride 106 97 - 108 mmol/L    CO2 19 (L) 21 - 32 mmol/L    Anion gap 8 5 - 15 mmol/L    Glucose 81 65 - 100 mg/dL    BUN 48 (H) 6 - 20 MG/DL    Creatinine 2.00 (H) 0.55 - 1.02 MG/DL    BUN/Creatinine ratio 24 (H) 12 - 20      GFR est AA 30 (L) >60 ml/min/1.73m2    GFR est non-AA 25 (L) >60 ml/min/1.73m2    Calcium 9.0 8.5 - 10.1 MG/DL   GLUCOSE, POC    Collection Time: 02/11/21  6:29 AM   Result Value Ref Range    Glucose (POC) 73 65 - 100 mg/dL    Performed by [id number] [facility]    RENAL FUNCTION PANEL    Collection Time: 02/11/21  8:17 AM   Result Value Ref Range    Sodium 139 136 - 145 mmol/L    Potassium 4.1 3.5 - 5.1 mmol/L    Chloride 106 97 - 108 mmol/L    CO2 24 21 - 32 mmol/L    Anion gap 9 5 - 15 mmol/L    Glucose 81 65 - 100 mg/dL    BUN 46 (H) 6 - 20 MG/DL    Creatinine 1.80 (H) 0.55 - 1.02 MG/DL    BUN/Creatinine ratio 26 (H) 12 - 20      GFR est AA 34 (L) >60 ml/min/1.73m2    GFR est non-AA 28 (L) >60 ml/min/1.73m2    Calcium 8.8 8.5 - 10.1 MG/DL    Phosphorus 3.6 2.6 - 4.7 MG/DL    Albumin 3.5 3.5 - 5.0 g/dL   CBC WITH AUTOMATED DIFF    Collection Time: 02/11/21  8:17 AM   Result Value Ref Range    WBC 6.8 3.6 - 11.0 K/uL    RBC 1.77 (L) 3.80 - 5.20 M/uL    HGB 5.6 (LL) 11.5 - 16.0 g/dL    HCT 18.1 (L) 35.0 - 47.0 %    .3 (H) 80.0 - 99.0 FL    MCH 31.6 26.0 - 34.0 PG    MCHC 30.9 30.0 - 36.5 g/dL    RDW 15.8 (H) 11.5 - 14.5 %    PLATELET 612 (L) 984 - 400 K/uL    MPV 10.3 8.9 - 12.9 FL    NRBC 0.0 0  WBC    ABSOLUTE NRBC 0.00 0.00 - 0.01 K/uL    NEUTROPHILS PENDING %    LYMPHOCYTES PENDING %    MONOCYTES PENDING %    EOSINOPHILS PENDING %    BASOPHILS PENDING %    IMMATURE GRANULOCYTES PENDING %    ABS. NEUTROPHILS PENDING K/UL    ABS. LYMPHOCYTES PENDING K/UL    ABS. MONOCYTES PENDING K/UL    ABS. EOSINOPHILS PENDING K/UL    ABS. BASOPHILS PENDING K/UL    ABS. IMM. GRANS. PENDING K/UL    DF PENDING                      Kerri Franco MD  [address]  Phone - [phone]   Fax - [phone]  [email or web address]

## 2021-02-11 NOTE — ANESTHESIA PREPROCEDURE EVALUATION
Relevant Problems   CARDIOVASCULAR   (+) Hypertension complicating diabetes (HCC)   (+) PAD (peripheral artery disease) (HCC)      RENAL FAILURE   (+) CKD (chronic kidney disease), stage III      ENDOCRINE   (+) Obesity   (+) Type 2 diabetes mellitus with neurologic complication (HCC)   (+) Type 2 diabetes with nephropathy (HCC)      HEMATOLOGY   (+) Osteomyelitis of foot, left, acute (HCC)       Anesthetic History   No history of anesthetic complications            Review of Systems / Medical History  Patient summary reviewed, nursing notes reviewed and pertinent labs reviewed    Pulmonary  Within defined limits                 Neuro/Psych   Within defined limits           Cardiovascular  Within defined limits  Hypertension          Hyperlipidemia    Exercise tolerance: >4 METS  Comments: From Admit note:   ASSESSMENT:  1. Syncope. 2.  Fall. 3.  Acute rhabdomyolysis. 4.  Acute-on-chronic kidney disease, stage V.  5.  Acute proctocolitis. 6.  Dyslipidemia. 7.  Type 2 diabetes. 8.  Hypertension. 9.  Anemia. 10.  Thrombocytosis.     EF 60%      GI/Hepatic/Renal         Renal disease      Comments: Acute Proctocolitis  Acute on Chronic Kidney disease Endo/Other    Diabetes    Morbid obesity and anemia     Other Findings            Physical Exam    Airway  Mallampati: II  TM Distance: 4 - 6 cm  Neck ROM: normal range of motion   Mouth opening: Normal     Cardiovascular    Rhythm: regular  Rate: normal         Dental  No notable dental hx       Pulmonary  Breath sounds clear to auscultation               Abdominal  GI exam deferred       Other Findings            Anesthetic Plan    ASA: 3  Anesthesia type: total IV anesthesia and general          Induction: Intravenous  Anesthetic plan and risks discussed with: Patient

## 2021-02-11 NOTE — PROGRESS NOTES
[facility] Adult  Hospitalist Group                                                                                          Hospitalist Progress Note  Beny Bravo MD  Answering service: [phone] from in house phone        Date of Service:  2021  NAME:  Hemal Rebolledo  :  1955  MRN:  [id number]    This documentation was facilitated by a Voice Recognition software and may contain inadvertent typographical errors. Admission Summary:   From h&P  \"This is a 81-year-old woman with a past medical history significant for chronic kidney disease, dyslipidemia, type 2 diabetes, hypertension, peripheral artery disease, was in her usual state of health until the day of her presentation at the emergency room when the patient stated that she fell at home. The patient complained of generalized weakness, which is progressive. Also complained of diarrhea. According to the patient, she woke up from sleep, was trying to go to the bathroom, when all of a sudden, the patient felt dizzy and passed out. She has no recollection of the event. \"    Interval history / Subjective:        Patient laying in bed comfortably,complains of mild lower abdominal pain  HB dropped to 5.6,discussed  Transfusion with patient,she agreed. She says she has chronic anemia. Also discussed with GI NP, Ivana Terry. Patient due for colonoscopy this afternoon. Assessment & Plan:   Severe acute on chronic anemia, suspect GIB  -Hold aspirin and sc heparin. IV PPI  -Transfuse,pt consented  -Monitor H&H serially  -Colonoscopy and ?egd today.  -Discussed with GI NP Ivana Terry      Syncope likely orthostatic from volume contraction due to diarrhea  -MRI shows no acute hemorrhage or infarction. 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  -Echo with normal LVEF,no significant valve disease.     Fall  -As a result of her syncope  -Negative work-up for acute fracture or injury    Proctocolitis  -Evident on CT abdomen and pelvis  -Stool studies negative  -On Flagyl  -Continue questran and immodium prn for loose stool  -GI on board. Colonoscopy this afternoon      Acute kidney injury on CKD III/IV  -In the setting of rhabdo and volume depletion from diarrhea  -Nephrology evaluating the patient  -Continue IV fluid and monitor renal function, overall improving  -Renal indices improving. Rhabdomyolysis  -As a result of fall  -Continue IV fluid  -Ordered Labs for am    Metabolic acidosis  -Due to alkaline loss from GI tract as well as acute kidney injury  -Now resolved  -Nephrology evaluating patient    Thrombocytopenia~100k today  -Monitor platelet     Abnormal LFTs  -Also likely related to infectious process,rhabdomyolysis  -CT abdomen and pelvis without any acute liver or gallbladder pathology      Hypertension  -Hold antihypertensives for now as patient initially hypotensive    T2Dm on metformin preadmission  -Continue insulin sliding scale coverage    Dyslipidemia  -Holding statin due to rhabdomyolysis    Chronic leg edema. On Bumex which is held due to PRABHU,dehydration    Code status:full code  DVT prophylaxis: scd,d/c heparin due to GIB  Care Plan discussed with: Patient/Family and Nurse  Anticipated Disposition: Home w/Family  Anticipated Discharge: 24 hours to 48 hours  Hospital Problems  Date Reviewed: 2/4/2021          Codes Class Noted POA    * (Principal) Syncope ICD-10-CM: R55  ICD-9-CM: 780.2  2/4/2021 Yes                Review of Systems:   A comprehensive review of systems was negative except for that written in the HPI. Vital Signs:    Last 24hrs VS reviewed since prior progress note.  Most recent are:  Visit Vitals  BP (!) 92/54 (BP 1 Location: Right arm, BP Patient Position: At rest)   Pulse 70   Temp 98.1 °F (36.7 °C)   Resp 17   Ht 5' 6\" (1.676 m)   Wt 106.7 kg (235 lb 3.7 oz)   SpO2 100%   BMI 37.97 kg/m²         Intake/Output Summary (Last 24 hours) at 2/11/2021 1014  Last data filed at 2/11/2021 0925  Gross per 24 hour   Intake 0 ml   Output 1 ml   Net -1 ml        Physical Examination:     I had a face to face encounter with this patient and independently examined them on2/11/2021 as outlined below:        Constitutional:  No acute distress, cooperative, pleasant    HEENT:  Atraumatic.Oral mucosa moist,.Non icteric sclera.No pallor.   Resp:  CTA bilaterally. No wheezing/rhonchi/rales. No accessory muscle use   Chest Wall: No deformity   CV:  Regular rhythm, normal rate, no murmurs, gallops, rubs    GI:  Soft, non distended, non tender.Bruises from sc injection.normoactive bowel sounds, no hepatosplenomegaly    :  No CVA or suprapubic tenderness    Musculoskeletal: ++ leg edema.    Neurologic:  Mental status:AAOx3,   Cranial nerves II-XII : WNL  Motor exam:Moves all extremities symmetrically              Data Review:    Review and/or order of clinical lab test  Review and/or order of tests in the radiology section of CPT  Review and/or order of tests in the medicine section of CPT      Labs:     Recent Labs     02/11/21  0817 02/09/21  0239   WBC 6.8 8.7   HGB 5.6* 7.5*   HCT 18.1* 23.7*   * 116*     Recent Labs     02/11/21  0817 02/11/21  0007 02/10/21  0101    133* 134*   K 4.1 4.2 3.9    106 100   CO2 24 19* 25   BUN 46* 48* 58*   CREA 1.80* 2.00* 2.02*   GLU 81 81 76   CA 8.8 9.0 8.9   PHOS 3.6  --   --      Recent Labs     02/11/21  0817   ALB 3.5     No results for input(s): INR, PTP, APTT, INREXT in the last 72 hours.   No results for input(s): FE, TIBC, PSAT, FERR in the last 72 hours.   Lab Results   Component Value Date/Time    Folate 19.8 10/02/2016 02:07 AM      No results for input(s): PH, PCO2, PO2 in the last 72 hours.  No results for input(s): CPK, CKNDX, TROIQ in the last 72 hours.    No lab exists for component: CPKMB  Lab Results   Component Value Date/Time    Cholesterol,  total 165 09/29/2016 10:02 AM    HDL Cholesterol 34 09/29/2016 10:02 AM    LDL, calculated 91.2 09/29/2016 10:02 AM    Triglyceride 199 (H) 09/29/2016 10:02 AM    CHOL/HDL Ratio 4.9 09/29/2016 10:02 AM     Lab Results   Component Value Date/Time    Glucose (POC) 73 02/11/2021 06:29 AM    Glucose (POC) 79 02/10/2021 09:03 PM    Glucose (POC) 118 (H) 02/10/2021 04:30 PM    Glucose (POC) 106 (H) 02/10/2021 11:51 AM    Glucose (POC) 86 02/10/2021 06:17 AM     Lab Results   Component Value Date/Time    Color DARK YELLOW 02/04/2021 02:16 AM    Appearance CLOUDY (A) 02/04/2021 02:16 AM    Specific gravity 1.021 02/04/2021 02:16 AM    pH (UA) 5.0 02/04/2021 02:16 AM    Protein 30 (A) 02/04/2021 02:16 AM    Glucose Negative 02/04/2021 02:16 AM    Ketone Negative 02/04/2021 02:16 AM    Bilirubin NEGATIVE  09/29/2016 10:02 AM    Urobilinogen 0.2 02/04/2021 02:16 AM    Nitrites Negative 02/04/2021 02:16 AM    Leukocyte Esterase Negative 02/04/2021 02:16 AM    Epithelial cells FEW 02/04/2021 02:16 AM    Bacteria Negative 02/04/2021 02:16 AM    WBC 0-4 02/04/2021 02:16 AM    RBC 0-5 02/04/2021 02:16 AM         Medications Reviewed:     Current Facility-Administered Medications   Medication Dose Route Frequency    0.9% sodium chloride infusion 250 mL  250 mL IntraVENous PRN    traMADoL (ULTRAM) tablet 50 mg  50 mg Oral Q6H PRN    loperamide (IMODIUM) capsule 4 mg  4 mg Oral Q4H PRN    levoFLOXacin (LEVAQUIN) 750 mg in D5W IVPB  750 mg IntraVENous Q48H    metroNIDAZOLE (FLAGYL) IVPB premix 500 mg  500 mg IntraVENous Q12H    cholestyramine-aspartame (QUESTRAN LIGHT) packet 4 g  4 g Oral TID WITH MEALS    aspirin chewable tablet 81 mg  81 mg Oral DAILY    sodium chloride (NS) flush 5-40 mL  5-40 mL IntraVENous Q8H    sodium chloride (NS) flush 5-40 mL  5-40 mL IntraVENous PRN    acetaminophen (TYLENOL) tablet 650 mg  650 mg Oral Q6H PRN    Or    acetaminophen (TYLENOL) suppository 650 mg  650 mg Rectal Q6H PRN    promethazine (PHENERGAN) tablet 12.5 mg  12.5 mg Oral Q6H PRN    Or    ondansetron (ZOFRAN) injection 4 mg  4 mg IntraVENous Q6H PRN    heparin (porcine) injection 5,000 Units  5,000 Units SubCUTAneous Q8H    L.acidophilus-paracasei-S.thermophil-bifidobacter (RISAQUAD) 8 billion cell capsule  1 Cap Oral DAILY    insulin lispro (HUMALOG) injection   SubCUTAneous AC&HS    glucose chewable tablet 16 g  4 Tab Oral PRN    dextrose (D50W) injection syrg 12.5-25 g  12.5-25 g IntraVENous PRN    glucagon (GLUCAGEN) injection 1 mg  1 mg IntraMUSCular PRN     ______________________________________________________________________  EXPECTED LENGTH OF STAY: 2d 7h  ACTUAL LENGTH OF STAY:          7                 Jessica Baer MD

## 2021-02-11 NOTE — PROCEDURES
[facility].  [address] [place] [address] [address], [address]  [phone]                           Colonoscopy and EGD Procedure Note      Indications:  Anemia, colitis     :  Huan Renteria MD    Staff: Endoscopy Technician-1: Zachary Gates  Endoscopy RN-1: Swetha Edgar RN    Referring Provider: Lawanda Jack MD    Sedation:  MAC anesthesia    Procedure Details:  After informed consent was obtained with all risks and benefits of procedure explained and pre-operative exam completed, pt was placed in the left lateral decubitus position. Following sequential administration of sedation as per above, the gastroscope was inserted into the mouth and advanced under direct vision to second portion of the duodenum. A careful inspection was made as the gastroscope was withdrawn, including a retroflexed view of the proximal stomach; findings and interventions are described below. EGD Findings:  Esophagus: overall normal appearing esophageal mucosa. One superficial 5 mm ulceration in mid-esophagus (29 cm from the incisors), biopsied. GE junction 36 cm from the incisors with grade A esophagitis. Small (1 cm) sliding hiatal hernia  Stomach: few small areas of erythema, though otherwise healthy appearing gastric mucosa, biopsied  Duodenum/jejunum: diffuse clean based ulcerations throughout duodenal bulb, randing in size from 5-10 mm, no active bleeding. Two small biopsies obtained from ulcer border, normal D2    EGD Interventions:  biopsies    The bed was then turned and upon sequential sedation as per above, a digital rectal exam was performed per below. The Olympus videocolonoscope was inserted in the rectum and carefully advanced to the transverse colon. The quality of preparation was inadequate. [place] Bowel Prep Score -/1/1. The colonoscope was slowly withdrawn with careful evaluation between folds. Retroflexion in the rectum was not performed.      Colon Findings: Rectum: diffuse ischemic changes with submucosal edema and circumferential coalescing ulcerations involving all of rectum extending to dentate line, pale mucosa, biopsies obtained. Ulcerations extend to appx 15 cm from anal verge  Sigmoid: normal appearing mucosa, mild diverticulosis, liquid brown stool   Descending Colon: normal appearing mucosa - biopsied, mild diverticulosis, liquid brown stool   Transverse Colon: normal appearing mucosa - biopsied, liquid brown stool. Did not transverse past transverse colon given poor visualization     Colonoscopy Interventions: biopsies           Specimens Removed:    ID Type Source Tests Collected by Time Destination   1 : Duodenal ulcer bx Preservative Duodenum  Sydney Kennedy MD 2/11/2021 1412 Pathology   2 : Gastric bx Preservative Gastric  Sydney Kennedy MD 2/11/2021 1412 Pathology   3 : Esophageal bx Preservative   Sydney Kennedy MD 2/11/2021 1415 Pathology   4 : Transverse Colon Bx Preservative Colon, Transverse  Sydney Kennedy MD 2/11/2021 1423 Pathology   5 : Descending Colon Bx Preservative Colon, Descending  Sydney Kennedy MD 2/11/2021 1424 Pathology   6 : Recto-Sigmoid Colon Bx Preservative Rectum  Sydney Kennedy MD 2/11/2021 1423 Pathology       Complications: None. EBL:  Minimal     Impression:    See Postoperative diagnosis above    Recommendations:   - Await pathology. - Continue antibiotics (flevofloxacin, flagyl) and PPI q 12 hours  - Avoid blood thinners and trend CBC  - Endoscopic exam is concerning for ischemic proctosigmoiditis. Her renal function likely precludes abdominal imaging with contrast to assess vasculature at present. Continue supportive care. Will ask colorectal surgery to follow.       Mita Reyes MD  2/11/2021  2:37 PM

## 2021-02-11 NOTE — PROGRESS NOTES
Physical Therapy  2/11/2021    Chart reviewed and noted pt's Hgb has been updated and is currently 5.6g/dL. Pt to receive transfusion. Will defer and follow at later time as able and appropriate.      Molly Means, PTA

## 2021-02-12 ENCOUNTER — APPOINTMENT (OUTPATIENT)
Dept: CT IMAGING | Age: 66
DRG: 378 | End: 2021-02-12
Attending: PHYSICIAN ASSISTANT
Payer: COMMERCIAL

## 2021-02-12 LAB
ALBUMIN SERPL-MCNC: 2.9 G/DL (ref 3.5–5)
ALBUMIN/GLOB SERPL: 1.2 {RATIO} (ref 1.1–2.2)
ALP SERPL-CCNC: 41 U/L (ref 45–117)
ALT SERPL-CCNC: 29 U/L (ref 12–78)
ANION GAP SERPL CALC-SCNC: 7 MMOL/L (ref 5–15)
AST SERPL-CCNC: 45 U/L (ref 15–37)
ATRIAL RATE: 69 BPM
BASOPHILS # BLD: 0 K/UL (ref 0–0.1)
BASOPHILS NFR BLD: 0 % (ref 0–1)
BILIRUB SERPL-MCNC: 0.5 MG/DL (ref 0.2–1)
BUN SERPL-MCNC: 38 MG/DL (ref 6–20)
BUN/CREAT SERPL: 25 (ref 12–20)
CALCIUM SERPL-MCNC: 8.2 MG/DL (ref 8.5–10.1)
CALCULATED P AXIS, ECG09: 49 DEGREES
CALCULATED R AXIS, ECG10: 1 DEGREES
CALCULATED T AXIS, ECG11: 37 DEGREES
CHLORIDE SERPL-SCNC: 110 MMOL/L (ref 97–108)
CK SERPL-CCNC: 159 U/L (ref 26–192)
CO2 SERPL-SCNC: 20 MMOL/L (ref 21–32)
CREAT SERPL-MCNC: 1.52 MG/DL (ref 0.55–1.02)
DIAGNOSIS, 93000: NORMAL
DIFFERENTIAL METHOD BLD: ABNORMAL
EOSINOPHIL # BLD: 0.3 K/UL (ref 0–0.4)
EOSINOPHIL NFR BLD: 4 % (ref 0–7)
ERYTHROCYTE [DISTWIDTH] IN BLOOD BY AUTOMATED COUNT: 17.3 % (ref 11.5–14.5)
GLOBULIN SER CALC-MCNC: 2.4 G/DL (ref 2–4)
GLUCOSE BLD STRIP.AUTO-MCNC: 83 MG/DL (ref 65–100)
GLUCOSE BLD STRIP.AUTO-MCNC: 83 MG/DL (ref 65–100)
GLUCOSE BLD STRIP.AUTO-MCNC: 93 MG/DL (ref 65–100)
GLUCOSE BLD STRIP.AUTO-MCNC: 97 MG/DL (ref 65–100)
GLUCOSE SERPL-MCNC: 73 MG/DL (ref 65–100)
HCT VFR BLD AUTO: 23.3 % (ref 35–47)
HGB BLD-MCNC: 7.4 G/DL (ref 11.5–16)
IMM GRANULOCYTES # BLD AUTO: 0.1 K/UL (ref 0–0.04)
IMM GRANULOCYTES NFR BLD AUTO: 2 % (ref 0–0.5)
LYMPHOCYTES # BLD: 1.4 K/UL (ref 0.8–3.5)
LYMPHOCYTES NFR BLD: 21 % (ref 12–49)
MCH RBC QN AUTO: 31 PG (ref 26–34)
MCHC RBC AUTO-ENTMCNC: 31.8 G/DL (ref 30–36.5)
MCV RBC AUTO: 97.5 FL (ref 80–99)
MONOCYTES # BLD: 0.6 K/UL (ref 0–1)
MONOCYTES NFR BLD: 9 % (ref 5–13)
NEUTS SEG # BLD: 4.3 K/UL (ref 1.8–8)
NEUTS SEG NFR BLD: 64 % (ref 32–75)
NRBC # BLD: 0 K/UL (ref 0–0.01)
NRBC BLD-RTO: 0 PER 100 WBC
P-R INTERVAL, ECG05: 234 MS
PLATELET # BLD AUTO: 122 K/UL (ref 150–400)
PMV BLD AUTO: 11.2 FL (ref 8.9–12.9)
POTASSIUM SERPL-SCNC: 4.3 MMOL/L (ref 3.5–5.1)
PROT SERPL-MCNC: 5.3 G/DL (ref 6.4–8.2)
Q-T INTERVAL, ECG07: 382 MS
QRS DURATION, ECG06: 98 MS
QTC CALCULATION (BEZET), ECG08: 409 MS
RBC # BLD AUTO: 2.39 M/UL (ref 3.8–5.2)
RBC MORPH BLD: ABNORMAL
SERVICE CMNT-IMP: NORMAL
SODIUM SERPL-SCNC: 137 MMOL/L (ref 136–145)
VENTRICULAR RATE, ECG03: 69 BPM
WBC # BLD AUTO: 6.7 K/UL (ref 3.6–11)

## 2021-02-12 PROCEDURE — 97535 SELF CARE MNGMENT TRAINING: CPT

## 2021-02-12 PROCEDURE — 85025 COMPLETE CBC W/AUTO DIFF WBC: CPT

## 2021-02-12 PROCEDURE — 80053 COMPREHEN METABOLIC PANEL: CPT

## 2021-02-12 PROCEDURE — 74011250637 HC RX REV CODE- 250/637: Performed by: FAMILY MEDICINE

## 2021-02-12 PROCEDURE — 65660000000 HC RM CCU STEPDOWN

## 2021-02-12 PROCEDURE — 74011000250 HC RX REV CODE- 250: Performed by: HOSPITALIST

## 2021-02-12 PROCEDURE — 74011250637 HC RX REV CODE- 250/637: Performed by: INTERNAL MEDICINE

## 2021-02-12 PROCEDURE — 74177 CT ABD & PELVIS W/CONTRAST: CPT

## 2021-02-12 PROCEDURE — 82962 GLUCOSE BLOOD TEST: CPT

## 2021-02-12 PROCEDURE — 74011000636 HC RX REV CODE- 636: Performed by: HOSPITALIST

## 2021-02-12 PROCEDURE — 74011250636 HC RX REV CODE- 250/636: Performed by: HOSPITALIST

## 2021-02-12 PROCEDURE — C9113 INJ PANTOPRAZOLE SODIUM, VIA: HCPCS | Performed by: HOSPITALIST

## 2021-02-12 PROCEDURE — 82550 ASSAY OF CK (CPK): CPT

## 2021-02-12 PROCEDURE — 74011250636 HC RX REV CODE- 250/636: Performed by: INTERNAL MEDICINE

## 2021-02-12 PROCEDURE — 97530 THERAPEUTIC ACTIVITIES: CPT

## 2021-02-12 PROCEDURE — 74011250636 HC RX REV CODE- 250/636: Performed by: FAMILY MEDICINE

## 2021-02-12 PROCEDURE — 36415 COLL VENOUS BLD VENIPUNCTURE: CPT

## 2021-02-12 PROCEDURE — 97116 GAIT TRAINING THERAPY: CPT

## 2021-02-12 RX ADMIN — Medication 10 ML: at 05:51

## 2021-02-12 RX ADMIN — SODIUM CHLORIDE 75 ML/HR: 9 INJECTION, SOLUTION INTRAVENOUS at 02:17

## 2021-02-12 RX ADMIN — CHOLESTYRAMINE 4 G: 4 POWDER, FOR SUSPENSION ORAL at 15:18

## 2021-02-12 RX ADMIN — TRAMADOL HYDROCHLORIDE 50 MG: 50 TABLET, FILM COATED ORAL at 08:27

## 2021-02-12 RX ADMIN — TRAMADOL HYDROCHLORIDE 50 MG: 50 TABLET, FILM COATED ORAL at 02:22

## 2021-02-12 RX ADMIN — METRONIDAZOLE 500 MG: 500 INJECTION, SOLUTION INTRAVENOUS at 18:04

## 2021-02-12 RX ADMIN — TRAMADOL HYDROCHLORIDE 50 MG: 50 TABLET, FILM COATED ORAL at 20:47

## 2021-02-12 RX ADMIN — Medication 10 ML: at 20:47

## 2021-02-12 RX ADMIN — METRONIDAZOLE 500 MG: 500 INJECTION, SOLUTION INTRAVENOUS at 07:16

## 2021-02-12 RX ADMIN — CHOLESTYRAMINE 4 G: 4 POWDER, FOR SUSPENSION ORAL at 07:16

## 2021-02-12 RX ADMIN — TRAMADOL HYDROCHLORIDE 50 MG: 50 TABLET, FILM COATED ORAL at 15:18

## 2021-02-12 RX ADMIN — PANTOPRAZOLE SODIUM 40 MG: 40 INJECTION, POWDER, FOR SOLUTION INTRAVENOUS at 20:47

## 2021-02-12 RX ADMIN — IOHEXOL 50 ML: 240 INJECTION, SOLUTION INTRATHECAL; INTRAVASCULAR; INTRAVENOUS; ORAL at 17:00

## 2021-02-12 RX ADMIN — LEVOFLOXACIN 750 MG: 5 INJECTION, SOLUTION INTRAVENOUS at 05:51

## 2021-02-12 RX ADMIN — IOPAMIDOL 100 ML: 755 INJECTION, SOLUTION INTRAVENOUS at 17:00

## 2021-02-12 RX ADMIN — Medication 1 CAPSULE: at 08:24

## 2021-02-12 RX ADMIN — PANTOPRAZOLE SODIUM 40 MG: 40 INJECTION, POWDER, FOR SOLUTION INTRAVENOUS at 08:23

## 2021-02-12 RX ADMIN — CHOLESTYRAMINE 4 G: 4 POWDER, FOR SUSPENSION ORAL at 11:34

## 2021-02-12 NOTE — PROGRESS NOTES
Problem: Self Care Deficits Care Plan (Adult)  Goal: *Acute Goals and Plan of Care (Insert Text)  Description:   FUNCTIONAL STATUS PRIOR TO ADMISSION: Patient was modified independent using a veronique SPCs for functional mobility, was modified independent for basic and most instrumental ADLs, working at [facility] [employer] in [employer]. HOME SUPPORT: The patient lived alone with family in the area to provide assistance. Occupational Therapy Goals  Goals reassessed and updated: 2/12/2021  Initiated 2/5/2021  1. Patient will perform grooming at the sink with supervision/set-up within 7 day(s). 2.  Patient will perform bathing with minimal assistance/contact guard assist within 7 day(s). MET for upper body with set up. Continue for lower body within 7 days. 3.  Patient will perform lower body dressing with minimal assistance/contact guard assist and AE PRN within 7 day(s). 4.  Patient will perform toilet transfers with supervision/set-up within 7 day(s). 5.  Patient will perform all aspects of toileting with minimal assistance/contact guard assist within 7 day(s). 6.  Patient will participate in upper extremity therapeutic exercise/activities with supervision/set-up for 5 minutes within 7 day(s). 7.  Patient will utilize energy conservation techniques during functional activities with verbal and visual cues within 7 day(s). Outcome: Progressing Towards Goal   OCCUPATIONAL THERAPY TREATMENT/WEEKLY RE-ASSESSMENT  Patient: David Zavala (87 y.o. female)  Date: 2/12/2021  Diagnosis: Syncope [R55] Syncope  Procedure(s) (LRB):  COLONOSCOPY (N/A)  ESOPHAGOGASTRODUODENOSCOPY (EGD) (N/A)  ESOPHAGOGASTRODUODENAL (EGD) BIOPSY (N/A)  COLON BIOPSY (N/A) 1 Day Post-Op  Precautions: Fall  Chart, occupational therapy assessment, plan of care, and goals were reviewed. ASSESSMENT  Patient continues with skilled OT services and is progressing towards goals.   Patient with medical issues over the past week which impacted participation in [facility]. Participation impacted by bilateral LE edema, impaired reach to LEs, and IV inside of right (dominant UE) elbow during this session. Patient progressing well and goals updated. Current Level of Function Impacting Discharge (ADLs): UE self care with set up, grooming with set up, LE self care with mod to total assist without adapted equipment. Other factors to consider for discharge: Lives alone         PLAN :  Goals have been updated based on progression since last assessment. Patient continues to benefit from skilled intervention to address the above impairments. Continue to follow patient 5 times a week to address goals. Recommend with staff: Suman Trano in chair for meals and self care, toileting in bathroom with RW and assist of 1,  UE self care with set up, grooming with set up, LE self care with mod to total assist without adapted equipment. Recommend next OT session: LE self care with AE    Recommendation for discharge: (in order for the patient to meet his/her long term goals)  Therapy up to 5 days/week in SNF setting  If discharged to home, will need assist for LE self care, mobility and iADLs, HH  This discharge recommendation:  Has been made in collaboration with the attending provider and/or case management    IF patient discharges home will need the following DME: TBD       SUBJECTIVE:   Patient stated I usually wear compression hose.     OBJECTIVE DATA SUMMARY:   Cognitive/Behavioral Status:  Neurologic State: Alert  Orientation Level: Oriented X4  Cognition: Appropriate for age attention/concentration; Appropriate safety awareness; Follows commands  Perception: Appears intact  Perseveration: No perseveration noted  Safety/Judgement: Awareness of environment    Functional Mobility and Transfers for ADLs:    Transfers:  Sit to Stand: Stand-by assistance          Balance:  Sitting: Intact; Without support  Standing: Impaired; With support  Standing - Static: Good  Standing - Dynamic : Good    ADL Intervention:       Grooming  Position Performed: Seated in chair  Washing Face: Modified independent  Brushing/Combing Hair: Modified independent    Upper Body Bathing  Bathing Assistance: Set-up  Position Performed: Seated in chair    Lower Body Bathing  Lower Body : Moderate assistance  Position Performed: Seated in chair  Cues: Physical assistance         Lower Body Dressing Assistance  Socks: Total assistance (dependent)  Leg Crossed Method Used: No  Position Performed: Seated in chair  Cues: Physical assistance         Cognitive Retraining  Attention to Task: Multi-task  Following Commands: Follows multi-step complex commands/directions  Safety/Judgement: Awareness of environment      Activity Tolerance:   Fair    After treatment patient left in no apparent distress:   Sitting in chair and Call bell within reach    COMMUNICATION/COLLABORATION:   The patients plan of care was discussed with: Occupational therapist and Registered nurse.      CHILANGO Singer  Time Calculation: 39 mins

## 2021-02-12 NOTE — PROGRESS NOTES
VERA:  RUR: 22%    Disposition:  Return to [facility]. CM received call from [facility]). New insurance auth needed before return to facility. CM continuing to follow.   Laverle Lundborg, [facility], [address] [place] [address]

## 2021-02-12 NOTE — PROGRESS NOTES
Nephrology Progress Note  Nicci Jack  Date of Admission : 2/3/2021    CC: Follow up for PRABHU on CKD       Assessment and Plan     PRABHU on CKD:  - presumed 2/2 volume depletion  - Cr at baseline  - ok for CT scan w/ contrast  - cont IVF for now  - daily labs    CKD IIIb:  - f/b Dr. Jarrett Xiong  - recent baseline 1.5    Hypokalemia:  - replete PRN    Hypovolemia:  - improving    Orthostasis:  - BP low, improving symptomatically  - start NS as above    Acidosis:  - improving    Colitis:  - on levo and flagyl  - colonoscopy showing ischemic proctocolitis  - CT scan today    Diarrhea:  - improving    HTN:  - BP stable           Interval History:  Seen and examined. Cr better. BPs still low. Feeling a little better. Colonoscopy findings noted. Plan for CT contrast today. No cp, sob. Some nausea, no vomiting. Current Medications: all current  Medications have been eviewed in EPIC  Review of Systems: Pertinent items are noted in HPI. Objective:  Vitals:    Vitals:    02/12/21 0205 02/12/21 0802 02/12/21 0921 02/12/21 1427   BP: 99/60  108/64 133/79   Pulse: 65  (!) 56 (!) 57   Resp: 17  16 16   Temp: 98 °F (36.7 °C)  97.9 °F (36.6 °C) 98.8 °F (37.1 °C)   SpO2: 96%  100% 100%   Weight:  116.7 kg (257 lb 4.4 oz)     Height:         Intake and Output:  No intake/output data recorded.   02/10 1901 - 02/12 0700  In: 408.8 [I.V.:200]  Out: 2 [Urine:1]    Physical Examination:  General: NAD,Conversant   Neck:  Supple, no mass  Resp:  Lungs CTA B/L, no wheezing , normal respiratory effort  CV:  RRR,  no murmur or rub, no LE edema  GI:  Soft, NT, + Bowel sounds, no hepatosplenomegaly  Neurologic:  Non focal  Psych:             AAO x 3 appropriate affect   Skin:  No Rash  :  No austin    []    High complexity decision making was performed  []    Patient is at high-risk of decompensation with multiple organ involvement    Lab Data Personally Reviewed: I have reviewed all the pertinent labs, microbiology data and radiology studies during assessment.     Recent Labs     02/12/21  0205 02/11/21  0817 02/11/21  0007    139 133*   K 4.3 4.1 4.2   * 106 106   CO2 20* 24 19*   GLU 73 81 81   BUN 38* 46* 48*   CREA 1.52* 1.80* 2.00*   CA 8.2* 8.8 9.0   PHOS  --  3.6  --    ALB 2.9* 3.5  --    ALT 29  --   --      Recent Labs     02/12/21  0205 02/11/21  1904 02/11/21  0817   WBC 6.7  --  6.8   HGB 7.4* 7.9* 5.6*   HCT 23.3* 25.1* 18.1*   *  --  104*     No results found for: SDES  Lab Results   Component Value Date/Time    Culture result: NO GROWTH 5 DAYS 02/04/2021 12:22 AM    Culture result: NO GROWTH ON SOLID MEDIA AT 2 DAYS 01/13/2021 10:00 AM    Culture result: (A) 01/13/2021 10:00 AM     STAPHYLOCOCCUS SPECIES, COAGULASE NEGATIVE ISOLATED FROM [facility]IO BROTH ONLY     Recent Results (from the past 24 hour(s))   EKG, 12 LEAD, INITIAL    Collection Time: 02/11/21  5:35 PM   Result Value Ref Range    Ventricular Rate 69 BPM    Atrial Rate 69 BPM    P-R Interval 234 ms    QRS Duration 98 ms    Q-T Interval 382 ms    QTC Calculation (Bezet) 409 ms    Calculated P Axis 49 degrees    Calculated R Axis 1 degrees    Calculated T Axis 37 degrees    Diagnosis       Sinus rhythm with 1st degree AV block  Otherwise normal ECG  When compared with ECG of 04-FEB-2021 00:10,  QRS duration has increased  Confirmed by Thejo Garcia M.D., Shannan Rm ([id number]) on 2/12/2021 8:29:39 AM     HGB & HCT    Collection Time: 02/11/21  7:04 PM   Result Value Ref Range    HGB 7.9 (L) 11.5 - 16.0 g/dL    HCT 25.1 (L) 35.0 - 47.0 %   GLUCOSE, POC    Collection Time: 02/11/21  9:07 PM   Result Value Ref Range    Glucose (POC) 78 65 - 100 mg/dL    Performed by LÁZARO CHAN    CK    Collection Time: 02/12/21  2:05 AM   Result Value Ref Range     26 - 192 U/L   CBC WITH AUTOMATED DIFF    Collection Time: 02/12/21  2:05 AM   Result Value Ref Range    WBC 6.7 3.6 - 11.0 K/uL    RBC 2.39 (L) 3.80 - 5.20 M/uL    HGB 7.4 (L) 11.5 - 16.0 g/dL    HCT 23.3 (L) 35.0 - 47.0 %    MCV 97.5 80.0 - 99.0 FL    MCH 31.0 26.0 - 34.0 PG    MCHC 31.8 30.0 - 36.5 g/dL    RDW 17.3 (H) 11.5 - 14.5 %    PLATELET 675 (L) 602 - 400 K/uL    MPV 11.2 8.9 - 12.9 FL    NRBC 0.0 0  WBC    ABSOLUTE NRBC 0.00 0.00 - 0.01 K/uL    NEUTROPHILS 64 32 - 75 %    LYMPHOCYTES 21 12 - 49 %    MONOCYTES 9 5 - 13 %    EOSINOPHILS 4 0 - 7 %    BASOPHILS 0 0 - 1 %    IMMATURE GRANULOCYTES 2 (H) 0.0 - 0.5 %    ABS. NEUTROPHILS 4.3 1.8 - 8.0 K/UL    ABS. LYMPHOCYTES 1.4 0.8 - 3.5 K/UL    ABS. MONOCYTES 0.6 0.0 - 1.0 K/UL    ABS. EOSINOPHILS 0.3 0.0 - 0.4 K/UL    ABS. BASOPHILS 0.0 0.0 - 0.1 K/UL    ABS. IMM. GRANS. 0.1 (H) 0.00 - 0.04 K/UL    DF SMEAR SCANNED      RBC COMMENTS ANISOCYTOSIS  1+        RBC COMMENTS MACROCYTOSIS  1+        RBC COMMENTS OVALOCYTES  1+        RBC COMMENTS POLYCHROMASIA  PRESENT       METABOLIC PANEL, COMPREHENSIVE    Collection Time: 02/12/21  2:05 AM   Result Value Ref Range    Sodium 137 136 - 145 mmol/L    Potassium 4.3 3.5 - 5.1 mmol/L    Chloride 110 (H) 97 - 108 mmol/L    CO2 20 (L) 21 - 32 mmol/L    Anion gap 7 5 - 15 mmol/L    Glucose 73 65 - 100 mg/dL    BUN 38 (H) 6 - 20 MG/DL    Creatinine 1.52 (H) 0.55 - 1.02 MG/DL    BUN/Creatinine ratio 25 (H) 12 - 20      GFR est AA 42 (L) >60 ml/min/1.73m2    GFR est non-AA 34 (L) >60 ml/min/1.73m2    Calcium 8.2 (L) 8.5 - 10.1 MG/DL    Bilirubin, total 0.5 0.2 - 1.0 MG/DL    ALT (SGPT) 29 12 - 78 U/L    AST (SGOT) 45 (H) 15 - 37 U/L    Alk.  phosphatase 41 (L) 45 - 117 U/L    Protein, total 5.3 (L) 6.4 - 8.2 g/dL    Albumin 2.9 (L) 3.5 - 5.0 g/dL    Globulin 2.4 2.0 - 4.0 g/dL    A-G Ratio 1.2 1.1 - 2.2     GLUCOSE, POC    Collection Time: 02/12/21  6:52 AM   Result Value Ref Range    Glucose (POC) 93 65 - 100 mg/dL    Performed by William Ortiz, POC    Collection Time: 02/12/21 11:18 AM   Result Value Ref Range    Glucose (POC) 83 65 - 100 mg/dL    Performed by [address] [place] [address], POC    Collection Time: 02/12/21 4:16 PM   Result Value Ref Range    Glucose (POC) 83 65 - 100 mg/dL    Performed by Nunu Wells MD  [facility]   [address] [facility] [address]  Phone - [phone]   Fax - [phone]  [email or web address]

## 2021-02-12 NOTE — PROGRESS NOTES
[address]  [address], [address]  [phone]                GI PROGRESS NOTE      NAME:   Danielle Stone   :    1955   MRN:    [id number]     Subjective:     She has not had a BM or any leakage since the colonoscopy yesterday, but did see a little BRB from the rectum. She denies rectal and abdominal pain. She was able to get OOB and walk down the carr and overall feels a little better. Objective:     VITALS:   Last 24hrs VS reviewed since prior hospitalist progress note. Most recent are:  Visit Vitals  /64 (BP 1 Location: Left lower arm, BP Patient Position: At rest)   Pulse (!) 56   Temp 97.9 °F (36.6 °C)   Resp 16   Ht 5' 6\" (1.676 m)   Wt 116.7 kg (257 lb 4.4 oz)   SpO2 100%   BMI 41.53 kg/m²       Intake/Output Summary (Last 24 hours) at 2021 1058  Last data filed at 2021 2105  Gross per 24 hour   Intake 408.8 ml   Output 2 ml   Net 406.8 ml        PHYSICAL EXAM:  General   NAD, seems comfortable sitting in bedside chair  Skin         No visible rashes, no jaundice or pallor  Resp        Comfortable breathing   Abd     Non-distended  Psych      Pleasant, no agitation or anxiety    Lab Data Reviewed   Medications: Reviewed    Assessment/Plan     Diarrhea / fecal incontinence:  - Negative enteric stool panel and C.  Diff  - No improvement despite empiric Flagyl and Levaquin x 6 days  - No improvement on TID Questran and Imodium    Colitis - moderate to severe proctocolitis on CT 21  - Severe ischemic proctosigmoiditis in colonoscopy 21  - Consult to Dr. Kalpana Bliss for impression/recommendation  - Will repeat CT abd/pelvis today with contrast (cleared it with Dr. Sara Green; Cr 1.52)    Esophageal and duodenal ulcers  - Recurrent per patient, unclear etiology, sounds like rather extensive w/u at [facility] in past   - Path pending  - Continue PPI BID    Anemia, acute on chronic  - Hgb stabilized 7.4 today  - Continue to monitor and transfuse prn for Hgb < 7.0    PRABHU  - Renal following, Dr. Cook Simple  Rhabdomyolysis  - Following a fall, down for 36 hrs    (Primary GI is Dr. Karlo Perez at [address] [place] [address])

## 2021-02-12 NOTE — PROGRESS NOTES
[facility]                                                                                          Hospitalist Progress Note  Zachary Banerjee MD  Answering service: [phone] from in house phone        Date of Service:  2021  NAME:  Gaurav Dempsey  :  1955  MRN:  [id number]    This documentation was facilitated by a Voice Recognition software and may contain inadvertent typographical errors. Admission Summary:   From h&P  \"This is a 50-year-old woman with a past medical history significant for chronic kidney disease, dyslipidemia, type 2 diabetes, hypertension, peripheral artery disease, was in her usual state of health until the day of her presentation at the emergency room when the patient stated that she fell at home. The patient complained of generalized weakness, which is progressive. Also complained of diarrhea. According to the patient, she woke up from sleep, was trying to go to the bathroom, when all of a sudden, the patient felt dizzy and passed out. She has no recollection of the event. \"    Interval history / Subjective:        I saw patient early this morning, she denied vomiting or diarrhea or abdominal pain. She says she spoke with a colorectal surgeon last night (no notes when I saw her earlier)  Assessment & Plan:   Severe acute on chronic anemia, suspect GIB  -Hold aspirin and sc heparin. IV PPI  -Hemoglobin up to 7.4 from 5.6 yesterday after a unit of blood. -EGD showed patient with ulceration of the mid esophagus, grade nasopharyngitis and a small sliding hiatal hernia, few small areas of erythema in the stomach, diffuse clean-based ulcerations throughout the duodenal bulb without active bleeding. Biopsies pending  -Colonoscopy showed diffuse ischemic changes with submucosal edema and circumferential coalescing ulcerations involving all of the rectum extending to the dentate line.   Ulceration extends approximately 50 cm from anal verge. The sigmoid, descending and transverse colon are normal-appearing mucosa. Syncope likely orthostatic from volume contraction due to diarrhea  -MRI shows no acute hemorrhage or infarction. 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  -Echo with normal LVEF,no significant valve disease. Fall  -As a result of her syncope  -Negative work-up for acute fracture or injury    Proctocolitis  -Evident on CT abdomen and pelvis  -Stool studies negative  -Continue questran and immodium prn for loose stool  --EGD showed patient with ulceration of the mid esophagus, grade nasopharyngitis and a small sliding hiatal hernia, few small areas of erythema in the stomach, diffuse clean-based ulcerations throughout the duodenal bulb without active bleeding. Biopsies pending  -Colonoscopy showed diffuse ischemic changes with submucosal edema and circumferential coalescing ulcerations involving all of the rectum extending to the dentate line. Ulceration extends approximately 50 cm from anal verge. The sigmoid, descending and transverse colon are normal-appearing mucosa. -Colorectal surgery consulted, recommended conservative approach for now. -Repeat abdominal pelvic CT per GI. Biopsy pending      Acute kidney injury on CKD III/IV  -In the setting of rhabdo and volume depletion from diarrhea  -Nephrology evaluating the patient  -Continue IV fluid and monitor renal function, overall improving  -Renal indices improving.     Rhabdomyolysis  -As a result of fall  -Continue IV fluid  -Ordered Labs for am    Metabolic acidosis  -Due to alkaline loss from GI tract as well as acute kidney injury  -Now resolved  -Nephrology evaluating patient    Thrombocytopenia~mild without bleeding complications.  -Monitor platelet     Abnormal LFTs  -Also likely related to infectious process,rhabdomyolysis  -CT abdomen and pelvis without any acute liver or gallbladder pathology      Hypertension  -Hold antihypertensives for now as patient initially hypotensive    T2Dm on metformin preadmission  -Continue insulin sliding scale coverage    Dyslipidemia  -Holding statin due to rhabdomyolysis    Chronic leg edema. On Bumex which is held due to PRABHU,dehydration    Code status:full code  DVT prophylaxis: scd,d/c heparin due to GIB  Care Plan discussed with: Patient/Family and Nurse  Anticipated Disposition: Home w/Family  Anticipated Discharge: 24 hours to 48 hours  Hospital Problems  Date Reviewed: 2/11/2021          Codes Class Noted POA    Acute blood loss anemia ICD-10-CM: D62  ICD-9-CM: 285.1  2/11/2021 Yes        * (Principal) Syncope ICD-10-CM: R55  ICD-9-CM: 780.2  2/4/2021 Yes                Review of Systems:   A comprehensive review of systems was negative except for that written in the HPI. Vital Signs:       Physical Examination:     I had a face to face encounter with this patient and independently examined them on2/12/2021 as outlined below:        Constitutional:  No acute distress, cooperative, pleasant    HEENT:  Atraumatic. Oral mucosa moist,. Non icteric sclera. No pallor. Resp:  CTA bilaterally. No wheezing/rhonchi/rales. No accessory muscle use   Chest Wall: No deformity   CV:  Regular rhythm, normal rate, no murmurs, gallops, rubs    GI:  Soft, non distended, non tender. Bruises from sc injection. normoactive bowel sounds, no hepatosplenomegaly    :  No CVA or suprapubic tenderness    Musculoskeletal: ++ leg edema.     Neurologic:  Mental status:AAOx3,   Cranial nerves II-XII : WNL  Motor exam:Moves all extremities symmetrically              Data Review:    Review and/or order of clinical lab test  Review and/or order of tests in the radiology section of CPT  Review and/or order of tests in the medicine section of CPT      Labs:     Recent Labs     02/12/21  0205 02/11/21  1904 02/11/21  0817   WBC 6.7  --  6.8 HGB 7.4* 7.9* 5.6*   HCT 23.3* 25.1* 18.1*   *  --  104*     Recent Labs     02/12/21 0205 02/11/21  0817 02/11/21  0007    139 133*   K 4.3 4.1 4.2   * 106 106   CO2 20* 24 19*   BUN 38* 46* 48*   CREA 1.52* 1.80* 2.00*   GLU 73 81 81   CA 8.2* 8.8 9.0   PHOS  --  3.6  --      Recent Labs     02/12/21 0205 02/11/21 0817   ALT 29  --    AP 41*  --    TBILI 0.5  --    TP 5.3*  --    ALB 2.9* 3.5   GLOB 2.4  --      No results for input(s): INR, PTP, APTT, INREXT, INREXT in the last 72 hours. No results for input(s): FE, TIBC, PSAT, FERR in the last 72 hours. Lab Results   Component Value Date/Time    Folate 19.8 10/02/2016 02:07 AM      No results for input(s): PH, PCO2, PO2 in the last 72 hours.   Recent Labs     02/12/21 0205        Lab Results   Component Value Date/Time    Cholesterol, total 165 09/29/2016 10:02 AM    HDL Cholesterol 34 09/29/2016 10:02 AM    LDL, calculated 91.2 09/29/2016 10:02 AM    Triglyceride 199 (H) 09/29/2016 10:02 AM    CHOL/HDL Ratio 4.9 09/29/2016 10:02 AM     Lab Results   Component Value Date/Time    Glucose (POC) 83 02/12/2021 04:16 PM    Glucose (POC) 83 02/12/2021 11:18 AM    Glucose (POC) 93 02/12/2021 06:52 AM    Glucose (POC) 78 02/11/2021 09:07 PM    Glucose (POC) 71 02/11/2021 04:26 PM     Lab Results   Component Value Date/Time    Color DARK YELLOW 02/04/2021 02:16 AM    Appearance CLOUDY (A) 02/04/2021 02:16 AM    Specific gravity 1.021 02/04/2021 02:16 AM    pH (UA) 5.0 02/04/2021 02:16 AM    Protein 30 (A) 02/04/2021 02:16 AM    Glucose Negative 02/04/2021 02:16 AM    Ketone Negative 02/04/2021 02:16 AM    Bilirubin NEGATIVE  09/29/2016 10:02 AM    Urobilinogen 0.2 02/04/2021 02:16 AM    Nitrites Negative 02/04/2021 02:16 AM    Leukocyte Esterase Negative 02/04/2021 02:16 AM    Epithelial cells FEW 02/04/2021 02:16 AM    Bacteria Negative 02/04/2021 02:16 AM    WBC 0-4 02/04/2021 02:16 AM    RBC 0-5 02/04/2021 02:16 AM Medications Reviewed:     Current Facility-Administered Medications   Medication Dose Route Frequency    iopamidoL (ISOVUE-370) 76 % injection 100 mL  100 mL IntraVENous RAD ONCE    iohexoL (OMNIPAQUE) 240 mg iodine/mL solution 50 mL  50 mL Oral RAD ONCE    0.9% sodium chloride infusion 250 mL  250 mL IntraVENous PRN    0.9% sodium chloride infusion  75 mL/hr IntraVENous CONTINUOUS    pantoprazole (PROTONIX) 40 mg in 0.9% sodium chloride 10 mL injection  40 mg IntraVENous Q12H    sodium chloride (NS) flush 5-40 mL  5-40 mL IntraVENous Q8H    sodium chloride (NS) flush 5-40 mL  5-40 mL IntraVENous PRN    traMADoL (ULTRAM) tablet 50 mg  50 mg Oral Q6H PRN    loperamide (IMODIUM) capsule 4 mg  4 mg Oral Q4H PRN    metroNIDAZOLE (FLAGYL) IVPB premix 500 mg  500 mg IntraVENous Q12H    cholestyramine-aspartame (QUESTRAN LIGHT) packet 4 g  4 g Oral TID WITH MEALS    [Held by provider] aspirin chewable tablet 81 mg  81 mg Oral DAILY    sodium chloride (NS) flush 5-40 mL  5-40 mL IntraVENous Q8H    sodium chloride (NS) flush 5-40 mL  5-40 mL IntraVENous PRN    acetaminophen (TYLENOL) tablet 650 mg  650 mg Oral Q6H PRN    Or    acetaminophen (TYLENOL) suppository 650 mg  650 mg Rectal Q6H PRN    promethazine (PHENERGAN) tablet 12.5 mg  12.5 mg Oral Q6H PRN    Or    ondansetron (ZOFRAN) injection 4 mg  4 mg IntraVENous Q6H PRN    [Held by provider] heparin (porcine) injection 5,000 Units  5,000 Units SubCUTAneous Q8H    L.acidophilus-paracasei-S.thermophil-bifidobacter (RISAQUAD) 8 billion cell capsule  1 Cap Oral DAILY    insulin lispro (HUMALOG) injection   SubCUTAneous AC&HS    glucose chewable tablet 16 g  4 Tab Oral PRN    dextrose (D50W) injection syrg 12.5-25 g  12.5-25 g IntraVENous PRN    glucagon (GLUCAGEN) injection 1 mg  1 mg IntraMUSCular PRN     ______________________________________________________________________  EXPECTED LENGTH OF STAY: 2d 16h  ACTUAL LENGTH OF STAY: 8                 Dilia Morataya MD

## 2021-02-12 NOTE — PROGRESS NOTES
Problem: Mobility Impaired (Adult and Pediatric)  Goal: *Acute Goals and Plan of Care (Insert Text)  Description: FUNCTIONAL STATUS PRIOR TO ADMISSION: Patient was modified independent using bilateral single point cane for functional mobility. HOME SUPPORT PRIOR TO ADMISSION: The patient lived alone with family to provide assistance. Physical Therapy Goals  Weekly re-assessment 2/12/2021  All goals remain appropriate    Initiated 2/5/2021  1. Patient will move from supine to sit and sit to supine  and scoot up and down in bed with modified independence within 7 day(s). 2.  Patient will transfer from bed to chair and chair to bed with modified independence using the least restrictive device within 7 day(s). 3.  Patient will perform sit to stand with modified independence within 7 day(s). 4.  Patient will ambulate with modified independence for 150 feet with the least restrictive device within 7 day(s). 5.  Patient will ascend/descend 4 stairs with handrail(s) with modified independence within 7 day(s). Outcome: Progressing Towards Goal   PHYSICAL THERAPY TREATMENT: WEEKLY REASSESSMENT  Patient: Chapis Collins (68 y.o. female)  Date: 2/12/2021  Primary Diagnosis: Syncope [R55]  Procedure(s) (LRB):  COLONOSCOPY (N/A)  ESOPHAGOGASTRODUODENOSCOPY (EGD) (N/A)  ESOPHAGOGASTRODUODENAL (EGD) BIOPSY (N/A)  COLON BIOPSY (N/A) 1 Day Post-Op   Precautions:   Fall      ASSESSMENT  Patient continues with skilled PT services and is progressing towards goals. Patient has had minimal therapy this week due to medical issues but demonstrating improvement in all mobility (bed mobility not assessed). Overall supervision/contact guard for upright mobility. Improved endurance for gait today and improved gait pattern. She remains motivated to return to modified independent function and is remains appropriate for rehab. .     Patient's progression toward goals since last assessment: progressing with goals still appropriate    Current Level of Function Impacting Discharge (mobility/balance): per patient assist for out of bed; contact guard/supervision for gait    Functional Outcome Measure: The patient scored 16/28 on the Tinetti outcome measure which is indicative of high fall risk. Other factors to consider for discharge: has been accepted to [facility]         PLAN :  Goals have been updated based on progression since last assessment. Patient continues to benefit from skilled intervention to address the above impairments. Recommendations and Planned Interventions: bed mobility training, transfer training, gait training, therapeutic exercises, patient and family training/education, and therapeutic activities      Frequency/Duration: Patient will be followed by physical therapy:  5 times a week to address goals. Recommendation for discharge: (in order for the patient to meet his/her long term goals)  Therapy up to 5 days/week in SNF setting    This discharge recommendation:  Has been made in collaboration with the attending provider and/or case management    IF patient discharges home will need the following DME: none         SUBJECTIVE:   Patient stated I am doing better.     OBJECTIVE DATA SUMMARY:   HISTORY:    Past Medical History:   Diagnosis Date    Cancer ([facility] [address]     melanoma back stage II 2005    CKD (chronic kidney disease), stage III 3/1/2016    CKD (chronic kidney disease), stage IV ([address] 3/1/2016    Diabetes ([facility] [address]     HTN (hypertension) 11/2/2015    Hypertension     LBP (low back pain) 11/2/2015    Menopause     Microalbuminuria 11/21/2015    Obesity 11/2/2015    PAD (peripheral artery disease) ([facility] [address] 9/29/2016    Venous insufficiency 11/2/2015     Past Surgical History:   Procedure Laterality Date    COLONOSCOPY N/A 2/11/2021    COLONOSCOPY performed by Mag Garcia MD at [facility] ENDOSCOPY    HX AMPUTATION TOE  01/13/2021    Left second toe amputation; Dr. Dali Angeles.     HX APPENDECTOMY      HX BACK SURGERY      HX BREAST BIOPSY      unsure of which breast or when. Negative results    HX TONSILLECTOMY         Personal factors and/or comorbidities impacting plan of care:     Home Situation  Home Environment: Private residence  # Steps to Enter: 4  Rails to Enter: Yes  One/Two Story Residence: Two story, live on 1st floor  Living Alone: Yes  Support Systems: Family member(s)  Patient Expects to be Discharged to[de-identified] Rehabilitation facility  Current DME Used/Available at Home: Cane, straight, Walker, rolling, Tub transfer bench, Raised toilet seat(reacher, dressing stick)  Tub or Shower Type: Tub/Shower combination    EXAMINATION/PRESENTATION/DECISION MAKING:   Critical Behavior:  Neurologic State: Alert  Orientation Level: Oriented X4  Cognition: Appropriate decision making, Appropriate for age attention/concentration, Appropriate safety awareness, Follows commands  Safety/Judgement: Awareness of environment, Insight into deficits  Hearing: Auditory  Auditory Impairment: None    Edema: bilateral LE's  Functional Mobility:  Bed Mobility:   Received and left in chair           Transfers:  Sit to Stand: Stand-by assistance  Stand to Sit: Stand-by assistance                       Balance:   Sitting: Intact  Standing: Impaired; With support  Standing - Static: Good  Standing - Dynamic : Good  Ambulation/Gait Training:  Distance (ft): 80 Feet (ft)  Assistive Device: Gait belt;Walker, rolling  Ambulation - Level of Assistance: Contact guard assistance        Gait Abnormalities: Decreased step clearance        Base of Support: Widened     Speed/Karen: Slow  Step Length: Right shortened;Left shortened   Functional Measure:  Tinetti test:    Sitting Balance: 1  Arises: 1  Attempts to Rise: 2  Immediate Standing Balance: 1  Standing Balance: 1  Nudged: 0  Eyes Closed: 0  Turn 360 Degrees - Continuous/Discontinuous: 0  Turn 360 Degrees - Steady/Unsteady: 0  Sitting Down: 1  Balance Score: 7 Balance total score  Indication of Gait: 1  R Step Length/Height: 1  L Step Length/Height: 1  R Foot Clearance: 1  L Foot Clearance: 1  Step Symmetry: 1  Step Continuity: 1  Path: 1  Trunk: 0  Walking Time: 1  Gait Score: 9 Gait total score  Total Score: 16/28 Overall total score         Tinetti Tool Score Risk of Falls  <19 = High Fall Risk  19-24 = Moderate Fall Risk  25-28 = Low Fall Risk  Tinetti ME. Performance-Oriented Assessment of Mobility Problems in Elderly Patients. [address]; [id number]. (Scoring Description: PT Bulletin Feb. 10, 1993)    Older adults: Trace Oliveros et al, 2009; n = 1000 [facility] elderly evaluated with ABC, SOPHIA, ADL, and IADL)  · Mean SOPHIA score for males aged 69-68 years = 26.21(3.40)  · Mean SOPHIA score for females age 69-68 years = 25.16(4.30)  · Mean SOPHIA score for males over 80 years = 23.29(6.02)  · Mean SOPHIA score for females over 80 years = 17.20(8.32)       Activity Tolerance:   Fair and requires rest breaks    After treatment patient left in no apparent distress:   Sitting in chair and Call bell within reach    COMMUNICATION/EDUCATION:   The patients plan of care was discussed with: Occupational therapy assistant and Registered nurse. Fall prevention education was provided and the patient/caregiver indicated understanding., Patient/family have participated as able in goal setting and plan of care. , and Patient/family agree to work toward stated goals and plan of care.     Thank you for this referral.  Armando Christie, PT   Time Calculation: 31 mins

## 2021-02-12 NOTE — CONSULTS
Colon and Rectal Surgery History and Physical    Subjective:      Neftali Fox is a 60 y.o. female who has a history of diarrhea. She was brought to the hospital after being found on the ground. SHe had a ct scan showing ischemic proctocolitis. She has not had any rectal bleeding. She has no abdominal or rectal pain. She had a colonoscopy a few years ago which was negative other than diverticulosis. She had a colonoscopy yesterday which showed ischemic rectum and sigmoid colon. Unclear etiology. Patient Active Problem List    Diagnosis Date Noted    Acute blood loss anemia 02/11/2021    Syncope 02/04/2021    Type 2 diabetes with nephropathy ([address] 02/19/2018    Hypertension complicating diabetes ([address] 08/21/2017    CKD (chronic kidney disease), stage III 04/20/2017    Osteomyelitis of foot, left, acute ([address] 09/30/2016    Diabetic foot ulcer ([address] 09/29/2016    Hypokalemia 09/29/2016    PAD (peripheral artery disease) ([address] 09/29/2016    Type 2 diabetes mellitus with neurologic complication ([address] 27/51/6905    Obesity 11/02/2015    Venous insufficiency 11/02/2015    LBP (low back pain) 11/02/2015     Past Medical History:   Diagnosis Date    Cancer ([address]     melanoma back stage II 2005    CKD (chronic kidney disease), stage III 3/1/2016    CKD (chronic kidney disease), stage IV ([address] 3/1/2016    Diabetes ([address]     HTN (hypertension) 11/2/2015    Hypertension     LBP (low back pain) 11/2/2015    Menopause     Microalbuminuria 11/21/2015    Obesity 11/2/2015    PAD (peripheral artery disease) ([address] 9/29/2016    Venous insufficiency 11/2/2015      Past Surgical History:   Procedure Laterality Date    COLONOSCOPY N/A 2/11/2021    COLONOSCOPY performed by Elysia Milton MD at [facility] ENDOSCOPY    HX AMPUTATION TOE  01/13/2021    Left second toe amputation; Dr. Howard Marr.  HX APPENDECTOMY      HX BACK SURGERY      HX BREAST BIOPSY      unsure of which breast or when.  Negative results    HX TONSILLECTOMY        Social History     Tobacco Use    Smoking status: Former Smoker    Smokeless tobacco: Never Used   Substance Use Topics    Alcohol use: Yes     Alcohol/week: 0.0 standard drinks     Frequency: Monthly or less     Drinks per session: 1 or 2     Binge frequency: Never     Comment: occassion      Family History   Problem Relation Age of Onset   [facility] Arthritis-rheumatoid Mother     Dementia Father       Prior to Admission medications    Medication Sig Start Date End Date Taking? Authorizing Provider   atorvastatin (LIPITOR) 40 mg tablet TAKE 1 TABLET BY MOUTH EVERY DAY 11/21/19  Yes Ryann Sung MD   bumetanide (BUMEX) 2 mg tablet Take 1 Tab by mouth two (2) times daily as needed. Patient taking differently: Take  by mouth daily. Per patient  Indications: visible water retention 8/20/18  Yes Ryann Sung MD   [facility] ULTRA TEST strip TEST BLOOD SUGAR TWICE A DAY 3/3/18  Yes Ryann Sung MD   [facility]. 33 gauge misc USE TO TEST BLOOD SUGAR EVERY DAY 6/5/17  Yes Ryann Sung MD   [facility] ULTRA TEST strip TEST BLOOD SUGAR TWICE A DAY 2/2/17  Yes Ryann Sung MD   [id number]-algal oil 3 mg-35 mg-2 mg -90.314 mg cap Take 2 Caps by mouth daily. Yes Provider, Historical   acetaminophen (TYLENOL) 325 mg tablet Take 2 Tabs by mouth every four (4) hours as needed. 10/4/16  Yes Ryann Sung MD   metFORMIN (GLUCOPHAGE) 500 mg tablet TAKE 1 TABLET BY MOUTH TWICE A DAY WITH MEALS 8/8/19   Ryann Sung MD   potassium chloride SR (KLOR-CON 10) 10 mEq tablet Take 1 mEq by mouth daily. Provider, Historical   traMADol (ULTRAM) 50 mg tablet Take 1 Tab by mouth every six (6) hours as needed for Pain.  Max Daily Amount: 200 mg. 8/20/18   Ryann Sung MD   metFORMIN (GLUCOPHAGE) 500 mg tablet TAKE 1 TABLET BY MOUTH TWICE A DAY WITH MEALS 7/24/18   Ryann Sung MD   CHILDREN'S ASPIRIN 81 mg chewable tablet CHEW 1 TABLET BY MOUTH EVERY DAY 1/3/17   Ryann Sung MD     No Known Allergies     Review of Systems:    A comprehensive review of systems was negative except for that written in the History of Present Illness. Objective:     Visit Vitals  /79 (BP 1 Location: Left arm, BP Patient Position: At rest)   Pulse (!) 57   Temp 98.8 °F (37.1 °C)   Resp 16   Ht 5' 6\" (1.676 m)   Wt 116.7 kg (257 lb 4.4 oz)   SpO2 100%   BMI 41.53 kg/m²        Physical Exam:   General:  Alert, cooperative, no distress, appears stated age. Head:  Normocephalic, without obvious abnormality, atraumatic. Eyes:  Conjunctivae/corneas clear. PERRL, EOMs intact. Ears:  Normal external ear canals both ears. Nose: Nares normal. Septum midline. No drainage. Throat: Lips, mucosa, and tongue normal. Teeth and gums normal.   Neck: Supple, symmetrical, trachea midline, no adenopathy   Back:   Symmetric, no curvature. ROM normal.   Lungs:   Clear   Chest wall:  No tenderness or deformity. Heart:  Regular rate and rhythm       Abdomen:   Soft, non-tender. Bowel sounds normal. No masses,  No organomegaly. Genitalia:  deferred       Extremities: Extremities normal, atraumatic, no cyanosis or edema. Skin: Skin color, texture, turgor normal. No rashes or lesions. Neurologic: CNII-XII intact. Normal strength, sensation and reflexes throughout.          Imaging:  images and reports reviewed    Lab Review:    Recent Results (from the past 24 hour(s))   EKG, 12 LEAD, INITIAL    Collection Time: 02/11/21  5:35 PM   Result Value Ref Range    Ventricular Rate 69 BPM    Atrial Rate 69 BPM    P-R Interval 234 ms    QRS Duration 98 ms    Q-T Interval 382 ms    QTC Calculation (Bezet) 409 ms    Calculated P Axis 49 degrees    Calculated R Axis 1 degrees    Calculated T Axis 37 degrees    Diagnosis       Sinus rhythm with 1st degree AV block  Otherwise normal ECG  When compared with ECG of 04-FEB-2021 00:10,  QRS duration has increased  Confirmed by Arlet Rivas M.D., Ravin Lazaro ([id number]) on 2/12/2021 8:29:39 AM     HGB & HCT    Collection Time: 02/11/21  7:04 PM   Result Value Ref Range    HGB 7.9 (L) 11.5 - 16.0 g/dL    HCT 25.1 (L) 35.0 - 47.0 %   GLUCOSE, POC    Collection Time: 02/11/21  9:07 PM   Result Value Ref Range    Glucose (POC) 78 65 - 100 mg/dL    Performed by Nicole Liz    CK    Collection Time: 02/12/21  2:05 AM   Result Value Ref Range     26 - 192 U/L   CBC WITH AUTOMATED DIFF    Collection Time: 02/12/21  2:05 AM   Result Value Ref Range    WBC 6.7 3.6 - 11.0 K/uL    RBC 2.39 (L) 3.80 - 5.20 M/uL    HGB 7.4 (L) 11.5 - 16.0 g/dL    HCT 23.3 (L) 35.0 - 47.0 %    MCV 97.5 80.0 - 99.0 FL    MCH 31.0 26.0 - 34.0 PG    MCHC 31.8 30.0 - 36.5 g/dL    RDW 17.3 (H) 11.5 - 14.5 %    PLATELET 389 (L) 616 - 400 K/uL    MPV 11.2 8.9 - 12.9 FL    NRBC 0.0 0  WBC    ABSOLUTE NRBC 0.00 0.00 - 0.01 K/uL    NEUTROPHILS 64 32 - 75 %    LYMPHOCYTES 21 12 - 49 %    MONOCYTES 9 5 - 13 %    EOSINOPHILS 4 0 - 7 %    BASOPHILS 0 0 - 1 %    IMMATURE GRANULOCYTES 2 (H) 0.0 - 0.5 %    ABS. NEUTROPHILS 4.3 1.8 - 8.0 K/UL    ABS. LYMPHOCYTES 1.4 0.8 - 3.5 K/UL    ABS. MONOCYTES 0.6 0.0 - 1.0 K/UL    ABS. EOSINOPHILS 0.3 0.0 - 0.4 K/UL    ABS. BASOPHILS 0.0 0.0 - 0.1 K/UL    ABS. IMM.  GRANS. 0.1 (H) 0.00 - 0.04 K/UL    DF SMEAR SCANNED      RBC COMMENTS ANISOCYTOSIS  1+        RBC COMMENTS MACROCYTOSIS  1+        RBC COMMENTS OVALOCYTES  1+        RBC COMMENTS POLYCHROMASIA  PRESENT       METABOLIC PANEL, COMPREHENSIVE    Collection Time: 02/12/21  2:05 AM   Result Value Ref Range    Sodium 137 136 - 145 mmol/L    Potassium 4.3 3.5 - 5.1 mmol/L    Chloride 110 (H) 97 - 108 mmol/L    CO2 20 (L) 21 - 32 mmol/L    Anion gap 7 5 - 15 mmol/L    Glucose 73 65 - 100 mg/dL    BUN 38 (H) 6 - 20 MG/DL    Creatinine 1.52 (H) 0.55 - 1.02 MG/DL    BUN/Creatinine ratio 25 (H) 12 - 20      GFR est AA 42 (L) >60 ml/min/1.73m2    GFR est non-AA 34 (L) >60 ml/min/1.73m2    Calcium 8.2 (L) 8.5 - 10.1 MG/DL    Bilirubin, total 0.5 0.2 - 1.0 MG/DL    ALT (SGPT) 29 12 - 78 U/L    AST (SGOT) 45 (H) 15 - 37 U/L    Alk. phosphatase 41 (L) 45 - 117 U/L    Protein, total 5.3 (L) 6.4 - 8.2 g/dL    Albumin 2.9 (L) 3.5 - 5.0 g/dL    Globulin 2.4 2.0 - 4.0 g/dL    A-G Ratio 1.2 1.1 - 2.2     GLUCOSE, POC    Collection Time: 02/12/21  6:52 AM   Result Value Ref Range    Glucose (POC) 93 65 - 100 mg/dL    Performed by [address], POC    Collection Time: 02/12/21 11:18 AM   Result Value Ref Range    Glucose (POC) 83 65 - 100 mg/dL    Performed by [address] [place] [address], POC    Collection Time: 02/12/21  4:16 PM   Result Value Ref Range    Glucose (POC) 83 65 - 100 mg/dL    Performed by [facility] [address] and radiology: images and reports reviewed      Assessment:     Unclear etiology for the ischemia of the rectum and sigmoid colon. She is totally asymptomatic and non toxic appearing. Thus taking her to the or would seem to be heavy handed and would likely require a low anterior resection. Would recommend continued conservative management for now as this will hopefully resolve spontaneously. If she should head in the wrong direction, my partners will be available to help as I will be out of town for the next week. Plan: Thank you for the consultation and for allowing me to participate in the care of this patient. Please feel free to contact me if you have any further questions or if I can be of further assistance.        Signed By: Ramin Amos MD     February 12, 2021

## 2021-02-13 LAB
ALBUMIN SERPL-MCNC: 2.9 G/DL (ref 3.5–5)
ALBUMIN/GLOB SERPL: 1.2 {RATIO} (ref 1.1–2.2)
ALP SERPL-CCNC: 44 U/L (ref 45–117)
ALT SERPL-CCNC: 29 U/L (ref 12–78)
ANION GAP SERPL CALC-SCNC: 5 MMOL/L (ref 5–15)
AST SERPL-CCNC: 28 U/L (ref 15–37)
BASOPHILS # BLD: 0 K/UL (ref 0–0.1)
BASOPHILS NFR BLD: 0 % (ref 0–1)
BILIRUB SERPL-MCNC: 0.4 MG/DL (ref 0.2–1)
BUN SERPL-MCNC: 26 MG/DL (ref 6–20)
BUN/CREAT SERPL: 19 (ref 12–20)
CALCIUM SERPL-MCNC: 8.6 MG/DL (ref 8.5–10.1)
CHLORIDE SERPL-SCNC: 110 MMOL/L (ref 97–108)
CO2 SERPL-SCNC: 23 MMOL/L (ref 21–32)
CREAT SERPL-MCNC: 1.38 MG/DL (ref 0.55–1.02)
DIFFERENTIAL METHOD BLD: ABNORMAL
EOSINOPHIL # BLD: 0.3 K/UL (ref 0–0.4)
EOSINOPHIL NFR BLD: 4 % (ref 0–7)
ERYTHROCYTE [DISTWIDTH] IN BLOOD BY AUTOMATED COUNT: 17.1 % (ref 11.5–14.5)
GLOBULIN SER CALC-MCNC: 2.5 G/DL (ref 2–4)
GLUCOSE BLD STRIP.AUTO-MCNC: 117 MG/DL (ref 65–100)
GLUCOSE BLD STRIP.AUTO-MCNC: 76 MG/DL (ref 65–100)
GLUCOSE BLD STRIP.AUTO-MCNC: 83 MG/DL (ref 65–100)
GLUCOSE BLD STRIP.AUTO-MCNC: 95 MG/DL (ref 65–100)
GLUCOSE SERPL-MCNC: 77 MG/DL (ref 65–100)
HCT VFR BLD AUTO: 23.7 % (ref 35–47)
HGB BLD-MCNC: 7.7 G/DL (ref 11.5–16)
IMM GRANULOCYTES # BLD AUTO: 0.1 K/UL (ref 0–0.04)
IMM GRANULOCYTES NFR BLD AUTO: 2 % (ref 0–0.5)
LYMPHOCYTES # BLD: 1.4 K/UL (ref 0.8–3.5)
LYMPHOCYTES NFR BLD: 21 % (ref 12–49)
MCH RBC QN AUTO: 31.2 PG (ref 26–34)
MCHC RBC AUTO-ENTMCNC: 32.5 G/DL (ref 30–36.5)
MCV RBC AUTO: 96 FL (ref 80–99)
MONOCYTES # BLD: 0.6 K/UL (ref 0–1)
MONOCYTES NFR BLD: 9 % (ref 5–13)
NEUTS SEG # BLD: 4.4 K/UL (ref 1.8–8)
NEUTS SEG NFR BLD: 64 % (ref 32–75)
NRBC # BLD: 0 K/UL (ref 0–0.01)
NRBC BLD-RTO: 0 PER 100 WBC
PLATELET # BLD AUTO: 147 K/UL (ref 150–400)
PMV BLD AUTO: 10.2 FL (ref 8.9–12.9)
POTASSIUM SERPL-SCNC: 3.9 MMOL/L (ref 3.5–5.1)
PROT SERPL-MCNC: 5.4 G/DL (ref 6.4–8.2)
RBC # BLD AUTO: 2.47 M/UL (ref 3.8–5.2)
SERVICE CMNT-IMP: ABNORMAL
SERVICE CMNT-IMP: NORMAL
SODIUM SERPL-SCNC: 138 MMOL/L (ref 136–145)
WBC # BLD AUTO: 6.7 K/UL (ref 3.6–11)

## 2021-02-13 PROCEDURE — 74011000250 HC RX REV CODE- 250: Performed by: HOSPITALIST

## 2021-02-13 PROCEDURE — 74011250636 HC RX REV CODE- 250/636: Performed by: HOSPITALIST

## 2021-02-13 PROCEDURE — 65270000029 HC RM PRIVATE

## 2021-02-13 PROCEDURE — C9113 INJ PANTOPRAZOLE SODIUM, VIA: HCPCS | Performed by: HOSPITALIST

## 2021-02-13 PROCEDURE — 74011250636 HC RX REV CODE- 250/636: Performed by: INTERNAL MEDICINE

## 2021-02-13 PROCEDURE — 36415 COLL VENOUS BLD VENIPUNCTURE: CPT

## 2021-02-13 PROCEDURE — 74011250636 HC RX REV CODE- 250/636: Performed by: FAMILY MEDICINE

## 2021-02-13 PROCEDURE — 74011250637 HC RX REV CODE- 250/637: Performed by: INTERNAL MEDICINE

## 2021-02-13 PROCEDURE — 85025 COMPLETE CBC W/AUTO DIFF WBC: CPT

## 2021-02-13 PROCEDURE — 80053 COMPREHEN METABOLIC PANEL: CPT

## 2021-02-13 PROCEDURE — 74011250637 HC RX REV CODE- 250/637: Performed by: FAMILY MEDICINE

## 2021-02-13 PROCEDURE — 82962 GLUCOSE BLOOD TEST: CPT

## 2021-02-13 PROCEDURE — 94760 N-INVAS EAR/PLS OXIMETRY 1: CPT

## 2021-02-13 RX ADMIN — TRAMADOL HYDROCHLORIDE 50 MG: 50 TABLET, FILM COATED ORAL at 20:25

## 2021-02-13 RX ADMIN — METRONIDAZOLE 500 MG: 500 INJECTION, SOLUTION INTRAVENOUS at 07:05

## 2021-02-13 RX ADMIN — Medication 10 ML: at 11:22

## 2021-02-13 RX ADMIN — PANTOPRAZOLE SODIUM 40 MG: 40 INJECTION, POWDER, FOR SOLUTION INTRAVENOUS at 20:26

## 2021-02-13 RX ADMIN — Medication 10 ML: at 20:26

## 2021-02-13 RX ADMIN — CHOLESTYRAMINE 4 G: 4 POWDER, FOR SUSPENSION ORAL at 11:20

## 2021-02-13 RX ADMIN — CHOLESTYRAMINE 4 G: 4 POWDER, FOR SUSPENSION ORAL at 08:01

## 2021-02-13 RX ADMIN — CHOLESTYRAMINE 4 G: 4 POWDER, FOR SUSPENSION ORAL at 16:59

## 2021-02-13 RX ADMIN — Medication 10 ML: at 11:21

## 2021-02-13 RX ADMIN — TRAMADOL HYDROCHLORIDE 50 MG: 50 TABLET, FILM COATED ORAL at 08:01

## 2021-02-13 RX ADMIN — SODIUM CHLORIDE 75 ML/HR: 9 INJECTION, SOLUTION INTRAVENOUS at 06:15

## 2021-02-13 RX ADMIN — Medication 1 CAPSULE: at 08:01

## 2021-02-13 RX ADMIN — SODIUM CHLORIDE 75 ML/HR: 9 INJECTION, SOLUTION INTRAVENOUS at 20:30

## 2021-02-13 RX ADMIN — PANTOPRAZOLE SODIUM 40 MG: 40 INJECTION, POWDER, FOR SOLUTION INTRAVENOUS at 08:01

## 2021-02-13 RX ADMIN — Medication 10 ML: at 06:15

## 2021-02-13 NOTE — PROGRESS NOTES
[address]  [address] [facility], [address]  [phone]                GI PROGRESS NOTE      NAME:   Jose Ascencio   :    1955   MRN:    [id number]     Subjective:     Pt denies any issues. Tolerating PO. No pain. Did pass some diarrhea with streaks of blood this AM.     Objective:     VITALS:   Last 24hrs VS reviewed since prior hospitalist progress note. Most recent are:  Visit Vitals  BP (!) 95/52 (BP 1 Location: Left upper arm, BP Patient Position: Sitting)   Pulse 62   Temp 98.1 °F (36.7 °C)   Resp 16   Ht 5' 6\" (1.676 m)   Wt 111.9 kg (246 lb 11.1 oz)   SpO2 99%   BMI 39.82 kg/m²     No intake or output data in the 24 hours ending 21 1235     PHYSICAL EXAM:  General   NAD  EENT  Normocephalic, Atraumatic, PERRLA, EOMI, sclera clear  Neck   Supple, No thyromegaly or bruit  Respiratory   Clear To Auscultation bilaterally - no wheezes, rales, rhonchi, or crackles  Cardiology  Regular Rate and Rythmn - no murmurs, rubs or gallops  Abdominal  Soft, non-tender, non-distended     Lab Data Reviewed     Medications: Reviewed    Assessment/Plan     Jose Ascencio is a 60 y.o.  female who was admitted after GLF/being down for > 36 hours. She had rhabdo and PRABHU with recto-sigmoid colitis and bloody diarrhea 2/2 ischemia. Repeat CT yesterday:   1. Persistent but improved CT appearance of the rectosigmoid colitis. 2. Mild generalized vascular disease without SMA or BOSTON occlusion. 3. New small pleural effusions right greater than left. 4. Otherwise stable  incidental and postoperative changes    She is currently stable.      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

## 2021-02-13 NOTE — PROGRESS NOTES
[facility]ist [facility]                                                                                          Hospitalist Progress Note  Mic Vasquez MD  Answering service: [phone] from in house phone        Date of Service:  2021  NAME:  Brendan Pickett  :  1955  MRN:  [id number]    This documentation was facilitated by a Voice Recognition software and may contain inadvertent typographical errors. Admission Summary:   From h&P  \"This is a 61-year-old woman with a past medical history significant for chronic kidney disease, dyslipidemia, type 2 diabetes, hypertension, peripheral artery disease, was in her usual state of health until the day of her presentation at the emergency room when the patient stated that she fell at home. The patient complained of generalized weakness, which is progressive. Also complained of diarrhea. According to the patient, she woke up from sleep, was trying to go to the bathroom, when all of a sudden, the patient felt dizzy and passed out. She has no recollection of the event. \"    Interval history / Subjective:        Patient sitting in chair by the bed. She did swallowing with some blood   Otherwise tolerating diet, no nausea or abdominal pain  Assessment & Plan:   Severe acute on chronic anemia, suspect GIB  -Hold aspirin and sc heparin. IV PPI  -Hemoglobin up to 7.7 from 5.6  after a unit of blood. -EGD showed patient with ulceration of the mid esophagus, grade nasopharyngitis and a small sliding hiatal hernia, few small areas of erythema in the stomach, diffuse clean-based ulcerations throughout the duodenal bulb without active bleeding. Biopsies pending  -Colonoscopy showed diffuse ischemic changes with submucosal edema and circumferential coalescing ulcerations involving all of the rectum extending to the dentate line. Ulceration extends approximately 50 cm from anal verge.   The sigmoid, descending and transverse colon are normal-appearing mucosa. Syncope likely orthostatic from volume contraction due to diarrhea  -MRI shows no acute hemorrhage or infarction. 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  -Echo with normal LVEF,no significant valve disease. Fall  -As a result of her syncope  -Negative work-up for acute fracture or injury    Proctocolitis  -Evident on CT abdomen and pelvis  -Stool studies negative  -Continue questran and immodium prn for loose stool  --EGD showed patient with ulceration of the mid esophagus, grade nasopharyngitis and a small sliding hiatal hernia, few small areas of erythema in the stomach, diffuse clean-based ulcerations throughout the duodenal bulb without active bleeding. Biopsies pending  -Colonoscopy showed diffuse ischemic changes with submucosal edema and circumferential coalescing ulcerations involving all of the rectum extending to the dentate line. Ulceration extends approximately 50 cm from anal verge. The sigmoid, descending and transverse colon are normal-appearing mucosa. -Colorectal surgery consulted, recommended conservative approach for now. -Repeat abdominal pelvic CT showed improvement in the rectosigmoid colon. Acute kidney injury on CKD III/IV  -In the setting of rhabdo and volume depletion from diarrhea  -Nephrology evaluating the patient  -Continue IV fluid and monitor renal function, overall improving  -Renal indices improving.     Rhabdomyolysis  -As a result of fall  -Continue IV fluid  -Ordered Labs for am    Metabolic acidosis  -Due to alkaline loss from GI tract as well as acute kidney injury  -Now resolved  -Nephrology evaluating patient    Thrombocytopenia~mild without bleeding complications.  -Monitor platelet     Abnormal LFTs  -Also likely related to infectious process,rhabdomyolysis  -CT abdomen and pelvis without any acute liver or gallbladder pathology      Hypertension  -Hold antihypertensives for now as patient initially hypotensive    T2Dm on metformin preadmission  -Continue insulin sliding scale coverage    Dyslipidemia  -Holding statin due to rhabdomyolysis    Chronic leg edema. On Bumex which is held due to PRABHU,dehydration    Code status:full code  DVT prophylaxis: scd,d/c heparin due to GIB  Care Plan discussed with: Patient/Family and Nurse  Anticipated Disposition: Home w/Family  Anticipated Discharge: 24 hours to 48 hours  Hospital Problems  Date Reviewed: 2/11/2021          Codes Class Noted POA    Acute blood loss anemia ICD-10-CM: D62  ICD-9-CM: 285.1  2/11/2021 Yes        * (Principal) Syncope ICD-10-CM: R55  ICD-9-CM: 780.2  2/4/2021 Yes                Review of Systems:   A comprehensive review of systems was negative except for that written in the HPI. Vital Signs:       Physical Examination:     I had a face to face encounter with this patient and independently examined them on2/13/2021 as outlined below:        Constitutional:  No acute distress, cooperative, pleasant    HEENT:  Atraumatic. Oral mucosa moist,. Non icteric sclera. No pallor. Resp:  CTA bilaterally. No wheezing/rhonchi/rales. No accessory muscle use   Chest Wall: No deformity   CV:  Regular rhythm, normal rate, no murmurs, gallops, rubs    GI:  Soft, non distended, non tender. Bruises from sc injection. normoactive bowel sounds, no hepatosplenomegaly    :  No CVA or suprapubic tenderness    Musculoskeletal: ++ leg edema.     Neurologic:  Mental status:AAOx3,   Cranial nerves II-XII : WNL  Motor exam:Moves all extremities symmetrically              Data Review:    Review and/or order of clinical lab test  Review and/or order of tests in the radiology section of CPT  Review and/or order of tests in the medicine section of CPT      Labs:     Recent Labs     02/13/21  0224 02/12/21  0205   WBC 6.7 6.7   HGB 7.7* 7.4*   HCT 23.7* 23.3*   * 122* Recent Labs     02/13/21 0224 02/12/21 0205 02/11/21 0817    137 139   K 3.9 4.3 4.1   * 110* 106   CO2 23 20* 24   BUN 26* 38* 46*   CREA 1.38* 1.52* 1.80*   GLU 77 73 81   CA 8.6 8.2* 8.8   PHOS  --   --  3.6     Recent Labs     02/13/21 0224 02/12/21 0205 02/11/21 0817   ALT 29 29  --    AP 44* 41*  --    TBILI 0.4 0.5  --    TP 5.4* 5.3*  --    ALB 2.9* 2.9* 3.5   GLOB 2.5 2.4  --      No results for input(s): INR, PTP, APTT, INREXT, INREXT in the last 72 hours. No results for input(s): FE, TIBC, PSAT, FERR in the last 72 hours. Lab Results   Component Value Date/Time    Folate 19.8 10/02/2016 02:07 AM      No results for input(s): PH, PCO2, PO2 in the last 72 hours.   Recent Labs     02/12/21 0205        Lab Results   Component Value Date/Time    Cholesterol, total 165 09/29/2016 10:02 AM    HDL Cholesterol 34 09/29/2016 10:02 AM    LDL, calculated 91.2 09/29/2016 10:02 AM    Triglyceride 199 (H) 09/29/2016 10:02 AM    CHOL/HDL Ratio 4.9 09/29/2016 10:02 AM     Lab Results   Component Value Date/Time    Glucose (POC) 95 02/13/2021 11:11 AM    Glucose (POC) 76 02/13/2021 07:13 AM    Glucose (POC) 97 02/12/2021 09:19 PM    Glucose (POC) 83 02/12/2021 04:16 PM    Glucose (POC) 83 02/12/2021 11:18 AM     Lab Results   Component Value Date/Time    Color DARK YELLOW 02/04/2021 02:16 AM    Appearance CLOUDY (A) 02/04/2021 02:16 AM    Specific gravity 1.021 02/04/2021 02:16 AM    pH (UA) 5.0 02/04/2021 02:16 AM    Protein 30 (A) 02/04/2021 02:16 AM    Glucose Negative 02/04/2021 02:16 AM    Ketone Negative 02/04/2021 02:16 AM    Bilirubin NEGATIVE  09/29/2016 10:02 AM    Urobilinogen 0.2 02/04/2021 02:16 AM    Nitrites Negative 02/04/2021 02:16 AM    Leukocyte Esterase Negative 02/04/2021 02:16 AM    Epithelial cells FEW 02/04/2021 02:16 AM    Bacteria Negative 02/04/2021 02:16 AM    WBC 0-4 02/04/2021 02:16 AM    RBC 0-5 02/04/2021 02:16 AM         Medications Reviewed:     Current Facility-Administered Medications   Medication Dose Route Frequency    0.9% sodium chloride infusion 250 mL  250 mL IntraVENous PRN    0.9% sodium chloride infusion  75 mL/hr IntraVENous CONTINUOUS    pantoprazole (PROTONIX) 40 mg in 0.9% sodium chloride 10 mL injection  40 mg IntraVENous Q12H    sodium chloride (NS) flush 5-40 mL  5-40 mL IntraVENous PRN    traMADoL (ULTRAM) tablet 50 mg  50 mg Oral Q6H PRN    loperamide (IMODIUM) capsule 4 mg  4 mg Oral Q4H PRN    cholestyramine-aspartame (QUESTRAN LIGHT) packet 4 g  4 g Oral TID WITH MEALS    [Held by provider] aspirin chewable tablet 81 mg  81 mg Oral DAILY    sodium chloride (NS) flush 5-40 mL  5-40 mL IntraVENous Q8H    sodium chloride (NS) flush 5-40 mL  5-40 mL IntraVENous PRN    acetaminophen (TYLENOL) tablet 650 mg  650 mg Oral Q6H PRN    Or    acetaminophen (TYLENOL) suppository 650 mg  650 mg Rectal Q6H PRN    promethazine (PHENERGAN) tablet 12.5 mg  12.5 mg Oral Q6H PRN    Or    ondansetron (ZOFRAN) injection 4 mg  4 mg IntraVENous Q6H PRN    [Held by provider] heparin (porcine) injection 5,000 Units  5,000 Units SubCUTAneous Q8H    L.acidophilus-paracasei-S.thermophil-bifidobacter (RISAQUAD) 8 billion cell capsule  1 Cap Oral DAILY    insulin lispro (HUMALOG) injection   SubCUTAneous AC&HS    glucose chewable tablet 16 g  4 Tab Oral PRN    dextrose (D50W) injection syrg 12.5-25 g  12.5-25 g IntraVENous PRN    glucagon (GLUCAGEN) injection 1 mg  1 mg IntraMUSCular PRN     ______________________________________________________________________  EXPECTED LENGTH OF STAY: 2d 16h  ACTUAL LENGTH OF STAY:          9                 Ranjan Fierro MD

## 2021-02-14 LAB
GLUCOSE BLD STRIP.AUTO-MCNC: 113 MG/DL (ref 65–100)
GLUCOSE BLD STRIP.AUTO-MCNC: 137 MG/DL (ref 65–100)
GLUCOSE BLD STRIP.AUTO-MCNC: 84 MG/DL (ref 65–100)
GLUCOSE BLD STRIP.AUTO-MCNC: 97 MG/DL (ref 65–100)
SERVICE CMNT-IMP: ABNORMAL
SERVICE CMNT-IMP: ABNORMAL
SERVICE CMNT-IMP: NORMAL
SERVICE CMNT-IMP: NORMAL

## 2021-02-14 PROCEDURE — 74011000250 HC RX REV CODE- 250: Performed by: HOSPITALIST

## 2021-02-14 PROCEDURE — 65270000029 HC RM PRIVATE

## 2021-02-14 PROCEDURE — 74011250637 HC RX REV CODE- 250/637: Performed by: FAMILY MEDICINE

## 2021-02-14 PROCEDURE — 74011250637 HC RX REV CODE- 250/637: Performed by: INTERNAL MEDICINE

## 2021-02-14 PROCEDURE — C9113 INJ PANTOPRAZOLE SODIUM, VIA: HCPCS | Performed by: HOSPITALIST

## 2021-02-14 PROCEDURE — 82962 GLUCOSE BLOOD TEST: CPT

## 2021-02-14 PROCEDURE — 74011250636 HC RX REV CODE- 250/636: Performed by: HOSPITALIST

## 2021-02-14 PROCEDURE — 74011250636 HC RX REV CODE- 250/636: Performed by: INTERNAL MEDICINE

## 2021-02-14 PROCEDURE — 74011250637 HC RX REV CODE- 250/637: Performed by: HOSPITALIST

## 2021-02-14 RX ORDER — BUMETANIDE 1 MG/1
2 TABLET ORAL 2 TIMES DAILY
Status: DISCONTINUED | OUTPATIENT
Start: 2021-02-14 | End: 2021-02-17

## 2021-02-14 RX ADMIN — BUMETANIDE 2 MG: 1 TABLET ORAL at 16:06

## 2021-02-14 RX ADMIN — Medication 10 ML: at 21:29

## 2021-02-14 RX ADMIN — PANTOPRAZOLE SODIUM 40 MG: 40 INJECTION, POWDER, FOR SOLUTION INTRAVENOUS at 08:35

## 2021-02-14 RX ADMIN — SODIUM CHLORIDE 75 ML/HR: 9 INJECTION, SOLUTION INTRAVENOUS at 08:40

## 2021-02-14 RX ADMIN — TRAMADOL HYDROCHLORIDE 50 MG: 50 TABLET, FILM COATED ORAL at 06:33

## 2021-02-14 RX ADMIN — BUMETANIDE 2 MG: 1 TABLET ORAL at 11:54

## 2021-02-14 RX ADMIN — CHOLESTYRAMINE 4 G: 4 POWDER, FOR SUSPENSION ORAL at 08:36

## 2021-02-14 RX ADMIN — TRAMADOL HYDROCHLORIDE 50 MG: 50 TABLET, FILM COATED ORAL at 21:37

## 2021-02-14 RX ADMIN — Medication 1 CAPSULE: at 08:36

## 2021-02-14 RX ADMIN — TRAMADOL HYDROCHLORIDE 50 MG: 50 TABLET, FILM COATED ORAL at 16:06

## 2021-02-14 RX ADMIN — ACETAMINOPHEN 650 MG: 325 TABLET ORAL at 08:38

## 2021-02-14 RX ADMIN — CHOLESTYRAMINE 4 G: 4 POWDER, FOR SUSPENSION ORAL at 16:07

## 2021-02-14 RX ADMIN — Medication 10 ML: at 06:33

## 2021-02-14 RX ADMIN — PANTOPRAZOLE SODIUM 40 MG: 40 INJECTION, POWDER, FOR SOLUTION INTRAVENOUS at 21:29

## 2021-02-14 RX ADMIN — CHOLESTYRAMINE 4 G: 4 POWDER, FOR SUSPENSION ORAL at 11:54

## 2021-02-14 NOTE — PROGRESS NOTES
[address] [facility]                                                                                          Hospitalist Progress Note  Beny Bravo MD  Answering service: [phone] from in house phone        Date of Service:  2021  NAME:  Hemal Rebolledo  :  1955  MRN:  [id number]    This documentation was facilitated by a Voice Recognition software and may contain inadvertent typographical errors. Admission Summary:   From h&P  \"This is a 61-year-old woman with a past medical history significant for chronic kidney disease, dyslipidemia, type 2 diabetes, hypertension, peripheral artery disease, was in her usual state of health until the day of her presentation at the emergency room when the patient stated that she fell at home. The patient complained of generalized weakness, which is progressive. Also complained of diarrhea. According to the patient, she woke up from sleep, was trying to go to the bathroom, when all of a sudden, the patient felt dizzy and passed out. She has no recollection of the event. \"    Interval history / Subjective:        Patient sitting in chair, no complaints. She says her right lower sided of abdomen feels asymmetrical   Leg edema increasing,her diuretic was on hold and she was getting iv fluids. Assessment & Plan:   Severe acute on chronic anemia, suspect GIB  -Hold aspirin and sc heparin. IV PPI  -Hemoglobin up to 7.7 from 5.6  after a unit of blood. -EGD showed patient with ulceration of the mid esophagus, grade nasopharyngitis and a small sliding hiatal hernia, few small areas of erythema in the stomach, diffuse clean-based ulcerations throughout the duodenal bulb without active bleeding. Biopsies pending  -Colonoscopy showed diffuse ischemic changes with submucosal edema and circumferential coalescing ulcerations involving all of the rectum extending to the dentate line.   Ulceration extends approximately 50 cm from anal verge. The sigmoid, descending and transverse colon are normal-appearing mucosa. Syncope likely orthostatic from volume contraction due to diarrhea  -MRI shows no acute hemorrhage or infarction. 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  -Echo with normal LVEF,no significant valve disease. Fall  -As a result of her syncope  -Negative work-up for acute fracture or injury    Proctocolitis  -Evident on CT abdomen and pelvis  -Stool studies negative  -Continue questran and immodium prn for loose stool  --EGD showed patient with ulceration of the mid esophagus, grade nasopharyngitis and a small sliding hiatal hernia, few small areas of erythema in the stomach, diffuse clean-based ulcerations throughout the duodenal bulb without active bleeding. Biopsies pending  -Colonoscopy showed diffuse ischemic changes with submucosal edema and circumferential coalescing ulcerations involving all of the rectum extending to the dentate line. Ulceration extends approximately 50 cm from anal verge. The sigmoid, descending and transverse colon are normal-appearing mucosa. -Colorectal surgery consulted, recommended conservative approach for now. -Repeat abdominal pelvic CT showed improvement in the rectosigmoid colon. Acute kidney injury on CKD III/IV  -In the setting of rhabdo and volume depletion from diarrhea  -Nephrology evaluating the patient  -creatinine at baseline. D/c iv fluids. Chronic leg edema with increasing leg and abdominal edema. No sob or hypoxia  -D/c iv fluids  -Resume PTA [facility] today,2/14      Rhabdomyolysis  -As a result of fall  -Continue IV fluid  -Ordered Labs for am    Metabolic acidosis  -Due to alkaline loss from GI tract as well as acute kidney injury  -Now resolved  -Nephrology evaluating patient    Thrombocytopenia~mild without bleeding complications.  -Monitor platelet Abnormal LFTs  -Also likely related to infectious process,rhabdomyolysis  -CT abdomen and pelvis without any acute liver or gallbladder pathology      Hypertension  -Hold antihypertensives for now as patient initially hypotensive    T2Dm on metformin preadmission  -Continue insulin sliding scale coverage    Dyslipidemia  -Holding statin due to rhabdomyolysis      Code status:full code  DVT prophylaxis: scd,d/c heparin due to [address] discussed with: Patient/Family and Nurse  Anticipated Disposition: Home w/Family  Anticipated Discharge: 24 hours to 48 hours  Hospital Problems  Date Reviewed: 2/11/2021          Codes Class Noted POA    Acute blood loss anemia ICD-10-CM: D62  ICD-9-CM: 285.1  2/11/2021 Yes        * (Principal) Syncope ICD-10-CM: R55  ICD-9-CM: 780.2  2/4/2021 Yes                Review of Systems:   A comprehensive review of systems was negative except for that written in the HPI. Vital Signs:       Physical Examination:     I had a face to face encounter with this patient and independently examined them on2/14/2021 as outlined below:        Constitutional:  No acute distress, cooperative, pleasant    HEENT:  Atraumatic. Oral mucosa moist,. Non icteric sclera. No pallor. Resp:  CTA bilaterally. No wheezing/rhonchi/rales. No accessory muscle use   Chest Wall: No deformity   CV:  Regular rhythm, normal rate, no murmurs, gallops, rubs    GI:  Soft, non distended, non tender. Bruises from sc injection. normoactive bowel sounds, no hepatosplenomegaly,+abd edema   :  No CVA or suprapubic tenderness    Musculoskeletal: ++ leg edema.     Neurologic:  Mental status:AAOx3,   Cranial nerves II-XII : WNL  Motor exam:Moves all extremities symmetrically              Data Review:    Review and/or order of clinical lab test  Review and/or order of tests in the radiology section of CPT  Review and/or order of tests in the medicine section of CPT      Labs:     Recent Labs     02/13/21  0224 02/12/21  0204 WBC 6.7 6.7   HGB 7.7* 7.4*   HCT 23.7* 23.3*   * 122*     Recent Labs     02/13/21 0224 02/12/21  0205    137   K 3.9 4.3   * 110*   CO2 23 20*   BUN 26* 38*   CREA 1.38* 1.52*   GLU 77 73   CA 8.6 8.2*     Recent Labs     02/13/21 0224 02/12/21  0205   ALT 29 29   AP 44* 41*   TBILI 0.4 0.5   TP 5.4* 5.3*   ALB 2.9* 2.9*   GLOB 2.5 2.4     No results for input(s): INR, PTP, APTT, INREXT, INREXT in the last 72 hours. No results for input(s): FE, TIBC, PSAT, FERR in the last 72 hours. Lab Results   Component Value Date/Time    Folate 19.8 10/02/2016 02:07 AM      No results for input(s): PH, PCO2, PO2 in the last 72 hours.   Recent Labs     02/12/21  0205        Lab Results   Component Value Date/Time    Cholesterol, total 165 09/29/2016 10:02 AM    HDL Cholesterol 34 09/29/2016 10:02 AM    LDL, calculated 91.2 09/29/2016 10:02 AM    Triglyceride 199 (H) 09/29/2016 10:02 AM    CHOL/HDL Ratio 4.9 09/29/2016 10:02 AM     Lab Results   Component Value Date/Time    Glucose (POC) 137 (H) 02/14/2021 11:12 AM    Glucose (POC) 97 02/14/2021 06:39 AM    Glucose (POC) 117 (H) 02/13/2021 09:15 PM    Glucose (POC) 83 02/13/2021 04:44 PM    Glucose (POC) 95 02/13/2021 11:11 AM     Lab Results   Component Value Date/Time    Color DARK YELLOW 02/04/2021 02:16 AM    Appearance CLOUDY (A) 02/04/2021 02:16 AM    Specific gravity 1.021 02/04/2021 02:16 AM    pH (UA) 5.0 02/04/2021 02:16 AM    Protein 30 (A) 02/04/2021 02:16 AM    Glucose Negative 02/04/2021 02:16 AM    Ketone Negative 02/04/2021 02:16 AM    Bilirubin NEGATIVE  09/29/2016 10:02 AM    Urobilinogen 0.2 02/04/2021 02:16 AM    Nitrites Negative 02/04/2021 02:16 AM    Leukocyte Esterase Negative 02/04/2021 02:16 AM    Epithelial cells FEW 02/04/2021 02:16 AM    Bacteria Negative 02/04/2021 02:16 AM    WBC 0-4 02/04/2021 02:16 AM    RBC 0-5 02/04/2021 02:16 AM         Medications Reviewed:     Current Facility-Administered Medications Medication Dose Route Frequency    bumetanide (BUMEX) tablet 2 mg  2 mg Oral BID    0.9% sodium chloride infusion 250 mL  250 mL IntraVENous PRN    pantoprazole (PROTONIX) 40 mg in 0.9% sodium chloride 10 mL injection  40 mg IntraVENous Q12H    sodium chloride (NS) flush 5-40 mL  5-40 mL IntraVENous PRN    traMADoL (ULTRAM) tablet 50 mg  50 mg Oral Q6H PRN    loperamide (IMODIUM) capsule 4 mg  4 mg Oral Q4H PRN    cholestyramine-aspartame (QUESTRAN LIGHT) packet 4 g  4 g Oral TID WITH MEALS    [Held by provider] aspirin chewable tablet 81 mg  81 mg Oral DAILY    sodium chloride (NS) flush 5-40 mL  5-40 mL IntraVENous Q8H    sodium chloride (NS) flush 5-40 mL  5-40 mL IntraVENous PRN    acetaminophen (TYLENOL) tablet 650 mg  650 mg Oral Q6H PRN    Or    acetaminophen (TYLENOL) suppository 650 mg  650 mg Rectal Q6H PRN    promethazine (PHENERGAN) tablet 12.5 mg  12.5 mg Oral Q6H PRN    Or    ondansetron (ZOFRAN) injection 4 mg  4 mg IntraVENous Q6H PRN    [Held by provider] heparin (porcine) injection 5,000 Units  5,000 Units SubCUTAneous Q8H    L.acidophilus-paracasei-S.thermophil-bifidobacter (RISAQUAD) 8 billion cell capsule  1 Cap Oral DAILY    insulin lispro (HUMALOG) injection   SubCUTAneous AC&HS    glucose chewable tablet 16 g  4 Tab Oral PRN    dextrose (D50W) injection syrg 12.5-25 g  12.5-25 g IntraVENous PRN    glucagon (GLUCAGEN) injection 1 mg  1 mg IntraMUSCular PRN     ______________________________________________________________________  EXPECTED LENGTH OF STAY: 2d 16h  ACTUAL LENGTH OF STAY:          10                 Dagoberto Ross MD

## 2021-02-14 NOTE — PROGRESS NOTES
Physician Progress Note      PATIENTJuwan Hopper  CSN #:                  [id number]  :                       1955  ADMIT DATE:       2/3/2021 11:49 PM  [address] [place] DATE:  RESPONDING  PROVIDER #:        Ashkan Gifford MD          QUERY TEXT:    Pt admitted with syncope and has anemia, acute on chronic documented. If possible, please document in progress notes and discharge summary further specificity regarding the acuity and type of anemia:    The medical record reflects the following:  Risk Factors: 66yo admitted for syncope  Clinical Indicators:  - HGB: 5.6-10  - H&P: Anemia  -  Hospitalist: Anemia -Unclear chronicity  -  Hospitalist: Severe acute on chronic anemia, suspect GIB  -  GI: Anemia, acute on chronic  - Hgb dropped overnight 5.6 <-- 7.5  - Colonoscopy findings: Diverticulosis large intestine w/o perforation or abscess w/o bleeding  Treatment: GI following with EGD and Colonoscopy, Blood transfusion, daily H/H, Hold aspirin and sc heparin. IV PPI    Thank you,  Amy Vazquez  [phone]  Options provided:  -- Anemia due to acute on chronic blood loss  -- Anemia due to chronic disease  -- Other - I will add my own diagnosis  -- Disagree - Not applicable / Not valid  -- Disagree - Clinically unable to determine / Unknown  -- Refer to Clinical Documentation Reviewer    PROVIDER RESPONSE TEXT:    This patient has acute on chronic blood loss anemia.     Query created by: Lorena Stanford on 2021 3:43 PM      Electronically signed by:  Ashkan Gifford MD 2021 10:34 PM

## 2021-02-15 LAB
ABO + RH BLD: NORMAL
ALBUMIN SERPL-MCNC: 2.1 G/DL (ref 3.5–5)
ALBUMIN/GLOB SERPL: 0.7 {RATIO} (ref 1.1–2.2)
ALP SERPL-CCNC: 52 U/L (ref 45–117)
ALT SERPL-CCNC: 26 U/L (ref 12–78)
ANION GAP SERPL CALC-SCNC: 6 MMOL/L (ref 5–15)
AST SERPL-CCNC: 31 U/L (ref 15–37)
BASOPHILS # BLD: 0 K/UL (ref 0–0.1)
BASOPHILS NFR BLD: 1 % (ref 0–1)
BILIRUB SERPL-MCNC: 0.3 MG/DL (ref 0.2–1)
BLD PROD TYP BPU: NORMAL
BLD PROD TYP BPU: NORMAL
BLOOD GROUP ANTIBODIES SERPL: NORMAL
BPU ID: NORMAL
BPU ID: NORMAL
BUN SERPL-MCNC: 22 MG/DL (ref 6–20)
BUN/CREAT SERPL: 14 (ref 12–20)
CALCIUM SERPL-MCNC: 7.9 MG/DL (ref 8.5–10.1)
CHLORIDE SERPL-SCNC: 114 MMOL/L (ref 97–108)
CO2 SERPL-SCNC: 16 MMOL/L (ref 21–32)
CREAT SERPL-MCNC: 1.53 MG/DL (ref 0.55–1.02)
CROSSMATCH RESULT,%XM: NORMAL
CROSSMATCH RESULT,%XM: NORMAL
DIFFERENTIAL METHOD BLD: ABNORMAL
EOSINOPHIL # BLD: 0.2 K/UL (ref 0–0.4)
EOSINOPHIL NFR BLD: 3 % (ref 0–7)
ERYTHROCYTE [DISTWIDTH] IN BLOOD BY AUTOMATED COUNT: 16.7 % (ref 11.5–14.5)
GLOBULIN SER CALC-MCNC: 3.2 G/DL (ref 2–4)
GLUCOSE BLD STRIP.AUTO-MCNC: 106 MG/DL (ref 65–100)
GLUCOSE BLD STRIP.AUTO-MCNC: 88 MG/DL (ref 65–100)
GLUCOSE BLD STRIP.AUTO-MCNC: 95 MG/DL (ref 65–100)
GLUCOSE BLD STRIP.AUTO-MCNC: 98 MG/DL (ref 65–100)
GLUCOSE SERPL-MCNC: 80 MG/DL (ref 65–100)
HCT VFR BLD AUTO: 27.8 % (ref 35–47)
HGB BLD-MCNC: 8.1 G/DL (ref 11.5–16)
IMM GRANULOCYTES # BLD AUTO: 0.1 K/UL (ref 0–0.04)
IMM GRANULOCYTES NFR BLD AUTO: 1 % (ref 0–0.5)
LYMPHOCYTES # BLD: 1.4 K/UL (ref 0.8–3.5)
LYMPHOCYTES NFR BLD: 19 % (ref 12–49)
MCH RBC QN AUTO: 30.8 PG (ref 26–34)
MCHC RBC AUTO-ENTMCNC: 29.1 G/DL (ref 30–36.5)
MCV RBC AUTO: 105.7 FL (ref 80–99)
MONOCYTES # BLD: 0.7 K/UL (ref 0–1)
MONOCYTES NFR BLD: 10 % (ref 5–13)
NEUTS SEG # BLD: 5.1 K/UL (ref 1.8–8)
NEUTS SEG NFR BLD: 68 % (ref 32–75)
NRBC # BLD: 0 K/UL (ref 0–0.01)
NRBC BLD-RTO: 0 PER 100 WBC
PLATELET # BLD AUTO: 144 K/UL (ref 150–400)
PMV BLD AUTO: 10 FL (ref 8.9–12.9)
POTASSIUM SERPL-SCNC: 4.2 MMOL/L (ref 3.5–5.1)
PROT SERPL-MCNC: 5.3 G/DL (ref 6.4–8.2)
RBC # BLD AUTO: 2.63 M/UL (ref 3.8–5.2)
SERVICE CMNT-IMP: ABNORMAL
SERVICE CMNT-IMP: NORMAL
SODIUM SERPL-SCNC: 136 MMOL/L (ref 136–145)
SPECIMEN EXP DATE BLD: NORMAL
STATUS OF UNIT,%ST: NORMAL
STATUS OF UNIT,%ST: NORMAL
UNIT DIVISION, %UDIV: 0
UNIT DIVISION, %UDIV: 0
WBC # BLD AUTO: 7.6 K/UL (ref 3.6–11)

## 2021-02-15 PROCEDURE — 97116 GAIT TRAINING THERAPY: CPT

## 2021-02-15 PROCEDURE — 94760 N-INVAS EAR/PLS OXIMETRY 1: CPT

## 2021-02-15 PROCEDURE — 74011250637 HC RX REV CODE- 250/637: Performed by: FAMILY MEDICINE

## 2021-02-15 PROCEDURE — 74011000250 HC RX REV CODE- 250: Performed by: HOSPITALIST

## 2021-02-15 PROCEDURE — 97530 THERAPEUTIC ACTIVITIES: CPT

## 2021-02-15 PROCEDURE — 74011250636 HC RX REV CODE- 250/636: Performed by: HOSPITALIST

## 2021-02-15 PROCEDURE — 36415 COLL VENOUS BLD VENIPUNCTURE: CPT

## 2021-02-15 PROCEDURE — 85025 COMPLETE CBC W/AUTO DIFF WBC: CPT

## 2021-02-15 PROCEDURE — 80053 COMPREHEN METABOLIC PANEL: CPT

## 2021-02-15 PROCEDURE — C9113 INJ PANTOPRAZOLE SODIUM, VIA: HCPCS | Performed by: HOSPITALIST

## 2021-02-15 PROCEDURE — 74011250637 HC RX REV CODE- 250/637: Performed by: INTERNAL MEDICINE

## 2021-02-15 PROCEDURE — 74011250637 HC RX REV CODE- 250/637: Performed by: HOSPITALIST

## 2021-02-15 PROCEDURE — 65270000029 HC RM PRIVATE

## 2021-02-15 PROCEDURE — 2709999900 HC NON-CHARGEABLE SUPPLY

## 2021-02-15 PROCEDURE — 82962 GLUCOSE BLOOD TEST: CPT

## 2021-02-15 RX ADMIN — TRAMADOL HYDROCHLORIDE 50 MG: 50 TABLET, FILM COATED ORAL at 10:51

## 2021-02-15 RX ADMIN — BUMETANIDE 2 MG: 1 TABLET ORAL at 17:21

## 2021-02-15 RX ADMIN — Medication 10 ML: at 21:27

## 2021-02-15 RX ADMIN — CHOLESTYRAMINE 4 G: 4 POWDER, FOR SUSPENSION ORAL at 09:31

## 2021-02-15 RX ADMIN — TRAMADOL HYDROCHLORIDE 50 MG: 50 TABLET, FILM COATED ORAL at 17:21

## 2021-02-15 RX ADMIN — PANTOPRAZOLE SODIUM 40 MG: 40 INJECTION, POWDER, FOR SOLUTION INTRAVENOUS at 09:10

## 2021-02-15 RX ADMIN — PANTOPRAZOLE SODIUM 40 MG: 40 INJECTION, POWDER, FOR SOLUTION INTRAVENOUS at 21:26

## 2021-02-15 RX ADMIN — BUMETANIDE 2 MG: 1 TABLET ORAL at 09:10

## 2021-02-15 RX ADMIN — Medication 20 ML: at 14:00

## 2021-02-15 RX ADMIN — Medication 1 CAPSULE: at 09:10

## 2021-02-15 RX ADMIN — CHOLESTYRAMINE 4 G: 4 POWDER, FOR SUSPENSION ORAL at 17:21

## 2021-02-15 RX ADMIN — TRAMADOL HYDROCHLORIDE 50 MG: 50 TABLET, FILM COATED ORAL at 03:41

## 2021-02-15 RX ADMIN — CHOLESTYRAMINE 4 G: 4 POWDER, FOR SUSPENSION ORAL at 12:20

## 2021-02-15 RX ADMIN — TRAMADOL HYDROCHLORIDE 50 MG: 50 TABLET, FILM COATED ORAL at 23:21

## 2021-02-15 NOTE — WOUND CARE
Wound Care Note: Follow-up visit for left buttock Chart shows: 
Admitted for syncope Past Medical History:  
Diagnosis Date  Cancer ([facility] melanoma back stage II 2005  CKD (chronic kidney disease), stage III 3/1/2016  CKD (chronic kidney disease), stage IV ([facility] 3/1/2016  Diabetes ([facility]  HTN (hypertension) 11/2/2015  Hypertension  LBP (low back pain) 11/2/2015  Menopause  Microalbuminuria 11/21/2015  Obesity 11/2/2015  PAD (peripheral artery disease) ([facility] 9/29/2016  Venous insufficiency 11/2/2015 WBC = 7.6 on 2/15/21 Admitted from home Assessment:  
Patient is A&O x 4, communicative, incontinent with some assistance needed in repositioning. Bed: [facility] Diet: GI Lite Patient reports some pain in feet/edema; no number given. Right buttocks and sacral skin intact and without erythema. 1. 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 Stated, they will never go back on if you take them off. 
 
2.  POA left lower buttock abrasion is improving, there is a small crusted area approximately 1.5 cm x 1.5 cm, no drainage, sepideh-wound intact. Z guard paste and Optifoam Gentle applied. Patient up in chair. Recommendations:   
Continue with current wound care. Left buttock and sepideh-anal skin- Every 12 hours and as needed apply Z guard paste. Skin Care & Pressure Prevention: 
Minimize layers of linen/pads under patient to optimize support surface. Turn/reposition approximately every 2 hours and offload heels. Manage incontinence / promote continence Nourishing Skin Cream to dry skin, minimize use of briefs when able Discussed above plan with patient & Marylin Low RN Transition of Care: Plan to follow as needed while admitted to hospital. 
 
Orvil Reason" DONTRELL River, RN, [facility], [facility]. 
office [phone] pager [phone] or call  to page

## 2021-02-15 NOTE — PROGRESS NOTES
[address] [facility]                                                                                          Hospitalist Progress Note  Lina Dyer MD  Answering service: [phone] from in house phone        Date of Service:  2/15/2021  NAME:  Garret Durbin  :  1955  MRN:  [id number]    This documentation was facilitated by a Voice Recognition software and may contain inadvertent typographical errors. Admission Summary:   From h&P  \"This is a 61-year-old woman with a past medical history significant for chronic kidney disease, dyslipidemia, type 2 diabetes, hypertension, peripheral artery disease, was in her usual state of health until the day of her presentation at the emergency room when the patient stated that she fell at home. The patient complained of generalized weakness, which is progressive. Also complained of diarrhea. According to the patient, she woke up from sleep, was trying to go to the bathroom, when all of a sudden, the patient felt dizzy and passed out. She has no recollection of the event. \"    Interval history / Subjective:        Patient continues to feel well without abdominal pain, nausea or vomiting. But she continues to fecal incontinence \"every time I get up\"  Discussed with GI, okay for discharge with plans for outpatient follow-up with biopsy results. Assessment & Plan:   Severe acute on chronic anemia, suspect GIB  -Hold aspirin and sc heparin. IV PPI  -Hemoglobin up to 8.1 from 5.6  after a unit of blood. -EGD showed patient with ulceration of the mid esophagus, grade nasopharyngitis and a small sliding hiatal hernia, few small areas of erythema in the stomach, diffuse clean-based ulcerations throughout the duodenal bulb without active bleeding.   Biopsies pending  -Colonoscopy showed diffuse ischemic changes with submucosal edema and circumferential coalescing ulcerations involving all of the rectum extending to the dentate line.  Ulceration extends approximately 50 cm from anal verge. The sigmoid, descending and transverse colon are normal-appearing mucosa. Syncope likely orthostatic from volume contraction due to diarrhea  -MRI shows no acute hemorrhage or infarction. 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  -Echo with normal LVEF,no significant valve disease. Fall  -As a result of her syncope  -Negative work-up for acute fracture or injury    Proctocolitis  -Evident on CT abdomen and pelvis  -Stool studies negative  -Continue questran and immodium prn for loose stool  --EGD showed patient with ulceration of the mid esophagus, grade nasopharyngitis and a small sliding hiatal hernia, few small areas of erythema in the stomach, diffuse clean-based ulcerations throughout the duodenal bulb without active bleeding. Biopsies pending  -Colonoscopy showed diffuse ischemic changes with submucosal edema and circumferential coalescing ulcerations involving all of the rectum extending to the dentate line. Ulceration extends approximately 50 cm from anal verge. The sigmoid, descending and transverse colon are normal-appearing mucosa. -Colorectal surgery consulted, recommended conservative approach for now. -Repeat abdominal pelvic CT showed improvement in the rectosigmoid colon. Acute kidney injury on CKD III/IV  -In the setting of rhabdo and volume depletion from diarrhea  -Nephrology evaluating the patient  -creatinine at baseline. D/c iv fluids. Chronic leg edema with increasing leg and abdominal edema. No sob or hypoxia  -D/c iv fluids  -Resume PTA [facility] today,2/14      Rhabdomyolysis  -As a result of fall  -Continue IV fluid  -Ordered Labs for am    Metabolic acidosis, improved.   -Due to alkaline loss from GI tract as well as acute kidney injury  -Now resolved  -Nephrology following. Thrombocytopenia~mild without bleeding complications.  -Monitor platelet     Abnormal LFTs  -Also likely related to infectious process,rhabdomyolysis  -CT abdomen and pelvis without any acute liver or gallbladder pathology      Hypertension  -Hold antihypertensives for now as patient initially hypotensive    T2Dm on metformin preadmission  -Continue insulin sliding scale coverage    Dyslipidemia  -Holding statin due to rhabdomyolysis      Code status:full code  DVT prophylaxis: scd,d/c heparin due to [address] discussed with: Patient/Family and Nurse  Anticipated Disposition: Pending insurance Auth for SNF. Anticipated Discharge: 24 hours to 48 hours  Hospital Problems  Date Reviewed: 2/11/2021          Codes Class Noted POA    Acute blood loss anemia ICD-10-CM: D62  ICD-9-CM: 285.1  2/11/2021 Yes        * (Principal) Syncope ICD-10-CM: R55  ICD-9-CM: 780.2  2/4/2021 Yes                Review of Systems:   A comprehensive review of systems was negative except for that written in the HPI. Vital Signs:       Physical Examination:     I had a face to face encounter with this patient and independently examined them on2/15/2021 as outlined below:        Constitutional:  No acute distress, cooperative, pleasant    HEENT:  Atraumatic. Oral mucosa moist,. Non icteric sclera. No pallor. Resp:  CTA bilaterally. No wheezing/rhonchi/rales. No accessory muscle use   Chest Wall: No deformity   CV:  Regular rhythm, normal rate, no murmurs, gallops, rubs    GI:  Soft, non distended, non tender. Bruises from sc injection. normoactive bowel sounds, no hepatosplenomegaly,+abd edema   :  No CVA or suprapubic tenderness    Musculoskeletal: ++ leg edema.     Neurologic:  Mental status:AAOx3,   Cranial nerves II-XII : WNL  Motor exam:Moves all extremities symmetrically              Data Review:    Review and/or order of clinical lab test  Review and/or order of tests in the radiology section of CPT  Review and/or order of tests in the medicine section of [facility]      Labs:     Recent Labs     02/15/21  0341 02/13/21  0224   WBC 7.6 6.7   HGB 8.1* 7.7*   HCT 27.8* 23.7*   * 147*     Recent Labs     02/15/21  0341 02/13/21  0224    138   K 4.2 3.9   * 110*   CO2 16* 23   BUN 22* 26*   CREA 1.53* 1.38*   GLU 80 77   CA 7.9* 8.6     Recent Labs     02/15/21  0341 02/13/21  0224   ALT 26 29   AP 52 44*   TBILI 0.3 0.4   TP 5.3* 5.4*   ALB 2.1* 2.9*   GLOB 3.2 2.5     No results for input(s): INR, PTP, APTT, INREXT, INREXT in the last 72 hours. No results for input(s): FE, TIBC, PSAT, FERR in the last 72 hours. Lab Results   Component Value Date/Time    Folate 19.8 10/02/2016 02:07 AM      No results for input(s): PH, PCO2, PO2 in the last 72 hours. No results for input(s): CPK, CKNDX, TROIQ in the last 72 hours.     No lab exists for component: CPKMB  Lab Results   Component Value Date/Time    Cholesterol, total 165 09/29/2016 10:02 AM    HDL Cholesterol 34 09/29/2016 10:02 AM    LDL, calculated 91.2 09/29/2016 10:02 AM    Triglyceride 199 (H) 09/29/2016 10:02 AM    CHOL/HDL Ratio 4.9 09/29/2016 10:02 AM     Lab Results   Component Value Date/Time    Glucose (POC) 88 02/15/2021 04:33 PM    Glucose (POC) 98 02/15/2021 11:15 AM    Glucose (POC) 95 02/15/2021 06:54 AM    Glucose (POC) 113 (H) 02/14/2021 08:54 PM    Glucose (POC) 84 02/14/2021 04:12 PM     Lab Results   Component Value Date/Time    Color DARK YELLOW 02/04/2021 02:16 AM    Appearance CLOUDY (A) 02/04/2021 02:16 AM    Specific gravity 1.021 02/04/2021 02:16 AM    pH (UA) 5.0 02/04/2021 02:16 AM    Protein 30 (A) 02/04/2021 02:16 AM    Glucose Negative 02/04/2021 02:16 AM    Ketone Negative 02/04/2021 02:16 AM    Bilirubin NEGATIVE  09/29/2016 10:02 AM    Urobilinogen 0.2 02/04/2021 02:16 AM    Nitrites Negative 02/04/2021 02:16 AM    Leukocyte Esterase Negative 02/04/2021 02:16 AM    Epithelial cells FEW 02/04/2021 02:16 AM    Bacteria Negative 02/04/2021 02:16 AM    WBC 0-4 02/04/2021 02:16 AM    RBC 0-5 02/04/2021 02:16 AM         Medications Reviewed:     Current Facility-Administered Medications   Medication Dose Route Frequency    bumetanide (BUMEX) tablet 2 mg  2 mg Oral BID    0.9% sodium chloride infusion 250 mL  250 mL IntraVENous PRN    pantoprazole (PROTONIX) 40 mg in 0.9% sodium chloride 10 mL injection  40 mg IntraVENous Q12H    sodium chloride (NS) flush 5-40 mL  5-40 mL IntraVENous PRN    traMADoL (ULTRAM) tablet 50 mg  50 mg Oral Q6H PRN    loperamide (IMODIUM) capsule 4 mg  4 mg Oral Q4H PRN    cholestyramine-aspartame (QUESTRAN LIGHT) packet 4 g  4 g Oral TID WITH MEALS    [Held by provider] aspirin chewable tablet 81 mg  81 mg Oral DAILY    sodium chloride (NS) flush 5-40 mL  5-40 mL IntraVENous Q8H    sodium chloride (NS) flush 5-40 mL  5-40 mL IntraVENous PRN    acetaminophen (TYLENOL) tablet 650 mg  650 mg Oral Q6H PRN    Or    acetaminophen (TYLENOL) suppository 650 mg  650 mg Rectal Q6H PRN    promethazine (PHENERGAN) tablet 12.5 mg  12.5 mg Oral Q6H PRN    Or    ondansetron (ZOFRAN) injection 4 mg  4 mg IntraVENous Q6H PRN    [Held by provider] heparin (porcine) injection 5,000 Units  5,000 Units SubCUTAneous Q8H    L.acidophilus-paracasei-S.thermophil-bifidobacter (RISAQUAD) 8 billion cell capsule  1 Cap Oral DAILY    insulin lispro (HUMALOG) injection   SubCUTAneous AC&HS    glucose chewable tablet 16 g  4 Tab Oral PRN    dextrose (D50W) injection syrg 12.5-25 g  12.5-25 g IntraVENous PRN    glucagon (GLUCAGEN) injection 1 mg  1 mg IntraMUSCular PRN     ______________________________________________________________________  EXPECTED LENGTH OF STAY: 2d 16h  ACTUAL LENGTH OF STAY:          11                 Emily Laurent MD

## 2021-02-15 NOTE — PROGRESS NOTES
Problem: Mobility Impaired (Adult and Pediatric)  Goal: *Acute Goals and Plan of Care (Insert Text)  Description: FUNCTIONAL STATUS PRIOR TO ADMISSION: Patient was modified independent using bilateral single point cane for functional mobility. HOME SUPPORT PRIOR TO ADMISSION: The patient lived alone with family to provide assistance. Physical Therapy Goals  Weekly re-assessment 2/12/2021  All goals remain appropriate    Initiated 2/5/2021  1. Patient will move from supine to sit and sit to supine  and scoot up and down in bed with modified independence within 7 day(s). 2.  Patient will transfer from bed to chair and chair to bed with modified independence using the least restrictive device within 7 day(s). 3.  Patient will perform sit to stand with modified independence within 7 day(s). 4.  Patient will ambulate with modified independence for 150 feet with the least restrictive device within 7 day(s). 5.  Patient will ascend/descend 4 stairs with handrail(s) with modified independence within 7 day(s). Outcome: Progressing Towards Goal   PHYSICAL THERAPY TREATMENT  Patient: Maria Guadalupe Braun (40 y.o. female)  Date: 2/15/2021  Diagnosis: Syncope [R55] Syncope  Procedure(s) (LRB):  COLONOSCOPY (N/A)  ESOPHAGOGASTRODUODENOSCOPY (EGD) (N/A)  ESOPHAGOGASTRODUODENAL (EGD) BIOPSY (N/A)  COLON BIOPSY (N/A) 4 Days Post-Op  Precautions: Fall  Chart, physical therapy assessment, plan of care and goals were reviewed. ASSESSMENT  Patient continues with skilled PT services and is progressing towards goals. Pt received in chair, eager to ambulate. Noted edema bilat distal LEs and feet. Pt transferred sit to stand with SBA; able to maintain static/ dynamic stand with single UE support and SBA while assisted with donning brief. Pt tolerated amb on unit with RW and CGA. Demo increasing tolerance to activity as noted by increased gait distance.      Pt remains motivated to participate in rehab process and return to modified indep LOF with mobility. Pt will benefit from con't PT for balance/ mobility progression as tolerated. Recommend follow up SNF at d/c. Current Level of Function Impacting Discharge (mobility/balance): transfers SBA, standing balance with UE support SBA, amb with RW CGA    Other factors to consider for discharge: unsafe to return home alone at this time         PLAN :  Patient continues to benefit from skilled intervention to address the above impairments. Continue treatment per established plan of care. to address goals. Recommendation for discharge: (in order for the patient to meet his/her long term goals)  Therapy up to 5 days/week in SNF setting    This discharge recommendation:  Has been made in collaboration with the attending provider and/or case management    IF patient discharges home will need the following DME: to be determined (TBD)       SUBJECTIVE:   Patient stated I tend to poop when I stand up    OBJECTIVE DATA SUMMARY:   Critical Behavior:  Neurologic State: Alert  Orientation Level: Oriented X4  Cognition: Follows commands  Safety/Judgement: Awareness of environment  Functional Mobility Training:  Bed Mobility:   Not observed                 Transfers:  Sit to Stand: Stand-by assistance  Stand to Sit: Supervision                             Balance:  Sitting: Intact  Standing: Impaired  Standing - Static: Good;Constant support  Standing - Dynamic : Fair;Constant support  Ambulation/Gait Training:  Distance (ft): 90 Feet (ft)  Assistive Device: Gait belt;Walker, rolling  Ambulation - Level of Assistance: Contact guard assistance        Gait Abnormalities: Decreased step clearance;Shuffling gait(flexed posture)        Base of Support: Widened     Speed/Karen: Pace decreased (<100 feet/min); Shuffled  Step Length: Left shortened;Right shortened     Pain Rating:  No c/o pain    Activity Tolerance:   Good and Fair    After treatment patient left in no apparent distress:   Sitting in chair and Call bell within reach    COMMUNICATION/COLLABORATION:   The patients plan of care was discussed with: Registered nurse.      Neha Rawls, PT   Time Calculation: 24 mins

## 2021-02-15 NOTE — PROGRESS NOTES
VERA:  RUR: 20%    Disposition:  Admit to [facility]. Patient admitted from home via EMS, patient was found on the floor in her home by her brother. CM contacting facility liaison Nellie Pennington at [facility]). Per CM note (Friday, new insurance auth needed). Patient is working with PT and OT and SNF rehab is recommended. Discharge planned 24-48 hrs per MD note. CM continuing to follow.   Yasir Martinez, [address], [address] [place] [address]

## 2021-02-15 NOTE — PROGRESS NOTES
Nephrology Progress Note  Efren Canada  Date of Admission : 2/3/2021    CC: Follow up for PRABHU on CKD       Assessment and Plan     PRABHU on CKD:  - presumed 2/2 volume depletion  - resolved and Cr at baseline  - ok for d/c AND F/U WITH Dr Shashank Haas on 3/3/21 at 1 pm -- she has been informed     CKD IIIb:  - f/b Dr. Shashank Haas  - recent baseline 1.5    Orthostasis:  - resolving     Colitis:  - on levo and flagyl  - colonoscopy showing ischemic proctocolitis  - CT scan today    Diarrhea:  - improving    HTN:  - BP stable           Interval History:  Seen and examined. Continue to have diarrhe a  Cr stable   orthostasis is getting better    Current Medications: all current  Medications have been eviewed in EPIC  Review of Systems: Pertinent items are noted in HPI. Objective:  Vitals:    Vitals:    02/14/21 0758 02/14/21 1416 02/14/21 2054 02/15/21 0343   BP: 113/66 107/67 136/74 112/66   Pulse: (!) 59 (!) 57 (!) 58 70   Resp: 16 16 18 18   Temp: 98.6 °F (37 °C) 98.5 °F (36.9 °C) 98.4 °F (36.9 °C) 98.2 °F (36.8 °C)   SpO2: 98% 100% 100% 97%   Weight:       Height:         Intake and Output:  No intake/output data recorded. No intake/output data recorded. Physical Examination:  General: NAD,Conversant   Neck:  Supple, no mass  Resp:  Lungs CTA B/L, no wheezing , normal respiratory effort  CV:  RRR,  no murmur or rub, no LE edema  GI:  Soft, NT, + Bowel sounds, no hepatosplenomegaly  Neurologic:  Non focal  Psych:             AAO x 3 appropriate affect   Skin:  No Rash  :  No austin    []    High complexity decision making was performed  []    Patient is at high-risk of decompensation with multiple organ involvement    Lab Data Personally Reviewed: I have reviewed all the pertinent labs, microbiology data and radiology studies during assessment.     Recent Labs     02/15/21  0341 02/13/21  0224    138   K 4.2 3.9   * 110*   CO2 16* 23   GLU 80 77   BUN 22* 26*   CREA 1.53* 1.38*   CA 7.9* 8.6   ALB 2.1* 2.9*   ALT 26 29     Recent Labs     02/15/21  0341 02/13/21  0224   WBC 7.6 6.7   HGB 8.1* 7.7*   HCT 27.8* 23.7*   * 147*     No results found for: SDES  Lab Results   Component Value Date/Time    Culture result: NO GROWTH 5 DAYS 02/04/2021 12:22 AM    Culture result: NO GROWTH ON SOLID MEDIA AT 2 DAYS 01/13/2021 10:00 AM    Culture result: (A) 01/13/2021 10:00 AM     STAPHYLOCOCCUS SPECIES, COAGULASE NEGATIVE ISOLATED FROM THIO BROTH ONLY     Recent Results (from the past 24 hour(s))   GLUCOSE, POC    Collection Time: 02/14/21  4:12 PM   Result Value Ref Range    Glucose (POC) 84 65 - 100 mg/dL    Performed by [address] [place] [address], POC    Collection Time: 02/14/21  8:54 PM   Result Value Ref Range    Glucose (POC) 113 (H) 65 - 100 mg/dL    Performed by Ramsey Bridges    CBC WITH AUTOMATED DIFF    Collection Time: 02/15/21  3:41 AM   Result Value Ref Range    WBC 7.6 3.6 - 11.0 K/uL    RBC 2.63 (L) 3.80 - 5.20 M/uL    HGB 8.1 (L) 11.5 - 16.0 g/dL    HCT 27.8 (L) 35.0 - 47.0 %    .7 (H) 80.0 - 99.0 FL    MCH 30.8 26.0 - 34.0 PG    MCHC 29.1 (L) 30.0 - 36.5 g/dL    RDW 16.7 (H) 11.5 - 14.5 %    PLATELET 878 (L) 524 - 400 K/uL    MPV 10.0 8.9 - 12.9 FL    NRBC 0.0 0  WBC    ABSOLUTE NRBC 0.00 0.00 - 0.01 K/uL    NEUTROPHILS 68 32 - 75 %    LYMPHOCYTES 19 12 - 49 %    MONOCYTES 10 5 - 13 %    EOSINOPHILS 3 0 - 7 %    BASOPHILS 1 0 - 1 %    IMMATURE GRANULOCYTES 1 (H) 0.0 - 0.5 %    ABS. NEUTROPHILS 5.1 1.8 - 8.0 K/UL    ABS. LYMPHOCYTES 1.4 0.8 - 3.5 K/UL    ABS. MONOCYTES 0.7 0.0 - 1.0 K/UL    ABS. EOSINOPHILS 0.2 0.0 - 0.4 K/UL    ABS. BASOPHILS 0.0 0.0 - 0.1 K/UL    ABS. IMM.  GRANS. 0.1 (H) 0.00 - 0.04 K/UL    DF AUTOMATED     METABOLIC PANEL, COMPREHENSIVE    Collection Time: 02/15/21  3:41 AM   Result Value Ref Range    Sodium 136 136 - 145 mmol/L    Potassium 4.2 3.5 - 5.1 mmol/L    Chloride 114 (H) 97 - 108 mmol/L    CO2 16 (L) 21 - 32 mmol/L    Anion gap 6 5 - 15 mmol/L Glucose 80 65 - 100 mg/dL    BUN 22 (H) 6 - 20 MG/DL    Creatinine 1.53 (H) 0.55 - 1.02 MG/DL    BUN/Creatinine ratio 14 12 - 20      GFR est AA 41 (L) >60 ml/min/1.73m2    GFR est non-AA 34 (L) >60 ml/min/1.73m2    Calcium 7.9 (L) 8.5 - 10.1 MG/DL    Bilirubin, total 0.3 0.2 - 1.0 MG/DL    ALT (SGPT) 26 12 - 78 U/L    AST (SGOT) 31 15 - 37 U/L    Alk. phosphatase 52 45 - 117 U/L    Protein, total 5.3 (L) 6.4 - 8.2 g/dL    Albumin 2.1 (L) 3.5 - 5.0 g/dL    Globulin 3.2 2.0 - 4.0 g/dL    A-G Ratio 0.7 (L) 1.1 - 2.2     GLUCOSE, POC    Collection Time: 02/15/21  6:54 AM   Result Value Ref Range    Glucose (POC) 95 65 - 100 mg/dL    Performed by [address] [place], [place] [id number] , POC    Collection Time: 02/15/21 11:15 AM   Result Value Ref Range    Glucose (POC) 98 65 - 100 mg/dL    Performed by Jorge Luis Saunders MD  [address]  Phone - [phone]   Fax - [phone]  [email or web address]

## 2021-02-15 NOTE — PROGRESS NOTES
[address]  [address], [address]  [phone]                GI PROGRESS NOTE      NAME:   Dionisio Jo   :    1955   MRN:    [id number]     Subjective:     She continues to feel overall better. She has less fecal incontinence, but still has watery stool and some leakage. She has noted that her left abdominal pannus is larger than the right, some tenderness with palpation. She denies hematochezia, melena, abdominal pain, nausea, vomiting. She is to be discharged to rehab. Objective:     VITALS:   Last 24hrs VS reviewed since prior hospitalist progress note. Most recent are:  Visit Vitals  /66   Pulse 70   Temp 98.2 °F (36.8 °C)   Resp 18   Ht 5' 6\" (1.676 m)   Wt 112.9 kg (248 lb 14.4 oz)   SpO2 97%   BMI 40.17 kg/m²     No intake or output data in the 24 hours ending 02/15/21 1025     PHYSICAL EXAM:  General   NAD  EENT  Normocephalic, Atraumatic, PERRLA, EOMI, sclera clear  Neck   Supple, No thyromegaly or bruit  Respiratory   Clear To Auscultation bilaterally - no wheezes, rales, rhonchi, or crackles  Cardiology  Regular Rate and Rythmn - no murmurs, rubs or gallops  Abdominal  Soft, non-tender, non-distended     Lab Data Reviewed     Medications: Reviewed    Assessment/Plan     Dionisio Jo is a 60 y.o.  female who was admitted after GLF/being down for > 36 hours. She had rhabdo and PRABHU with recto-sigmoid colitis and bloody diarrhea 2/2 ischemia. Repeat CT 21:   1. Persistent but improved CT appearance of the rectosigmoid colitis. 2. Mild generalized vascular disease without SMA or BOSTON occlusion. 3. New small pleural effusions right greater than left.  4. 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 From GI perspective, she can be discharged to rehab when hospitalist agrees, and we will touch base with her regarding path results. I have personally reviewed the history and independently examined the patient. I have reviewed the chart and agree with the documentation recorded by the Mid Level Provider, including the assessment, treatment plan, and disposition. 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 No other source identified. This is not very common though not unknown. Clinically improving.    Continue current regimen    Hanh Dickerson MD

## 2021-02-16 LAB
ALBUMIN SERPL-MCNC: 3 G/DL (ref 3.5–5)
ANION GAP SERPL CALC-SCNC: 7 MMOL/L (ref 5–15)
BUN SERPL-MCNC: 26 MG/DL (ref 6–20)
BUN/CREAT SERPL: 18 (ref 12–20)
CALCIUM SERPL-MCNC: 8.2 MG/DL (ref 8.5–10.1)
CHLORIDE SERPL-SCNC: 108 MMOL/L (ref 97–108)
CO2 SERPL-SCNC: 21 MMOL/L (ref 21–32)
CREAT SERPL-MCNC: 1.44 MG/DL (ref 0.55–1.02)
GLUCOSE BLD STRIP.AUTO-MCNC: 112 MG/DL (ref 65–100)
GLUCOSE BLD STRIP.AUTO-MCNC: 89 MG/DL (ref 65–100)
GLUCOSE BLD STRIP.AUTO-MCNC: 96 MG/DL (ref 65–100)
GLUCOSE BLD STRIP.AUTO-MCNC: 98 MG/DL (ref 65–100)
GLUCOSE SERPL-MCNC: 97 MG/DL (ref 65–100)
PHOSPHATE SERPL-MCNC: 2.8 MG/DL (ref 2.6–4.7)
POTASSIUM SERPL-SCNC: 3.8 MMOL/L (ref 3.5–5.1)
SERVICE CMNT-IMP: ABNORMAL
SERVICE CMNT-IMP: NORMAL
SODIUM SERPL-SCNC: 136 MMOL/L (ref 136–145)

## 2021-02-16 PROCEDURE — 74011250637 HC RX REV CODE- 250/637: Performed by: HOSPITALIST

## 2021-02-16 PROCEDURE — 80069 RENAL FUNCTION PANEL: CPT

## 2021-02-16 PROCEDURE — 97535 SELF CARE MNGMENT TRAINING: CPT

## 2021-02-16 PROCEDURE — 74011250637 HC RX REV CODE- 250/637: Performed by: FAMILY MEDICINE

## 2021-02-16 PROCEDURE — 94760 N-INVAS EAR/PLS OXIMETRY 1: CPT

## 2021-02-16 PROCEDURE — 97530 THERAPEUTIC ACTIVITIES: CPT

## 2021-02-16 PROCEDURE — 36415 COLL VENOUS BLD VENIPUNCTURE: CPT

## 2021-02-16 PROCEDURE — 65270000029 HC RM PRIVATE

## 2021-02-16 PROCEDURE — 74011000250 HC RX REV CODE- 250: Performed by: HOSPITALIST

## 2021-02-16 PROCEDURE — 74011250637 HC RX REV CODE- 250/637: Performed by: INTERNAL MEDICINE

## 2021-02-16 PROCEDURE — 97116 GAIT TRAINING THERAPY: CPT

## 2021-02-16 PROCEDURE — 82962 GLUCOSE BLOOD TEST: CPT

## 2021-02-16 PROCEDURE — C9113 INJ PANTOPRAZOLE SODIUM, VIA: HCPCS | Performed by: HOSPITALIST

## 2021-02-16 PROCEDURE — 74011250636 HC RX REV CODE- 250/636: Performed by: HOSPITALIST

## 2021-02-16 RX ORDER — PANTOPRAZOLE SODIUM 40 MG/1
40 TABLET, DELAYED RELEASE ORAL 2 TIMES DAILY
Qty: 60 TAB | Refills: 0 | Status: SHIPPED | OUTPATIENT
Start: 2021-02-16 | End: 2021-03-18

## 2021-02-16 RX ORDER — CHOLESTYRAMINE 4 G/4.8G
4 POWDER, FOR SUSPENSION ORAL
Qty: 90 PACKET | Refills: 0 | Status: SHIPPED | OUTPATIENT
Start: 2021-02-16 | End: 2021-03-18

## 2021-02-16 RX ORDER — LOPERAMIDE HYDROCHLORIDE 2 MG/1
4 CAPSULE ORAL
Qty: 30 CAP | Refills: 0 | Status: ON HOLD | OUTPATIENT
Start: 2021-02-16 | End: 2021-03-05

## 2021-02-16 RX ADMIN — Medication 10 ML: at 22:17

## 2021-02-16 RX ADMIN — CHOLESTYRAMINE 4 G: 4 POWDER, FOR SUSPENSION ORAL at 07:13

## 2021-02-16 RX ADMIN — TRAMADOL HYDROCHLORIDE 50 MG: 50 TABLET, FILM COATED ORAL at 08:55

## 2021-02-16 RX ADMIN — Medication 10 ML: at 14:06

## 2021-02-16 RX ADMIN — CHOLESTYRAMINE 4 G: 4 POWDER, FOR SUSPENSION ORAL at 11:37

## 2021-02-16 RX ADMIN — Medication 1 CAPSULE: at 08:51

## 2021-02-16 RX ADMIN — PANTOPRAZOLE SODIUM 40 MG: 40 INJECTION, POWDER, FOR SOLUTION INTRAVENOUS at 08:51

## 2021-02-16 RX ADMIN — CHOLESTYRAMINE 4 G: 4 POWDER, FOR SUSPENSION ORAL at 17:35

## 2021-02-16 RX ADMIN — PANTOPRAZOLE SODIUM 40 MG: 40 INJECTION, POWDER, FOR SOLUTION INTRAVENOUS at 22:16

## 2021-02-16 RX ADMIN — BUMETANIDE 2 MG: 1 TABLET ORAL at 08:51

## 2021-02-16 RX ADMIN — Medication 10 ML: at 06:34

## 2021-02-16 RX ADMIN — BUMETANIDE 2 MG: 1 TABLET ORAL at 17:35

## 2021-02-16 NOTE — PROGRESS NOTES
Nephrology Progress Note  Raheel Jung  Date of Admission : 2/3/2021    CC: Follow up for PRABHU on CKD       Assessment and Plan     PRABHU on CKD:  - presumed 2/2 volume depletion  - Labs pending  - if Cr stable, would increase Bumex dose   - f/u with Dr Melinda Richmond on 3/3/21 at 1 pm     CKD IIIb:  - f/b Dr. Melinda Richmond  - recent baseline 1.5    Orthostasis:  - resolving     Colitis:  - on levo and flagyl  - colonoscopy showing ischemic proctocolitis  - CT scan today    Diarrhea:  - improving    HTN:  - BP stable           Interval History:  Seen and examined. Wearing compression stockings  Awaiting placement   No labs today   No diarrhea     Current Medications: all current  Medications have been eviewed in EPIC  Review of Systems: Pertinent items are noted in HPI. Objective:  Vitals:    Vitals:    02/15/21 1910 02/16/21 0407 02/16/21 0909 02/16/21 1406   BP: 128/82 117/62 100/62 108/66   Pulse: 64 61 (!) 58 (!) 55   Resp: 14 16 16 16   Temp: 98.2 °F (36.8 °C) 98.1 °F (36.7 °C) 97.8 °F (36.6 °C) 98.1 °F (36.7 °C)   SpO2: 100% 96% 98% 100%   Weight:       Height:         Intake and Output:  No intake/output data recorded. No intake/output data recorded. Physical Examination:  General: NAD,Conversant   Neck:  Supple, no mass  Resp:  Lungs CTA B/L, no wheezing , normal respiratory effort  CV:  RRR,  no murmur or rub, no LE edema  GI:  Soft, NT, + Bowel sounds, no hepatosplenomegaly  Neurologic:  Non focal  Psych:             AAO x 3 appropriate affect   Skin:  No Rash  :  No austin    []    High complexity decision making was performed  []    Patient is at high-risk of decompensation with multiple organ involvement    Lab Data Personally Reviewed: I have reviewed all the pertinent labs, microbiology data and radiology studies during assessment.     Recent Labs     02/15/21  0341      K 4.2   *   CO2 16*   GLU 80   BUN 22*   CREA 1.53*   CA 7.9*   ALB 2.1*   ALT 26     Recent Labs     02/15/21  0341   WBC 7. 6   HGB 8.1*   HCT 27.8*   *     No results found for: SDES  Lab Results   Component Value Date/Time    Culture result: NO GROWTH 5 DAYS 02/04/2021 12:22 AM    Culture result: NO GROWTH ON SOLID MEDIA AT 2 DAYS 01/13/2021 10:00 AM    Culture result: (A) 01/13/2021 10:00 AM     STAPHYLOCOCCUS SPECIES, COAGULASE NEGATIVE ISOLATED FROM THIO BROTH ONLY     Recent Results (from the past 24 hour(s))   GLUCOSE, POC    Collection Time: 02/15/21  4:33 PM   Result Value Ref Range    Glucose (POC) 88 65 - 100 mg/dL    Performed by BRI Manjarrez  PCT    GLUCOSE, POC    Collection Time: 02/15/21  9:00 PM   Result Value Ref Range    Glucose (POC) 106 (H) 65 - 100 mg/dL    Performed by BRI Manjarrez  PCT    GLUCOSE, POC    Collection Time: 02/16/21  6:51 AM   Result Value Ref Range    Glucose (POC) 89 65 - 100 mg/dL    Performed by [address] [place] [address], POC    Collection Time: 02/16/21 11:37 AM   Result Value Ref Range    Glucose (POC) 98 65 - 100 mg/dL    Performed by Dorene, POC    Collection Time: 02/16/21  3:39 PM   Result Value Ref Range    Glucose (POC) 112 (H) 65 - 100 mg/dL    Performed by [place], MD  [address]  Phone - [phone]   Fax - [phone]  [email or web address]

## 2021-02-16 NOTE — PROGRESS NOTES
[address] [facility] Adult  Hospitalist Group                                                                                          Hospitalist Progress Note  Emily Laurent MD  Answering service: [phone] from in house phone        Date of Service:  2021  NAME:  Al Mayo  :  1955  MRN:  [id number]    This documentation was facilitated by a Voice Recognition software and may contain inadvertent typographical errors. Admission Summary:   From h&P  \"This is a 61-year-old woman with a past medical history significant for chronic kidney disease, dyslipidemia, type 2 diabetes, hypertension, peripheral artery disease, was in her usual state of health until the day of her presentation at the emergency room when the patient stated that she fell at home. The patient complained of generalized weakness, which is progressive. Also complained of diarrhea. According to the patient, she woke up from sleep, was trying to go to the bathroom, when all of a sudden, the patient felt dizzy and passed out. She has no recollection of the event. \"    Interval history / Subjective:        She was sitting in the chair talking on the phone during my visit. She has no complaints. She feels that leg edema is better, the KULWINDER hose is helping. Assessment & Plan:   Severe acute on chronic anemia, suspect GIB  -Hold aspirin and sc heparin. IV PPI  -Hemoglobin up to 8.1 from 5.6  after a unit of blood. -EGD showed patient with ulceration of the mid esophagus, grade nasopharyngitis and a small sliding hiatal hernia, few small areas of erythema in the stomach, diffuse clean-based ulcerations throughout the duodenal bulb without active bleeding. Biopsies pending  -Colonoscopy showed diffuse ischemic changes with submucosal edema and circumferential coalescing ulcerations involving all of the rectum extending to the dentate line. Ulceration extends approximately 50 cm from anal verge.   The sigmoid, descending and transverse colon are normal-appearing mucosa. Syncope likely orthostatic from volume contraction due to diarrhea  -MRI shows no acute hemorrhage or infarction. 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  -Echo with normal LVEF,no significant valve disease. Fall  -As a result of her syncope  -Negative work-up for acute fracture or injury    Proctocolitis  -Evident on CT abdomen and pelvis  -Stool studies negative  -Continue questran and immodium prn for loose stool  --EGD showed patient with ulceration of the mid esophagus, grade nasopharyngitis and a small sliding hiatal hernia, few small areas of erythema in the stomach, diffuse clean-based ulcerations throughout the duodenal bulb without active bleeding. Biopsies pending  -Colonoscopy showed diffuse ischemic changes with submucosal edema and circumferential coalescing ulcerations involving all of the rectum extending to the dentate line. Ulceration extends approximately 50 cm from anal verge. The sigmoid, descending and transverse colon are normal-appearing mucosa. -Colorectal surgery consulted, recommended conservative approach for now. -Repeat abdominal pelvic CT showed improvement in the rectosigmoid colon. Acute kidney injury on CKD III/IV  -In the setting of rhabdo and volume depletion from diarrhea  -Nephrology evaluating the patient  -creatinine at baseline. IV fluid already discontinued and she is back on her home Bumex. Chronic leg edema with increasing leg and abdominal edema. No sob or hypoxia  -D/c iv fluids  -Resume PTA Bumex today,2/14      Rhabdomyolysis. Resolved  -As a result of fall      Metabolic acidosis, improved. -Due to alkaline loss from GI tract as well as acute kidney injury  -Now resolved  -Nephrology following.     Thrombocytopenia~mild without bleeding complications.  -Monitor platelet Abnormal LFTs. Resolved  -Also likely related to infectious process,rhabdomyolysis  -CT abdomen and pelvis without any acute liver or gallbladder pathology      Hypertension  -She is back on Bumex, blood pressure is soft systolic in the low 891H, continue to hold antihypertensives. T2Dm on metformin preadmission, controlled. -Continue insulin sliding scale coverage    Dyslipidemia  -Holding statin due to rhabdomyolysis      Code status:full code  DVT prophylaxis: scd,d/c heparin due to GIB  Care Plan discussed with: Patient/Family and Nurse  Anticipated Disposition: Stable for discharge, pending insurance Auth for SNF. Discussed with case management. Hospital Problems  Date Reviewed: 2/11/2021          Codes Class Noted POA    Acute blood loss anemia ICD-10-CM: D62  ICD-9-CM: 285.1  2/11/2021 Yes        * (Principal) Syncope ICD-10-CM: R55  ICD-9-CM: 780.2  2/4/2021 Yes                Review of Systems:   A comprehensive review of systems was negative except for that written in the HPI. Vital Signs:       Physical Examination:     I had a face to face encounter with this patient and independently examined them on2/16/2021 as outlined below:        Constitutional:  No acute distress, cooperative, pleasant    HEENT:  Atraumatic. Oral mucosa moist,. Non icteric sclera. No pallor. Resp:  CTA bilaterally. No wheezing/rhonchi/rales. No accessory muscle use   Chest Wall: No deformity   CV:  Regular rhythm, normal rate, no murmurs, gallops, rubs    GI:  Soft, non distended, non tender. Bruises from sc injection. normoactive bowel sounds, no hepatosplenomegaly,+abd edema   :  No CVA or suprapubic tenderness    Musculoskeletal: ++ leg edema.     Neurologic:  Mental status:AAOx3,   Cranial nerves II-XII : WNL  Motor exam:Moves all extremities symmetrically              Data Review:    Review and/or order of clinical lab test  Review and/or order of tests in the radiology section of CPT  Review and/or order of tests in the medicine section of [facility]      Labs:     Recent Labs     02/15/21  0341   WBC 7.6   HGB 8.1*   HCT 27.8*   *     Recent Labs     02/15/21  0341      K 4.2   *   CO2 16*   BUN 22*   CREA 1.53*   GLU 80   CA 7.9*     Recent Labs     02/15/21  0341   ALT 26   AP 52   TBILI 0.3   TP 5.3*   ALB 2.1*   GLOB 3.2     No results for input(s): INR, PTP, APTT, INREXT, INREXT in the last 72 hours. No results for input(s): FE, TIBC, PSAT, FERR in the last 72 hours. Lab Results   Component Value Date/Time    Folate 19.8 10/02/2016 02:07 AM      No results for input(s): PH, PCO2, PO2 in the last 72 hours. No results for input(s): CPK, CKNDX, TROIQ in the last 72 hours.     No lab exists for component: CPKMB  Lab Results   Component Value Date/Time    Cholesterol, total 165 09/29/2016 10:02 AM    HDL Cholesterol 34 09/29/2016 10:02 AM    LDL, calculated 91.2 09/29/2016 10:02 AM    Triglyceride 199 (H) 09/29/2016 10:02 AM    CHOL/HDL Ratio 4.9 09/29/2016 10:02 AM     Lab Results   Component Value Date/Time    Glucose (POC) 112 (H) 02/16/2021 03:39 PM    Glucose (POC) 98 02/16/2021 11:37 AM    Glucose (POC) 89 02/16/2021 06:51 AM    Glucose (POC) 106 (H) 02/15/2021 09:00 PM    Glucose (POC) 88 02/15/2021 04:33 PM     Lab Results   Component Value Date/Time    Color DARK YELLOW 02/04/2021 02:16 AM    Appearance CLOUDY (A) 02/04/2021 02:16 AM    Specific gravity 1.021 02/04/2021 02:16 AM    pH (UA) 5.0 02/04/2021 02:16 AM    Protein 30 (A) 02/04/2021 02:16 AM    Glucose Negative 02/04/2021 02:16 AM    Ketone Negative 02/04/2021 02:16 AM    Bilirubin NEGATIVE  09/29/2016 10:02 AM    Urobilinogen 0.2 02/04/2021 02:16 AM    Nitrites Negative 02/04/2021 02:16 AM    Leukocyte Esterase Negative 02/04/2021 02:16 AM    Epithelial cells FEW 02/04/2021 02:16 AM    Bacteria Negative 02/04/2021 02:16 AM    WBC 0-4 02/04/2021 02:16 AM    RBC 0-5 02/04/2021 02:16 AM         Medications Reviewed:     Current Facility-Administered Medications   Medication Dose Route Frequency    bumetanide (BUMEX) tablet 2 mg  2 mg Oral BID    0.9% sodium chloride infusion 250 mL  250 mL IntraVENous PRN    pantoprazole (PROTONIX) 40 mg in 0.9% sodium chloride 10 mL injection  40 mg IntraVENous Q12H    sodium chloride (NS) flush 5-40 mL  5-40 mL IntraVENous PRN    traMADoL (ULTRAM) tablet 50 mg  50 mg Oral Q6H PRN    loperamide (IMODIUM) capsule 4 mg  4 mg Oral Q4H PRN    cholestyramine-aspartame (QUESTRAN LIGHT) packet 4 g  4 g Oral TID WITH MEALS    [Held by provider] aspirin chewable tablet 81 mg  81 mg Oral DAILY    sodium chloride (NS) flush 5-40 mL  5-40 mL IntraVENous Q8H    sodium chloride (NS) flush 5-40 mL  5-40 mL IntraVENous PRN    acetaminophen (TYLENOL) tablet 650 mg  650 mg Oral Q6H PRN    Or    acetaminophen (TYLENOL) suppository 650 mg  650 mg Rectal Q6H PRN    promethazine (PHENERGAN) tablet 12.5 mg  12.5 mg Oral Q6H PRN    Or    ondansetron (ZOFRAN) injection 4 mg  4 mg IntraVENous Q6H PRN    [Held by provider] heparin (porcine) injection 5,000 Units  5,000 Units SubCUTAneous Q8H    L.acidophilus-paracasei-S.thermophil-bifidobacter (RISAQUAD) 8 billion cell capsule  1 Cap Oral DAILY    insulin lispro (HUMALOG) injection   SubCUTAneous AC&HS    glucose chewable tablet 16 g  4 Tab Oral PRN    dextrose (D50W) injection syrg 12.5-25 g  12.5-25 g IntraVENous PRN    glucagon (GLUCAGEN) injection 1 mg  1 mg IntraMUSCular PRN     ______________________________________________________________________  EXPECTED LENGTH OF STAY: 2d 16h  ACTUAL LENGTH OF STAY:          12                 Jessica Baer MD

## 2021-02-16 NOTE — PROGRESS NOTES
Problem: Mobility Impaired (Adult and Pediatric)  Goal: *Acute Goals and Plan of Care (Insert Text)  Description: FUNCTIONAL STATUS PRIOR TO ADMISSION: Patient was modified independent using bilateral single point cane for functional mobility. HOME SUPPORT PRIOR TO ADMISSION: The patient lived alone with family to provide assistance. Physical Therapy Goals  Weekly re-assessment 2/12/2021  All goals remain appropriate    Initiated 2/5/2021  1. Patient will move from supine to sit and sit to supine  and scoot up and down in bed with modified independence within 7 day(s). 2.  Patient will transfer from bed to chair and chair to bed with modified independence using the least restrictive device within 7 day(s). 3.  Patient will perform sit to stand with modified independence within 7 day(s). 4.  Patient will ambulate with modified independence for 150 feet with the least restrictive device within 7 day(s). 5.  Patient will ascend/descend 4 stairs with handrail(s) with modified independence within 7 day(s). Outcome: Progressing Towards Goal    PHYSICAL THERAPY TREATMENT  Patient: Renny Mora (49 y.o. female)  Date: 2/16/2021  Diagnosis: Syncope [R55] Syncope  Procedure(s) (LRB):  COLONOSCOPY (N/A)  ESOPHAGOGASTRODUODENOSCOPY (EGD) (N/A)  ESOPHAGOGASTRODUODENAL (EGD) BIOPSY (N/A)  COLON BIOPSY (N/A) 5 Days Post-Op  Precautions: Fall  Chart, physical therapy assessment, plan of care and goals were reviewed. ASSESSMENT  Patient continues with skilled PT services and is progressing towards goals. Pt received seated in chair and with LE in dependent position, willing to work with therapy. Pt was explaining that her legs were very swollen, but have improved over the last few days. She was able to demo gait training with RW up to 100' with VC for RW placement and upright posture to decrease UE pain with amb.  Pt presented with step to gait pattern with manual cue for more step to with RW to decrease UE support. Pt completed session seated in recliner with LE elevated, pt given a towel roll to support LB for comfort. Pt ed for elevation with LE to help decrease edema along with ankle pumps. Pt  left with call bell within reach and all needs met at the time. Rn notified of session. Current Level of Function Impacting Discharge (mobility/balance): SBA  for STS, CGA for amb up to 100' with RW     Other factors to consider for discharge: LE edema, lives alone         PLAN :  Patient continues to benefit from skilled intervention to address the above impairments. Continue treatment per established plan of care. to address goals. Recommendation for discharge: (in order for the patient to meet his/her long term goals)  Therapy up to 5 days/week in SNF setting    This discharge recommendation:  Has not yet been discussed the attending provider and/or case management    IF patient discharges home will need the following DME: patient owns DME required for discharge       SUBJECTIVE:   Patient stated my lower back hurts if I raise my legs.     OBJECTIVE DATA SUMMARY:   Critical Behavior:  Neurologic State: Alert  Orientation Level: Oriented X4  Cognition: Appropriate for age attention/concentration, Appropriate safety awareness, Follows commands  Safety/Judgement: Awareness of environment  Functional Mobility Training:    Transfers:  Sit to Stand: Stand-by assistance  Stand to Sit: Stand-by assistance     Balance:  Sitting: Intact  Standing: Impaired  Standing - Static: Constant support;Good  Standing - Dynamic : Constant support; Fair  Ambulation/Gait Training:  Distance (ft): 100 Feet (ft)  Assistive Device: Gait belt;Walker, rolling  Ambulation - Level of Assistance: Contact guard assistance  Gait Abnormalities: Decreased step clearance(more with LLE than RLE)  Base of Support: Widened  Speed/Karen: Pace decreased (<100 feet/min)  Step Length: Left shortened;Right shortened    Pain Rating:  No pain reported    Activity Tolerance:   Good    After treatment patient left in no apparent distress:   Sitting in chair and Call bell within reach    COMMUNICATION/COLLABORATION:   The patients plan of care was discussed with: Registered nurse.      Bonnie Alonso PTA   Time Calculation: 33 mins

## 2021-02-16 NOTE — PROGRESS NOTES
Problem: Falls - Risk of  Goal: *Absence of Falls  Description: Document Clydene Bone Fall Risk and appropriate interventions in the flowsheet. Outcome: Progressing Towards Goal  Note: Fall Risk Interventions:  Mobility Interventions: Assess mobility with egress test, Communicate number of staff needed for ambulation/transfer, Patient to call before getting OOB         Medication Interventions: Patient to call before getting OOB    Elimination Interventions: Call light in reach    History of Falls Interventions: Door open when patient unattended         Problem: Pressure Injury - Risk of  Goal: *Prevention of pressure injury  Description: Document Ra Scale and appropriate interventions in the flowsheet. Outcome: Progressing Towards Goal  Note: Pressure Injury Interventions:  Sensory Interventions: Keep linens dry and wrinkle-free, Maintain/enhance activity level, Minimize linen layers, Pressure redistribution bed/mattress (bed type)    Moisture Interventions: Absorbent underpads, Apply protective barrier, creams and emollients, Limit adult briefs, Minimize layers    Activity Interventions: Increase time out of bed, Pressure redistribution bed/mattress(bed type)    Mobility Interventions: Pressure redistribution bed/mattress (bed type), PT/OT evaluation    Nutrition Interventions: Document food/fluid/supplement intake, Offer support with meals,snacks and hydration    Friction and Shear Interventions: Apply protective barrier, creams and emollients, Lift sheet, Minimize layers                Problem: Risk for Spread of Infection  Goal: Prevent transmission of infectious organism to others  Description: Prevent the transmission of infectious organisms to other patients, staff members, and visitors.   Outcome: Progressing Towards Goal     Problem: Patient Education:  Go to Education Activity  Goal: Patient/Family Education  Outcome: Progressing Towards Goal

## 2021-02-16 NOTE — PROGRESS NOTES
Verbal shift change report given to Rossy Benson (oncoming nurse) by Elmer Pearl (offgoing nurse). Report included the following information SBAR, Kardex, MAR and Recent Results.

## 2021-02-16 NOTE — PROGRESS NOTES
VERA:  RUR: 20%     Disposition:  Admit to [facility]. Patient admitted from home via EMS, patient was found on the floor in her home by her brother.      CM spoke with Dr. Bernett Mortimer. Patient is medically ready for d/c. Facility is seeking insurance auth. Plan for patient discharge tomorrow. CM  Continuing to follow.     Ramesh MartiniLens, [address] [place] [address]

## 2021-02-16 NOTE — PROGRESS NOTES
[address] [facility].  [address] [facility] [address]  [address] [facility], [address]  [phone]                GI PROGRESS NOTE      NAME:   Garfield Tran   :    1955   MRN:    [id number]     Subjective:     She continues to feel overall better, ambulating in the halls several times a day, less fecal leakage. She had a telemed visit with her regular GI DrBoom Mckenna this morning. He is going to gather her hospital records and get her in with someone in his group regarding the ischemic proctitis, as he specializes in UGI. She prefers to stay within the [facility] practice. She is waiting on d/c to rehab, needs insurance authorization. Objective:     VITALS:   Last 24hrs VS reviewed since prior hospitalist progress note. Most recent are:  Visit Vitals  /66 (BP 1 Location: Right arm, BP Patient Position: At rest)   Pulse (!) 55   Temp 98.1 °F (36.7 °C)   Resp 16   Ht 5' 6\" (1.676 m)   Wt 112.9 kg (248 lb 14.4 oz)   SpO2 100%   BMI 40.17 kg/m²     No intake or output data in the 24 hours ending 21 1625     PHYSICAL EXAM:  General   NAD  EENT sclera clear  Abdominal  non-distended  Psych   No agitation or anxiety     Lab Data Reviewed     Medications: Reviewed    Assessment/Plan     Garfield Tran is a 60 y.o.  female who was admitted after GLF/being down for > 36 hours. She had rhabdo and PRABHU with recto-sigmoid colitis and bloody diarrhea 2/2 ischemia. Repeat CT 21:   1. Persistent but improved CT appearance of the rectosigmoid colitis. 2. Mild generalized vascular disease without SMA or BOSTON occlusion. 3. New small pleural effusions right greater than left. 4. 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 Mellie Felty: \"Ischemic proctosigmoiditis likely from low flow state. No other source identified. This is not very common though not unknown. \"  - She plans to f/u at [facility]    Esophageal and duodenal ulcers 2/11/21  - Recurrent per patient, unclear etiology, sounds like rather extensive w/u at [facility] in past   - Path suggestive of pill esophagitis  - Continue PPI BID  - Will continue to follow with Dr. Philippe Gar at [facility]     From GI perspective, she is ok for discharge as planned.

## 2021-02-16 NOTE — PROGRESS NOTES
Problem: Self Care Deficits Care Plan (Adult)  Goal: *Acute Goals and Plan of Care (Insert Text)  Description:   FUNCTIONAL STATUS PRIOR TO ADMISSION: Patient was modified independent using a veronique SPCs for functional mobility, was modified independent for basic and most instrumental ADLs, working at [address] [place] [address] in [employer]. HOME SUPPORT: The patient lived alone with family in the area to provide assistance. Occupational Therapy Goals  Goals reassessed and updated: 2/12/2021  Initiated 2/5/2021  1. Patient will perform grooming at the sink with supervision/set-up within 7 day(s). 2.  Patient will perform bathing with minimal assistance/contact guard assist within 7 day(s). MET for upper body with set up. Continue for lower body within 7 days. 3.  Patient will perform lower body dressing with minimal assistance/contact guard assist and AE PRN within 7 day(s). 4.  Patient will perform toilet transfers with supervision/set-up within 7 day(s). 5.  Patient will perform all aspects of toileting with minimal assistance/contact guard assist within 7 day(s). 6.  Patient will participate in upper extremity therapeutic exercise/activities with supervision/set-up for 5 minutes within 7 day(s). 7.  Patient will utilize energy conservation techniques during functional activities with verbal and visual cues within 7 day(s). Outcome: Progressing Towards Goal   OCCUPATIONAL THERAPY TREATMENT  Patient: Al Mayo (44 y.o. female)  Date: 2/16/2021  Diagnosis: Syncope [R55] Syncope  Procedure(s) (LRB):  COLONOSCOPY (N/A)  ESOPHAGOGASTRODUODENOSCOPY (EGD) (N/A)  ESOPHAGOGASTRODUODENAL (EGD) BIOPSY (N/A)  COLON BIOPSY (N/A) 5 Days Post-Op  Precautions: Fall  Chart, occupational therapy assessment, plan of care, and goals were reviewed. ASSESSMENT  Patient continues with skilled OT services and is progressing towards goals. Participation impacted by impaired reach to feet and bilateral LE edema. Patient instructed in and practiced use of reacher and dressing stick to complete dressing activities. Patient states she has this equipment at home. Current Level of Function Impacting Discharge (ADLs): UE self care and grooming with set up, LE self care with mod assist overall, toileting in bathroom with RW and CGA for mobility    Other factors to consider for discharge: lives alone         PLAN :  Patient continues to benefit from skilled intervention to address the above impairments. Continue treatment per established plan of care. to address goals. Recommend with staff: [place] in chair for meals and self care, UE self care and grooming with set up, LE self care with mod assist overall, toileting in bathroom with RW and CGA for mobility    Recommend next OT session: standing endurance for grooming at sink    Recommendation for discharge: (in order for the patient to meet his/her long term goals)  Therapy up to 5 days/week in SNF setting    This discharge recommendation:  Has been made in collaboration with the attending provider and/or case management    IF patient discharges home will need the following DME: none       SUBJECTIVE:   Patient stated My legs are even bigger.     OBJECTIVE DATA SUMMARY:   Cognitive/Behavioral Status:  Neurologic State: Alert  Orientation Level: Oriented X4  Cognition: Appropriate for age attention/concentration; Appropriate safety awareness; Follows commands  Perception: Appears intact  Perseveration: No perseveration noted  Safety/Judgement: Awareness of environment    Functional Mobility and Transfers for ADLs:  Bed Mobility:       Transfers:  Sit to Stand: Stand-by assistance  Functional Transfers  Bathroom Mobility: Contact guard assistance  Adaptive Equipment: Walker (comment)       Balance:  Sitting: Intact; Without support  Standing: Intact; Without support  Standing - Static: Good;Constant support    ADL Intervention:        Lower Body Dressing Assistance  Dressing Assistance: Minimum assistance  Underpants: Minimum assistance  Pants With Button/Zipper: Minimum assistance  Socks: Total assistance (dependent)  Leg Crossed Method Used: No  Position Performed: Seated in chair  Cues: Verbal cues provided  Adaptive Equipment Used: Dressing stick; Reacher         Cognitive Retraining  Safety/Judgement: Awareness of environment      Activity Tolerance:   Fair    After treatment patient left in no apparent distress:   Sitting in chair and Call bell within reach    COMMUNICATION/COLLABORATION:   The patients plan of care was discussed with: Registered nurse.      CHILANGO Graham  Time Calculation: 29 mins

## 2021-02-17 LAB
ANION GAP SERPL CALC-SCNC: 7 MMOL/L (ref 5–15)
BASOPHILS # BLD: 0 K/UL (ref 0–0.1)
BASOPHILS NFR BLD: 1 % (ref 0–1)
BUN SERPL-MCNC: 25 MG/DL (ref 6–20)
BUN/CREAT SERPL: 19 (ref 12–20)
CALCIUM SERPL-MCNC: 8.4 MG/DL (ref 8.5–10.1)
CHLORIDE SERPL-SCNC: 108 MMOL/L (ref 97–108)
CO2 SERPL-SCNC: 21 MMOL/L (ref 21–32)
CREAT SERPL-MCNC: 1.35 MG/DL (ref 0.55–1.02)
DIFFERENTIAL METHOD BLD: ABNORMAL
EOSINOPHIL # BLD: 0.2 K/UL (ref 0–0.4)
EOSINOPHIL NFR BLD: 3 % (ref 0–7)
ERYTHROCYTE [DISTWIDTH] IN BLOOD BY AUTOMATED COUNT: 16.2 % (ref 11.5–14.5)
FOLATE SERPL-MCNC: 54.6 NG/ML (ref 5–21)
GLUCOSE BLD STRIP.AUTO-MCNC: 118 MG/DL (ref 65–100)
GLUCOSE BLD STRIP.AUTO-MCNC: 130 MG/DL (ref 65–100)
GLUCOSE BLD STRIP.AUTO-MCNC: 91 MG/DL (ref 65–100)
GLUCOSE BLD STRIP.AUTO-MCNC: 94 MG/DL (ref 65–100)
GLUCOSE SERPL-MCNC: 78 MG/DL (ref 65–100)
HCT VFR BLD AUTO: 22 % (ref 35–47)
HCT VFR BLD AUTO: 23.6 % (ref 35–47)
HEMOCCULT STL QL: POSITIVE
HGB BLD-MCNC: 7.2 G/DL (ref 11.5–16)
HGB BLD-MCNC: 7.5 G/DL (ref 11.5–16)
IMM GRANULOCYTES # BLD AUTO: 0 K/UL (ref 0–0.04)
IMM GRANULOCYTES NFR BLD AUTO: 0 % (ref 0–0.5)
IRON SATN MFR SERPL: 14 % (ref 20–50)
IRON SERPL-MCNC: 29 UG/DL (ref 35–150)
LYMPHOCYTES # BLD: 1.3 K/UL (ref 0.8–3.5)
LYMPHOCYTES NFR BLD: 19 % (ref 12–49)
MCH RBC QN AUTO: 31.3 PG (ref 26–34)
MCHC RBC AUTO-ENTMCNC: 32.7 G/DL (ref 30–36.5)
MCV RBC AUTO: 95.7 FL (ref 80–99)
MONOCYTES # BLD: 0.7 K/UL (ref 0–1)
MONOCYTES NFR BLD: 10 % (ref 5–13)
NEUTS SEG # BLD: 4.5 K/UL (ref 1.8–8)
NEUTS SEG NFR BLD: 67 % (ref 32–75)
NRBC # BLD: 0 K/UL (ref 0–0.01)
NRBC BLD-RTO: 0 PER 100 WBC
PLATELET # BLD AUTO: 144 K/UL (ref 150–400)
PMV BLD AUTO: 9.9 FL (ref 8.9–12.9)
POTASSIUM SERPL-SCNC: 3.8 MMOL/L (ref 3.5–5.1)
RBC # BLD AUTO: 2.3 M/UL (ref 3.8–5.2)
SERVICE CMNT-IMP: ABNORMAL
SERVICE CMNT-IMP: ABNORMAL
SERVICE CMNT-IMP: NORMAL
SERVICE CMNT-IMP: NORMAL
SODIUM SERPL-SCNC: 136 MMOL/L (ref 136–145)
TIBC SERPL-MCNC: 204 UG/DL (ref 250–450)
VIT B12 SERPL-MCNC: 1721 PG/ML (ref 193–986)
WBC # BLD AUTO: 6.7 K/UL (ref 3.6–11)

## 2021-02-17 PROCEDURE — C9113 INJ PANTOPRAZOLE SODIUM, VIA: HCPCS | Performed by: HOSPITALIST

## 2021-02-17 PROCEDURE — 85018 HEMOGLOBIN: CPT

## 2021-02-17 PROCEDURE — 85025 COMPLETE CBC W/AUTO DIFF WBC: CPT

## 2021-02-17 PROCEDURE — 74011000250 HC RX REV CODE- 250: Performed by: HOSPITALIST

## 2021-02-17 PROCEDURE — 97116 GAIT TRAINING THERAPY: CPT

## 2021-02-17 PROCEDURE — 74011250637 HC RX REV CODE- 250/637: Performed by: INTERNAL MEDICINE

## 2021-02-17 PROCEDURE — 74011250636 HC RX REV CODE- 250/636: Performed by: HOSPITALIST

## 2021-02-17 PROCEDURE — 74011250637 HC RX REV CODE- 250/637: Performed by: FAMILY MEDICINE

## 2021-02-17 PROCEDURE — 82962 GLUCOSE BLOOD TEST: CPT

## 2021-02-17 PROCEDURE — 74011250637 HC RX REV CODE- 250/637: Performed by: HOSPITALIST

## 2021-02-17 PROCEDURE — 82272 OCCULT BLD FECES 1-3 TESTS: CPT

## 2021-02-17 PROCEDURE — 36415 COLL VENOUS BLD VENIPUNCTURE: CPT

## 2021-02-17 PROCEDURE — 94760 N-INVAS EAR/PLS OXIMETRY 1: CPT

## 2021-02-17 PROCEDURE — 65270000029 HC RM PRIVATE

## 2021-02-17 PROCEDURE — 97530 THERAPEUTIC ACTIVITIES: CPT

## 2021-02-17 PROCEDURE — 82746 ASSAY OF FOLIC ACID SERUM: CPT

## 2021-02-17 PROCEDURE — 80048 BASIC METABOLIC PNL TOTAL CA: CPT

## 2021-02-17 PROCEDURE — 82607 VITAMIN B-12: CPT

## 2021-02-17 PROCEDURE — 83540 ASSAY OF IRON: CPT

## 2021-02-17 RX ORDER — BUMETANIDE 1 MG/1
3 TABLET ORAL 2 TIMES DAILY
Status: DISCONTINUED | OUTPATIENT
Start: 2021-02-17 | End: 2021-02-19 | Stop reason: HOSPADM

## 2021-02-17 RX ADMIN — Medication 10 ML: at 14:25

## 2021-02-17 RX ADMIN — BUMETANIDE 2 MG: 1 TABLET ORAL at 08:25

## 2021-02-17 RX ADMIN — Medication 10 ML: at 22:39

## 2021-02-17 RX ADMIN — CHOLESTYRAMINE 4 G: 4 POWDER, FOR SUSPENSION ORAL at 17:11

## 2021-02-17 RX ADMIN — Medication 1 CAPSULE: at 08:25

## 2021-02-17 RX ADMIN — PANTOPRAZOLE SODIUM 40 MG: 40 INJECTION, POWDER, FOR SOLUTION INTRAVENOUS at 08:25

## 2021-02-17 RX ADMIN — CHOLESTYRAMINE 4 G: 4 POWDER, FOR SUSPENSION ORAL at 12:00

## 2021-02-17 RX ADMIN — CHOLESTYRAMINE 4 G: 4 POWDER, FOR SUSPENSION ORAL at 08:25

## 2021-02-17 RX ADMIN — TRAMADOL HYDROCHLORIDE 50 MG: 50 TABLET, FILM COATED ORAL at 20:21

## 2021-02-17 RX ADMIN — BUMETANIDE 3 MG: 1 TABLET ORAL at 17:11

## 2021-02-17 RX ADMIN — TRAMADOL HYDROCHLORIDE 50 MG: 50 TABLET, FILM COATED ORAL at 02:34

## 2021-02-17 RX ADMIN — PANTOPRAZOLE SODIUM 40 MG: 40 INJECTION, POWDER, FOR SOLUTION INTRAVENOUS at 20:21

## 2021-02-17 NOTE — PROGRESS NOTES
VERA:  RUR: 20%     Disposition:  Admit to [facility].     Follow-up call to Samson (liaison with [facility]). Facility still awaiting insurance auth. CM continuing to follow.     Brittany Salcedo, [address] [facility], [address] [place] [address]

## 2021-02-17 NOTE — PROGRESS NOTES
Nephrology Progress Note  Jayna Aguirre  Date of Admission : 2/3/2021    CC: Follow up for PRABHU on CKD       Assessment and Plan     PRABHU on CKD:  - presumed 2/2 volume depletion  - Increased Bumex to 3 mg BID  - f/u with Dr Lashawn Rodriguez on 3/3/21 at 1 pm     CKD IIIb:  - f/b Dr. Lashawn Rodriguez  - recent baseline 1.5    Orthostasis:  - resolving     Colitis:  - on levo and flagyl  - colonoscopy showing ischemic proctocolitis  - CT scan today    Diarrhea:  - improving    HTN:  - BP stable           Interval History:  Seen and examined. Edema improvng   Cr stable     Current Medications: all current  Medications have been eviewed in EPIC  Review of Systems: Pertinent items are noted in HPI. Objective:  Vitals:    Vitals:    02/16/21 1406 02/16/21 1949 02/17/21 0202 02/17/21 0839   BP: 108/66 123/69 108/60 118/63   Pulse: (!) 55 60 64 69   Resp: 16 17 16 16   Temp: 98.1 °F (36.7 °C) 98 °F (36.7 °C) 98.5 °F (36.9 °C) 98.1 °F (36.7 °C)   SpO2: 100% 98% 97% 96%   Weight:       Height:         Intake and Output:  No intake/output data recorded. No intake/output data recorded. Physical Examination:  General: NAD,Conversant   Neck:  Supple, no mass  Resp:  Lungs CTA B/L, no wheezing , normal respiratory effort  CV:  RRR,  no murmur or rub, no LE edema  GI:  Soft, NT, + Bowel sounds, no hepatosplenomegaly  Neurologic:  Non focal  Psych:             AAO x 3 appropriate affect   Skin:  No Rash  :  No austin    []    High complexity decision making was performed  []    Patient is at high-risk of decompensation with multiple organ involvement    Lab Data Personally Reviewed: I have reviewed all the pertinent labs, microbiology data and radiology studies during assessment.     Recent Labs     02/17/21  0203 02/16/21  1845 02/15/21  0341    136 136   K 3.8 3.8 4.2    108 114*   CO2 21 21 16*   GLU 78 97 80   BUN 25* 26* 22*   CREA 1.35* 1.44* 1.53*   CA 8.4* 8.2* 7.9*   PHOS  --  2.8  --    ALB  --  3.0* 2.1*   ALT  -- --  26     Recent Labs     02/17/21  0203 02/15/21  0341   WBC 6.7 7.6   HGB 7.2* 8.1*   HCT 22.0* 27.8*   * 144*     No results found for: SDES  Lab Results   Component Value Date/Time    Culture result: NO GROWTH 5 DAYS 02/04/2021 12:22 AM    Culture result: NO GROWTH ON SOLID MEDIA AT 2 DAYS 01/13/2021 10:00 AM    Culture result: (A) 01/13/2021 10:00 AM     STAPHYLOCOCCUS SPECIES, COAGULASE NEGATIVE ISOLATED FROM [facility] ONLY     Recent Results (from the past 24 hour(s))   GLUCOSE, POC    Collection Time: 02/16/21  3:39 PM   Result Value Ref Range    Glucose (POC) 112 (H) 65 - 100 mg/dL    Performed by Elizabeth Knox    RENAL FUNCTION PANEL    Collection Time: 02/16/21  6:45 PM   Result Value Ref Range    Sodium 136 136 - 145 mmol/L    Potassium 3.8 3.5 - 5.1 mmol/L    Chloride 108 97 - 108 mmol/L    CO2 21 21 - 32 mmol/L    Anion gap 7 5 - 15 mmol/L    Glucose 97 65 - 100 mg/dL    BUN 26 (H) 6 - 20 MG/DL    Creatinine 1.44 (H) 0.55 - 1.02 MG/DL    BUN/Creatinine ratio 18 12 - 20      GFR est AA 44 (L) >60 ml/min/1.73m2    GFR est non-AA 37 (L) >60 ml/min/1.73m2    Calcium 8.2 (L) 8.5 - 10.1 MG/DL    Phosphorus 2.8 2.6 - 4.7 MG/DL    Albumin 3.0 (L) 3.5 - 5.0 g/dL   GLUCOSE, POC    Collection Time: 02/16/21  9:03 PM   Result Value Ref Range    Glucose (POC) 96 65 - 100 mg/dL    Performed by Elizabeth Knox    CBC WITH AUTOMATED DIFF    Collection Time: 02/17/21  2:03 AM   Result Value Ref Range    WBC 6.7 3.6 - 11.0 K/uL    RBC 2.30 (L) 3.80 - 5.20 M/uL    HGB 7.2 (L) 11.5 - 16.0 g/dL    HCT 22.0 (L) 35.0 - 47.0 %    MCV 95.7 80.0 - 99.0 FL    MCH 31.3 26.0 - 34.0 PG    MCHC 32.7 30.0 - 36.5 g/dL    RDW 16.2 (H) 11.5 - 14.5 %    PLATELET 220 (L) 673 - 400 K/uL    MPV 9.9 8.9 - 12.9 FL    NRBC 0.0 0  WBC    ABSOLUTE NRBC 0.00 0.00 - 0.01 K/uL    NEUTROPHILS 67 32 - 75 %    LYMPHOCYTES 19 12 - 49 %    MONOCYTES 10 5 - 13 %    EOSINOPHILS 3 0 - 7 %    BASOPHILS 1 0 - 1 %    IMMATURE GRANULOCYTES 0 0.0 - 0.5 %    ABS. NEUTROPHILS 4.5 1.8 - 8.0 K/UL    ABS. LYMPHOCYTES 1.3 0.8 - 3.5 K/UL    ABS. MONOCYTES 0.7 0.0 - 1.0 K/UL    ABS. EOSINOPHILS 0.2 0.0 - 0.4 K/UL    ABS. BASOPHILS 0.0 0.0 - 0.1 K/UL    ABS. IMM. GRANS. 0.0 0.00 - 0.04 K/UL    DF AUTOMATED     METABOLIC PANEL, BASIC    Collection Time: 02/17/21  2:03 AM   Result Value Ref Range    Sodium 136 136 - 145 mmol/L    Potassium 3.8 3.5 - 5.1 mmol/L    Chloride 108 97 - 108 mmol/L    CO2 21 21 - 32 mmol/L    Anion gap 7 5 - 15 mmol/L    Glucose 78 65 - 100 mg/dL    BUN 25 (H) 6 - 20 MG/DL    Creatinine 1.35 (H) 0.55 - 1.02 MG/DL    BUN/Creatinine ratio 19 12 - 20      GFR est AA 48 (L) >60 ml/min/1.73m2    GFR est non-AA 39 (L) >60 ml/min/1.73m2    Calcium 8.4 (L) 8.5 - 10.1 MG/DL   GLUCOSE, POC    Collection Time: 02/17/21  6:47 AM   Result Value Ref Range    Glucose (POC) 94 65 - 100 mg/dL    Performed by [address] [place] [address], POC    Collection Time: 02/17/21 11:19 AM   Result Value Ref Range    Glucose (POC) 91 65 - 100 mg/dL    Performed by Yosi Hernandez MD  [address]  Phone - [phone]   Fax - [phone]  [email or web address]

## 2021-02-17 NOTE — PROGRESS NOTES
Problem: Mobility Impaired (Adult and Pediatric)  Goal: *Acute Goals and Plan of Care (Insert Text)  Description: FUNCTIONAL STATUS PRIOR TO ADMISSION: Patient was modified independent using bilateral single point cane for functional mobility. HOME SUPPORT PRIOR TO ADMISSION: The patient lived alone with family to provide assistance. Physical Therapy Goals  Weekly re-assessment 2/12/2021  All goals remain appropriate    Initiated 2/5/2021  1. Patient will move from supine to sit and sit to supine  and scoot up and down in bed with modified independence within 7 day(s). 2.  Patient will transfer from bed to chair and chair to bed with modified independence using the least restrictive device within 7 day(s). 3.  Patient will perform sit to stand with modified independence within 7 day(s). 4.  Patient will ambulate with modified independence for 150 feet with the least restrictive device within 7 day(s). 5.  Patient will ascend/descend 4 stairs with handrail(s) with modified independence within 7 day(s). Outcome: Progressing Towards Goal  PHYSICAL THERAPY TREATMENT  Patient: Garfield Tran (5 y.o. female)  Date: 2/17/2021  Diagnosis: Syncope [R55] Syncope  Procedure(s) (LRB):  COLONOSCOPY (N/A)  ESOPHAGOGASTRODUODENOSCOPY (EGD) (N/A)  ESOPHAGOGASTRODUODENAL (EGD) BIOPSY (N/A)  COLON BIOPSY (N/A) 6 Days Post-Op  Precautions: Fall  Chart, physical therapy assessment, plan of care and goals were reviewed. ASSESSMENT  Patient continues with skilled PT services and is progressing towards goals. Patient in chair on arrival with BUE elevated. She reports continued edema but improvements. Patient with anxiety of report of HGB decreasing to 7.4 but remains symptomatic. She tolerated increasing distance of ambulation well but requires increased time, definite support of RW, and cues to improve posture. Progressing well with therapy and expect she will continue to do so in SNF rehab.      Current Level of Function Impacting Discharge (mobility/balance): CGA    Other factors to consider for discharge: lives alone         PLAN :  Patient continues to benefit from skilled intervention to address the above impairments. Continue treatment per established plan of care. to address goals. Recommendation for discharge: (in order for the patient to meet his/her long term goals)  Therapy up to 5 days/week in SNF setting    This discharge recommendation:  Has been made in collaboration with the attending provider and/or case management    IF patient discharges home will need the following DME: to be determined (TBD)       SUBJECTIVE:   Patient stated I wish they knew why it dropped.     OBJECTIVE DATA SUMMARY:   Critical Behavior:  Neurologic State: Alert  Orientation Level: Oriented X4  Cognition: Appropriate decision making  Safety/Judgement: Awareness of environment  Functional Mobility Training:  Bed Mobility:   Not assessed; patient in chair on arrival      Transfers:  Sit to Stand: Supervision; Additional time  Stand to Sit: Supervision     Balance:  Sitting: Intact  Standing: Impaired  Standing - Static: Good  Standing - Dynamic : Fair  Ambulation/Gait Training:  Distance (ft): 115 Feet (ft)  Assistive Device: Walker, rolling;Gait belt  Ambulation - Level of Assistance: Contact guard assistance; Additional time        Gait Abnormalities: Decreased step clearance;Shuffling gait(forward flexed posture)        Base of Support: Widened     Speed/Karen: Pace decreased (<100 feet/min)  Step Length: Right shortened;Left shortened                  Slow, cues to increased upright posture and decreased UE support/lean on RW. Ambulated to/from bathroom, navigated small space well but required increased time, and in hallway.        Therapeutic Exercises:   Reviewed edema management exercises: ankle pumps, quad sets, glut sets, LAQ  Pain Rating:  No c/o pain     Activity Tolerance:   Good and Fair    After treatment patient left in no apparent distress:   Sitting in chair, Call bell within reach, and LE elevated     COMMUNICATION/COLLABORATION:   The patients plan of care was discussed with: Registered nurse.      Britney Moreno, PT, DPT   Time Calculation: 23 mins

## 2021-02-18 LAB
ANION GAP SERPL CALC-SCNC: 9 MMOL/L (ref 5–15)
BASOPHILS # BLD: 0 K/UL (ref 0–0.1)
BASOPHILS NFR BLD: 1 % (ref 0–1)
BUN SERPL-MCNC: 26 MG/DL (ref 6–20)
BUN/CREAT SERPL: 18 (ref 12–20)
CALCIUM SERPL-MCNC: 8.2 MG/DL (ref 8.5–10.1)
CHLORIDE SERPL-SCNC: 108 MMOL/L (ref 97–108)
CO2 SERPL-SCNC: 17 MMOL/L (ref 21–32)
CREAT SERPL-MCNC: 1.43 MG/DL (ref 0.55–1.02)
DIFFERENTIAL METHOD BLD: ABNORMAL
EOSINOPHIL # BLD: 0.1 K/UL (ref 0–0.4)
EOSINOPHIL NFR BLD: 2 % (ref 0–7)
ERYTHROCYTE [DISTWIDTH] IN BLOOD BY AUTOMATED COUNT: 16.3 % (ref 11.5–14.5)
GLUCOSE BLD STRIP.AUTO-MCNC: 106 MG/DL (ref 65–100)
GLUCOSE BLD STRIP.AUTO-MCNC: 112 MG/DL (ref 65–100)
GLUCOSE BLD STRIP.AUTO-MCNC: 93 MG/DL (ref 65–100)
GLUCOSE BLD STRIP.AUTO-MCNC: 94 MG/DL (ref 65–100)
GLUCOSE SERPL-MCNC: 78 MG/DL (ref 65–100)
HCT VFR BLD AUTO: 21.3 % (ref 35–47)
HCT VFR BLD AUTO: 21.8 % (ref 35–47)
HGB BLD-MCNC: 6.9 G/DL (ref 11.5–16)
HGB BLD-MCNC: 7 G/DL (ref 11.5–16)
HISTORY CHECKED?,CKHIST: NORMAL
IMM GRANULOCYTES # BLD AUTO: 0 K/UL (ref 0–0.04)
IMM GRANULOCYTES NFR BLD AUTO: 1 % (ref 0–0.5)
LYMPHOCYTES # BLD: 1.1 K/UL (ref 0.8–3.5)
LYMPHOCYTES NFR BLD: 18 % (ref 12–49)
MCH RBC QN AUTO: 31.1 PG (ref 26–34)
MCHC RBC AUTO-ENTMCNC: 32.1 G/DL (ref 30–36.5)
MCV RBC AUTO: 96.9 FL (ref 80–99)
MONOCYTES # BLD: 0.7 K/UL (ref 0–1)
MONOCYTES NFR BLD: 12 % (ref 5–13)
NEUTS SEG # BLD: 4.2 K/UL (ref 1.8–8)
NEUTS SEG NFR BLD: 66 % (ref 32–75)
NRBC # BLD: 0 K/UL (ref 0–0.01)
NRBC BLD-RTO: 0 PER 100 WBC
PLATELET # BLD AUTO: 140 K/UL (ref 150–400)
PMV BLD AUTO: 10.1 FL (ref 8.9–12.9)
POTASSIUM SERPL-SCNC: 3.8 MMOL/L (ref 3.5–5.1)
RBC # BLD AUTO: 2.25 M/UL (ref 3.8–5.2)
SERVICE CMNT-IMP: ABNORMAL
SERVICE CMNT-IMP: ABNORMAL
SERVICE CMNT-IMP: NORMAL
SERVICE CMNT-IMP: NORMAL
SODIUM SERPL-SCNC: 134 MMOL/L (ref 136–145)
WBC # BLD AUTO: 6.2 K/UL (ref 3.6–11)

## 2021-02-18 PROCEDURE — 74011250637 HC RX REV CODE- 250/637: Performed by: INTERNAL MEDICINE

## 2021-02-18 PROCEDURE — 86901 BLOOD TYPING SEROLOGIC RH(D): CPT

## 2021-02-18 PROCEDURE — 65270000029 HC RM PRIVATE

## 2021-02-18 PROCEDURE — C9113 INJ PANTOPRAZOLE SODIUM, VIA: HCPCS | Performed by: HOSPITALIST

## 2021-02-18 PROCEDURE — 36415 COLL VENOUS BLD VENIPUNCTURE: CPT

## 2021-02-18 PROCEDURE — 74011250636 HC RX REV CODE- 250/636: Performed by: HOSPITALIST

## 2021-02-18 PROCEDURE — 74011250637 HC RX REV CODE- 250/637: Performed by: FAMILY MEDICINE

## 2021-02-18 PROCEDURE — 94760 N-INVAS EAR/PLS OXIMETRY 1: CPT

## 2021-02-18 PROCEDURE — 74011250636 HC RX REV CODE- 250/636: Performed by: INTERNAL MEDICINE

## 2021-02-18 PROCEDURE — 82962 GLUCOSE BLOOD TEST: CPT

## 2021-02-18 PROCEDURE — 80048 BASIC METABOLIC PNL TOTAL CA: CPT

## 2021-02-18 PROCEDURE — 85018 HEMOGLOBIN: CPT

## 2021-02-18 PROCEDURE — 85025 COMPLETE CBC W/AUTO DIFF WBC: CPT

## 2021-02-18 PROCEDURE — P9047 ALBUMIN (HUMAN), 25%, 50ML: HCPCS | Performed by: INTERNAL MEDICINE

## 2021-02-18 PROCEDURE — 74011000250 HC RX REV CODE- 250: Performed by: HOSPITALIST

## 2021-02-18 PROCEDURE — 86923 COMPATIBILITY TEST ELECTRIC: CPT

## 2021-02-18 PROCEDURE — P9016 RBC LEUKOCYTES REDUCED: HCPCS

## 2021-02-18 PROCEDURE — 36430 TRANSFUSION BLD/BLD COMPNT: CPT

## 2021-02-18 RX ORDER — ALBUMIN HUMAN 250 G/1000ML
25 SOLUTION INTRAVENOUS EVERY 6 HOURS
Status: COMPLETED | OUTPATIENT
Start: 2021-02-18 | End: 2021-02-18

## 2021-02-18 RX ORDER — SODIUM CHLORIDE 9 MG/ML
250 INJECTION, SOLUTION INTRAVENOUS AS NEEDED
Status: DISCONTINUED | OUTPATIENT
Start: 2021-02-18 | End: 2021-02-19 | Stop reason: HOSPADM

## 2021-02-18 RX ADMIN — BUMETANIDE 3 MG: 1 TABLET ORAL at 17:59

## 2021-02-18 RX ADMIN — ACETAMINOPHEN 650 MG: 325 TABLET ORAL at 08:25

## 2021-02-18 RX ADMIN — TRAMADOL HYDROCHLORIDE 50 MG: 50 TABLET, FILM COATED ORAL at 08:25

## 2021-02-18 RX ADMIN — PANTOPRAZOLE SODIUM 40 MG: 40 INJECTION, POWDER, FOR SOLUTION INTRAVENOUS at 20:53

## 2021-02-18 RX ADMIN — CHOLESTYRAMINE 4 G: 4 POWDER, FOR SUSPENSION ORAL at 17:59

## 2021-02-18 RX ADMIN — Medication 10 ML: at 07:01

## 2021-02-18 RX ADMIN — CHOLESTYRAMINE 4 G: 4 POWDER, FOR SUSPENSION ORAL at 07:01

## 2021-02-18 RX ADMIN — ALBUMIN (HUMAN) 25 G: 0.25 INJECTION, SOLUTION INTRAVENOUS at 20:49

## 2021-02-18 RX ADMIN — Medication 10 ML: at 20:53

## 2021-02-18 RX ADMIN — ALBUMIN (HUMAN) 25 G: 0.25 INJECTION, SOLUTION INTRAVENOUS at 08:24

## 2021-02-18 RX ADMIN — BUMETANIDE 3 MG: 1 TABLET ORAL at 11:39

## 2021-02-18 RX ADMIN — PANTOPRAZOLE SODIUM 40 MG: 40 INJECTION, POWDER, FOR SOLUTION INTRAVENOUS at 08:25

## 2021-02-18 RX ADMIN — Medication 1 CAPSULE: at 08:25

## 2021-02-18 RX ADMIN — ALBUMIN (HUMAN) 25 G: 0.25 INJECTION, SOLUTION INTRAVENOUS at 15:27

## 2021-02-18 RX ADMIN — IRON SUCROSE 400 MG: 20 INJECTION, SOLUTION INTRAVENOUS at 11:29

## 2021-02-18 NOTE — PROGRESS NOTES
[facility]  Hospitalist Group                                                                                          Hospitalist Progress Note  Melisa Fox MD  Answering service: [phone] from in house phone        Date of Service:  2021  NAME:  Efren Canada  :  1955  MRN:  [id number]    This documentation was facilitated by a Voice Recognition software and may contain inadvertent typographical errors. Admission Summary:   From h&P  \"This is a 23-year-old woman with a past medical history significant for chronic kidney disease, dyslipidemia, type 2 diabetes, hypertension, peripheral artery disease, was in her usual state of health until the day of her presentation at the emergency room when the patient stated that she fell at home. The patient complained of generalized weakness, which is progressive. Also complained of diarrhea. According to the patient, she woke up from sleep, was trying to go to the bathroom, when all of a sudden, the patient felt dizzy and passed out. She has no recollection of the event. \"    Interval history / Subjective:        Patient sitting in recliner chair with her legs raised. She is on room air. Denies shortness of breath. She tells me she is not having as much stool leakage and stool is more formed consistency. Hemoglobin from this morning was 7.2 which is almost a 1 point drop from 2 days ago. I told her we will keep her today, monitor her hemoglobin, will check this afternoon and tomorrow morning if hemoglobin remains stable that she could be discharged otherwise if hemoglobin drops below 7 she would need blood. Assessment & Plan:   Severe acute on chronic anemia, suspect GIB  -Hold aspirin and sc heparin. IV PPI  -EGD showed patient with ulceration of the mid esophagus, grade nasopharyngitis and a small sliding hiatal hernia, few small areas of erythema in the stomach, diffuse clean-based ulcerations throughout the duodenal bulb without active bleeding. Biopsies pending  -Colonoscopy showed diffuse ischemic changes with submucosal edema and circumferential coalescing ulcerations involving all of the rectum extending to the dentate line. Ulceration extends approximately 50 cm from anal verge. The sigmoid, descending and transverse colon are normal-appearing mucosa. -Received a unit of blood on 2/11  -Hemoglobin dropped to 7.2 today from 8.1 two days ago. Home discharge today, check hemoglobin in the afternoon and transfuse as needed. Syncope likely orthostatic from volume contraction due to diarrhea  -MRI shows no acute hemorrhage or infarction. 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  -Echo with normal LVEF,no significant valve disease. Fall  -As a result of her syncope  -Negative work-up for acute fracture or injury    Proctocolitis  -Evident on CT abdomen and pelvis  -Stool studies negative  -Continue questran and immodium prn for loose stool  --EGD showed patient with ulceration of the mid esophagus, grade nasopharyngitis and a small sliding hiatal hernia, few small areas of erythema in the stomach, diffuse clean-based ulcerations throughout the duodenal bulb without active bleeding. Biopsies pending  -Colonoscopy showed diffuse ischemic changes with submucosal edema and circumferential coalescing ulcerations involving all of the rectum extending to the dentate line. Ulceration extends approximately 50 cm from anal verge. The sigmoid, descending and transverse colon are normal-appearing mucosa. -Colorectal surgery consulted, recommended conservative approach for now. -Repeat abdominal pelvic CT showed improvement in the rectosigmoid colon.       Acute kidney injury on CKD III/IV  -In the setting of rhabdo and volume depletion from diarrhea  -Nephrology evaluating the patient  -creatinine at baseline. IV fluid already discontinued and she is back on her home Bumex. Chronic leg edema with increasing leg and abdominal edema. No sob or hypoxia  -D/c iv fluids  -She still has significant leg edema and creatinine has improved significantly, Bumex dose increased. Nephrology following. Rhabdomyolysis. Resolved  -As a result of fall      Metabolic acidosis, improved. -Due to alkaline loss from GI tract as well as acute kidney injury  -Now resolved  -Nephrology following. Thrombocytopenia~mild without bleeding complications.  -Platelet> 1 4 renal.    Abnormal LFTs. Resolved  -Also likely related to infectious process,rhabdomyolysis  -CT abdomen and pelvis without any acute liver or gallbladder pathology      Hypertension  -She is back on Bumex, blood pressure is soft systolic in the low 982I, continue to hold antihypertensives. T2Dm on metformin preadmission, controlled. -Continue insulin sliding scale coverage    Dyslipidemia  -Holding statin due to rhabdomyolysis      Code status:full code  DVT prophylaxis: scd,d/c heparin due to GIB  Care Plan discussed with: Patient/Family and Nurse  Anticipated Disposition: If hemoglobin remains stable, she could be discharged to SNF as early as tomorrow. Discussed with case management, insurance Auth pending as of this morning. Hospital Problems  Date Reviewed: 2/11/2021          Codes Class Noted POA    Acute blood loss anemia ICD-10-CM: D62  ICD-9-CM: 285.1  2/11/2021 Yes        * (Principal) Syncope ICD-10-CM: R55  ICD-9-CM: 780.2  2/4/2021 Yes                Review of Systems:   A comprehensive review of systems was negative except for that written in the HPI. Physical Examination:     I had a face to face encounter with this patient and independently examined them on2/17/2021 as outlined below:        Constitutional:  No acute distress, cooperative, pleasant    HEENT:  Atraumatic. Oral mucosa moist,. Non icteric sclera. No pallor. Resp: CTA bilaterally. No wheezing/rhonchi/rales. No accessory muscle use   Chest Wall: No deformity   CV:  Regular rhythm, normal rate, no murmurs, gallops, rubs    GI:  Soft, non distended, non tender. Bruises from sc injection. normoactive bowel sounds, no hepatosplenomegaly,+abd edema   :  No CVA or suprapubic tenderness    Musculoskeletal: ++ leg edema. Neurologic:  Mental status:AAOx3,   Cranial nerves II-XII : WNL  Motor exam:Moves all extremities symmetrically              Data Review:    Review and/or order of clinical lab test  Review and/or order of tests in the radiology section of CPT  Review and/or order of tests in the medicine section of CPT      Labs:     Recent Labs     02/17/21  1549 02/17/21  0203 02/15/21  0341   WBC  --  6.7 7.6   HGB 7.5* 7.2* 8.1*   HCT 23.6* 22.0* 27.8*   PLT  --  144* 144*     Recent Labs     02/17/21  0203 02/16/21  1845 02/15/21  0341    136 136   K 3.8 3.8 4.2    108 114*   CO2 21 21 16*   BUN 25* 26* 22*   CREA 1.35* 1.44* 1.53*   GLU 78 97 80   CA 8.4* 8.2* 7.9*   PHOS  --  2.8  --      Recent Labs     02/16/21  1845 02/15/21  0341   ALT  --  26   AP  --  52   TBILI  --  0.3   TP  --  5.3*   ALB 3.0* 2.1*   GLOB  --  3.2     No results for input(s): INR, PTP, APTT, INREXT, INREXT in the last 72 hours. Recent Labs     02/17/21  1549   TIBC 204*   PSAT 14*      Lab Results   Component Value Date/Time    Folate 54.6 (H) 02/17/2021 03:49 PM      No results for input(s): PH, PCO2, PO2 in the last 72 hours. No results for input(s): CPK, CKNDX, TROIQ in the last 72 hours.     No lab exists for component: CPKMB  Lab Results   Component Value Date/Time    Cholesterol, total 165 09/29/2016 10:02 AM    HDL Cholesterol 34 09/29/2016 10:02 AM    LDL, calculated 91.2 09/29/2016 10:02 AM    Triglyceride 199 (H) 09/29/2016 10:02 AM    CHOL/HDL Ratio 4.9 09/29/2016 10:02 AM     Lab Results   Component Value Date/Time    Glucose (POC) 130 (H) 02/17/2021 09:02 PM Glucose (POC) 118 (H) 02/17/2021 04:51 PM    Glucose (POC) 91 02/17/2021 11:19 AM    Glucose (POC) 94 02/17/2021 06:47 AM    Glucose (POC) 96 02/16/2021 09:03 PM     Lab Results   Component Value Date/Time    Color DARK YELLOW 02/04/2021 02:16 AM    Appearance CLOUDY (A) 02/04/2021 02:16 AM    Specific gravity 1.021 02/04/2021 02:16 AM    pH (UA) 5.0 02/04/2021 02:16 AM    Protein 30 (A) 02/04/2021 02:16 AM    Glucose Negative 02/04/2021 02:16 AM    Ketone Negative 02/04/2021 02:16 AM    Bilirubin NEGATIVE  09/29/2016 10:02 AM    Urobilinogen 0.2 02/04/2021 02:16 AM    Nitrites Negative 02/04/2021 02:16 AM    Leukocyte Esterase Negative 02/04/2021 02:16 AM    Epithelial cells FEW 02/04/2021 02:16 AM    Bacteria Negative 02/04/2021 02:16 AM    WBC 0-4 02/04/2021 02:16 AM    RBC 0-5 02/04/2021 02:16 AM         Medications Reviewed:     Current Facility-Administered Medications   Medication Dose Route Frequency    bumetanide (BUMEX) tablet 3 mg  3 mg Oral BID    0.9% sodium chloride infusion 250 mL  250 mL IntraVENous PRN    pantoprazole (PROTONIX) 40 mg in 0.9% sodium chloride 10 mL injection  40 mg IntraVENous Q12H    sodium chloride (NS) flush 5-40 mL  5-40 mL IntraVENous PRN    traMADoL (ULTRAM) tablet 50 mg  50 mg Oral Q6H PRN    loperamide (IMODIUM) capsule 4 mg  4 mg Oral Q4H PRN    cholestyramine-aspartame (QUESTRAN LIGHT) packet 4 g  4 g Oral TID WITH MEALS    [Held by provider] aspirin chewable tablet 81 mg  81 mg Oral DAILY    sodium chloride (NS) flush 5-40 mL  5-40 mL IntraVENous Q8H    sodium chloride (NS) flush 5-40 mL  5-40 mL IntraVENous PRN    acetaminophen (TYLENOL) tablet 650 mg  650 mg Oral Q6H PRN    Or    acetaminophen (TYLENOL) suppository 650 mg  650 mg Rectal Q6H PRN    promethazine (PHENERGAN) tablet 12.5 mg  12.5 mg Oral Q6H PRN    Or    ondansetron (ZOFRAN) injection 4 mg  4 mg IntraVENous Q6H PRN    [Held by provider] heparin (porcine) injection 5,000 Units  5,000 Units SubCUTAneous Q8H    L.acidophilus-paracasei-S.thermophil-bifidobacter (RISAQUAD) 8 billion cell capsule  1 Cap Oral DAILY    insulin lispro (HUMALOG) injection   SubCUTAneous AC&HS    glucose chewable tablet 16 g  4 Tab Oral PRN    dextrose (D50W) injection syrg 12.5-25 g  12.5-25 g IntraVENous PRN    glucagon (GLUCAGEN) injection 1 mg  1 mg IntraMUSCular PRN     ______________________________________________________________________  EXPECTED LENGTH OF STAY: 2d 16h  ACTUAL LENGTH OF STAY:          13                 Laura Qureshi MD

## 2021-02-18 NOTE — PROGRESS NOTES
Nephrology Progress Note  Valorie Part  Date of Admission : 2/3/2021    CC: Follow up for PRABHU on CKD       Assessment and Plan     PRABHU on CKD:  - presumed 2/2 volume depletion  - continue Bumex to 3 mg BID  - ordered IV albumin for today   - f/u with Dr Jorge L Jackson on 3/3/21 at 1 pm     CKD IIIb:  - f/b Dr. Jorge L Jackson  - recent baseline 1.5    Worsening anemia :  - per primary team   - iron def: being replaced     Orthostasis:  - resolving     Colitis:  - on levo and flagyl  - colonoscopy showing ischemic proctocolitis    HTN:  - BP stable           Interval History:  Seen and examined. Edema improvng   Cr stable   Hb continues to trend down   Getting venofer this morning   BP low     Current Medications: all current  Medications have been eviewed in EPIC  Review of Systems: Pertinent items are noted in HPI. Objective:  Vitals:    Vitals:    02/17/21 2106 02/18/21 0347 02/18/21 0824 02/18/21 0842   BP: 111/63 (!) 94/52 (!) 95/58 (!) 95/58   Pulse: 70 71  63   Resp: 16 18 18   Temp: 99 °F (37.2 °C) 99.2 °F (37.3 °C)  97.8 °F (36.6 °C)   SpO2: 100% 96%  98%   Weight:       Height:         Intake and Output:  No intake/output data recorded. No intake/output data recorded. Physical Examination:  General: NAD,Conversant   Neck:  Supple, no mass  Resp:  CTA  CV:  RRR,  no murmur or rub, 3+LE edema  GI:  Soft, NT, + Bs  Neurologic:  Non focal  Psych:             AAO x 3 appropriate affect   Skin:  No Rash  :  No austin    []    High complexity decision making was performed  []    Patient is at high-risk of decompensation with multiple organ involvement    Lab Data Personally Reviewed: I have reviewed all the pertinent labs, microbiology data and radiology studies during assessment.     Recent Labs     02/18/21  0514 02/17/21  0203 02/16/21  1845   * 136 136   K 3.8 3.8 3.8    108 108   CO2 17* 21 21   GLU 78 78 97   BUN 26* 25* 26*   CREA 1.43* 1.35* 1.44*   CA 8.2* 8.4* 8.2*   PHOS  --   --  2.8   ALB --   --  3.0*     Recent Labs     02/18/21  0514 02/17/21  1549 02/17/21  0203   WBC 6.2  --  6.7   HGB 6.9*  7.0* 7.5* 7.2*   HCT 21.3*  21.8* 23.6* 22.0*   *  --  144*     No results found for: SDES  Lab Results   Component Value Date/Time    Culture result: NO GROWTH 5 DAYS 02/04/2021 12:22 AM    Culture result: NO GROWTH ON SOLID MEDIA AT 2 DAYS 01/13/2021 10:00 AM    Culture result: (A) 01/13/2021 10:00 AM     STAPHYLOCOCCUS SPECIES, COAGULASE NEGATIVE ISOLATED FROM [facility] ONLY     Recent Results (from the past 24 hour(s))   GLUCOSE, POC    Collection Time: 02/17/21 11:19 AM   Result Value Ref Range    Glucose (POC) 91 65 - 100 mg/dL    Performed by Adrianna Alford    HGB & HCT    Collection Time: 02/17/21  3:49 PM   Result Value Ref Range    HGB 7.5 (L) 11.5 - 16.0 g/dL    HCT 23.6 (L) 35.0 - 47.0 %   IRON PROFILE    Collection Time: 02/17/21  3:49 PM   Result Value Ref Range    Iron 29 (L) 35 - 150 ug/dL    TIBC 204 (L) 250 - 450 ug/dL    Iron % saturation 14 (L) 20 - 50 %   VITAMIN B12    Collection Time: 02/17/21  3:49 PM   Result Value Ref Range    Vitamin B12 1,721 (H) 193 - 986 pg/mL   FOLATE    Collection Time: 02/17/21  3:49 PM   Result Value Ref Range    Folate 54.6 (H) 5.0 - 21.0 ng/mL   OCCULT BLOOD, STOOL    Collection Time: 02/17/21  4:02 PM   Result Value Ref Range    Occult blood, stool Positive (A) NEG     GLUCOSE, POC    Collection Time: 02/17/21  4:51 PM   Result Value Ref Range    Glucose (POC) 118 (H) 65 - 100 mg/dL    Performed by BRI SURESH    GLUCOSE, POC    Collection Time: 02/17/21  9:02 PM   Result Value Ref Range    Glucose (POC) 130 (H) 65 - 100 mg/dL    Performed by Vika Bridges    HGB & HCT    Collection Time: 02/18/21  5:14 AM   Result Value Ref Range    HGB 6.9 (L) 11.5 - 16.0 g/dL    HCT 21.3 (L) 35.0 - 37.8 %   METABOLIC PANEL, BASIC    Collection Time: 02/18/21  5:14 AM   Result Value Ref Range    Sodium 134 (L) 136 - 145 mmol/L    Potassium 3.8 3.5 - 5.1 mmol/L    Chloride 108 97 - 108 mmol/L    CO2 17 (L) 21 - 32 mmol/L    Anion gap 9 5 - 15 mmol/L    Glucose 78 65 - 100 mg/dL    BUN 26 (H) 6 - 20 MG/DL    Creatinine 1.43 (H) 0.55 - 1.02 MG/DL    BUN/Creatinine ratio 18 12 - 20      GFR est AA 45 (L) >60 ml/min/1.73m2    GFR est non-AA 37 (L) >60 ml/min/1.73m2    Calcium 8.2 (L) 8.5 - 10.1 MG/DL   CBC WITH AUTOMATED DIFF    Collection Time: 02/18/21  5:14 AM   Result Value Ref Range    WBC 6.2 3.6 - 11.0 K/uL    RBC 2.25 (L) 3.80 - 5.20 M/uL    HGB 7.0 (L) 11.5 - 16.0 g/dL    HCT 21.8 (L) 35.0 - 47.0 %    MCV 96.9 80.0 - 99.0 FL    MCH 31.1 26.0 - 34.0 PG    MCHC 32.1 30.0 - 36.5 g/dL    RDW 16.3 (H) 11.5 - 14.5 %    PLATELET 035 (L) 328 - 400 K/uL    MPV 10.1 8.9 - 12.9 FL    NRBC 0.0 0  WBC    ABSOLUTE NRBC 0.00 0.00 - 0.01 K/uL    NEUTROPHILS 66 32 - 75 %    LYMPHOCYTES 18 12 - 49 %    MONOCYTES 12 5 - 13 %    EOSINOPHILS 2 0 - 7 %    BASOPHILS 1 0 - 1 %    IMMATURE GRANULOCYTES 1 (H) 0.0 - 0.5 %    ABS. NEUTROPHILS 4.2 1.8 - 8.0 K/UL    ABS. LYMPHOCYTES 1.1 0.8 - 3.5 K/UL    ABS. MONOCYTES 0.7 0.0 - 1.0 K/UL    ABS. EOSINOPHILS 0.1 0.0 - 0.4 K/UL    ABS. BASOPHILS 0.0 0.0 - 0.1 K/UL    ABS. IMM. GRANS. 0.0 0.00 - 0.04 K/UL    DF AUTOMATED     GLUCOSE, POC    Collection Time: 02/18/21  6:59 AM   Result Value Ref Range    Glucose (POC) 94 65 - 100 mg/dL    Performed by Eileen NAJERA, ALLOCATE    Collection Time: 02/18/21  7:30 AM   Result Value Ref Range    HISTORY CHECKED? Historical check performed                      Flores Vincent MD  [address]  Phone - [phone]   Fax - [phone]  www. [facility]

## 2021-02-18 NOTE — PROGRESS NOTES
Comprehensive Nutrition Assessment    Type and Reason for Visit: Reassess    Nutrition Recommendations/Plan:    1. Continue current diet, encouraged binding foods - rice, toast, occasional banana (careful with high K+)    Nutrition Assessment:    60year old female admitted for Syncope [R55] who  has a past medical history of Cancer, CKD stage IV (3/1/2016), Diabetes, HTN (11/2/2015), LBP (low back pain) (11/2/2015), Menopause, Microalbuminuria (11/21/2015), Obesity (11/2/2015), PAD (peripheral artery disease) (9/29/2016), and Venous insufficiency (11/2/2015). Pt had recent partial toe amputation, had syncopal event at home and fell, pt reports she has not had control of her bowels since this event. On abx and eating fair to poorly, LE edema, renal function improving with gentle hydration. GI consult noted. Visited with patient and encouraged some bulking foods & will benefit from continued probiotics once abx are finished. May consider trial of Banatrol if open to needing additional help binding. Pt has lost a significant amount of weight however, this was intentional as she is a member of [employer] program. This being said, wt loss could be side effect of another underlying condition and should be monitored. Did not complete nutrition focused physical exam at this time. 2/18 - pt seen for follow-up. Discussed in rounds. Pt with excellent appetite, eating very well. Less fecal leakage, awaiting rehab placement. Iron deficiency - IV venofer    Malnutrition Assessment:  Malnutrition Status:  Insufficient data      Nutritionally Significant Medications: Bumex, Questran light, Risaquad, Protonix, Imodium PRN, Venofex       Estimated Daily Nutrient Needs:  Energy (kcal): 1785 - 1933 kcal/d (MSJ xAF 1.2 - 1.3); Weight Used for Energy Requirements: Current  Protein (g): 92-110g (1-1.2g/kg);  Weight Used for Protein Requirements: Current(92 kg - admit/stated wt)  Fluid (ml/day): 2L/d; Method Used for Fluid Requirements: 1 ml/kcal    Nutrition Related Findings:   BM: 2/17 - loose  Edema: bilat 3+ pitting  Wounds:  Surgical incision       Current Nutrition Therapies:   DIET GI LITE (POST SURGICAL)    Meal intake:   No data found. Anthropometric Measures:  · Height:  5' 6\" (167.6 cm)  · Current Body Wt:  92.1 kg (203 lb)   · Admission Body Wt:  204 lb 12.9 oz    · Usual Body Wt:        · Ideal Body Wt:   :  156.2 %   Body mass index is 40.17 kg/m². · BMI Categories:  Obese class 2 (BMI 35.0-39. 9)       Last 3 Recorded Weights in this Encounter    02/12/21 0802 02/13/21 0740 02/14/21 0737   Weight: 116.7 kg (257 lb 4.4 oz) 111.9 kg (246 lb 11.1 oz) 112.9 kg (248 lb 14.4 oz)     Wt Readings from Last 10 Encounters:   02/14/21 112.9 kg (248 lb 14.4 oz)   02/04/21 92.5 kg (204 lb)   04/03/19 104.8 kg (231 lb)   04/03/19 104.8 kg (231 lb)   03/19/19 105.1 kg (231 lb 9.6 oz)   09/21/18 106.1 kg (234 lb)   08/20/18 106.1 kg (234 lb)   07/10/18 106.1 kg (234 lb)   02/19/18 106.1 kg (234 lb)   10/19/17 107.6 kg (237 lb 4.8 oz)       Nutrition Diagnosis:   · Altered GI function related to altered GI structure as evidenced by diarrhea    Nutrition Interventions:   Food and/or Nutrient Delivery: Continue current diet  Nutrition Education and Counseling: Education initiated  Coordination of Nutrition Care: Continue to monitor while inpatient    Goals:  intake of 75% of meals with reduced diarrhea to 1-2x daily within 1 week       Nutrition Monitoring and Evaluation:   Behavioral-Environmental Outcomes: Knowledge or skill  Food/Nutrient Intake Outcomes: Food and nutrient intake  Physical Signs/Symptoms Outcomes: Weight, GI status, Diarrhea, Biochemical data    Discharge Planning:    No discharge needs at this time       Recent Labs     02/18/21  0514 02/17/21  0203 02/16/21  1845   GLU 78 78 97   BUN 26* 25* 26*   CREA 1.43* 1.35* 1.44*   * 136 136   K 3.8 3.8 3.8    108 108   CO2 17* 21 21   CA 8.2* 8.4* 8.2*   PHOS  -- --  2.8       Recent Labs     02/16/21  1845   ALB 3.0*         Recent Labs     02/18/21  0514 02/17/21  1549 02/17/21  0203   WBC 6.2  --  6.7   HGB 6.9*  7.0* 7.5* 7.2*   HCT 21.3*  21.8* 23.6* 22.0*   *  --  144*         Recent Labs     02/18/21  1053 02/18/21  0659 02/17/21  2102 02/17/21  1651 02/17/21  1119 02/17/21  0647 02/16/21  2103 02/16/21  1539 02/16/21  1137 02/16/21  0651 02/15/21  2100 02/15/21  1633   GLUCPOC 112* 94 130* 118* 91 94 96 112* 98 89 106* 88       Lab Results   Component Value Date/Time    Hemoglobin A1c 5.9 09/29/2016 10:02 AM    Hemoglobin A1c (POC) 6.6 (A) 08/20/2018 09:19 AM       Iron Profile  Iron   Date Value Ref Range Status   02/17/2021 29 (L) 35 - 150 ug/dL Final   01/19/2017 71 27 - 159 ug/dL Final   10/02/2016 66 35 - 150 ug/dL Final     TIBC   Date Value Ref Range Status   02/17/2021 204 (L) 250 - 450 ug/dL Final   01/19/2017 307 250 - 450 ug/dL Final   10/02/2016 216 (L) 250 - 450 ug/dL Final     Iron % saturation   Date Value Ref Range Status   02/17/2021 14 (L) 20 - 50 % Final   01/19/2017 23 15 - 55 % Final   10/02/2016 31 20 - 50 % Final     B Vitamins  Folate   Date Value Ref Range Status   02/17/2021 54.6 (H) 5.0 - 21.0 ng/mL Final   10/02/2016 19.8 5.0 - 21.0 ng/mL Final     Vitamin B12   Date Value Ref Range Status   02/17/2021 1,721 (H) 193 - 986 pg/mL Final   10/02/2016 688 211 - 911 pg/mL Final     Comment:     Method used is Siemens Advia Centaur   11/02/2015 491 211 - 946 pg/mL Final       Deana Aldridgeu, RD  Available via [employer]

## 2021-02-18 NOTE — PROGRESS NOTES
[facility] Adult  Hospitalist Group                                                                                          Hospitalist Progress Note  Kiran Higgins MD  Answering service: [phone] from in house phone        Date of Service:  2021  NAME:  Raheel Jung  :  1955  MRN:  [id number]      Admission Summary:     Admission Summary:   From h&P  \"This is a 80-year-old woman with a past medical history significant for chronic kidney disease, dyslipidemia, type 2 diabetes, hypertension, peripheral artery disease, was in her usual state of health until the day of her presentation at the emergency room when the patient stated that she fell at home.  The patient complained of generalized weakness, which is progressive.  Also complained of diarrhea.  According to the patient, she woke up from sleep, was trying to go to the bathroom, when all of a sudden, the patient felt dizzy and passed out. Sawyer Almodovar has no recollection of the event. \"                   Interval history / Subjective:     2021 :    Hgb down, less than 7 for blood transfuson    Discussed case with pt   Discussed case with nephrology    Pt feels as does nephrology that distal edema some better   Skippy Betters as below        Assessment & Plan:     Severe acute on chronic anemia, suspect GIB  -Hold aspirin and sc heparin. IV PPI  -EGD 2021: showed patient with ulceration of the mid esophagus, grade nasopharyngitis and a small sliding hiatal hernia, few small areas of erythema in the stomach, diffuse clean-based ulcerations throughout the duodenal bulb without active bleeding. Biopsies pending  -Colonoscopy 2021 showed diffuse ischemic changes with submucosal edema and circumferential coalescing ulcerations involving all of the rectum extending to the dentate line. Ulceration extends approximately 50 cm from anal verge. The sigmoid, descending and transverse colon are normal-appearing mucosa.   -Received a unit of blood on 2/11- another single unit 2/18/2021     Low serum Iron:   - for iv venofer 2/18/2021 - 400 mg       Syncope likely orthostatic from volume contraction due to diarrhea  - MRI shows no acute hemorrhage or infarction. -MRA shows no aneurysm, dissection or evidence of hemodynamically significant stenosis  - Carotic duplex shows 50-69% stenosis of left internal carotic artery  - Echo with normal LVEF,no significant valve disease. - Syst BP 95 mmHg a m. 2/18/2021 , minimally symptomatic      Fall  - As a result of her syncope  - Negative work-up for acute fracture or injury     Proctocolitis  - Evident on CT abdomen and pelvis 2/04/2021    - Stool studies negative  - Continue questran and immodium prn for loose stool  - Colorectal surgery consulted, recommended conservative approach for now. - Repeat abdominal pelvic CT 2/12/2021  showed improvement in the rectosigmoid colon.        Acute kidney injury on CKD III/IV  - In the setting of rhabdo and volume depletion from diarrhea  - Nephrology evaluating the patient  - creatinine at baseline. V fluid alreadyI   - On her home Bumex but increase to 3 mg bid po(from home dose 2 mg bid)  (  - IVF's dc'd as AK reolves to baseline      Chronic leg edema with increasing leg and abdominal edema. No sob or hypoxia  - D/c iv fluids  - She still has significant leg edema and creatinine has improved significantly, Bumex dose increased. Nephrology following.        Rhabdomyolysis. Resolved  - As a result of fall      Metabolic acidosis,   - Due to alkaline loss from GI tract as well as acute kidney injury  - Now resolved     Thrombocytopenia~mild without bleeding complications. - Platelet last 960     Abnormal LFTs.   Resolved  - Also likely related to infectious process,rhabdomyolysis  - CT abdomen and pelvis without any acute liver or gallbladder pathology        Hypertension  - On Bumex, blood pressure is soft systolic      U4IE on metformin preadmission, controlled. - Continue insulin sliding scale coverage     Dyslipidemia  - Holding statin due to rhabdomyolysis        Code status:full code  DVT prophylaxis: scd,d/c heparin due to GIB   Patient/Family and Nurse  Anticipated Disposition: to SNF when stable         Hospital Problems  Date Reviewed: 2/11/2021          Codes Class Noted POA    Acute blood loss anemia ICD-10-CM: D62  ICD-9-CM: 285.1  2/11/2021 Yes        * (Principal) Syncope ICD-10-CM: R55  ICD-9-CM: 780.2  2/4/2021 Yes                  After personally interviewing pt at bedside the following is noted on 2/18/2021 :    Review of Systems   Constitutional: Negative. HENT: Negative. Eyes: Negative. Respiratory: Negative. Cardiovascular: Positive for leg swelling. Negative for chest pain and palpitations. Gastrointestinal: Negative. Genitourinary: Negative. Musculoskeletal: Negative. Skin: Negative. Neurological: Negative. Psychiatric/Behavioral: Negative. I had a face to face encounter with patient on 2/18/2021 at bedside for the following physical exam:     PHYSICAL EXAM:    Visit Vitals  BP (!) 95/58   Pulse 71   Temp 99.2 °F (37.3 °C)   Resp 18   Ht 5' 6\" (1.676 m)   Wt 112.9 kg (248 lb 14.4 oz)   SpO2 96%   BMI 40.17 kg/m²          Physical Exam  Constitutional:       General: She is in acute distress. Appearance: She is not ill-appearing. HENT:      Head: Normocephalic and atraumatic. Right Ear: External ear normal.      Left Ear: External ear normal.      Nose: Nose normal.      Mouth/Throat:      Mouth: Mucous membranes are moist.   Eyes:      Extraocular Movements: Extraocular movements intact. Conjunctiva/sclera: Conjunctivae normal.      Pupils: Pupils are equal, round, and reactive to light. Neck:      Musculoskeletal: Normal range of motion and neck supple. Cardiovascular:      Rate and Rhythm: Normal rate and regular rhythm. Heart sounds: Normal heart sounds. No gallop. Pulmonary:      Effort: Pulmonary effort is normal.      Breath sounds: Normal breath sounds. Abdominal:      General: Abdomen is flat. There is no distension. Palpations: Abdomen is soft. Tenderness: There is no abdominal tenderness. Musculoskeletal:         General: Swelling present. No tenderness. Right lower leg: Edema present. Left lower leg: Edema present. Skin:     General: Skin is warm and dry. Comments: Stasis dermatitis veronique LE's    Neurological:      General: No focal deficit present. Mental Status: She is alert and oriented to person, place, and time. Psychiatric:         Mood and Affect: Mood normal.         Thought Content: Thought content normal.         Judgment: Judgment normal.                     Data Review:    Review and/or order of clinical lab test      Labs:     Recent Labs     02/18/21  0514 02/17/21  1549 02/17/21  0203   WBC 6.2  --  6.7   HGB 6.9*  7.0* 7.5* 7.2*   HCT 21.3*  21.8* 23.6* 22.0*   *  --  144*     Recent Labs     02/18/21  0514 02/17/21  0203 02/16/21  1845   * 136 136   K 3.8 3.8 3.8    108 108   CO2 17* 21 21   BUN 26* 25* 26*   CREA 1.43* 1.35* 1.44*   GLU 78 78 97   CA 8.2* 8.4* 8.2*   PHOS  --   --  2.8     Recent Labs     02/16/21  1845   ALB 3.0*     No results for input(s): INR, PTP, APTT, INREXT in the last 72 hours. Recent Labs     02/17/21  1549   TIBC 204*   PSAT 14*      Lab Results   Component Value Date/Time    Folate 54.6 (H) 02/17/2021 03:49 PM      No results for input(s): PH, PCO2, PO2 in the last 72 hours. No results for input(s): CPK, CKNDX, TROIQ in the last 72 hours.     No lab exists for component: CPKMB  Lab Results   Component Value Date/Time    Cholesterol, total 165 09/29/2016 10:02 AM    HDL Cholesterol 34 09/29/2016 10:02 AM    LDL, calculated 91.2 09/29/2016 10:02 AM    Triglyceride 199 (H) 09/29/2016 10:02 AM    CHOL/HDL Ratio 4.9 09/29/2016 10:02 AM     Lab Results   Component Value Date/Time    Glucose (POC) 94 02/18/2021 06:59 AM    Glucose (POC) 130 (H) 02/17/2021 09:02 PM    Glucose (POC) 118 (H) 02/17/2021 04:51 PM    Glucose (POC) 91 02/17/2021 11:19 AM    Glucose (POC) 94 02/17/2021 06:47 AM     Lab Results   Component Value Date/Time    Color DARK YELLOW 02/04/2021 02:16 AM    Appearance CLOUDY (A) 02/04/2021 02:16 AM    Specific gravity 1.021 02/04/2021 02:16 AM    pH (UA) 5.0 02/04/2021 02:16 AM    Protein 30 (A) 02/04/2021 02:16 AM    Glucose Negative 02/04/2021 02:16 AM    Ketone Negative 02/04/2021 02:16 AM    Bilirubin NEGATIVE  09/29/2016 10:02 AM    Urobilinogen 0.2 02/04/2021 02:16 AM    Nitrites Negative 02/04/2021 02:16 AM    Leukocyte Esterase Negative 02/04/2021 02:16 AM    Epithelial cells FEW 02/04/2021 02:16 AM    Bacteria Negative 02/04/2021 02:16 AM    WBC 0-4 02/04/2021 02:16 AM    RBC 0-5 02/04/2021 02:16 AM         Medications Reviewed:     Current Facility-Administered Medications   Medication Dose Route Frequency    albumin human 25% (BUMINATE) solution 25 g  25 g IntraVENous Q6H    iron sucrose (VENOFER) 400 mg in 0.9% sodium chloride 250 mL IVPB  400 mg IntraVENous ONCE    0.9% sodium chloride infusion 250 mL  250 mL IntraVENous PRN    0.9% sodium chloride infusion 250 mL  250 mL IntraVENous PRN    bumetanide (BUMEX) tablet 3 mg  3 mg Oral BID    0.9% sodium chloride infusion 250 mL  250 mL IntraVENous PRN    pantoprazole (PROTONIX) 40 mg in 0.9% sodium chloride 10 mL injection  40 mg IntraVENous Q12H    sodium chloride (NS) flush 5-40 mL  5-40 mL IntraVENous PRN    traMADoL (ULTRAM) tablet 50 mg  50 mg Oral Q6H PRN    loperamide (IMODIUM) capsule 4 mg  4 mg Oral Q4H PRN    cholestyramine-aspartame (QUESTRAN LIGHT) packet 4 g  4 g Oral TID WITH MEALS    [Held by provider] aspirin chewable tablet 81 mg  81 mg Oral DAILY    sodium chloride (NS) flush 5-40 mL  5-40 mL IntraVENous Q8H    sodium chloride (NS) flush 5-40 mL  5-40 mL IntraVENous PRN  acetaminophen (TYLENOL) tablet 650 mg  650 mg Oral Q6H PRN    Or    acetaminophen (TYLENOL) suppository 650 mg  650 mg Rectal Q6H PRN    promethazine (PHENERGAN) tablet 12.5 mg  12.5 mg Oral Q6H PRN    Or    ondansetron (ZOFRAN) injection 4 mg  4 mg IntraVENous Q6H PRN    [Held by provider] heparin (porcine) injection 5,000 Units  5,000 Units SubCUTAneous Q8H    L.acidophilus-paracasei-S.thermophil-bifidobacter (RISAQUAD) 8 billion cell capsule  1 Cap Oral DAILY    insulin lispro (HUMALOG) injection   SubCUTAneous AC&HS    glucose chewable tablet 16 g  4 Tab Oral PRN    dextrose (D50W) injection syrg 12.5-25 g  12.5-25 g IntraVENous PRN    glucagon (GLUCAGEN) injection 1 mg  1 mg IntraMUSCular PRN     ______________________________________________________________________  EXPECTED LENGTH OF STAY: 2d 16h  ACTUAL LENGTH OF STAY:          14                 Pb Joseph MD

## 2021-02-18 NOTE — PROGRESS NOTES
VERA:  Waiting authorization from [facility]. Per MD, earliest discharge is tomorrow, 2/19, not medically stable today. [facility] on will call for 2/19. CM has called Kerry Walsh, facility liason today [phone] and CM also called facility [facility] to request updates on authorization. No response yet from facility. Advised that pt is medically ready for discharge. HOLLY Galdamez      9:10am  Spoke with Boone Melendrez and they expect to receive authorization first thing this morning. Will plan on 12noon [facility] transport. HOLLY Nicole      9:45am  Updated Kerry Walsh that discharge not today, possibly tomorrow.   HOLLY Galdamez

## 2021-02-18 NOTE — WOUND CARE
Contacted by Arlene Libman, RN, wound on buttocks has healed. Patient no longer wants Optifoam Gentle applied. Wound care order to be discontinued. Oscar Ruiz" YURY RiverN, RN, [facility] Office x [phone] Pager x [phone]

## 2021-02-18 NOTE — PROGRESS NOTES
Chart checked, pt cleared by nursing but was instructed that pt had been symptomatic and BP had been low. Pt received up to the chair LEs elevated with a slightly lower BP that last one in doc flow (see chart below), discussed with her nurse, with iron infusing and per pt plan for a transfusion afterwards. Her hemoglobin was 7.0 today. Pt reported feeling OK, not symptomatic. PT opted to defer at this time, will follow and see as able and appropriate.  Thank you, Michelle Morel, PT          Vitals:      02/18/21 1348 02/18/21 1411   BP:   (!) 89/51 (!) 89/41   BP 1 Location:   Left arm Left upper arm   BP Patient Position:   Sitting Sitting   Pulse:   62 (!) 58   Resp:   18    Temp:   97.6 °F (36.4 °C)    SpO2:   96%

## 2021-02-18 NOTE — PROGRESS NOTES
Bedside shift change report given to Sabina Copelnad (oncoming nurse) by Danielito Marcial (offgoing nurse). Report included the following information SBAR, Kardex and MAR.

## 2021-02-18 NOTE — PROGRESS NOTES
Problem: Falls - Risk of  Goal: *Absence of Falls  Description: Document Arambulahenna Mohr Fall Risk and appropriate interventions in the flowsheet. Outcome: Progressing Towards Goal  Note: Fall Risk Interventions:  Mobility Interventions: Communicate number of staff needed for ambulation/transfer, Patient to call before getting OOB         Medication Interventions: Patient to call before getting OOB, Teach patient to arise slowly    Elimination Interventions: Patient to call for help with toileting needs, Call light in reach    History of Falls Interventions: Door open when patient unattended         Problem: Pressure Injury - Risk of  Goal: *Prevention of pressure injury  Description: Document Ra Scale and appropriate interventions in the flowsheet. Outcome: Progressing Towards Goal  Note: Pressure Injury Interventions:  Sensory Interventions: Keep linens dry and wrinkle-free    Moisture Interventions: Absorbent underpads    Activity Interventions: Increase time out of bed, Pressure redistribution bed/mattress(bed type)    Mobility Interventions: Pressure redistribution bed/mattress (bed type)    Nutrition Interventions: Offer support with meals,snacks and hydration    Friction and Shear Interventions: Minimize layers                Problem: Risk for Spread of Infection  Goal: Prevent transmission of infectious organism to others  Description: Prevent the transmission of infectious organisms to other patients, staff members, and visitors.   Outcome: Progressing Towards Goal

## 2021-02-18 NOTE — PROGRESS NOTES
Occupational Therapy: defer    Chart reviewed, consulted with PT, who just attempted session but deferred due to hypotension. Will also defer OT and will f/u as able and appropriate.     Ricardo Lockhart, OTR/L

## 2021-02-19 VITALS
BODY MASS INDEX: 40 KG/M2 | TEMPERATURE: 98.5 F | DIASTOLIC BLOOD PRESSURE: 73 MMHG | WEIGHT: 248.9 LBS | SYSTOLIC BLOOD PRESSURE: 129 MMHG | OXYGEN SATURATION: 91 % | RESPIRATION RATE: 18 BRPM | HEART RATE: 69 BPM | HEIGHT: 66 IN

## 2021-02-19 PROBLEM — D62 ACUTE BLOOD LOSS ANEMIA: Status: RESOLVED | Noted: 2021-02-11 | Resolved: 2021-02-19

## 2021-02-19 PROBLEM — R55 SYNCOPE: Status: RESOLVED | Noted: 2021-02-04 | Resolved: 2021-02-19

## 2021-02-19 LAB
ABO + RH BLD: NORMAL
BLD PROD TYP BPU: NORMAL
BLOOD GROUP ANTIBODIES SERPL: NORMAL
BPU ID: NORMAL
CROSSMATCH RESULT,%XM: NORMAL
GLUCOSE BLD STRIP.AUTO-MCNC: 103 MG/DL (ref 65–100)
GLUCOSE BLD STRIP.AUTO-MCNC: 117 MG/DL (ref 65–100)
HCT VFR BLD AUTO: 24.7 % (ref 35–47)
HGB BLD-MCNC: 8.1 G/DL (ref 11.5–16)
SERVICE CMNT-IMP: ABNORMAL
SERVICE CMNT-IMP: ABNORMAL
SPECIMEN EXP DATE BLD: NORMAL
STATUS OF UNIT,%ST: NORMAL
UNIT DIVISION, %UDIV: 0

## 2021-02-19 PROCEDURE — 94760 N-INVAS EAR/PLS OXIMETRY 1: CPT

## 2021-02-19 PROCEDURE — P9047 ALBUMIN (HUMAN), 25%, 50ML: HCPCS | Performed by: INTERNAL MEDICINE

## 2021-02-19 PROCEDURE — 74011250636 HC RX REV CODE- 250/636: Performed by: INTERNAL MEDICINE

## 2021-02-19 PROCEDURE — 74011250637 HC RX REV CODE- 250/637: Performed by: INTERNAL MEDICINE

## 2021-02-19 PROCEDURE — 74011000250 HC RX REV CODE- 250: Performed by: HOSPITALIST

## 2021-02-19 PROCEDURE — C9113 INJ PANTOPRAZOLE SODIUM, VIA: HCPCS | Performed by: HOSPITALIST

## 2021-02-19 PROCEDURE — 74011250637 HC RX REV CODE- 250/637: Performed by: FAMILY MEDICINE

## 2021-02-19 PROCEDURE — 36415 COLL VENOUS BLD VENIPUNCTURE: CPT

## 2021-02-19 PROCEDURE — 82962 GLUCOSE BLOOD TEST: CPT

## 2021-02-19 PROCEDURE — 85018 HEMOGLOBIN: CPT

## 2021-02-19 PROCEDURE — 74011250636 HC RX REV CODE- 250/636: Performed by: HOSPITALIST

## 2021-02-19 RX ORDER — BUMETANIDE 1 MG/1
3 TABLET ORAL 2 TIMES DAILY
Qty: 180 TAB | Refills: 0 | Status: SHIPPED | OUTPATIENT
Start: 2021-02-19

## 2021-02-19 RX ORDER — ALBUMIN HUMAN 250 G/1000ML
25 SOLUTION INTRAVENOUS ONCE
Status: COMPLETED | OUTPATIENT
Start: 2021-02-19 | End: 2021-02-19

## 2021-02-19 RX ORDER — TRAMADOL HYDROCHLORIDE 50 MG/1
50 TABLET ORAL
Qty: 100 TAB | Refills: 5 | Status: SHIPPED | OUTPATIENT
Start: 2021-02-19 | End: 2021-03-21

## 2021-02-19 RX ADMIN — Medication 1 CAPSULE: at 08:11

## 2021-02-19 RX ADMIN — BUMETANIDE 3 MG: 1 TABLET ORAL at 08:11

## 2021-02-19 RX ADMIN — ALBUMIN (HUMAN) 25 G: 0.25 INJECTION, SOLUTION INTRAVENOUS at 11:46

## 2021-02-19 RX ADMIN — Medication 10 ML: at 07:38

## 2021-02-19 RX ADMIN — CHOLESTYRAMINE 4 G: 4 POWDER, FOR SUSPENSION ORAL at 07:38

## 2021-02-19 RX ADMIN — TRAMADOL HYDROCHLORIDE 50 MG: 50 TABLET, FILM COATED ORAL at 08:16

## 2021-02-19 RX ADMIN — IRON SUCROSE 400 MG: 20 INJECTION, SOLUTION INTRAVENOUS at 10:35

## 2021-02-19 RX ADMIN — ACETAMINOPHEN 650 MG: 325 TABLET ORAL at 08:16

## 2021-02-19 RX ADMIN — PANTOPRAZOLE SODIUM 40 MG: 40 INJECTION, POWDER, FOR SOLUTION INTRAVENOUS at 08:11

## 2021-02-19 RX ADMIN — CHOLESTYRAMINE 4 G: 4 POWDER, FOR SUSPENSION ORAL at 11:46

## 2021-02-19 NOTE — PROGRESS NOTES
Transition of Care Plan to SNF/Rehab    SNF/Rehab Transition:  Patient has been accepted to [facility] [id number] confirmed 2/19 and meets criteria for admission. Patient will transported by hospital to home transport phone [phone] and expected to leave at 3pm and they will bring wheelchair to room. Pt requested wheelchair transport and pt can pay for this service. Communication to Patient/Family:  Met with patient and they are agreeable to the transition plan. Communication to SNF/Rehab:  Bedside RN, Candy Brown has been notified to update the transition plan to the facility and call report (phone number [phone]). Discharge information has been updated on the AVS.     Discharge instructions to be fax'd to facility at (Fax #cc link. Nursing Please include all hard scripts for controlled substances, med rec and dc summary, and AVS in packet. Reviewed and confirmed with facility, Isabel , can manage the patient care needs for the following:     SNF/Rehab Transition:  [facility] with (X) only those applicable:    Medication:  [x]  Medications will be available at the facility  []  IV Antibiotics   [x]  Controlled Substance - hard copy to be sent with patient   [x]  Weekly Labs   Documents:  [x] Hard RX  [x] MAR  [x] Kardex  [x] AVS  [x]Transfer Summary  [x]Discharge   Equipment:  []  CPAP/BiPAP  []  Wound Vacuum  []  Romero or Urinary Device  []  PICC/Central Line  []  Nebulizer  []  Ventilator   Treatment:  []Isolation (for MRSA, VRE, etc.)  []Surgical Drain Management  []Tracheostomy Care  []Dressing Changes  []Dialysis with transportation and chair time[]PEG Care  []Oxygen  []Daily Weights for Heart Failure   Dietary:  []Any diet limitations  []Tube Feedings   []Total Parenteral Management (TPN)   Eligible for Medicaid Long Term Services and Supports  Yes:  [] Eligible for medical assistance or will become eligible within 180 days and UAI completed.    [] Provider/Patient and/or support system has requested screening. [] UAI copy provided to patient or responsible party,   [] UAI unavailable at discharge will send once processed to SNF provider. [] UAI unavailable at discharged mailed to patient  No:   [x] Private pay and is not financially eligible for Medicaid within the next 180 days. [] Reside out-of-state.   [] A residents of a state owned/operated facility that is licensed  by [address] [facility] or [facility]  [] Enrollment in [facility] hospice services  [] [address] [place] [address] [facility]  [] Patient /Family declines to have screening completed or provide financial information for screening     Financial Resources:  Medicaid    [] Initiated and application pending   [] Full coverage     Advanced Care Plan:  []Surrogate Decision Maker of Care  []POA  []Communicated Code Status Full\")    Other

## 2021-02-19 NOTE — DISCHARGE SUMMARY
Discharge Summary       PATIENT ID: Elysia Contreras  MRN: [id number]   YOB: 1955    DATE OF ADMISSION: 2/3/2021 11:49 PM    DATE OF DISCHARGE: 2/19/2021    PRIMARY CARE PROVIDER: Meagan Jeffers MD     ATTENDING PHYSICIAN: Ritu Patel MD   DISCHARGING PROVIDER: Ritu Patel MD    To contact this individual call [phone] and ask the  to page. If unavailable ask to be transferred the Adult Hospitalist Department. CONSULTATIONS: IP CONSULT TO NEPHROLOGY  IP CONSULT TO GASTROENTEROLOGY  IP CONSULT TO GASTROENTEROLOGY  IP CONSULT TO NEPHROLOGY  IP CONSULT TO GASTROENTEROLOGY  IP CONSULT TO COLORECTAL SURGERY    PROCEDURES/SURGERIES: Procedure(s):  COLONOSCOPY  ESOPHAGOGASTRODUODENOSCOPY (EGD)  ESOPHAGOGASTRODUODENAL (EGD) BIOPSY  COLON BIOPSY    ADMITTING DIAGNOSES & HOSPITAL COURSE:   Per notes:       From h&P  \"This is a 80-year-old woman with a past medical history significant for chronic kidney disease, dyslipidemia, type 2 diabetes, hypertension, peripheral artery disease, was in her usual state of health until the day of her presentation at the emergency room when the patient stated that she fell at home.  The patient complained of generalized weakness, which is progressive.  Also complained of diarrhea.  According to the patient, she woke up from sleep, was trying to go to the bathroom, when all of a sudden, the patient felt dizzy and passed out. Caity Mullen has no recollection of the event. \"       Nephrology Progress Note  Elysia Contreras  Date of Admission : 2/3/2021     CC:   Follow up for PRABHU on CKD        Assessment and Plan      PRABHU on CKD:  - presumed 2/2 volume depletion  - continue Bumex to 3 mg BID  - ordered labs   - we will see her as needed over w/e  - f/u with Dr Cedric Pak on 3/3/21 at 1 pm      CKD IIIb:  - f/b Dr. Cedric Pak  - recent baseline 1.5     Worsening anemia :  - s/p 1 unit RBC  - iron def: being replaced      Colitis:  - on levo and flagyl  - colonoscopy showed ischemic proctocolitis     HTN:  - BP low              Hospitalist note:       Assessment & Plan:      Severe acute on chronic anemia, suspect GIB  -Hold aspirin and sc heparin. IV PPI  -EGD 2/11/2021: showed patient with ulceration of the mid esophagus, grade nasopharyngitis and a small sliding hiatal hernia, few small areas of erythema in the stomach, diffuse clean-based ulcerations throughout the duodenal bulb without active bleeding.  Biopsies pending  -Colonoscopy 2/11/2021 showed diffuse ischemic changes with submucosal edema and circumferential coalescing ulcerations involving all of the rectum extending to the dentate line.  Ulceration extends approximately 50 cm from anal verge.  The sigmoid, descending and transverse colon are normal-appearing mucosa. -Received a unit of blood on 2/11- another single unit 2/18/2021   - post transfusion hgb 8.1 2/19/2021 , iron iv 400 mg daily  X 2 2/18 and 2/19      Low serum Iron:   - for iv venofer 2/18/2021 - 400 mg       Syncope likely orthostatic from volume contraction due to diarrhea  - MRI shows no acute hemorrhage or infarction.    -MRA shows no aneurysm, dissection or evidence of hemodynamically significant stenosis  - Carotic duplex shows 50-69% stenosis of left internal carotic artery  - Echo with normal LVEF,no significant valve disease. - Syst BP 95 mmHg a m. 2/18/2021 , minimally symptomatic      Fall  - As a result of her syncope  - Negative work-up for acute fracture or injury     Proctocolitis  - Evident on CT abdomen and pelvis 2/04/2021    - Stool studies negative  - Continue questran and immodium prn for loose stool  - Colorectal surgery consulted, recommended conservative approach for now. - Repeat abdominal pelvic CT 2/12/2021  showed improvement in the rectosigmoid colon.        Acute kidney injury on CKD III/IV  - In the setting of rhabdo and volume depletion from diarrhea  - Nephrology evaluating the patient  - creatinine at baseline.  V fluid alreadyI - On her home Bumex but increase to 3 mg bid po(from home dose 2 mg bid)  (  - IVF's dc'd as AK reolves to baseline      Chronic leg edema with increasing leg and abdominal edema. No sob or hypoxia  - D/c iv fluids  - She still has significant leg edema and creatinine has improved significantly, Bumex dose increased.  Nephrology following. -see Dr. Argenis Ferrell 3/3/21 at 1 PM         Rhabdomyolysis. Franciso Drop  - As a result of fall        Metabolic acidosis,   - Due to alkaline loss from GI tract as well as acute kidney injury  - Now resolved     Thrombocytopenia~mild without bleeding complications. - Platelet last 732      Abnormal LFTs.  Resolved  - Also likely related to infectious process,rhabdomyolysis  - CT abdomen and pelvis without any acute liver or gallbladder pathology        Hypertension  - On Bumex, blood pressure is soft systolic      K5IK on metformin preadmission, controlled.   - Continue insulin sliding scale coverage     Dyslipidemia  - Holding statin due to rhabdomyolysis        Code status:full code  DVT prophylaxis: scd,d/c heparin due to GIB   Patient/Family and Nurse  Anticipated Disposition: to SNF when stable              DISCHARGE DIAGNOSES / PLAN:      1.  as above      ADDITIONAL CARE RECOMMENDATIONS:   na     PENDING TEST RESULTS:   At the time of discharge the following test results are still pending: na    FOLLOW UP APPOINTMENTS:    Follow-up Information     Follow up With Specialties Details Why Contact Info    [address] [place] [address]   [address]    [facility] [place], [facility]   [address]  [phone]      Geovanny Guardado MD Nephrology  as directed 3/3/21 at 1 pm  [facility]  [phone]               DIET: Cardiac Diet/ dm diet      ACTIVITY: Activity as tolerated    WOUND CARE: na    EQUIPMENT needed: na      DISCHARGE MEDICATIONS:  Current Discharge Medication List      START taking these medications    Details   cholestyramine-aspartame (QUESTRAN LIGHT) 4 gram packet Take 1 Packet by mouth three (3) times daily (with meals) for 30 days. Qty: 90 Packet, Refills: 0      L.acid,para-B. bifidum-S.therm (RISAQUAD) 8 billion cell cap cap Take 1 Cap by mouth daily for 30 days. Qty: 30 Cap, Refills: 0      loperamide (IMODIUM) 2 mg capsule Take 2 Caps by mouth every four (4) hours as needed for Diarrhea for up to 10 days. Qty: 30 Cap, Refills: 0      pantoprazole (Protonix) 40 mg tablet Take 1 Tab by mouth two (2) times a day for 30 days. Qty: 60 Tab, Refills: 0         CONTINUE these medications which have CHANGED    Details   traMADoL (ULTRAM) 50 mg tablet Take 1 Tab by mouth every six (6) hours as needed for Pain for up to 30 days. Max Daily Amount: 200 mg. Indications: neuropathic pain  Qty: 100 Tab, Refills: 5    Associated Diagnoses: Neuropathic pain      bumetanide (BUMEX) 1 mg tablet Take 3 Tabs by mouth two (2) times a day. Qty: 180 Tab, Refills: 0         CONTINUE these medications which have NOT CHANGED    Details   atorvastatin (LIPITOR) 40 mg tablet TAKE 1 TABLET BY MOUTH EVERY DAY  Qty: 90 Tab, Refills: 3      !! ONETOUCH ULTRA TEST strip TEST BLOOD SUGAR TWICE A DAY  Qty: 50 Strip, Refills: 11      ONE TOUCH DELICA 33 gauge misc USE TO TEST BLOOD SUGAR EVERY DAY  Qty: 30 Lancet, Refills: 11      !! ONETOUCH ULTRA TEST strip TEST BLOOD SUGAR TWICE A DAY  Qty: 50 Strip, Refills: 10      levomefolate-B6-meB12-algal oil 3 mg-35 mg-2 mg -90.314 mg cap Take 2 Caps by mouth daily. acetaminophen (TYLENOL) 325 mg tablet Take 2 Tabs by mouth every four (4) hours as needed. Qty: 30 Tab, Refills: 11      !! metFORMIN (GLUCOPHAGE) 500 mg tablet TAKE 1 TABLET BY MOUTH TWICE A DAY WITH MEALS  Qty: 180 Tab, Refills: 3      potassium chloride SR (KLOR-CON 10) 10 mEq tablet Take 1 mEq by mouth daily.       !! metFORMIN (GLUCOPHAGE) 500 mg tablet TAKE 1 TABLET BY MOUTH TWICE A DAY WITH MEALS  Qty: 180 Tab, Refills: 4      CHILDREN'S ASPIRIN 81 mg chewable tablet CHEW 1 TABLET BY MOUTH EVERY DAY  Qty: 30 Tab, Refills: 10       !! - Potential duplicate medications found. Please discuss with provider. NOTIFY YOUR PHYSICIAN FOR ANY OF THE FOLLOWING:   Fever over 101 degrees for 24 hours. Chest pain, shortness of breath, fever, chills, nausea, vomiting, diarrhea, change in mentation, falling, weakness, bleeding. Severe pain or pain not relieved by medications. Or, any other signs or symptoms that you may have questions about.     DISPOSITION:    Home With:   OT  PT  HH  RN      x Long term SNF/Inpatient Rehab    Independent/assisted living    Hospice    Other:       PATIENT CONDITION AT DISCHARGE:     Functional status    Poor    x Deconditioned     Independent      Cognition   x  Lucid     Forgetful     Dementia      Catheters/lines (plus indication)    Romero     PICC     PEG    x None      Code status    x Full code     DNR      PHYSICAL EXAMINATION AT DISCHARGE:  Visit Vitals  /68 (BP 1 Location: Right arm, BP Patient Position: At rest)   Pulse 66   Temp 98.2 °F (36.8 °C)   Resp 17   Ht 5' 6\" (1.676 m)   Wt 112.9 kg (248 lb 14.4 oz)   SpO2 94%   BMI 40.17 kg/m²      Chronic edema stable   Lung clear on exam serially     CHRONIC MEDICAL DIAGNOSES:  Problem List as of 2/19/2021 Date Reviewed: 2/19/2021          Codes Class Noted - Resolved    Type 2 diabetes with nephropathy ([facility] ICD-10-CM: E11.21  ICD-9-CM: 250.40, 583.81  2/19/2018 - Present        Hypertension complicating diabetes ([facility] ICD-10-CM: E11.59, I10  ICD-9-CM: 250.80, 401.9  8/21/2017 - Present        CKD (chronic kidney disease), stage III ICD-10-CM: N18.30  ICD-9-CM: 585.3  4/20/2017 - Present        Osteomyelitis of foot, left, acute ([facility] ICD-10-CM: X77.519  ICD-9-CM: 730.07  9/30/2016 - Present    Overview Addendum 10/3/2016  7:28 AM by Aundrea Watters MD     First metatarsal head - proteus mirabilis             Diabetic foot ulcer ([facility] ICD-10-CM: E11.621, L97.509  ICD-9-CM: 250.80, 707.15  9/29/2016 - Present        Hypokalemia ICD-10-CM: E87.6  ICD-9-CM: 276.8  9/29/2016 - Present        PAD (peripheral artery disease) ([facility] ICD-10-CM: I73.9  ICD-9-CM: 443.9  9/29/2016 - Present        Type 2 diabetes mellitus with neurologic complication ([facility] PEQ-33-QW: E11.49  ICD-9-CM: 250.60  11/3/2015 - Present    Overview Addendum 9/29/2016 12:48 PM by Aundrea Watters MD     Peripheral neuropathy and charcot arthropathy left ankle             Obesity ICD-10-CM: E66.9  ICD-9-CM: 278.00  11/2/2015 - Present        Venous insufficiency ICD-10-CM: G98.8  ICD-9-CM: 459.81  11/2/2015 - Present        LBP (low back pain) ICD-10-CM: M54.5  ICD-9-CM: 724.2  11/2/2015 - Present        RESOLVED: Acute blood loss anemia ICD-10-CM: D62  ICD-9-CM: 285.1  2/11/2021 - 2/19/2021        * (Principal) RESOLVED: Syncope ICD-10-CM: R55  ICD-9-CM: 780.2  2/4/2021 - 2/19/2021        RESOLVED: CKD (chronic kidney disease), stage IV ([facility] ICD-10-CM: N18.4  ICD-9-CM: 585.4  3/1/2016 - 4/20/2017        RESOLVED: Essential hypertension ICD-10-CM: I10  ICD-9-CM: 401.9  2/29/2016 - 8/20/2018        RESOLVED: HTN (hypertension) ICD-10-CM: I10  ICD-9-CM: 401.9  11/2/2015 - 2/29/2016              Greater than 20  minutes were spent with the patient on counseling and coordination of care    Signed:   Evelin Villagran MD  2/19/2021  10:34 AM

## 2021-02-19 NOTE — PROGRESS NOTES
Nephrology Progress Note  Danielle Stone  Date of Admission : 2/3/2021    CC: Follow up for PRABHU on CKD       Assessment and Plan     PRABHU on CKD:  - presumed 2/2 volume depletion  - continue Bumex to 3 mg BID  - ordered labs   - we will see her as needed over w/e  - f/u with Dr Osiel Gross on 3/3/21 at 1 pm     CKD IIIb:  - f/b Dr. Osiel Gross  - recent baseline 1.5    Worsening anemia :  - s/p 1 unit RBC  - iron def: being replaced     Colitis:  - on levo and flagyl  - colonoscopy showed ischemic proctocolitis    HTN:  - BP low          Interval History:  BP better after 1 unit of blood  She had solid BM today and stool was very dark   Tolerated venofer   No renal labs done today   Edema improving     Current Medications: all current  Medications have been eviewed in EPIC  Review of Systems: Pertinent items are noted in HPI. Objective:  Vitals:    Vitals:    02/18/21 1940 02/18/21 2102 02/19/21 0215 02/19/21 0754   BP: 135/69 127/62 (!) 97/59 125/68   Pulse: 61 61 75 66   Resp: 16 14 14 17   Temp: 98 °F (36.7 °C)  99 °F (37.2 °C) 98.2 °F (36.8 °C)   SpO2: 99% 100% 95% 94%   Weight:       Height:         Intake and Output:  No intake/output data recorded. 02/17 1901 - 02/19 0700  In: 743.8 [P.O.:300]  Out: -     Physical Examination:  General: NAD,Conversant   Neck:  Supple, no mass  Resp:  CTA  CV:  RRR,  no murmur or rub, 3+LE edema  GI:  Soft, NT, + Bs  Neurologic:  Non focal  Psych:             AAO x 3 appropriate affect   Skin:  No Rash  :  No austin    []    High complexity decision making was performed  []    Patient is at high-risk of decompensation with multiple organ involvement    Lab Data Personally Reviewed: I have reviewed all the pertinent labs, microbiology data and radiology studies during assessment.     Recent Labs     02/18/21  0514 02/17/21  0203 02/16/21  1845   * 136 136   K 3.8 3.8 3.8    108 108   CO2 17* 21 21   GLU 78 78 97   BUN 26* 25* 26*   CREA 1.43* 1.35* 1.44*   CA 8.2* 8.4* 8.2*   PHOS  --   --  2.8   ALB  --   --  3.0*     Recent Labs     02/19/21  0210 02/18/21  0514 02/17/21  1549 02/17/21  0203   WBC  --  6.2  --  6.7   HGB 8.1* 6.9*  7.0* 7.5* 7.2*   HCT 24.7* 21.3*  21.8* 23.6* 22.0*   PLT  --  140*  --  144*     No results found for: SDES  Lab Results   Component Value Date/Time    Culture result: NO GROWTH 5 DAYS 02/04/2021 12:22 AM    Culture result: NO GROWTH ON SOLID MEDIA AT 2 DAYS 01/13/2021 10:00 AM    Culture result: (A) 01/13/2021 10:00 AM     STAPHYLOCOCCUS SPECIES, COAGULASE NEGATIVE ISOLATED FROM THIO BROTH ONLY     Recent Results (from the past 24 hour(s))   GLUCOSE, POC    Collection Time: 02/18/21 10:53 AM   Result Value Ref Range    Glucose (POC) 112 (H) 65 - 100 mg/dL    Performed by Adrianna Rocío    TYPE & SCREEN    Collection Time: 02/18/21  2:34 PM   Result Value Ref Range    Crossmatch Expiration 02/21/2021,2359     ABO/Rh(D) Katiae Deems POSITIVE     Antibody screen NEG     Unit number [id number]     Blood component type [facility]     Unit division 00     Status of unit TRANSFUSED     Crossmatch result Compatible    GLUCOSE, POC    Collection Time: 02/18/21  4:49 PM   Result Value Ref Range    Glucose (POC) 106 (H) 65 - 100 mg/dL    Performed by Anaya Carpenter    GLUCOSE, POC    Collection Time: 02/18/21  8:59 PM   Result Value Ref Range    Glucose (POC) 93 65 - 100 mg/dL    Performed by Connor Shafer    HGB & HCT    Collection Time: 02/19/21  2:10 AM   Result Value Ref Range    HGB 8.1 (L) 11.5 - 16.0 g/dL    HCT 24.7 (L) 35.0 - 47.0 %   GLUCOSE, POC    Collection Time: 02/19/21  6:53 AM   Result Value Ref Range    Glucose (POC) 103 (H) 65 - 100 mg/dL    Performed by Lashawn Pritchett MD  [address] - [phone]   Fax - [phone]  [email or web address]

## 2021-02-23 ENCOUNTER — HOSPITAL ENCOUNTER (INPATIENT)
Age: 66
LOS: 9 days | Discharge: NURSING, REHAB OR LONG-TERM CARE | DRG: 988 | End: 2021-03-05
Attending: EMERGENCY MEDICINE | Admitting: HOSPITALIST
Payer: COMMERCIAL

## 2021-02-23 DIAGNOSIS — K62.5 PROCTOCOLITIS WITH RECTAL BLEEDING: ICD-10-CM

## 2021-02-23 DIAGNOSIS — K52.9 PROCTOCOLITIS WITH RECTAL BLEEDING: ICD-10-CM

## 2021-02-23 DIAGNOSIS — K92.1 HEMATOCHEZIA: Primary | ICD-10-CM

## 2021-02-23 DIAGNOSIS — N18.32 STAGE 3B CHRONIC KIDNEY DISEASE (HCC): ICD-10-CM

## 2021-02-23 DIAGNOSIS — D64.9 NORMOCYTIC ANEMIA: ICD-10-CM

## 2021-02-23 LAB
ALBUMIN SERPL-MCNC: 3.8 G/DL (ref 3.5–5)
ALBUMIN/GLOB SERPL: 1.3 {RATIO} (ref 1.1–2.2)
ALP SERPL-CCNC: 76 U/L (ref 45–117)
ALT SERPL-CCNC: 19 U/L (ref 12–78)
ANION GAP SERPL CALC-SCNC: 9 MMOL/L (ref 5–15)
AST SERPL-CCNC: 21 U/L (ref 15–37)
BASOPHILS # BLD: 0 K/UL (ref 0–0.1)
BASOPHILS NFR BLD: 1 % (ref 0–1)
BILIRUB SERPL-MCNC: 0.6 MG/DL (ref 0.2–1)
BUN SERPL-MCNC: 30 MG/DL (ref 6–20)
BUN/CREAT SERPL: 19 (ref 12–20)
CALCIUM SERPL-MCNC: 9 MG/DL (ref 8.5–10.1)
CHLORIDE SERPL-SCNC: 97 MMOL/L (ref 97–108)
CO2 SERPL-SCNC: 24 MMOL/L (ref 21–32)
COMMENT, HOLDF: NORMAL
CREAT SERPL-MCNC: 1.54 MG/DL (ref 0.55–1.02)
DIFFERENTIAL METHOD BLD: ABNORMAL
EOSINOPHIL # BLD: 0.2 K/UL (ref 0–0.4)
EOSINOPHIL NFR BLD: 4 % (ref 0–7)
ERYTHROCYTE [DISTWIDTH] IN BLOOD BY AUTOMATED COUNT: 16 % (ref 11.5–14.5)
GLOBULIN SER CALC-MCNC: 3 G/DL (ref 2–4)
GLUCOSE SERPL-MCNC: 96 MG/DL (ref 65–100)
HCT VFR BLD AUTO: 26.8 % (ref 35–47)
HGB BLD-MCNC: 8.9 G/DL (ref 11.5–16)
IMM GRANULOCYTES # BLD AUTO: 0 K/UL (ref 0–0.04)
IMM GRANULOCYTES NFR BLD AUTO: 1 % (ref 0–0.5)
LYMPHOCYTES # BLD: 0.8 K/UL (ref 0.8–3.5)
LYMPHOCYTES NFR BLD: 16 % (ref 12–49)
MCH RBC QN AUTO: 31.1 PG (ref 26–34)
MCHC RBC AUTO-ENTMCNC: 33.2 G/DL (ref 30–36.5)
MCV RBC AUTO: 93.7 FL (ref 80–99)
MONOCYTES # BLD: 0.7 K/UL (ref 0–1)
MONOCYTES NFR BLD: 14 % (ref 5–13)
NEUTS SEG # BLD: 3.4 K/UL (ref 1.8–8)
NEUTS SEG NFR BLD: 64 % (ref 32–75)
NRBC # BLD: 0 K/UL (ref 0–0.01)
NRBC BLD-RTO: 0 PER 100 WBC
PLATELET # BLD AUTO: 171 K/UL (ref 150–400)
PMV BLD AUTO: 11.1 FL (ref 8.9–12.9)
POTASSIUM SERPL-SCNC: 4.1 MMOL/L (ref 3.5–5.1)
PROT SERPL-MCNC: 6.8 G/DL (ref 6.4–8.2)
RBC # BLD AUTO: 2.86 M/UL (ref 3.8–5.2)
SAMPLES BEING HELD,HOLD: NORMAL
SODIUM SERPL-SCNC: 130 MMOL/L (ref 136–145)
WBC # BLD AUTO: 5.2 K/UL (ref 3.6–11)

## 2021-02-23 PROCEDURE — 99218 HC RM OBSERVATION: CPT

## 2021-02-23 PROCEDURE — 99284 EMERGENCY DEPT VISIT MOD MDM: CPT

## 2021-02-23 PROCEDURE — 86923 COMPATIBILITY TEST ELECTRIC: CPT

## 2021-02-23 PROCEDURE — 80053 COMPREHEN METABOLIC PANEL: CPT

## 2021-02-23 PROCEDURE — 85025 COMPLETE CBC W/AUTO DIFF WBC: CPT

## 2021-02-23 PROCEDURE — 86901 BLOOD TYPING SEROLOGIC RH(D): CPT

## 2021-02-23 PROCEDURE — 36415 COLL VENOUS BLD VENIPUNCTURE: CPT

## 2021-02-23 PROCEDURE — 74011250636 HC RX REV CODE- 250/636: Performed by: HOSPITALIST

## 2021-02-23 RX ORDER — DEXTROSE 50 % IN WATER (D50W) INTRAVENOUS SYRINGE
12.5-25 AS NEEDED
Status: DISCONTINUED | OUTPATIENT
Start: 2021-02-23 | End: 2021-03-05 | Stop reason: HOSPADM

## 2021-02-23 RX ORDER — ATORVASTATIN CALCIUM 40 MG/1
40 TABLET, FILM COATED ORAL DAILY
Status: DISCONTINUED | OUTPATIENT
Start: 2021-02-24 | End: 2021-03-05 | Stop reason: HOSPADM

## 2021-02-23 RX ORDER — ACETAMINOPHEN 325 MG/1
650 TABLET ORAL
Status: DISCONTINUED | OUTPATIENT
Start: 2021-02-23 | End: 2021-03-05 | Stop reason: HOSPADM

## 2021-02-23 RX ORDER — MAGNESIUM SULFATE 100 %
4 CRYSTALS MISCELLANEOUS AS NEEDED
Status: DISCONTINUED | OUTPATIENT
Start: 2021-02-23 | End: 2021-03-05 | Stop reason: HOSPADM

## 2021-02-23 RX ORDER — SODIUM CHLORIDE 0.9 % (FLUSH) 0.9 %
5-40 SYRINGE (ML) INJECTION EVERY 8 HOURS
Status: DISCONTINUED | OUTPATIENT
Start: 2021-02-23 | End: 2021-03-05 | Stop reason: HOSPADM

## 2021-02-23 RX ORDER — POLYETHYLENE GLYCOL 3350 17 G/17G
17 POWDER, FOR SOLUTION ORAL DAILY PRN
Status: DISCONTINUED | OUTPATIENT
Start: 2021-02-23 | End: 2021-03-05 | Stop reason: HOSPADM

## 2021-02-23 RX ORDER — PANTOPRAZOLE SODIUM 40 MG/1
40 TABLET, DELAYED RELEASE ORAL 2 TIMES DAILY
Status: DISCONTINUED | OUTPATIENT
Start: 2021-02-24 | End: 2021-03-05 | Stop reason: HOSPADM

## 2021-02-23 RX ORDER — SODIUM CHLORIDE 9 MG/ML
75 INJECTION, SOLUTION INTRAVENOUS CONTINUOUS
Status: DISCONTINUED | OUTPATIENT
Start: 2021-02-23 | End: 2021-02-24

## 2021-02-23 RX ORDER — INSULIN LISPRO 100 [IU]/ML
INJECTION, SOLUTION INTRAVENOUS; SUBCUTANEOUS
Status: DISCONTINUED | OUTPATIENT
Start: 2021-02-23 | End: 2021-03-05 | Stop reason: HOSPADM

## 2021-02-23 RX ORDER — PROMETHAZINE HYDROCHLORIDE 25 MG/1
12.5 TABLET ORAL
Status: DISCONTINUED | OUTPATIENT
Start: 2021-02-23 | End: 2021-03-05 | Stop reason: HOSPADM

## 2021-02-23 RX ORDER — ACETAMINOPHEN 650 MG/1
650 SUPPOSITORY RECTAL
Status: DISCONTINUED | OUTPATIENT
Start: 2021-02-23 | End: 2021-03-05 | Stop reason: HOSPADM

## 2021-02-23 RX ORDER — SODIUM CHLORIDE 0.9 % (FLUSH) 0.9 %
5-40 SYRINGE (ML) INJECTION AS NEEDED
Status: DISCONTINUED | OUTPATIENT
Start: 2021-02-23 | End: 2021-03-05 | Stop reason: HOSPADM

## 2021-02-23 RX ORDER — TRAMADOL HYDROCHLORIDE 50 MG/1
50 TABLET ORAL
Status: DISCONTINUED | OUTPATIENT
Start: 2021-02-23 | End: 2021-03-05 | Stop reason: HOSPADM

## 2021-02-23 RX ORDER — ONDANSETRON 2 MG/ML
4 INJECTION INTRAMUSCULAR; INTRAVENOUS
Status: DISCONTINUED | OUTPATIENT
Start: 2021-02-23 | End: 2021-03-05 | Stop reason: HOSPADM

## 2021-02-23 RX ADMIN — SODIUM CHLORIDE 100 ML/HR: 9 INJECTION, SOLUTION INTRAVENOUS at 21:14

## 2021-02-23 NOTE — ADT AUTH CERT NOTES
Utilization Reviews 
 
  
Renal Failure, Acute - Care Day 15 (2/18/2021) by Susanna Camp 
 
  
Review Entered Review Status 2/19/2021 12:07 Completed  
  
Criteria Review Care Day: 15 Care Date: 2/18/2021 Level of Care: Inpatient Floor Guideline Day 4 Clinical Status ( ) * Hemodynamic stability 2/19/2021 12:07:36 EST by Nahid Cabrera   
  P58 BP 89/41MAP 57  Sitting   
(X) * Mental status at baseline   
(X) * Tachypnea absent 2/19/2021 12:07:36 EST by Nahid Cabrera   
  RR 18   
(X) * Hypoxemia absent 2/19/2021 12:07:36 EST by Nahid Cabrera   
  RA   
( ) * Etiology requiring inpatient treatment absent   
( ) * Electrolyte abnormalities absent or acceptable for next level of care 2/19/2021 12:07:36 EST by Nahid Cabrera   
  labs below   
(X) * Acid-base abnormalities absent   
( ) * Volume status at baseline or acceptable for next level of care   
(X) * Diet tolerated 2/19/2021 11:49:29 EST by Mina Escobar per DIETITIAN ; Pt with excellent appetite, eating very well   
(X) * Dialysis not needed or access and plan established ( ) * Discharge plans and education understood 2/19/2021 11:49:29 EST by Mina Escobar Waiting authorization from [facility]. Per MD, earliest discharge is tomorrow, 2/19, not medically stable today Activity ( ) * Ambulatory or acceptable for next level of care [J]   
2/19/2021 11:49:29 EST by Nahid Cabrera   
  PER PT;  was instructed that pt had been symptomatic and BP had been low. Pt received up to the chair LEs elevated with a slightly lower BP than last one in doc flow , discussed with her nurse, with iron infusing and per pt plan for a transf Routes   
(X) * Oral hydration, medications, and diet 2/19/2021 12:07:36 EST by Nahid Cabrera   
  SEE meds (X) Renal diet as tolerated 2/19/2021 12:07:36 EST by Nahid Cabrera   
  GI lite diet Interventions (X) Monitor electrolytes, renal function tests, acid-base, and volume status 2/19/2021 12:07:36 EST by Jesse Dela Cruz Medications (X) Possible medical therapies 2/19/2021 12:07:36 EST by Jose Everett   
  bumex 3 mg p o bid, questran 4 g po tid order and given twice. Resaquad po 1 cap daily, Protonix 40 mg iv q 12h,  
albumin 25 g iv q 6h, VENOFER 400 mg iv , 
1 UNIT PRBCs. * Milestone Additional Notes 2/18 VS 98.2  58   RR 18  BP 89/41 map 57 - Sitting Sat 96 RA.   
  
2/18/2021 05:14 Sodium: 134 (L) Potassium: 3.8 Chloride: 108 CO2: 17 (L) Anion gap: 9 Glucose: 78 BUN: 26 (H) Creatinine: 1.43 (H) BUN/Creatinine ratio: 18 Calcium: 8.2 (L)  
GFR est non-AA: 37 (L) GFR est AA: 45 (L)  
  
2/18/2021 05:14  
RBC: 2.25 (L) HGB: 7.0 (L) HCT: 21.8 (L) MCV: 96.9 MCH: 31.1 MCHC: 32.1 RDW: 16.3 (H) PLATELET: 965 (L)  
  
2/18/2021 05:14 HGB: 6.9 (L) HCT: 21.3 (L)  
  
---------PER PT  
 was instructed that pt had been symptomatic and BP had been low. Pt received up to the chair LEs elevated with a slightly lower BP that last one in doc flow (see chart below), discussed with her nurse, with iron infusing and per pt plan for a transfusion afterwards. Her hemoglobin was 7.0 today. Pt reported feeling OK, not symptomatic. PT opted to defer at this time, will follow   
  
-----------MEDICINE  
2/18/2021 :   
· Hgb down, less than 7 for blood transfuson · Discussed case with pt · Discussed case with nephrology · Pt feels as does nephrology that distal edema some better · Yee Nascimento as below   
   
 Assessment & Plan:  
   
Severe acute on chronic anemia, suspect GIB  
-Hold aspirin and sc heparin. IV PPI  
 -EGD 2/11/2021: showed patient with ulceration of the mid esophagus, grade nasopharyngitis and a small sliding hiatal hernia, few small areas of erythema in the stomach, diffuse clean-based ulcerations throughout the duodenal bulb without active bleeding.  Biopsies pending  
-Colonoscopy 2/11/2021 showed diffuse ischemic changes with submucosal edema and circumferential coalescing ulcerations involving all of the rectum extending to the dentate line.  Ulceration extends approximately 50 cm from anal verge.  The sigmoid, descending and transverse colon are normal-appearing mucosa. -Received a unit of blood on 2/11- another single unit 2/18/2021   
   
Low serum Iron:   
- for iv venofer 2/18/2021 - 400 mg    
   
Syncope likely orthostatic from volume contraction due to diarrhea - MRI shows no acute hemorrhage or infarction.    
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 - Syst BP 95 mmHg a m. 2/18/2021 , minimally symptomatic   
   
Fall - As a result of her syncope - Negative work-up for acute fracture or injury  
   
Proctocolitis  
- Evident on CT abdomen and pelvis 2/04/2021    
- Stool studies negative - Continue questran and immodium prn for loose stool - Colorectal surgery consulted, recommended conservative approach for now. - Repeat abdominal pelvic CT 2/12/2021  showed improvement in the rectosigmoid colon.  
    
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  
- In the setting of rhabdo and volume depletion from diarrhea - Nephrology evaluating the patient  
- creatinine at baseline. V fluid alreadyI - On her home Bumex but increase to 3 mg bid po(from home dose 2 mg bid)  (  
- IVF's dc'd as AK reolves to baseline   
   
Chronic leg edema with increasing leg and abdominal edema. No sob or hypoxia - D/c iv fluids - She still has significant leg edema and creatinine has improved significantly, Bumex dose increased.  Nephrology following.  
   
 Rhabdomyolysis. Virgilio Sep - As a result of fall   
   
Metabolic acidosis,   
- Due to alkaline loss from GI tract as well as acute kidney injury - Now resolved  
  Thrombocytopenia~mild without bleeding complications. - Platelet last 092   
   
Abnormal LFTs. Virgilio Sep - Also likely related to infectious process,rhabdomyolysis - CT abdomen and pelvis without any acute liver or gallbladder pathology  
    
Hypertension - On Bumex, blood pressure is soft systolic   
   
C0LT on metformin preadmission, controlled. - Continue insulin sliding scale coverage  
   
Dyslipidemia  
- Holding statin due to rhabdomyolysis  
    
Code status:full code DVT prophylaxis: scd,d/c heparin due to GIB  
 Patient/Family and Nurse Anticipated Disposition: to SNF when stable   
   
Physical Exam  
Constitutional:    
   General: She is in acute distress.   
   Appearance: She is not ill-appearing. HENT:   
   Head: Normocephalic and atraumatic.   
   Right Ear: External ear normal.   
   Left Ear: External ear normal.   
   Nose: Nose normal.   
   Mouth/Throat:   
   Mouth: Mucous membranes are moist.   
Eyes:   
   Extraocular Movements: Extraocular movements intact.   
   Conjunctiva/sclera: Conjunctivae normal.   
   Pupils: Pupils are equal, round, and reactive to light. Neck:   
   Musculoskeletal: Normal range of motion and neck supple. Cardiovascular:   
   Rate and Rhythm: Normal rate and regular rhythm.   
   Heart sounds: Normal heart sounds. No gallop.    
Pulmonary:   
   Effort: Pulmonary effort is normal.   
   Breath sounds: Normal breath sounds. Abdominal:   
   General: Abdomen is flat. There is no distension.   
   Palpations: Abdomen is soft.   
   Tenderness: There is no abdominal tenderness.    
Musculoskeletal:      
    General: Swelling present. No tenderness.   
   Right lower leg: Edema present.   
   Left lower leg: Edema present. Skin:  
   General: Skin is warm and dry.   
   Comments: Stasis dermatitis veronique LE's    
Neurological:   
   General: No focal deficit present.   
   Mental Status: She is alert and oriented to person, place, and time. Psychiatric:      
   Mood and Affect: Mood normal.      
   Thought Content: Thought content normal.      
   Judgment: Judgment normal.   
   
-------------NEPHROLOGY Assessment and Plan  
   
PRABHU on CKD:  
- presumed 2/2 volume depletion  
- continue Bumex to 3 mg BID  
- ordered IV albumin for today   
- f/u with Dr Fiona Doyle on 3/3/21 at 1 pm   
   
CKD IIIb:  
- f/b Dr. Fiona Doyle  
- recent baseline 1.5  
   
Worsening anemia :  
- per primary team   
- iron def: being replaced   
   
Orthostasis:  
- resolving   
   
Colitis:  
- on levo and flagyl  
- colonoscopy showing ischemic proctocolitis  
   
HTN:  
- BP stable  
    
Interval History:  
Seen and examined. Edema improvng Cr stable Hb continues to trend down Getting venofer this morning BP low   
  
  
Renal Failure, Acute - Care Day 14 (2/17/2021) by Maggie Rangel 
 
  
Review Entered Review Status 2/19/2021 11:39 Completed  
  
Criteria Review Care Day: 14 Care Date: 2/17/2021 Level of Care: Inpatient Floor Guideline Day 4 Clinical Status   
(X) * Hemodynamic stability 2/19/2021 11:39:09 EST by Della Din   
  VS BELOW   
(X) * Mental status at baseline   
(X) * Tachypnea absent 2/19/2021 11:39:09 EST by Della Din   
  RR 17   
(X) * Hypoxemia absent 2/19/2021 11:39:09 EST by Della Din   
  RA   
( ) * Etiology requiring inpatient treatment absent   
( ) * Electrolyte abnormalities absent or acceptable for next level of care 2/19/2021 11:39:09 EST by Della Din   
  LABS BELOW   
(X) * Acid-base abnormalities absent ( ) * Volume status at baseline or acceptable for next level of care ( ) * Diet tolerated 2/19/2021 11:39:09 EST by Kam Elder not noted   
(X) * Dialysis not needed or access and plan established ( ) * Discharge plans and education understood 2/19/2021 11:39:09 EST by Kam Elder ([place]).  Facility still awaiting insurance auth. Activity   
(X) * Ambulatory or acceptable for next level of care [J]   
2/19/2021 11:39:09 EST by Karine ariza PT who recomm SNF. Routes   
(X) * Oral hydration, medications, and diet 2/19/2021 11:39:09 EST by Karine Rojas   
  BUMEX 2mg increase to 3 mg po bid, questran light 4 g po tid, risaquad 1 cap po daily,  
protonix 40 mg iv q 12h ( ) Renal diet as tolerated 2/19/2021 11:39:09 EST by Karine Rojas   
  GI LITE DIET Interventions (X) Monitor electrolytes, renal function tests, acid-base, and volume status 2/19/2021 11:39:09 EST by Jean Pierre Peña Medications (X) Possible medical therapies 2/19/2021 11:39:09 EST by Karine Rojas   
  BUMEX 2mg increase to 3 mg po bid, questran light 4 g po tid, risaquad 1 cap po daily,  
protonix 40 mg iv q 12h * Milestone Additional Notes 2/17  
98.2 °F (36.8 °C) 83 107/59  - 17 99 % RA  
  
2/17/2021 02:03  
BUN: 25 (H) Creatinine: 1.35 (H) BUN/Creatinine ratio: 19 Calcium: 8.4 (L)  
GFR est non-AA: 39 (L) GFR est AA: 48 (L)  
  
2/17/2021 15:49 Vitamin B12: 1,721 (H) Folate: 54.6 (H) Iron: 29 (L) TIBC: 204 (L) Iron % saturation: 14 (L)  
2/17/2021 02:03  
RBC: 2.30 (L) HGB: 7.2 (L) HCT: 22.0 (L) MCV: 95.7 MCH: 31.3 MCHC: 32.7 RDW: 16.2 (H) PLATELET: 009 (L)  
2/31/8457 15:49 HGB: 7.5 (L) HCT: 23.6 (L)  
  
---NEPHROLOGY PRABHU on CKD:  
- presumed 2/2 volume depletion - Increased Bumex to 3 mg BID  
- f/u with Dr Shashank Haas on 3/3/21 at 1 pm   
   
CKD IIIb:  
- f/b Dr. Shashank Haas  
- recent baseline 1.5  
   
 Orthostasis:  
- resolving   
   
Colitis:  
- on levo and flagyl  
- colonoscopy showing ischemic proctocolitis - CT scan today  
   
Diarrhea:  
- improving  
   
HTN:  
- BP stable   
   
Interval History:  
Seen and examined. Edema improvng Cr stable   
  
--------------MEDICINE Interval history / Subjective:  
    
Patient sitting in recliner chair with her legs raised.  She is on room air.  Denies shortness of breath.  She tells me she is not having as much stool leakage and stool is more formed consistency. Hemoglobin from this morning was 7.2 which is almost a 1 point drop from 2 days ago.  I told her we will keep her today, monitor her hemoglobin, will check this afternoon and tomorrow morning if hemoglobin remains stable that she could be discharged otherwise if hemoglobin drops below 7 she would need blood. Assessment & Plan:  
Severe acute on chronic anemia, suspect GIB  
-Hold aspirin and sc heparin. IV PPI  
-EGD showed patient with ulceration of the mid esophagus, grade nasopharyngitis and a small sliding hiatal hernia, few small areas of erythema in the stomach, diffuse clean-based ulcerations throughout the duodenal bulb without active bleeding.  Biopsies pending  
-Colonoscopy showed diffuse ischemic changes with submucosal edema and circumferential coalescing ulcerations involving all of the rectum extending to the dentate line.  Ulceration extends approximately 50 cm from anal verge.  The sigmoid, descending and transverse colon are normal-appearing mucosa. -Received a unit of blood on 2/11  
-Hemoglobin dropped to 7.2 today from 8.1 two days ago.    
 check hemoglobin in the afternoon and transfuse as needed.   
   
Syncope likely orthostatic from volume contraction due to diarrhea  
-MRI shows no acute hemorrhage or infarction. 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  
-Echo with normal LVEF,no significant valve disease.  
   
Fall  
-As a result of her syncope  
-Negative work-up for acute fracture or injury  
   
Proctocolitis  
-Evident on CT abdomen and pelvis  
-Stool studies negative  
-Continue questran and immodium prn for loose stool  
--EGD showed patient with ulceration of the mid esophagus, grade nasopharyngitis and a small sliding hiatal hernia, few small areas of erythema in the stomach, diffuse clean-based ulcerations throughout the duodenal bulb without active bleeding.  Biopsies pending  
-Colonoscopy showed diffuse ischemic changes with submucosal edema and circumferential coalescing ulcerations involving all of the rectum extending to the dentate line.  Ulceration extends approximately 50 cm from anal verge.  The sigmoid, descending and transverse colon are normal-appearing mucosa. -Colorectal surgery consulted, recommended conservative approach for now. -Repeat abdominal pelvic CT showed improvement in the rectosigmoid colon.  
    
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  
-In the setting of rhabdo and volume depletion from diarrhea  
-Nephrology evaluating the patient  
-creatinine at baseline. IV fluid already discontinued and she is back on her home Bumex.  
   
Chronic leg edema with increasing leg and abdominal edema. No sob or hypoxia  
-D/c iv fluids  
-She still has significant leg edema and creatinine has improved significantly, Bumex dose increased.  Nephrology following.  
    
Rhabdomyolysis.  Resolved  
-As a result of fall  
    
Metabolic acidosis, improved. -Due to alkaline loss from GI tract as well as acute kidney injury  
-Now resolved -Nephrology following.  
   
Thrombocytopenia~mild without bleeding complications.  
-Platelet> 1 4 renal.  
   
Abnormal LFTs.  Resolved  
-Also likely related to infectious process,rhabdomyolysis -CT abdomen and pelvis without any acute liver or gallbladder pathology  
    
Hypertension  
-She is back on Bumex, blood pressure is soft systolic in the low 600H, continue to hold antihypertensives.  
   
T2Dm on metformin preadmission, controlled. -Continue insulin sliding scale coverage  
   
Dyslipidemia  
-Holding statin due to rhabdomyolysis  
   
 Code status:full code DVT prophylaxis: scd,d/c heparin due to GIB Care Plan discussed with: Patient/Family and Nurse Anticipated Disposition: If hemoglobin remains stable, she could be discharged to SNF as early as tomorrow.    
                                            Discussed with case management, insurance Auth pending as of this morning EXAM   
Constitutional: No acute distress, cooperative, pleasant   
HEENT: Atraumatic. Oral mucosa moist,. Non icteric sclera. No pallor. Resp: CTA bilaterally. No wheezing/rhonchi/rales. No accessory muscle use Chest Wall: No deformity CV: Regular rhythm, normal rate, no murmurs, gallops, rubs  
 GI: Soft, non distended, non tender. Bruises from sc injection. normoactive bowel sounds, no hepatosplenomegaly,+abd edema : No CVA or suprapubic tenderness  
 Musculoskeletal: ++ leg edema.  
 Neurologic: Mental status:AAOx3,   
Cranial nerves II-XII : WNL Motor exam:Moves all extremities symmetrically  
   
  
  
  
  
Renal Failure, Acute - Care Day 13 (2/16/2021) by Bill Mitchell RN 
 
  
Review Entered Review Status 2/17/2021 09:07 Completed  
  
Criteria Review Care Day: 13 Care Date: 2/16/2021 Level of Care: Inpatient Floor Guideline Day 4 Level Of Care ( ) Floor to discharge 2/17/2021 09:07:37 EST by Markus Cummins   
  IP Medical   
Clinical Status   
(X) * Hemodynamic stability 2/17/2021 09:07:37 EST by Markus Cummins   
  /66 (BP 1 Location: Right arm, BP Patient Position: At rest) Pulse(!) 55 Temp98.1 °F (36.7 °C) Resp16 Ht5' 6\" (1.676 m) Wt112.9 kg (248 lb 14.4 oz) [id number]% on RA   
(X) * Mental status at baseline 2/17/2021 09:07:37 EST by Ciaran Slaughter   
  A+O   
(X) * Tachypnea absent 2/17/2021 09:07:37 EST by Ciaran Slaughter   
  rr16   
(X) * Hypoxemia absent 2/17/2021 09:07:37 EST by Ciaran Slaughter   
  100% on RA   
(X) * Etiology requiring inpatient treatment absent 2/17/2021 09:07:37 EST by Dimitri Gilford Patient is medically ready for d/c.  Facility is seeking insurance [employer] for patient discharge tomorrow. (X) * Electrolyte abnormalities absent or acceptable for next level of care 2/17/2021 09:07:37 EST by Ciaran Slaughter   
  BUN: 26 (H) Creatinine: 1.44 (H) BUN/Creatinine ratio: 18 Calcium: 8.2 (L) Phosphorus: 2.8 GFR est non-AA: 37 (L) GFR est AA: 44 (L) Albumin: 3.0 (L)   
(X) * Acid-base abnormalities absent 2/17/2021 09:07:37 EST by Ciaran Slaughter   
  Absent   
( ) * Volume status at baseline or acceptable for next level of care   
(X) * Diet tolerated 2/17/2021 09:07:37 EST by Ciaran Slaughter   
  GI Lite diet   
( ) * Dialysis not needed or access and plan established ( ) * Discharge plans and education understood 2/17/2021 09:07:37 EST by Dimitri Gilford Patient is medically ready for d/c.  Facility is seeking insurance [employer] for patient discharge tomorrow. Activity   
(X) * Ambulatory or acceptable for next level of care [J]   
2/17/2021 09:07:37 EST by Ciaran Slaughter   
  See PT Note - pt progressing towards goal   
Routes   
(X) * Oral hydration, medications, and diet 2/17/2021 09:07:37 EST by Ciaran Slaughter   
  GI Lite diet Interventions (X) Monitor electrolytes, renal function tests, acid-base, and volume status 2/17/2021 09:07:37 EST by Ciaran Slaughter   
  Monitoring * Milestone Additional Notes 2/16/21 IP Medical   
  
Nephrology PN- Wearing compression stockings Awaiting placement No labs today No diarrhea Physical Exam-  
General:          NAD,Conversant Neck:               Supple, no mass Resp:               Lungs CTA B/L, no wheezing , normal respiratory effort CV:                  RRR,  no murmur or rub, no LE edema GI:                   Soft, NT, + Bowel sounds, no hepatosplenomegaly Neurologic:       Non focal  
Psych:             AAO x 3 appropriate affect Skin:                No Rash :                  No [place] BUN: 26 (H) Creatinine: 1.44 (H) BUN/Creatinine ratio: 18 Calcium: 8.2 (L) Phosphorus: 2.8 GFR est non-AA: 37 (L) GFR est AA: 44 (L) Albumin: 3.0 (L) Cx result-  STAPHYLOCOCCUS SPECIES, COAGULASE NEGATIVE ISOLATED FROM THIO BROTH ONLY Meds- Bumex 2mg po bid, Questran light 4g po tid, Risaquad 1 cap po qd, Imodium 4mg po prn , Protonix 40mg iv q12hrs, Ultram 50mg po q6hrs prn x1 Assessment and Plan PRABHU on CKD:  
- presumed 2/2 volume depletion - Labs pending  
- if Cr stable, would increase Bumex dose - f/u with Dr Esther Cosme on 3/3/21 at 1 pm   
   
CKD IIIb:  
- f/b Dr. Esther Cosme  
- recent baseline 1.5  
   
Orthostasis:- resolving   
   
Colitis:  
- on levo and flagyl  
- colonoscopy showing ischemic proctocolitis - CT scan today  
   
Diarrhea:- improving HTN:- BP stable  
   
GI PN- She continues to feel overall better, ambulating in the halls several times a day, less fecal leakage. She had a telemed visit with her regular GI Dr. Israel Bring this morning. He is going to gather her hospital records and get her in with someone in his group regarding the ischemic proctitis, as he specializes in UGI.  She prefers to stay within the [facility] practice. She is waiting on d/c to rehab, needs insurance authorization. A/P Per GI -  
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  
- Continue [facility] - Path c/w ischemia-- Dr. Gretel Dempsey: \"Ischemic proctosigmoiditis likely from low flow state. No other source identified. This is not very common though not unknown. \"  
- She plans to f/u at [facility]  
   
Esophageal and duodenal ulcers 2/11/21  
- Recurrent per patient, unclear etiology, sounds like rather extensive w/u at [address] [place] [address] in past   
- Path suggestive of pill esophagitis - Continue PPI BID  
- Will continue to follow with Dr. Jessica Israel at [address] [place] [address] IM PN - She was sitting in the chair talking on the phone during my visit.  She has no complaints. Jade Cecelia feels that leg edema is better, the KULWINDER hose is helping. Assessment & Plan:  
Severe acute on chronic anemia, suspect GIB  
-Hold aspirin and sc heparin. IV PPI  
-Hemoglobin up to 8.1 from 5.6  after a unit of blood. -EGD showed patient with ulceration of the mid esophagus, grade nasopharyngitis and a small sliding hiatal hernia, few small areas of erythema in the stomach, diffuse clean-based ulcerations throughout the duodenal bulb without active bleeding.  Biopsies pending  
-Colonoscopy showed diffuse ischemic changes with submucosal edema and circumferential coalescing ulcerations involving all of the rectum extending to the dentate line.  Ulceration extends approximately 50 cm from anal verge.  The sigmoid, descending and transverse colon are normal-appearing mucosa.  
   
   
Syncope likely orthostatic from volume contraction due to diarrhea  
-MRI shows no acute hemorrhage or infarction. [facility] shows no aneurysm, dissection or evidence of hemodynamically significant stenosis  
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  
-Echo with normal LVEF,no significant valve disease.  
   
Fall  
-As a result of her syncope  
-Negative work-up for acute fracture or injury  
   
Proctocolitis  
-Evident on CT abdomen and pelvis  
-Stool studies negative  
-Continue questran and immodium prn for loose stool --EGD showed patient with ulceration of the mid esophagus, grade nasopharyngitis and a small sliding hiatal hernia, few small areas of erythema in the stomach, diffuse clean-based ulcerations throughout the duodenal bulb without active bleeding.  Biopsies pending  
-Colonoscopy showed diffuse ischemic changes with submucosal edema and circumferential coalescing ulcerations involving all of the rectum extending to the dentate line.  Ulceration extends approximately 50 cm from anal verge.  The sigmoid, descending and transverse colon are normal-appearing mucosa. -Colorectal surgery consulted, recommended conservative approach for now. -Repeat abdominal pelvic CT showed improvement in the rectosigmoid colon.  
   
   
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  
-In the setting of rhabdo and volume depletion from diarrhea  
-Nephrology evaluating the patient  
-creatinine at baseline. IV fluid already discontinued and she is back on her home Bumex.  
   
Chronic leg edema with increasing leg and abdominal edema. No sob or hypoxia  
-D/c iv fluids  
-Resume PTA Bumex today,2/14  
   
Rhabdomyolysis.  Resolved-As a result of fall  
   
Metabolic acidosis, improved. -Due to alkaline loss from GI tract as well as acute kidney injury  
-Now resolved -Nephrology following.  
   
Thrombocytopenia~mild without bleeding complications.-Monitor platelet   
   
Abnormal LFTs.  Resolved  
-Also likely related to infectious process,rhabdomyolysis  
-CT abdomen and pelvis without any acute liver or gallbladder pathology  
   
Hypertension-She is back on Bumex, blood pressure is soft systolic in the low 732H, continue to hold antihypertensives. T2Dm on metformin preadmission, controlled. -Continue insulin sliding scale coverage Dyslipidemia-Holding statin due to rhabdomyolysis Patient is medically ready for d/c.  Facility is seeking insurance Kayce Turner for patient discharge tomorrow.  
   
  
  
  
  
 Renal Failure, Acute - Care Day 12 (2/15/2021) by Chris Etienne RN 
 
  
Review Entered Review Status 2/17/2021 08:58 Completed  
  
Criteria Review Care Day: 12 Care Date: 2/15/2021 Level of Care: Inpatient Floor Guideline Day 4 Level Of Care ( ) Floor to discharge 2/17/2021 08:58:28 EST by Phill Ortiz   
  IP Floor Clinical Status   
(X) * Hemodynamic stability 2/17/2021 08:58:28 EST by Phill Ortiz   
  VITALS:  
/65 Pulse70 Temp98.2 °F `1 Resp18 [id number]% on RA   
(X) * Mental status at baseline 2/17/2021 08:58:28 EST by Phill Ortiz   
  NAD,Conversant   
(X) * Tachypnea absent 2/17/2021 08:58:28 EST by Pihll Ortiz   
  VITALS:  
/61 Pulse70 Temp98.2 °F `1 Resp18 [id number]% on RA   
(X) * Hypoxemia absent 2/17/2021 08:58:28 EST by Phill Ortiz   
  on RA   
(X) * Etiology requiring inpatient treatment absent 2/17/2021 08:58:28 EST by Phill Ortiz   
  She continues to improve, though still has some fecal leakage. From GI perspective, she can be discharged to rehab when hospitalist agrees, and we will touch base with her regarding path results. (X) * Electrolyte abnormalities absent or acceptable for next level of care 2/17/2021 08:58:28 EST by Phill Ortiz   
  Chloride: 114 (H) 
CO2: 16 (L) Anion gap: 6 Glucose: 80 BUN: 22 (H) Creatinine: 1.53 (H) BUN/Creatinine ratio: 14 
Calcium: 7.9 (L) GFR est non-AA: 34 (L) 
GFR est AA: 41 (L) Bilirubin, total: 0.3 Protein, total: 5.3 (L)   
(X) * Acid-base abnormalities absent 2/17/2021 08:58:28 EST by Phill Ortiz   
  none documented ( ) * Volume status at baseline or acceptable for next level of care   
(X) * Diet tolerated 2/17/2021 08:58:28 EST by Phill Ortiz   
  GI Lite diet   
( ) * Dialysis not needed or access and plan established ( ) * Discharge plans and education understood Activity ( ) * Ambulatory or acceptable for next level of care [J] Routes   
(X) * Oral hydration, medications, and diet 2/17/2021 08:58:28 EST by Marc Mohr diet * Milestone Additional Notes 2/15/21 IP Medical   
She continues to feel overall better. She has less fecal incontinence, but still has watery stool and some leakage. She has noted that her left abdominal pannus is larger than the right, some tenderness with palpation.    
She denies hematochezia, melena, abdominal pain, nausea, vomiting.  Patient is working with PT and OT and SNF rehab is recommended She is to be discharged to rehab.  
   
VITALS:   
/66 Pulse 70 Temp 98.2 °F `1 Resp 18 SpO2 97% on RA   
  
RBC: 2.63 (L) HGB: 8.1 (L) HCT: 27.8 (L) MCV: 105.7 (H) MCH: 30.8 MCHC: 29.1 (L) RDW: 16.7 (H) PLATELET: 000 (L) Chloride: 114 (H)  
CO2: 16 (L) Anion gap: 6 Glucose: 80 BUN: 22 (H) Creatinine: 1.53 (H) BUN/Creatinine ratio: 14  
Calcium: 7.9 (L) GFR est non-AA: 34 (L)  
GFR est AA: 41 (L) Bilirubin, total: 0.3 Protein, total: 5.3 (L) Albumin: 2.1 (L) Globulin: 3.2 A-G Ratio: 0.7 (L) ALT: 26 Meds- Bumex 2mg po bid, Questran light 4g po tid, Risaquad 1 cap po qd, Imodium 4mg po prn , Protonix 40mg iv q12hrs, Ultram 50mg po q6hrs prn z4   
    
PHYSICAL EXAM:  
General   NAD  
EENT  Normocephalic, Atraumatic, PERRLA, EOMI, sclera clear Neck   Supple, No thyromegaly or bruit Respiratory   Clear To Auscultation bilaterally - no wheezes, rales, rhonchi, or crackles Cardiology  Regular Rate and Rythmn - no murmurs, rubs or gallops Abdominal  Soft, non-tender, non-distended  
    
Assessment/Plan  
60 y.o.  female who was admitted after GLF/being down for > 36 hours. She had rhabdo and PRABHU with recto-sigmoid colitis and bloody diarrhea 2/2 ischemia.    
   
Repeat CT 2/14/21:   
1. Persistent but improved CT appearance of the rectosigmoid colitis. 2. Mild generalized vascular disease without SMA or BOSTON occlusion. 3. New small pleural effusions right greater than left. 4. Otherwise stable  
incidental and postoperative changes  
   
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  
- Continue Haleye Landy - Path still pending   
   
Esophageal and duodenal ulcers 2/11/21  
- Recurrent per patient, unclear etiology, sounds like rather extensive w/u at [facility] in past   
- Path still pending  
- Continue PPI BID  
   
She continues to improve, though still has some fecal leakage. From GI perspective, she can be discharged to rehab when hospitalist agrees, and we will touch base with her regarding path results. I have personally reviewed the history and independently examined the patient. I have reviewed the chart and agree with the documentation recorded by the Mid Level Provider, including the assessment, treatment plan, and disposition.  
   
ASSESSMENT AND PLAN per GI -   
Ischemic proctosigmoiditis likely from low flow state. No other source identified. This is not very common though not unknown. Clinically improving. Continue current regimen A/P Per Nephrology -   
Continues to have diarrhea Cr stable   
orthostasis is getting better Physical Examination:  
General:          NAD,Conversant Neck:               Supple, no mass Resp:               Lungs CTA B/L, no wheezing , normal respiratory effort CV:                  RRR,  no murmur or rub, no LE edema GI:                   Soft, NT, + Bowel sounds, no hepatosplenomegaly Neurologic:       Non focal  
Psych:             AAO x 3 appropriate affect Skin:                No Rash :                  No [place]  
   
  
PT Note-   
 Pt received in chair, eager to ambulate. Noted edema bilat distal LEs and feet. Pt transferred sit to stand with SBA; able to maintain static/ dynamic stand with single UE support and SBA while assisted with donning brief. Pt tolerated amb on unit with RW and CGA. Demo increasing tolerance to activity as noted by increased gait distance.   
   
Pt remains motivated to participate in rehab process and return to [facility] with mobility. Pt will benefit from con't PT for balance/ mobility progression as tolerated. Recommend follow up SNF at d/c. VOLODYMYR MCKENZIE PN-   
Patient continues to feel well without abdominal pain, nausea or vomiting.  But she continues to fecal incontinence \"every time I get up\" Discussed with GI, okay for discharge with plans for outpatient follow-up with biopsy results. Assessment & Plan:  
Severe acute on chronic anemia, suspect GIB  
-Hold aspirin and sc heparin. IV PPI  
-Hemoglobin up to 8.1 from 5.6  after a unit of blood. -EGD showed patient with ulceration of the mid esophagus, grade nasopharyngitis and a small sliding hiatal hernia, few small areas of erythema in the stomach, diffuse clean-based ulcerations throughout the duodenal bulb without active bleeding.  Biopsies pending  
-Colonoscopy showed diffuse ischemic changes with submucosal edema and circumferential coalescing ulcerations involving all of the rectum extending to the dentate line.  Ulceration extends approximately 50 cm from anal verge.  The sigmoid, descending and transverse colon are normal-appearing mucosa.  
   
Syncope likely orthostatic from volume contraction due to diarrhea  
-MRI shows no acute hemorrhage or infarction.  Tyra Sale shows no aneurysm, dissection or evidence of hemodynamically significant stenosis  
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  
-Echo with normal LVEF,no significant valve disease.  
   
 Shahana Mayers a result of her syncope-Negative work-up for acute fracture or injury  
   
Proctocolitis  
-Evident on CT abdomen and pelvis  
-Stool studies negative  
-Continue questran and immodium prn for loose stool  
--EGD showed patient with ulceration of the mid esophagus, grade nasopharyngitis and a small sliding hiatal hernia, few small areas of erythema in the stomach, diffuse clean-based ulcerations throughout the duodenal bulb without active bleeding.  Biopsies pending  
-Colonoscopy showed diffuse ischemic changes with submucosal edema and circumferential coalescing ulcerations involving all of the rectum extending to the dentate line.  Ulceration extends approximately 50 cm from anal verge.  The sigmoid, descending and transverse colon are normal-appearing mucosa. -Colorectal surgery consulted, recommended conservative approach for now. -Repeat abdominal pelvic CT showed improvement in the rectosigmoid colon.  
   
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  
-In the setting of rhabdo and volume depletion from diarrhea  
-Nephrology evaluating the patient  
-creatinine at baseline. D/c iv fluids.  
   
Chronic leg edema with increasing leg and abdominal edema. No sob or hypoxia  
-D/c iv fluids  
-Resume PTA Bumex today,2/14 Rhabdomyolysis -As a result of fall  
-Continue IV fluid  
-Ordered Labs for am  
   
Metabolic acidosis, improved. -Due to alkaline loss from GI tract as well as acute kidney injury  
-Now resolved -Nephrology following.  
   
Thrombocytopenia~mild without bleeding complications.-Monitor platelet   
   
Abnormal LFTs  
-Also likely related to infectious process,rhabdomyolysis  
-CT abdomen and pelvis without any acute liver or gallbladder pathology  
   
Hypertension-Hold antihypertensives for now as patient initially hypotensive T2Dm on metformin preadmission-Continue insulin sliding scale coverage Dyslipidemia-Holding statin due to rhabdomyolysis  
  
  
 Renal Failure, Acute - Care Day 11 (2/14/2021) by Dylon Woods 
 
  
Review Entered Review Status 2/16/2021 14:55 Completed  
  
Criteria Review Care Day: 11 Care Date: 2/14/2021 Level of Care: Telemetry Guideline Day 3 Clinical Status   
(X) * Mental status at baseline or improved 2/16/2021 14:55:34 EST by Charlie Arnold   
  Neurologic: Mental status:AAOx3,  
Cranial nerves II-XII : WNL Motor exam:Moves all extremities symmetrically   
(X) * Respiratory status at baseline or improved 2/16/2021 14:55:34 EST by Charlie Arnold   
  Resp: CTA bilaterally. No wheezing/rhonchi/rales. No accessory muscle use Routes   
(X) * Oral hydration, medications, and diet 2/16/2021 14:55:34 EST by Charlie Arnold   
  dm diet * Milestone Additional Notes 2/14 VS- 98.5 57 16 100 107/67 GLUCOSE,FAST - POC 97 137 (H) 84 113 (H) Medication Dose Route Frequency  
o bumetanide (BUMEX) tablet 2 mg 2 mg Oral BID  
o 0.9% sodium chloride infusion 250 mL 250 mL IntraVENous PRN  
o pantoprazole (PROTONIX) 40 mg in 0.9% sodium chloride 10 mL injection 40 mg IntraVENous Q12H  
o sodium chloride (NS) flush 5-40 mL 5-40 mL IntraVENous PRN  
o traMADoL (ULTRAM) tablet 50 mg 50 mg Oral Q6H PRN  
o loperamide (IMODIUM) capsule 4 mg 4 mg Oral Q4H PRN  
o cholestyramine-aspartame (QUESTRAN LIGHT) packet 4 g 4 g Oral TID WITH MEALS  
o [Held by provider] aspirin chewable tablet 81 mg 81 mg Oral DAILY  
o sodium chloride (NS) flush 5-40 mL 5-40 mL IntraVENous Q8H  
o sodium chloride (NS) flush 5-40 mL 5-40 mL IntraVENous PRN  
o acetaminophen (TYLENOL) tablet 650 mg 650 mg Oral Q6H PRN  
  Or  
o acetaminophen (TYLENOL) suppository 650 mg 650 mg Rectal Q6H PRN  
o promethazine (PHENERGAN) tablet 12.5 mg 12.5 mg Oral Q6H PRN  
  Or  
o ondansetron (ZOFRAN) injection 4 mg 4 mg IntraVENous Q6H PRN  
o [Held by provider] heparin (porcine) injection 5,000 Units 5,000 Units SubCUTAneous Q8H  
 o L.acidophilus-paracasei-S.thermophil-bifidobacter (RISAQUAD) 8 billion cell capsule 1 Cap Oral DAILY  
o insulin lispro (HUMALOG) injection SubCUTAneous AC&HS  
o glucose chewable tablet 16 g 4 Tab Oral PRN  
o dextrose (D50W) injection syrg 12.5-25 g 12.5-25 g IntraVENous PRN  
o glucagon (GLUCAGEN) injection 1 mg 1 mg IntraMUSCular Patient sitting in chair, no complaints. She says her right lower sided of abdomen feels asymmetrical   
Leg edema increasing,her diuretic was on hold and she was getting iv fluids. Assessment & Plan:  
Severe acute on chronic anemia, suspect GIB  
-Hold aspirin and sc heparin. IV PPI  
-Hemoglobin up to 7.7 from 5.6  after a unit of blood. -EGD showed patient with ulceration of the mid esophagus, grade nasopharyngitis and a small sliding hiatal hernia, few small areas of erythema in the stomach, diffuse clean-based ulcerations throughout the duodenal bulb without active bleeding.  Biopsies pending  
-Colonoscopy showed diffuse ischemic changes with submucosal edema and circumferential coalescing ulcerations involving all of the rectum extending to the dentate line.  Ulceration extends approximately 50 cm from anal verge.  The sigmoid, descending and transverse colon are normal-appearing mucosa.  
   
   
Syncope likely orthostatic from volume contraction due to diarrhea  
-MRI shows no acute hemorrhage or infarction. Nicole Price shows no aneurysm, dissection or evidence of hemodynamically significant stenosis  
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  
-Echo with normal LVEF,no significant valve disease.  
   
Fall  
-As a result of her syncope  
-Negative work-up for acute fracture or injury  
   
Proctocolitis  
-Evident on CT abdomen and pelvis  
-Stool studies negative  
-Continue questran and immodium prn for loose stool --EGD showed patient with ulceration of the mid esophagus, grade nasopharyngitis and a small sliding hiatal hernia, few small areas of erythema in the stomach, diffuse clean-based ulcerations throughout the duodenal bulb without active bleeding.  Biopsies pending  
-Colonoscopy showed diffuse ischemic changes with submucosal edema and circumferential coalescing ulcerations involving all of the rectum extending to the dentate line.  Ulceration extends approximately 50 cm from anal verge.  The sigmoid, descending and transverse colon are normal-appearing mucosa. -Colorectal surgery consulted, recommended conservative approach for now. -Repeat abdominal pelvic CT showed improvement in the rectosigmoid colon.  
   
   
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  
-In the setting of rhabdo and volume depletion from diarrhea  
-Nephrology evaluating the patient  
-creatinine at baseline. D/c iv fluids.  
   
Chronic leg edema with increasing leg and abdominal edema. No sob or hypoxia  
-D/c iv fluids  
-Resume PTA Bumex today,2/14  
   
   
Rhabdomyolysis -As a result of fall  
-Continue IV fluid  
-Ordered Labs for am  
   
Metabolic acidosis  
-Due to alkaline loss from GI tract as well as acute kidney injury  
-Now resolved -Nephrology evaluating patient  
   
Thrombocytopenia~mild without bleeding complications.  
-Monitor platelet   
   
Abnormal LFTs  
-Also likely related to infectious process,rhabdomyolysis  
-CT abdomen and pelvis without any acute liver or gallbladder pathology  
   
   
Hypertension  
-Hold antihypertensives for now as patient initially hypotensive  
   
T2Dm on metformin preadmission  
-Continue insulin sliding scale coverage  
   
Dyslipidemia  
-Holding statin due to rhabdomyolysis  
   
   
Code status:full code DVT prophylaxis: scd,d/c heparin due to GIB Care Plan discussed with: Patient/Family and Nurse Anticipated Disposition: Home w/Family Anticipated Discharge: 24 hours to 48 hours  
  
stitutional: No acute distress, cooperative, pleasant HEENT: Atraumatic. Oral mucosa moist,. Non icteric sclera. No pallor. Chest Wall: No deformity CV: Regular rhythm, normal rate, no murmurs, gallops, rubs  
 GI: Soft, non distended, non tender. Bruises from sc injection. normoactive bowel sounds, no hepatosplenomegaly,+abd edema : No CVA or suprapubic tenderness  
 Musculoskeletal: ++ leg edema

## 2021-02-24 ENCOUNTER — APPOINTMENT (OUTPATIENT)
Dept: CT IMAGING | Age: 66
DRG: 988 | End: 2021-02-24
Attending: NURSE PRACTITIONER
Payer: COMMERCIAL

## 2021-02-24 PROBLEM — K62.5 RECTAL BLEED: Status: ACTIVE | Noted: 2021-02-24

## 2021-02-24 LAB
ERYTHROCYTE [DISTWIDTH] IN BLOOD BY AUTOMATED COUNT: 16.3 % (ref 11.5–14.5)
GLUCOSE BLD STRIP.AUTO-MCNC: 103 MG/DL (ref 65–100)
GLUCOSE BLD STRIP.AUTO-MCNC: 108 MG/DL (ref 65–100)
GLUCOSE BLD STRIP.AUTO-MCNC: 113 MG/DL (ref 65–100)
GLUCOSE BLD STRIP.AUTO-MCNC: 122 MG/DL (ref 65–100)
GLUCOSE BLD STRIP.AUTO-MCNC: 163 MG/DL (ref 65–100)
HCT VFR BLD AUTO: 19.9 % (ref 35–47)
HCT VFR BLD AUTO: 22.1 % (ref 35–47)
HCT VFR BLD AUTO: 25.4 % (ref 35–47)
HGB BLD-MCNC: 6.7 G/DL (ref 11.5–16)
HGB BLD-MCNC: 7.4 G/DL (ref 11.5–16)
HGB BLD-MCNC: 8.2 G/DL (ref 11.5–16)
HISTORY CHECKED?,CKHIST: NORMAL
MCH RBC QN AUTO: 31 PG (ref 26–34)
MCHC RBC AUTO-ENTMCNC: 33.7 G/DL (ref 30–36.5)
MCV RBC AUTO: 92.1 FL (ref 80–99)
NRBC # BLD: 0 K/UL (ref 0–0.01)
NRBC BLD-RTO: 0 PER 100 WBC
PLATELET # BLD AUTO: 116 K/UL (ref 150–400)
PMV BLD AUTO: 11.1 FL (ref 8.9–12.9)
RBC # BLD AUTO: 2.16 M/UL (ref 3.8–5.2)
SERVICE CMNT-IMP: ABNORMAL
WBC # BLD AUTO: 4.7 K/UL (ref 3.6–11)

## 2021-02-24 PROCEDURE — 74011000636 HC RX REV CODE- 636: Performed by: FAMILY MEDICINE

## 2021-02-24 PROCEDURE — 74011250637 HC RX REV CODE- 250/637: Performed by: HOSPITALIST

## 2021-02-24 PROCEDURE — 36415 COLL VENOUS BLD VENIPUNCTURE: CPT

## 2021-02-24 PROCEDURE — 74011636637 HC RX REV CODE- 636/637: Performed by: HOSPITALIST

## 2021-02-24 PROCEDURE — 99218 HC RM OBSERVATION: CPT

## 2021-02-24 PROCEDURE — 85014 HEMATOCRIT: CPT

## 2021-02-24 PROCEDURE — 82962 GLUCOSE BLOOD TEST: CPT

## 2021-02-24 PROCEDURE — 30233N1 TRANSFUSION OF NONAUTOLOGOUS RED BLOOD CELLS INTO PERIPHERAL VEIN, PERCUTANEOUS APPROACH: ICD-10-PCS | Performed by: GENERAL ACUTE CARE HOSPITAL

## 2021-02-24 PROCEDURE — 74011250636 HC RX REV CODE- 250/636: Performed by: NURSE PRACTITIONER

## 2021-02-24 PROCEDURE — 85027 COMPLETE CBC AUTOMATED: CPT

## 2021-02-24 PROCEDURE — 74178 CT ABD&PLV WO CNTR FLWD CNTR: CPT

## 2021-02-24 PROCEDURE — 65660000000 HC RM CCU STEPDOWN

## 2021-02-24 PROCEDURE — 36430 TRANSFUSION BLD/BLD COMPNT: CPT

## 2021-02-24 PROCEDURE — P9016 RBC LEUKOCYTES REDUCED: HCPCS

## 2021-02-24 RX ORDER — SODIUM CHLORIDE 9 MG/ML
100 INJECTION, SOLUTION INTRAVENOUS CONTINUOUS
Status: DISCONTINUED | OUTPATIENT
Start: 2021-02-24 | End: 2021-02-24

## 2021-02-24 RX ORDER — SODIUM CHLORIDE 9 MG/ML
250 INJECTION, SOLUTION INTRAVENOUS AS NEEDED
Status: DISCONTINUED | OUTPATIENT
Start: 2021-02-24 | End: 2021-03-05 | Stop reason: HOSPADM

## 2021-02-24 RX ADMIN — INSULIN LISPRO 2 UNITS: 100 INJECTION, SOLUTION INTRAVENOUS; SUBCUTANEOUS at 16:20

## 2021-02-24 RX ADMIN — Medication 10 ML: at 13:28

## 2021-02-24 RX ADMIN — Medication 10 ML: at 03:35

## 2021-02-24 RX ADMIN — PANTOPRAZOLE SODIUM 40 MG: 40 TABLET, DELAYED RELEASE ORAL at 17:20

## 2021-02-24 RX ADMIN — SODIUM CHLORIDE 500 ML: 900 INJECTION, SOLUTION INTRAVENOUS at 09:28

## 2021-02-24 RX ADMIN — ATORVASTATIN CALCIUM 40 MG: 40 TABLET, FILM COATED ORAL at 09:27

## 2021-02-24 RX ADMIN — PANTOPRAZOLE SODIUM 40 MG: 40 TABLET, DELAYED RELEASE ORAL at 09:27

## 2021-02-24 RX ADMIN — Medication 10 ML: at 07:11

## 2021-02-24 RX ADMIN — IOPAMIDOL 100 ML: 755 INJECTION, SOLUTION INTRAVENOUS at 14:29

## 2021-02-24 NOTE — H&P
HISTORY AND PHYSICAL      PCP: Jorge Mcneill MD  History source: the patient      CC: rectal bleeding      HPI: 60 y.o lady with DM, HTN, PAD, CKD, recently admitted here with PRABHU and GI bleed due to ischemic proctocolitis, who presents with rectal bleeding. She noted constant bright bloody stools with clota today. She's starting to feel cold and light-headed. She denies abdominal pain, fever, n/v. She denies blood thinner use but is unsure if she is taking the aspirin on her med list.      PMH/PSH:  Past Medical History:   Diagnosis Date    Cancer [facility])     melanoma back stage II 2005    CKD (chronic kidney disease), stage III 3/1/2016    CKD (chronic kidney disease), stage IV ([address] 3/1/2016    Diabetes [facility] [address]     HTN (hypertension) 11/2/2015    Hypertension     LBP (low back pain) 11/2/2015    Menopause     Microalbuminuria 11/21/2015    Obesity 11/2/2015    PAD (peripheral artery disease) ([facility] [address] 9/29/2016    Venous insufficiency 11/2/2015     Past Surgical History:   Procedure Laterality Date    COLONOSCOPY N/A 2/11/2021    COLONOSCOPY performed by Jaxson Rendon [facility] at [facility] ENDOSCOPY    HX AMPUTATION TOE  01/13/2021    Left second toe amputation; Dr. Paxton Khan.  HX APPENDECTOMY      HX BACK SURGERY      HX BREAST BIOPSY      unsure of which breast or when. Negative results    HX TONSILLECTOMY         Home meds:   Prior to Admission medications    Medication Sig Start Date End Date Taking? Authorizing Provider   traMADoL (ULTRAM) 50 mg tablet Take 1 Tab by mouth every six (6) hours as needed for Pain for up to 30 days. Max Daily Amount: 200 mg. Indications: neuropathic pain 2/19/21 3/21/21  Kusum Zaldivar MD   bumetanide (BUMEX) 1 mg tablet Take 3 Tabs by mouth two (2) times a day. 2/19/21   Kusum Zaldivar MD   cholestyramine-aspartame Mabelene Callie LIGHT) 4 gram packet Take 1 Packet by mouth three (3) times daily (with meals) for 30 days.  2/16/21 3/18/21  Wyatt Bautista MD L.acid,para-B. bifidum-S.therm (RISAQUAD) 8 billion cell cap cap Take 1 Cap by mouth daily for 30 days. 2/17/21 3/19/21  Gregorio Morataya MD   loperamide (IMODIUM) 2 mg capsule Take 2 Caps by mouth every four (4) hours as needed for Diarrhea for up to 10 days. 2/16/21 2/26/21  Gregorio Morataya MD   pantoprazole (Protonix) 40 mg tablet Take 1 Tab by mouth two (2) times a day for 30 days. 2/16/21 3/18/21  Gregorio Morataya MD   atorvastatin (LIPITOR) 40 mg tablet TAKE 1 TABLET BY MOUTH EVERY DAY 11/21/19   Aundrea Watters MD   metFORMIN (GLUCOPHAGE) 500 mg tablet TAKE 1 TABLET BY MOUTH TWICE A DAY WITH MEALS 8/8/19   Aundrea Watters MD   potassium chloride SR (KLOR-CON 10) 10 mEq tablet Take 1 mEq by mouth daily. Provider, Historical   metFORMIN (GLUCOPHAGE) 500 mg tablet TAKE 1 TABLET BY MOUTH TWICE A DAY WITH MEALS 7/24/18   Aundrea Watters MD   [facility] ULTRA TEST strip TEST BLOOD SUGAR TWICE A DAY 3/3/18   Aundrea Watters MD   [facility]. 33 gauge misc USE TO TEST BLOOD SUGAR EVERY DAY 6/5/17   Aundrea Watters MD   [facility] ULTRA TEST strip TEST BLOOD SUGAR TWICE A DAY 2/2/17   Aundrea Watters MD   vpkwjewbxrru-P4-[id number]-algal oil 3 mg-35 mg-2 mg -90.314 mg cap Take 2 Caps by mouth daily. Provider, Historical   CHILDREN'S ASPIRIN 81 mg chewable tablet CHEW 1 TABLET BY MOUTH EVERY DAY 1/3/17   Aundrea Watters MD   acetaminophen (TYLENOL) 325 mg tablet Take 2 Tabs by mouth every four (4) hours as needed. 10/4/16   Aundrea Watters MD       Allergies:  No Known Allergies    FH:  Family History   Problem Relation Age of Onset   Maria T Stabs Arthritis-rheumatoid Mother     Dementia Father        SH:  Social History     Tobacco Use    Smoking status: Former Smoker    Smokeless tobacco: Never Used   Substance Use Topics    Alcohol use:  Yes     Alcohol/week: 0.0 standard drinks     Frequency: Monthly or less     Drinks per session: 1 or 2     Binge frequency: Never     Comment: occassion       ROS: A comprehensive review of systems was negative except for that written in the HPI. PHYSICAL EXAM:  Visit Vitals  /67 (BP 1 Location: Left upper arm, BP Patient Position: At rest)   Pulse 76   Temp 98.7 °F (37.1 °C)   Resp 18   SpO2 100%       Gen: NAD, non-toxic  HEENT: anicteric sclerae, pale conjunctiva  Neck: supple, trachea midline, no adenopathy  Heart: RRR, no MRG, no JVD, no peripheral edema  Lungs: CTA b/l, non-labored respirations  Abd: soft, NT, ND, BS+  Extr: warm  Skin: dry, no rash  Neuro: CN II-XII grossly intact, normal speech, moves all extremities  Psych: normal mood, appropriate affect      Labs/Imaging:  Recent Results (from the past 24 hour(s))   CBC WITH AUTOMATED DIFF    Collection Time: 02/23/21  7:22 PM   Result Value Ref Range    WBC 5.2 3.6 - 11.0 K/uL    RBC 2.86 (L) 3.80 - 5.20 M/uL    HGB 8.9 (L) 11.5 - 16.0 g/dL    HCT 26.8 (L) 35.0 - 47.0 %    MCV 93.7 80.0 - 99.0 FL    MCH 31.1 26.0 - 34.0 PG    MCHC 33.2 30.0 - 36.5 g/dL    RDW 16.0 (H) 11.5 - 14.5 %    PLATELET 629 295 - 365 K/uL    MPV 11.1 8.9 - 12.9 FL    NRBC 0.0 0  WBC    ABSOLUTE NRBC 0.00 0.00 - 0.01 K/uL    NEUTROPHILS 64 32 - 75 %    LYMPHOCYTES 16 12 - 49 %    MONOCYTES 14 (H) 5 - 13 %    EOSINOPHILS 4 0 - 7 %    BASOPHILS 1 0 - 1 %    IMMATURE GRANULOCYTES 1 (H) 0.0 - 0.5 %    ABS. NEUTROPHILS 3.4 1.8 - 8.0 K/UL    ABS. LYMPHOCYTES 0.8 0.8 - 3.5 K/UL    ABS. MONOCYTES 0.7 0.0 - 1.0 K/UL    ABS. EOSINOPHILS 0.2 0.0 - 0.4 K/UL    ABS. BASOPHILS 0.0 0.0 - 0.1 K/UL    ABS. IMM.  GRANS. 0.0 0.00 - 0.04 K/UL    DF AUTOMATED     METABOLIC PANEL, COMPREHENSIVE    Collection Time: 02/23/21  7:22 PM   Result Value Ref Range    Sodium 130 (L) 136 - 145 mmol/L    Potassium 4.1 3.5 - 5.1 mmol/L    Chloride 97 97 - 108 mmol/L    CO2 24 21 - 32 mmol/L    Anion gap 9 5 - 15 mmol/L    Glucose 96 65 - 100 mg/dL    BUN 30 (H) 6 - 20 MG/DL    Creatinine 1.54 (H) 0.55 - 1.02 MG/DL    BUN/Creatinine ratio 19 12 - 20      GFR est AA 41 (L) >60 ml/min/1.73m2    GFR est non-AA 34 (L) >60 ml/min/1.73m2    Calcium 9.0 8.5 - 10.1 MG/DL    Bilirubin, total 0.6 0.2 - 1.0 MG/DL    ALT (SGPT) 19 12 - 78 U/L    AST (SGOT) 21 15 - 37 U/L    Alk. phosphatase 76 45 - 117 U/L    Protein, total 6.8 6.4 - 8.2 g/dL    Albumin 3.8 3.5 - 5.0 g/dL    Globulin 3.0 2.0 - 4.0 g/dL    A-G Ratio 1.3 1.1 - 2.2     SAMPLES BEING HELD    Collection Time: 02/23/21  7:22 PM   Result Value Ref Range    SAMPLES BEING HELD 1 RED, 1 BRITANY     COMMENT        Add-on orders for these samples will be processed based on acceptable specimen integrity and analyte stability, which may vary by analyte. Recent Labs     02/23/21 1922   WBC 5.2   HGB 8.9*   HCT 26.8*        Recent Labs     02/23/21 1922   *   K 4.1   CL 97   CO2 24   BUN 30*   CREA 1.54*   GLU 96   CA 9.0     Recent Labs     02/23/21 1922   ALT 19   AP 76   TBILI 0.6   TP 6.8   ALB 3.8   GLOB 3.0       No results for input(s): CPK, CKNDX, TROIQ in the last 72 hours. No lab exists for component: CPKMB    No results for input(s): INR, PTP, APTT, INREXT in the last 72 hours. No results for input(s): PH, PCO2, PO2 in the last 72 hours. No results found.         Assessment & Plan:     Rectal bleeding: likely due to ischemic proctocolitis  -monitor H/H   -type and cross; pt consented for blood transfusion  -consult GI  -clear liquids for now  -gentle IVF    CKD III:  -hold diuretics  -monitor renal function and volume state on IVFs    PAD:  -hold aspirin    Type 2 DM:  -hold metformin  -SSI/POC checks    DVT ppx: SCDs  Code status: full  Disposition: likely back to rehab when medically appropriate    Signed By: Tucker Salazar MD     February 23, 2021

## 2021-02-24 NOTE — ED TRIAGE NOTES
Patient presents from [address] [place] [address] with complaints of uncontrollable rectal bleeding since using the restroom \"a little after 5pm\". Patient was just discharged after experiencing a fall where she layed on the ground for 36 hours. Patient reports she had ischemic colon and PRABHU from that.

## 2021-02-24 NOTE — PROGRESS NOTES
1845 Pt had small bloody bowel movement. [address]    Bedside and Verbal shift change report given to Quinton (oncoming nurse) by Suha Lan (offgoing nurse).  Report included the following information SBAR, Kardex, Intake/Output, MAR and Cardiac Rhythm SR.

## 2021-02-24 NOTE — PROGRESS NOTES
[facility]                                                                                          Hospitalist Progress Note  Meggan Andrews NP  Answering service: [phone] OR [phone] from in house phone        Date of Service:  2021  NAME:  Iwona Quintana  :  1955  MRN:  [id number]      Admission Summary:   Iwona Quintana is a 65 y.o lady with a PMH T2DM, HTN, PAD, CKD who was recently admitted here with PRABHU and GI bleed due to Ischemic Proctosigmoiditis, (last colonoscopy 21)who presented from [facility] with rectal bleeding. She noted constant bright bloody stools with clots with associated chills and light-headedness. She denied abdominal pain, fever, n/v. She denied blood thinner use but is unsure if she is taking the aspirin on her med list.    Interval history / Subjective:   Follow-up for issues listed below. (Rectal bleeding, CKD 3, PAD, T2DM, Ischemic Proctosigmoiditis)  -Patient seen and examined, c/o's lightheadedness.      Assessment & Plan:     Rectal bleeding 2/2 to Ischemic Proctosigmoiditis: 2/2 to ischemic proctocolitis, lightheadedness and hypotensive.   -monitor H/H, transfuse to keep Hgb >7.0   -consult GI  -clear liquids   -IVF: 500mL Bolus x1  -CTA abd/pelv : No CT evidence for acute GI bleeding at this time. Stable to slightly increased mucosal enhancement and wall thickening in the rectosigmoid colon consistent with proctocolitis since the prior study. Significant diverticulosis without acute diverticulitis.       Acute Blood Loss Anemia: 2/2 to above, plan as above.    CKD III:  -hold diuretics  -monitor renal function and volume state on IVFs     PAD: hold ASA 2/2 GIB.  T2DM: hold metformin, ISS, accuchecks. ECHO 2021: LVEF 60%, normal systolic and dyastolic function.     DVTppx: SCDs  GIppx: Protonix  Code Status: Full Code  Diet: clr liq  Activity: BR  Discharge: likely return to [facility] >48hrs  Ambulates: independent  Hospital Problems  Date Reviewed: 2/19/2021          Codes Class Noted POA    * (Principal) Rectal bleeding ICD-10-CM: K62.5  ICD-9-CM: 569.3  2/23/2021 Unknown                Review of Systems:   A comprehensive review of systems was negative except for that written in the HPI. Vital Signs:    Last 24hrs VS reviewed since prior progress note. Most recent are:  Visit Vitals  BP (!) 88/46   Pulse 63   Temp 98.8 °F (37.1 °C)   Resp 16   SpO2 97%       No intake or output data in the 24 hours ending 02/24/21 0858     Physical Examination:     I had a face to face encounter with this patient and independently examined them on 2/24/2021 as outlined below:    Constitutional:  No acute distress, cooperative, pleasant    ENT:  Oral mucosa moist.    Resp:  CTA bilaterally. No wheezing/rhonchi/rales. No accessory muscle use. CV:  Regular rhythm, normal rate, S1,S2.    GI/:  Soft, obese, non tender, no guarding, BS present. Passing blood clots, intermittent. Voids Freely. Musculoskeletal:  BLE edema, warm, 2+ pulses throughout. Neurologic:  Moves all extremities. AAOx3, CN II-XII reviewed. Skin:  Good turgor, no rashes or ulcers  Psych:  Good insight, Not anxious nor agitated. Data Review:    Review and/or order of clinical lab test      Labs:     Recent Labs     02/24/21  0330 02/23/21 1922   WBC 4.7 5.2   HGB 6.7* 8.9*   HCT 19.9* 26.8*   * 171     Recent Labs     02/23/21 1922   *   K 4.1   CL 97   CO2 24   BUN 30*   CREA 1.54*   GLU 96   CA 9.0     Recent Labs     02/23/21 1922   ALT 19   AP 76   TBILI 0.6   TP 6.8   ALB 3.8   GLOB 3.0     No results for input(s): INR, PTP, APTT, INREXT in the last 72 hours. No results for input(s): FE, TIBC, PSAT, FERR in the last 72 hours. Lab Results   Component Value Date/Time    Folate 54.6 (H) 02/17/2021 03:49 PM      No results for input(s): PH, PCO2, PO2 in the last 72 hours.   No results for input(s): CPK, CKNDX, TROIQ in the last 72 hours.     No lab exists for component: CPKMB  Lab Results   Component Value Date/Time    Cholesterol, total 165 09/29/2016 10:02 AM    HDL Cholesterol 34 09/29/2016 10:02 AM    LDL, calculated 91.2 09/29/2016 10:02 AM    Triglyceride 199 (H) 09/29/2016 10:02 AM    CHOL/HDL Ratio 4.9 09/29/2016 10:02 AM     Lab Results   Component Value Date/Time    Glucose (POC) 103 (H) 02/24/2021 06:31 AM    Glucose (POC) 122 (H) 02/24/2021 03:28 AM    Glucose (POC) 117 (H) 02/19/2021 11:04 AM    Glucose (POC) 103 (H) 02/19/2021 06:53 AM    Glucose (POC) 93 02/18/2021 08:59 PM     Lab Results   Component Value Date/Time    Color DARK YELLOW 02/04/2021 02:16 AM    Appearance CLOUDY (A) 02/04/2021 02:16 AM    Specific gravity 1.021 02/04/2021 02:16 AM    pH (UA) 5.0 02/04/2021 02:16 AM    Protein 30 (A) 02/04/2021 02:16 AM    Glucose Negative 02/04/2021 02:16 AM    Ketone Negative 02/04/2021 02:16 AM    Bilirubin NEGATIVE  09/29/2016 10:02 AM    Urobilinogen 0.2 02/04/2021 02:16 AM    Nitrites Negative 02/04/2021 02:16 AM    Leukocyte Esterase Negative 02/04/2021 02:16 AM    Epithelial cells FEW 02/04/2021 02:16 AM    Bacteria Negative 02/04/2021 02:16 AM    WBC 0-4 02/04/2021 02:16 AM    RBC 0-5 02/04/2021 02:16 AM         Medications Reviewed:     Current Facility-Administered Medications   Medication Dose Route Frequency    0.9% sodium chloride infusion 250 mL  250 mL IntraVENous PRN    0.9% sodium chloride infusion 250 mL  250 mL IntraVENous PRN    0.9% sodium chloride infusion  100 mL/hr IntraVENous CONTINUOUS    0.9% sodium chloride infusion 250 mL  250 mL IntraVENous PRN    sodium chloride (NS) flush 5-40 mL  5-40 mL IntraVENous Q8H    sodium chloride (NS) flush 5-40 mL  5-40 mL IntraVENous PRN    acetaminophen (TYLENOL) tablet 650 mg  650 mg Oral Q6H PRN    Or    acetaminophen (TYLENOL) suppository 650 mg  650 mg Rectal Q6H PRN    polyethylene glycol (MIRALAX) packet 17 g  17 g Oral DAILY PRN    promethazine (PHENERGAN) tablet 12.5 mg  12.5 mg Oral Q6H PRN    Or    ondansetron (ZOFRAN) injection 4 mg  4 mg IntraVENous Q6H PRN    atorvastatin (LIPITOR) tablet 40 mg  40 mg Oral DAILY    pantoprazole (PROTONIX) tablet 40 mg  40 mg Oral BID    traMADoL (ULTRAM) tablet 50 mg  50 mg Oral Q6H PRN    insulin lispro (HUMALOG) injection   SubCUTAneous AC&HS    glucose chewable tablet 16 g  4 Tab Oral PRN    dextrose (D50W) injection syrg 12.5-25 g  12.5-25 g IntraVENous PRN    glucagon (GLUCAGEN) injection 1 mg  1 mg IntraMUSCular PRN     ______________________________________________________________________  EXPECTED LENGTH OF STAY: - - -  ACTUAL LENGTH OF STAY:          0                 Meggan Andrews NP

## 2021-02-24 NOTE — CONSULTS
[address] [facility] [address], [address]  [phone]                     GI CONSULTATION NOTE      NAME:  Aimee Tabor   :   1955   MRN:   [id number]     Consult Date: 2021      Chief Complaint: Rectal bleeding    History of Present Illness:  Patient is a 60 y.o. who is seen in consultation at the request of Dr. Jolynn Howard for the above problem. She had been admitted here and seen by us from 2/3 -  after she had suffered a fall, was on the floor for 36 hours and was found to have severe rhabdomyolysis. CT abd/pelvis showed findings consistent with moderate to severe proctocolitis, and patient had c/o mainly of fecal incontinence / diarrhea with only intermittent very small amounts of BRB. A colonoscopy was done on 21 with diffuse ischemic changes with submucosal edema and circumferential coalescing ulcerations involving all of rectum, extending to dentate line, pale mucosa, ulcerations extend to appx 15 cm from anal verge. Pathology showed necroinflammatory debris and changes consistent with ischemic colitis. Afterward she continued to have persistent leakage and intermittent bleeding, even requiring transfusions. Repeat contrasted CT on  showed persistent abnormal thickening of the rectosigmoid colonic wall, but with  significant improvement in severity. Prior to discharge to rehab on , her Hgb was 6.9 and she received a unit of PRBCs. She now reports that she continues to have the fecal leakage and has been wearing adult diapers at rehab. Yesterday she felt like she was passing some stool, but when she went to the bathroom, she saw some BRB in the diaper. On wiping, though, she reports a large amount of BRB with dark clots coming out of the rectum. She began to feel weak so was brought back to the ER. It was noted that she had BRB on the bed pad. Hgb was 8.9 which dropped to 6.7 this morning, and she is currently being transfused.   She denies abdominal pain, rectal pain, nausea, vomiting. She is not taking any RX anticoagulants. She takes an 81 mg ASA for PAD but no other NSAIDs. PMH:  Past Medical History:   Diagnosis Date    Cancer [facility])     melanoma back stage II 2005    CKD (chronic kidney disease), stage III 3/1/2016    CKD (chronic kidney disease), stage IV ([address] 3/1/2016    [facility]     HTN (hypertension) 11/2/2015    Hypertension     LBP (low back pain) 11/2/2015    Menopause     Microalbuminuria 11/21/2015    Obesity 11/2/2015    PAD (peripheral artery disease) [address] 9/29/2016    Venous insufficiency 11/2/2015       PSH:  Past Surgical History:   Procedure Laterality Date    COLONOSCOPY N/A 2/11/2021    COLONOSCOPY performed by Dolores Chapin MD at [facility] ENDOSCOPY    HX AMPUTATION TOE  01/13/2021    Left second toe amputation; Dr. Claudia Bragg.  HX APPENDECTOMY      HX BACK SURGERY      HX BREAST BIOPSY      unsure of which breast or when. Negative results    HX TONSILLECTOMY         Allergies:  No Known Allergies    Home Medications:  Prior to Admission Medications   Prescriptions Last Dose Informant Patient Reported? Taking? CHILDREN'S ASPIRIN 81 mg chewable tablet   No No   Sig: CHEW 1 TABLET BY MOUTH EVERY DAY   L.acid,para-B. bifidum-S.therm (RISAQUAD) 8 billion cell cap cap   No No   Sig: Take 1 Cap by mouth daily for 30 days. ONE TOUCH DELICA 33 gauge misc   No No   Sig: USE TO TEST BLOOD SUGAR EVERY DAY   ONETOUCH ULTRA TEST strip   No No   Sig: TEST BLOOD SUGAR TWICE A DAY   ONETOUCH ULTRA TEST strip   No No   Sig: TEST BLOOD SUGAR TWICE A DAY   acetaminophen (TYLENOL) 325 mg tablet   No No   Sig: Take 2 Tabs by mouth every four (4) hours as needed. atorvastatin (LIPITOR) 40 mg tablet   No No   Sig: TAKE 1 TABLET BY MOUTH EVERY DAY   bumetanide (BUMEX) 1 mg tablet   No No   Sig: Take 3 Tabs by mouth two (2) times a day.    cholestyramine-aspartame (QUESTRAN LIGHT) 4 gram packet   No No   Sig: Take 1 Packet by mouth three (3) times daily (with meals) for 30 days. levomefolate-B6-meB12-algal oil 3 mg-35 mg-2 mg -90.314 mg cap   Yes No   Sig: Take 2 Caps by mouth daily. loperamide (IMODIUM) 2 mg capsule   No No   Sig: Take 2 Caps by mouth every four (4) hours as needed for Diarrhea for up to 10 days. metFORMIN (GLUCOPHAGE) 500 mg tablet   No No   Sig: TAKE 1 TABLET BY MOUTH TWICE A DAY WITH MEALS   metFORMIN (GLUCOPHAGE) 500 mg tablet   No No   Sig: TAKE 1 TABLET BY MOUTH TWICE A DAY WITH MEALS   pantoprazole (Protonix) 40 mg tablet   No No   Sig: Take 1 Tab by mouth two (2) times a day for 30 days. potassium chloride SR (KLOR-CON 10) 10 mEq tablet   Yes No   Sig: Take 1 mEq by mouth daily. traMADoL (ULTRAM) 50 mg tablet   No No   Sig: Take 1 Tab by mouth every six (6) hours as needed for Pain for up to 30 days. Max Daily Amount: 200 mg.  Indications: neuropathic pain      Facility-Administered Medications: None       Hospital Medications:  Current Facility-Administered Medications   Medication Dose Route Frequency    0.9% sodium chloride infusion 250 mL  250 mL IntraVENous PRN    0.9% sodium chloride infusion 250 mL  250 mL IntraVENous PRN    0.9% sodium chloride infusion 250 mL  250 mL IntraVENous PRN    iopamidoL (ISOVUE-370) 76 % injection 100 mL  100 mL IntraVENous RAD ONCE    sodium chloride (NS) flush 5-40 mL  5-40 mL IntraVENous Q8H    sodium chloride (NS) flush 5-40 mL  5-40 mL IntraVENous PRN    acetaminophen (TYLENOL) tablet 650 mg  650 mg Oral Q6H PRN    Or    acetaminophen (TYLENOL) suppository 650 mg  650 mg Rectal Q6H PRN    polyethylene glycol (MIRALAX) packet 17 g  17 g Oral DAILY PRN    promethazine (PHENERGAN) tablet 12.5 mg  12.5 mg Oral Q6H PRN    Or    ondansetron (ZOFRAN) injection 4 mg  4 mg IntraVENous Q6H PRN    atorvastatin (LIPITOR) tablet 40 mg  40 mg Oral DAILY    pantoprazole (PROTONIX) tablet 40 mg  40 mg Oral BID    traMADoL (ULTRAM) tablet 50 mg  50 mg Oral Q6H PRN    insulin lispro (HUMALOG) injection   SubCUTAneous AC&HS    glucose chewable tablet 16 g  4 Tab Oral PRN    dextrose (D50W) injection syrg 12.5-25 g  12.5-25 g IntraVENous PRN    glucagon (GLUCAGEN) injection 1 mg  1 mg IntraMUSCular PRN       Social History:  Social History     Tobacco Use    Smoking status: Former Smoker    Smokeless tobacco: Never Used   Substance Use Topics    Alcohol use: Yes     Alcohol/week: 0.0 standard drinks     Frequency: Monthly or less     Drinks per session: 1 or 2     Binge frequency: Never     Comment: occassion       Family History:  Family History   Problem Relation Age of Onset   Bruna Dorsey Arthritis-rheumatoid Mother     Dementia Father        Review of Systems:    Constitutional: negative fever, negative chills, negative weight loss  Eyes:   negative visual changes  ENT:   negative sore throat, tongue or lip swelling  Respiratory:  negative cough, negative dyspnea  Cards:  negative for chest pain, palpitations, positive bilateral lower extremity edema  GI:   See HPI  :  negative for frequency, dysuria  Integument:  negative for rash and pruritus  Heme:  negative for easy bruising and gum/nose bleeding  Musculoskel: negative for myalgias,  back pain and muscle weakness  Neuro: negative for headaches, dizziness, vertigo  Positive weakness  Psych:  negative for feelings of anxiety, depression      Objective:     Patient Vitals for the past 8 hrs:   BP Temp Pulse Resp SpO2   02/24/21 0805 (!) 88/46 98.8 °F (37.1 °C) 63 16 97 %   02/24/21 0735 (!) 89/47 98.2 °F (36.8 °C) 66 16 98 %   02/24/21 0720 (!) 86/51 98 °F (36.7 °C) 62 18 97 %   02/24/21 0707 (!) 87/44 98.4 °F (36.9 °C) 64 16 96 %     No intake/output data recorded. No intake/output data recorded. PHYSICAL EXAM:  General appearance: cooperative, no distress, appears stated age  Skin: Extremities and face reveal no rashes or jaundice. PALLOR  HEENT: Sclerae anicteric. Extra-occular muscles are intact. Cardiovascular: Regular rate and rhythm. No murmurs, gallops, or rubs. Respiratory: Clear breath sounds with no wheezes, rales, or rhonchi. GI: Abdomen nondistended, soft, and nontender. Normal active bowel sounds. Rectal: Deferred but looked in adult diaper, no blood   Musculoskeletal: Bilateral LE edema. Neurological: Gross memory appears intact. Patient is alert and oriented. Psychiatric: Mood appears appropriate with good judgement. No anxiety or agitation. Data Review     Recent Labs     02/24/21  0330 02/23/21 1922   WBC 4.7 5.2   HGB 6.7* 8.9*   HCT 19.9* 26.8*   * 171     Recent Labs     02/23/21 1922   *   K 4.1   CL 97   CO2 24   BUN 30*   CREA 1.54*   GLU 96   CA 9.0     Recent Labs     02/23/21 1922   AP 76   TP 6.8   ALB 3.8   GLOB 3.0     No results for input(s): INR, PTP, APTT, INREXT in the last 72 hours. Imaging studies reviewed from previous admission    Assessment / Plan :     Hematochezia, painless  Blood loss anemia  Hypotension  Ischemic proctosigmoiditis - colonoscopy 2/11/21 -   - Hgb 6.9 today, being transfused  - Monitor and transfuse prn if Hgb < 7  - Monitor vitals  - Contrasted CT ordered by hospitalist - Cr 1.54 hope it can be done  - Repeat colonoscopy needed - message sent to Dr. Brice Bence re: re-consult and to determine whether he should do the scope in the event she needs surgery this admission. He will see this afternoon.  - Clear liquids today, may need to prep    Discussed with Dr. Chavez Problem List:   Principal Problem:    Rectal bleeding (2/23/2021)    I have examined the patient. I have reviewed the chart and agree with the documentation recorded by the CADY, including the assessment, treatment plan, and disposition. ASSESSMENT AND PLAN:  60 yr old lady has presented with history of ischemic procto-sigmoiditis, hematochezia and acute blood loss anemia.  She had  A similar presentation 2 weeks ago and underwent colonoscopy. Monitor hemoglobins and transfuse as needed  Consult colorectal surgery (Dr. Ariadne Piper), discussed with him. We will follow with you. Thank you for consultation.     Owen Betts MD

## 2021-02-24 NOTE — PROGRESS NOTES
Bedside shift change report given to Virginia Abernathy RN (oncoming nurse) by Jayy Crespo RN (offgoing nurse). Report included the following information SBAR, Kardex, ED Summary, Intake/Output, MAR and Recent Results.

## 2021-02-24 NOTE — ED PROVIDER NOTES
43-year-old female with a history of chronic kidney disease stage III-IV, diabetes, hypertension, peripheral artery disease, and venous insufficiency has just returned to the hospital from rehab for bright red blood coming from the rectum after just being admitted for ischemic proctocolitis, acute anemia on chronic anemia from presumed GI bleed, and rhabdomyolysis. Patient says recently she has had a small amount of blood here and there in her stool, but nothing like what happened today. She had no normal bowel movement, it was just bleeding with clots and bright red. It was painless. No significant abdominal pain no other new symptoms. No blood thinners. Does not feel like anything she has had from a hemorrhoid in the past.           Past Medical History:   Diagnosis Date    Cancer [facility])     melanoma back stage II 2005    CKD (chronic kidney disease), stage III 3/1/2016    CKD (chronic kidney disease), stage IV ([address] 3/1/2016    Diabetes ([facility] [address]     HTN (hypertension) 11/2/2015    Hypertension     LBP (low back pain) 11/2/2015    Menopause     Microalbuminuria 11/21/2015    Obesity 11/2/2015    PAD (peripheral artery disease) ([facility] [address] 9/29/2016    Venous insufficiency 11/2/2015       Past Surgical History:   Procedure Laterality Date    COLONOSCOPY N/A 2/11/2021    COL[facility] Kip [facility]rish [facility] at [facility] ENDOSCOPY    HX AMPUTATION TOE  01/13/2021    Left second toe amputation; Dr. Rosalie Jay.  HX APPENDECTOMY      HX BACK SURGERY      HX BREAST BIOPSY      unsure of which breast or when.  Negative results    HX TONSILLECTOMY           Family History:   Problem Relation Age of Onset   Waunita Favorite Arthritis-rheumatoid Mother     Dementia Father        Social History     Socioeconomic History    Marital status: SINGLE     Spouse name: Not on file    Number of children: Not on file    Years of education: Not on file    Highest education level: Not on file   Occupational History    Not on file Social Needs    Financial resource strain: Not on file    Food insecurity     Worry: Not on file     Inability: Not on file    Transportation needs     Medical: Not on file     Non-medical: Not on file   Tobacco Use    Smoking status: Former Smoker    Smokeless tobacco: Never Used   Substance and Sexual Activity    Alcohol use: Yes     Alcohol/week: 0.0 standard drinks     Frequency: Monthly or less     Drinks per session: 1 or 2     Binge frequency: Never     Comment: occassion    Drug use: No    Sexual activity: Not on file   Lifestyle    Physical activity     Days per week: Not on file     Minutes per session: Not on file    Stress: Not on file   Relationships    Social connections     Talks on phone: Not on file     Gets together: Not on file     Attends Mu-ism service: Not on file     Active member of club or organization: Not on file     Attends meetings of clubs or organizations: Not on file     Relationship status: Not on file    Intimate partner violence     Fear of current or ex partner: Not on file     Emotionally abused: Not on file     Physically abused: Not on file     Forced sexual activity: Not on file   Other Topics Concern    Not on file   Social History Narrative    Not on file         ALLERGIES: Patient has no known allergies. Review of Systems   Constitutional: Negative for fever. HENT: Negative for trouble swallowing. Eyes: Negative for visual disturbance. Respiratory: Negative for cough. Cardiovascular: Negative for chest pain. Gastrointestinal: Positive for blood in stool. Negative for abdominal pain. Genitourinary: Negative for difficulty urinating. Musculoskeletal: Negative for gait problem. Skin: Negative for rash. Neurological: Negative for headaches. Hematological: Bruises/bleeds easily. Psychiatric/Behavioral: Negative for sleep disturbance.        Vitals:    02/23/21 1919   BP: 101/67   Pulse: 76   Resp: 18   Temp: 98.7 °F (37.1 °C)   SpO2: 100%            Physical Exam  Constitutional:       Appearance: Normal appearance. HENT:      Head: Normocephalic. Nose: Nose normal.      Mouth/Throat:      Mouth: Mucous membranes are moist.   Eyes:      Extraocular Movements: Extraocular movements intact. Conjunctiva/sclera: Conjunctivae normal.   Cardiovascular:      Rate and Rhythm: Normal rate. Pulmonary:      Effort: Pulmonary effort is normal. No respiratory distress. Abdominal:      General: Abdomen is flat. Genitourinary:     Comments: Bright red blood seen on the pad the patient was laying on, so rectal exam was deferred  Musculoskeletal: Normal range of motion. Skin:     Findings: No rash. Neurological:      General: No focal deficit present. Mental Status: She is alert. Psychiatric:         Behavior: Behavior normal.          MDM  Number of Diagnoses or Management Options  Hematochezia  Normocytic anemia  Proctocolitis with rectal bleeding  Stage 3b chronic kidney disease  Diagnosis management comments: Recent proctocolitis with ischemia, now with active bleeding with clots. Plan to obtain lab work and get in touch with Dr. Ya maria, , who saw the patient just last week during her admission. Perfect Serve Consult for Admission  8:22 PM    ED Room Number: ER15/15  Patient Name and age:  Brendan Pickett 60 y.o.  female  Working Diagnosis: Hematochezia  (primary encounter diagnosis)  Normocytic anemia  Stage 3b chronic kidney disease  Proctocolitis with rectal bleeding    COVID-19 Suspicion:  no  Sepsis present:  no  Reassessment needed: no  Code Status:  Full Code  Readmission: yes  Isolation Requirements:  no  Recommended Level of Care:  med/surg  Department:[facility] Adult ED - 21   Other:  I spoke with ZARA Gonzalez, who recommended readmission and clear liquids for now with observation to see if the patient needs further scope.  He plans to get in touch with Dr. Susana Humphrey, to let him know the patient has been readmitted. He did not think imaging or antibiotics would be beneficial currently.          Procedures

## 2021-02-24 NOTE — PROGRESS NOTES
Pt requested assistance with changing brief. She thought she had a BM but had large bright red blood clots instead. She had 2-3 episodes like this. Blood pressures running low but she is not c/o any new symptoms, just weakness which she initially presented with. NS infusing at 75ml/hr. Labs drawn & sent to lab. Will continue to monitor.

## 2021-02-24 NOTE — PROGRESS NOTES
Reason for Admission:   Rectal bleed                    RUR Score:    Not available                 Plan for utilizing home health:  TBD        PCP: First and Last name:  Sintia Bagley   Name of Practice:    Are you a current patient: Yes/No: Yes   Approximate date of last visit: 2 months ago   Can you participate in a virtual visit with your PCP: Yes                    Current Advanced Directive/Advance Care Plan: Not on chart. Healthcare Decision Maker: Effie Marcus- brother  Click here to complete [address] including selection of the Healthcare Decision Maker Relationship (ie \"Primary\")                         Transition of Care Plan:    CM met with pt to introduce her to the role of CM and transition of care. This pt was in rehab at [facility] when she returned to the hospital. She would like to return to [facility] to resume her rehab when she is ready for dc. Cm sent a referral to [facility] via [facility]. EMMANUEL Roblero,[facility]-    Observation notice provided in writing to patient and/or caregiver as well as verbal explanation of the policy. Patients who are in outpatient status also receive the Observation notice. Care Management Interventions  PCP Verified by CM:  Yes  Palliative Care Criteria Met (RRAT>21 & CHF Dx)?: No  Transition of Care Consult (CM Consult): Discharge Planning  Physical Therapy Consult: No  Occupational Therapy Consult: No  Speech Therapy Consult: No  Current Support Network: Lives Alone  Confirm Follow Up Transport: Family  The Plan for Transition of Care is Related to the Following Treatment Goals : safe d/c  The Patient and/or Patient Representative was Provided with a Choice of Provider and Agrees with the Discharge Plan?: Yes  Freedom of Choice List was Provided with Basic Dialogue that Supports the Patient's Individualized Plan of Care/Goals, Treatment Preferences and Shares the Quality Data Associated with the Providers?: Yes  The [employer] Information Provided?: No

## 2021-02-24 NOTE — ED NOTES
Verbal shift change report given to Mago Sandoval RN (oncoming nurse) by Kyle Osborne RN (offgoing nurse). Report included the following information SBAR, ED Summary, MAR and Recent Results.

## 2021-02-24 NOTE — ED NOTES
TRANSFER - OUT REPORT:    Verbal report given to FRANK Duque (name) on Aimee Tabor  being transferred to (unit) for routine progression of care       Report consisted of patients Situation, Background, Assessment and   Recommendations(SBAR). Information from the following report(s) SBAR, Kardex and ED Summary was reviewed with the receiving nurse. Lines:   Peripheral IV 02/23/21 Left Antecubital (Active)   Site Assessment Clean, dry, & intact 02/23/21 1921   Phlebitis Assessment 0 02/23/21 1921   Infiltration Assessment 0 02/23/21 1921   Dressing Status Clean, dry, & intact 02/23/21 1921        Opportunity for questions and clarification was provided.       Patient transported with:   Registered Nurse  Tech

## 2021-02-25 LAB
ANION GAP SERPL CALC-SCNC: 10 MMOL/L (ref 5–15)
BASOPHILS # BLD: 0 K/UL (ref 0–0.1)
BASOPHILS NFR BLD: 1 % (ref 0–1)
BUN SERPL-MCNC: 32 MG/DL (ref 6–20)
BUN/CREAT SERPL: 25 (ref 12–20)
CALCIUM SERPL-MCNC: 8.4 MG/DL (ref 8.5–10.1)
CHLORIDE SERPL-SCNC: 101 MMOL/L (ref 97–108)
CO2 SERPL-SCNC: 21 MMOL/L (ref 21–32)
COVID-19 RAPID TEST, COVR: NOT DETECTED
CREAT SERPL-MCNC: 1.3 MG/DL (ref 0.55–1.02)
DIFFERENTIAL METHOD BLD: ABNORMAL
EOSINOPHIL # BLD: 0.1 K/UL (ref 0–0.4)
EOSINOPHIL NFR BLD: 4 % (ref 0–7)
ERYTHROCYTE [DISTWIDTH] IN BLOOD BY AUTOMATED COUNT: 16 % (ref 11.5–14.5)
GLUCOSE BLD STRIP.AUTO-MCNC: 106 MG/DL (ref 65–100)
GLUCOSE BLD STRIP.AUTO-MCNC: 107 MG/DL (ref 65–100)
GLUCOSE BLD STRIP.AUTO-MCNC: 122 MG/DL (ref 65–100)
GLUCOSE BLD STRIP.AUTO-MCNC: 134 MG/DL (ref 65–100)
GLUCOSE SERPL-MCNC: 84 MG/DL (ref 65–100)
HCT VFR BLD AUTO: 21.1 % (ref 35–47)
HCT VFR BLD AUTO: 21.2 % (ref 35–47)
HCT VFR BLD AUTO: 24.7 % (ref 35–47)
HGB BLD-MCNC: 7 G/DL (ref 11.5–16)
HGB BLD-MCNC: 7 G/DL (ref 11.5–16)
HGB BLD-MCNC: 8.2 G/DL (ref 11.5–16)
IMM GRANULOCYTES # BLD AUTO: 0 K/UL (ref 0–0.04)
IMM GRANULOCYTES NFR BLD AUTO: 1 % (ref 0–0.5)
IRON SATN MFR SERPL: 23 % (ref 20–50)
IRON SERPL-MCNC: 64 UG/DL (ref 35–150)
LYMPHOCYTES # BLD: 0.7 K/UL (ref 0.8–3.5)
LYMPHOCYTES NFR BLD: 21 % (ref 12–49)
MCH RBC QN AUTO: 30.8 PG (ref 26–34)
MCHC RBC AUTO-ENTMCNC: 33 G/DL (ref 30–36.5)
MCV RBC AUTO: 93.4 FL (ref 80–99)
MONOCYTES # BLD: 0.6 K/UL (ref 0–1)
MONOCYTES NFR BLD: 16 % (ref 5–13)
NEUTS SEG # BLD: 2.1 K/UL (ref 1.8–8)
NEUTS SEG NFR BLD: 57 % (ref 32–75)
NRBC # BLD: 0 K/UL (ref 0–0.01)
NRBC BLD-RTO: 0 PER 100 WBC
PLATELET # BLD AUTO: 119 K/UL (ref 150–400)
PMV BLD AUTO: 10.6 FL (ref 8.9–12.9)
POTASSIUM SERPL-SCNC: 3.6 MMOL/L (ref 3.5–5.1)
RBC # BLD AUTO: 2.27 M/UL (ref 3.8–5.2)
RBC MORPH BLD: ABNORMAL
SERVICE CMNT-IMP: ABNORMAL
SODIUM SERPL-SCNC: 132 MMOL/L (ref 136–145)
SOURCE, COVRS: NORMAL
TIBC SERPL-MCNC: 276 UG/DL (ref 250–450)
WBC # BLD AUTO: 3.5 K/UL (ref 3.6–11)

## 2021-02-25 PROCEDURE — 82962 GLUCOSE BLOOD TEST: CPT

## 2021-02-25 PROCEDURE — 80048 BASIC METABOLIC PNL TOTAL CA: CPT

## 2021-02-25 PROCEDURE — 85025 COMPLETE CBC W/AUTO DIFF WBC: CPT

## 2021-02-25 PROCEDURE — 36415 COLL VENOUS BLD VENIPUNCTURE: CPT

## 2021-02-25 PROCEDURE — 65660000000 HC RM CCU STEPDOWN

## 2021-02-25 PROCEDURE — 83540 ASSAY OF IRON: CPT

## 2021-02-25 PROCEDURE — 74011250637 HC RX REV CODE- 250/637: Performed by: HOSPITALIST

## 2021-02-25 PROCEDURE — 85014 HEMATOCRIT: CPT

## 2021-02-25 PROCEDURE — 87635 SARS-COV-2 COVID-19 AMP PRB: CPT

## 2021-02-25 RX ADMIN — PANTOPRAZOLE SODIUM 40 MG: 40 TABLET, DELAYED RELEASE ORAL at 10:09

## 2021-02-25 RX ADMIN — Medication 10 ML: at 10:19

## 2021-02-25 RX ADMIN — PANTOPRAZOLE SODIUM 40 MG: 40 TABLET, DELAYED RELEASE ORAL at 17:28

## 2021-02-25 RX ADMIN — ATORVASTATIN CALCIUM 40 MG: 40 TABLET, FILM COATED ORAL at 10:09

## 2021-02-25 RX ADMIN — ACETAMINOPHEN 650 MG: 325 TABLET ORAL at 21:53

## 2021-02-25 NOTE — PROGRESS NOTES
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 She noted constant bright bloody stools with clots with associated chills and light-headedness. She denied abdominal pain, fever, n/v. She denied blood thinner use but is unsure if she is taking the aspirin on her med list.    Interval history / Subjective: Follow-up for issues listed below. (Rectal bleeding, CKD 3, PAD, T2DM, Ischemic Proctosigmoiditis). Patient seen and examined sitting on side of bed. States that she is feeling a little better today. Feels that she has had less rectal bleeding. Requesting for wound care to examine wound on right buttock. No open wound noted. Small area that is raised and tender noted. No new complaint. Denies any pain, syncope, dizziness, shortness of breath, chest pain or tightness, nausea, or vomiting. Assessment & Plan:     Rectal bleeding 2/2 to Ischemic Proctosigmoiditis (POA)   -monitor H/H, transfuse to keep Hgb >7.0   -CTA abd/pelv : No CT evidence for acute GI bleeding at this time. Stable to slightly increased mucosal enhancement and wall thickening in the rectosigmoid colon consistent with proctocolitis since the prior study. Significant diverticulosis without acute diverticulitis. - GI following- Flex sig scheduled for tomorrow am   - Clear liquid diet         Acute Blood Loss Anemia: 2/2 to above, plan as above.   - Hgb 7.0 this am and repeat 8.2      CKD IIIb -hold diuretics  -monitor renal function and volume state on IVFs     PAD: hold ASA 2/2 GIB. T2DM: hold metformin, ISS, accuchecks. ECHO 2/2021: LVEF 39%, normal systolic and dyastolic function. Code status: Full   DVT prophylaxis: SCDs    Care Plan discussed with: Patient/Family and Nurse  Anticipated Disposition: ? Return to [facility] rehab   Anticipated Discharge: Greater than 48 hours     Hospital Problems  Date Reviewed: 2/19/2021          Codes Class Noted POA    Rectal bleed ICD-10-CM: K62.5  ICD-9-CM: 569.3  2/24/2021 Unknown        * (Principal) Rectal bleeding ICD-10-CM: K62.5  ICD-9-CM: 569.3  2/23/2021 Unknown                Review of Systems:   A comprehensive review of systems was negative except for that written in the HPI. Vital Signs:    Last 24hrs VS reviewed since prior progress note. Most recent are:  Visit Vitals  BP (!) 102/56 (BP 1 Location: Left upper arm)   Pulse 66   Temp 99.1 °F (37.3 °C)   Resp 13   Wt 97 kg (213 lb 13.5 oz)   SpO2 97%   BMI 34.52 kg/m²         Intake/Output Summary (Last 24 hours) at 2/25/2021 1739  Last data filed at 2/25/2021 1649  Gross per 24 hour   Intake 200 ml   Output 1200 ml   Net -1000 ml        Physical Examination:             Constitutional:  No acute distress, cooperative, pleasant, answers questions   ENT:  Oral mucosa moist, oropharynx benign. Resp:  CTA bilaterally on room air. No wheezing/rhonchi/rales. No accessory muscle use   CV:  Regular rhythm, normal rate, no murmurs, gallops, rubs    GI:  Soft, non distended, non tender. normoactive bowel sounds    Musculoskeletal:  BLE edema +2 , warm, 2+ pulses throughout    Neurologic:  Moves all extremities. AAOx3, CN II-XII reviewed, follows commands. Psych:  Good insight, Not anxious nor agitated.        Data Review:    Review and/or order of clinical lab test  Review and/or order of tests in the radiology section of CPT  Review and/or order of tests in the medicine section of CPT      Labs:     Recent Labs     02/25/21  1418 02/25/21  0333 02/24/21  0330 02/24/21 0330   WBC  --  3.5*  --  4.7   HGB 8.2* 7.0*   < > 6.7*   HCT 24.7* 21.2*   < > 19.9*   PLT  --  119*  --  116*    < > = values in this interval not displayed. Recent Labs     02/25/21 0333 02/23/21 1922   * 130*   K 3.6 4.1    97   CO2 21 24   BUN 32* 30*   CREA 1.30* 1.54*   GLU 84 96   CA 8.4* 9.0     Recent Labs     02/23/21 1922   ALT 19   AP 76   TBILI 0.6   TP 6.8   ALB 3.8   GLOB 3.0     No results for input(s): INR, PTP, APTT, INREXT in the last 72 hours. Recent Labs     02/25/21 0333   TIBC 276   PSAT 23      Lab Results   Component Value Date/Time    Folate 54.6 (H) 02/17/2021 03:49 PM      No results for input(s): PH, PCO2, PO2 in the last 72 hours. No results for input(s): CPK, CKNDX, TROIQ in the last 72 hours.     No lab exists for component: CPKMB  Lab Results   Component Value Date/Time    Cholesterol, total 165 09/29/2016 10:02 AM    HDL Cholesterol 34 09/29/2016 10:02 AM    LDL, calculated 91.2 09/29/2016 10:02 AM    Triglyceride 199 (H) 09/29/2016 10:02 AM    CHOL/HDL Ratio 4.9 09/29/2016 10:02 AM     Lab Results   Component Value Date/Time    Glucose (POC) 122 (H) 02/25/2021 04:19 PM    Glucose (POC) 106 (H) 02/25/2021 11:55 AM    Glucose (POC) 107 (H) 02/25/2021 06:42 AM    Glucose (POC) 108 (H) 02/24/2021 10:50 PM    Glucose (POC) 163 (H) 02/24/2021 04:06 PM     Lab Results   Component Value Date/Time    Color DARK YELLOW 02/04/2021 02:16 AM    Appearance CLOUDY (A) 02/04/2021 02:16 AM    Specific gravity 1.021 02/04/2021 02:16 AM    pH (UA) 5.0 02/04/2021 02:16 AM    Protein 30 (A) 02/04/2021 02:16 AM    Glucose Negative 02/04/2021 02:16 AM    Ketone Negative 02/04/2021 02:16 AM    Bilirubin NEGATIVE  09/29/2016 10:02 AM    Urobilinogen 0.2 02/04/2021 02:16 AM    Nitrites Negative 02/04/2021 02:16 AM    Leukocyte Esterase Negative 02/04/2021 02:16 AM    Epithelial cells FEW 02/04/2021 02:16 AM    Bacteria Negative 02/04/2021 02:16 AM    WBC 0-4 02/04/2021 02:16 AM    RBC 0-5 02/04/2021 02:16 AM         Medications Reviewed:     Current Facility-Administered Medications   Medication Dose Route Frequency    0.9% sodium chloride infusion 250 mL  250 mL IntraVENous PRN    0.9% sodium chloride infusion 250 mL  250 mL IntraVENous PRN    0.9% sodium chloride infusion 250 mL  250 mL IntraVENous PRN    sodium chloride (NS) flush 5-40 mL  5-40 mL IntraVENous Q8H    sodium chloride (NS) flush 5-40 mL  5-40 mL IntraVENous PRN    acetaminophen (TYLENOL) tablet 650 mg  650 mg Oral Q6H PRN    Or    acetaminophen (TYLENOL) suppository 650 mg  650 mg Rectal Q6H PRN    polyethylene glycol (MIRALAX) packet 17 g  17 g Oral DAILY PRN    promethazine (PHENERGAN) tablet 12.5 mg  12.5 mg Oral Q6H PRN    Or    ondansetron (ZOFRAN) injection 4 mg  4 mg IntraVENous Q6H PRN    atorvastatin (LIPITOR) tablet 40 mg  40 mg Oral DAILY    pantoprazole (PROTONIX) tablet 40 mg  40 mg Oral BID    traMADoL (ULTRAM) tablet 50 mg  50 mg Oral Q6H PRN    insulin lispro (HUMALOG) injection   SubCUTAneous AC&HS    glucose chewable tablet 16 g  4 Tab Oral PRN    dextrose (D50W) injection syrg 12.5-25 g  12.5-25 g IntraVENous PRN    glucagon (GLUCAGEN) injection 1 mg  1 mg IntraMUSCular PRN     ______________________________________________________________________  EXPECTED LENGTH OF STAY: 3d 0h  ACTUAL LENGTH OF STAY:          1                 Frandy Pena NP

## 2021-02-25 NOTE — PROGRESS NOTES
Physician Progress Note      Violeta Wick  [facility] #:                  [id number]  :                       1955  ADMIT DATE:       2021 7:11 PM  [address] [place] DATE:  RESPONDING  PROVIDER #:        ANDRE Malone MD          QUERY TEXT:    Dear Attending,    Pt admitted with rectal bleeding likely due to ischemic proctocolitis. Noted documentation of proctocolitis presumably due to vasculitis by ordered Colon and Rectal Surgery consultant. If possible, please document in progress notes and discharge summary:    The medical record reflects the following:  Risk Factors: PAD, vasculitis, proctocolitis, ischemic proctosigmoiditis, HTN, DM  Clinical Indicators:  - H&P - Rectal bleeding: likely due to ischemic proctocolitis  - Colon and Rectal Surgery consult note - Proctocolitis presumably due to vasculitis. She has had a workup at [facility] without any obvious etiology. - GI consult note - Hematochezia, painless; Blood loss anemia; Hypotension; Ischemic proctosigmoiditis  - CT abdomen/pelvis 21 = Stable to slightly increased mucosal enhancement and wall thickening in the rectosigmoid colon consistent with proctocolitis  - Colonoscopy 21 (previous admission) = Rectum: diffuse ischemic changes with submucosal edema and circumferential coalescing ulcerations involving all of rectum extending to dentate line, pale mucosa, biopsies obtained.  Ulcerations extend to appx 15 cm from anal verge  Treatment: Colon and Rectal Surgery consult, GI consult, imaging studies, previous colonoscopy (2 weeks ago)    Thank-you,  Michael Donohue RN, [facility]  Clinical   [phone]  Options provided:  -- Proctocolitis due to vasculitis confirmed present on admission  -- Proctocolitis due to vasculitis ruled out  -- Other - I will add my own diagnosis  -- Disagree - Not applicable / Not valid  -- Disagree - Clinically unable to determine / Unknown  -- Refer to Clinical Documentation Reviewer    PROVIDER RESPONSE TEXT:    The diagnosis of proctocolitis due to vasculitis was ruled out.     Query created by: Kenyon Bedoya on 2/25/2021 3:03 PM      Electronically signed by:  ANDRE MCKENZIE [facility] MD 2/25/2021 3:09 PM

## 2021-02-25 NOTE — PROGRESS NOTES
Bedside shift change report given to [address] [place] [address] (oncoming nurse) by Nancy Bazan (offgoing nurse). Report included the following information SBAR, Kardex, ED Summary, Intake/Output, MAR, Accordion, Recent Results, Med Rec Status and Cardiac Rhythm NSR.

## 2021-02-25 NOTE — CONSULTS
Colon and Rectal Surgery History and Physical    Subjective:      Garfield Tran is a 60 y.o. female who has a history of vasculitis and proctocolitis. She presented with rectal bleeding a few weeks ago. Colonoscopy showed mucosal necrosis of the rectum and ulcerations in the esophagus and small bowel. She was able to be discharged a couple of weeks ago and re-presented to the ER with recurrent rectal bleeding. She had a ct angio today which showed inflammation of the rectum. There appeared to be perfusion of the rectum on ct angio however. She was off the floor when I came to round on her. Patient Active Problem List    Diagnosis Date Noted    Rectal bleed 02/24/2021    Rectal bleeding 02/23/2021    Type 2 diabetes with nephropathy ([address] 02/19/2018    Hypertension complicating diabetes ([address] 08/21/2017    CKD (chronic kidney disease), stage III 04/20/2017    Osteomyelitis of foot, left, acute ([address] 09/30/2016    Diabetic foot ulcer ([address] 09/29/2016    Hypokalemia 09/29/2016    PAD (peripheral artery disease) ([address] 09/29/2016    Type 2 diabetes mellitus with neurologic complication ([address] 44/52/4469    Obesity 11/02/2015    Venous insufficiency 11/02/2015    LBP (low back pain) 11/02/2015     Past Medical History:   Diagnosis Date    Cancer ([address]     melanoma back stage II 2005    CKD (chronic kidney disease), stage III 3/1/2016    CKD (chronic kidney disease), stage IV ([address] 3/1/2016    Diabetes ([address]     HTN (hypertension) 11/2/2015    Hypertension     LBP (low back pain) 11/2/2015    Menopause     Microalbuminuria 11/21/2015    Obesity 11/2/2015    PAD (peripheral artery disease) ([address] 9/29/2016    Venous insufficiency 11/2/2015      Past Surgical History:   Procedure Laterality Date    COLONOSCOPY N/A 2/11/2021    COL[facility]COPY    HX AMPUTATION TOE  01/13/2021    Left second toe amputation; Dr. Jacob Cabrera.     HX APPENDECTOMY      HX BACK SURGERY      HX BREAST BIOPSY      unsure of which breast or when. Negative results    HX TONSILLECTOMY        Social History     Tobacco Use    Smoking status: Former Smoker    Smokeless tobacco: Never Used   Substance Use Topics    Alcohol use: Yes     Alcohol/week: 0.0 standard drinks     Frequency: Monthly or less     Drinks per session: 1 or 2     Binge frequency: Never     Comment: occassion      Family History   Problem Relation Age of Onset   [facility] Arthritis-rheumatoid Mother     Dementia Father       Prior to Admission medications    Medication Sig Start Date End Date Taking? Authorizing Provider   traMADoL (ULTRAM) 50 mg tablet Take 1 Tab by mouth every six (6) hours as needed for Pain for up to 30 days. Max Daily Amount: 200 mg. Indications: neuropathic pain 2/19/21 3/21/21  Queen Nora MD   bumetanide (BUMEX) 1 mg tablet Take 3 Tabs by mouth two (2) times a day. 2/19/21   Queen Nora MD   cholestyramine-aspartame John Simone LIGHT) 4 gram packet Take 1 Packet by mouth three (3) times daily (with meals) for 30 days. 2/16/21 3/18/21  Adrien Jean Baptiste MD   L.acid,para-B. bifidum-S.therm (RISAQUAD) 8 billion cell cap cap Take 1 Cap by mouth daily for 30 days. 2/17/21 3/19/21  Tutu Morataya MD   loperamide (IMODIUM) 2 mg capsule Take 2 Caps by mouth every four (4) hours as needed for Diarrhea for up to 10 days. 2/16/21 2/26/21  Tutu Morataya MD   pantoprazole (Protonix) 40 mg tablet Take 1 Tab by mouth two (2) times a day for 30 days. 2/16/21 3/18/21  Tutu Morataya MD   atorvastatin (LIPITOR) 40 mg tablet TAKE 1 TABLET BY MOUTH EVERY DAY 11/21/19   Ryann Sung MD   metFORMIN (GLUCOPHAGE) 500 mg tablet TAKE 1 TABLET BY MOUTH TWICE A DAY WITH MEALS 8/8/19   Ryann Sung MD   potassium chloride SR (KLOR-CON 10) 10 mEq tablet Take 1 mEq by mouth daily.     Provider, Historical   metFORMIN (GLUCOPHAGE) 500 mg tablet TAKE 1 TABLET BY MOUTH TWICE A DAY WITH MEALS 7/24/18   Ryann Sung MD   ONETOUCH ULTRA TEST strip TEST BLOOD SUGAR TWICE A DAY 3/3/18   Alyssa Trejo MD   [facility], [facility] OF [facility]. 33 gauge misc USE TO TEST BLOOD SUGAR EVERY DAY 6/5/17   Alyssa Trejo MD   [facility] ULTRA TEST strip TEST BLOOD SUGAR TWICE A DAY 2/2/17   Alyssa Trejo MD   ubkyltuhpieo-V8-kqF29-algal oil 3 mg-35 mg-2 mg -90.314 mg cap Take 2 Caps by mouth daily. Provider, Historical   CHILDREN'S ASPIRIN 81 mg chewable tablet CHEW 1 TABLET BY MOUTH EVERY DAY 1/3/17   Alyssa Trejo MD   acetaminophen (TYLENOL) 325 mg tablet Take 2 Tabs by mouth every four (4) hours as needed. 10/4/16   Alyssa Trejo MD     No Known Allergies     Review of Systems:    A comprehensive review of systems was negative except for that written in the History of Present Illness. Objective:     Visit Vitals  BP (!) 90/41 (BP 1 Location: Right upper arm, BP Patient Position: At rest)   Pulse 72   Temp 99.3 °F (37.4 °C)   Resp 16   SpO2 99%        Physical Exam:   Patient off the floor at time of rounding    Imaging:  images and reports reviewed    Lab Review:    Recent Results (from the past 24 hour(s))   GLUCOSE, POC    Collection Time: 02/24/21  3:28 AM   Result Value Ref Range    Glucose (POC) 122 (H) 65 - 100 mg/dL    Performed by Chano pelletier RN    CBC W/O DIFF    Collection Time: 02/24/21  3:30 AM   Result Value Ref Range    WBC 4.7 3.6 - 11.0 K/uL    RBC 2.16 (L) 3.80 - 5.20 M/uL    HGB 6.7 (L) 11.5 - 16.0 g/dL    HCT 19.9 (L) 35.0 - 47.0 %    MCV 92.1 80.0 - 99.0 FL    MCH 31.0 26.0 - 34.0 PG    MCHC 33.7 30.0 - 36.5 g/dL    RDW 16.3 (H) 11.5 - 14.5 %    PLATELET 808 (L) 288 - 400 K/uL    MPV 11.1 8.9 - 12.9 FL    NRBC 0.0 0  WBC    ABSOLUTE NRBC 0.00 0.00 - 0.01 K/uL   RBC, ALLOCATE    Collection Time: 02/24/21  6:15 AM   Result Value Ref Range    HISTORY CHECKED?  Historical check performed    GLUCOSE, POC    Collection Time: 02/24/21  6:31 AM   Result Value Ref Range    Glucose (POC) 103 (H) 65 - 100 mg/dL    Performed by Renita Hamilton  PCT    RBC, ALLOCATE    Collection Time: 02/24/21  7:15 AM   Result Value Ref Range    HISTORY CHECKED? Historical check performed    RBC, ALLOCATE    Collection Time: 02/24/21  9:00 AM   Result Value Ref Range    HISTORY CHECKED? Historical check performed    GLUCOSE, POC    Collection Time: 02/24/21 11:54 AM   Result Value Ref Range    Glucose (POC) 113 (H) 65 - 100 mg/dL    Performed by DARRICK BARKER    HGB & HCT    Collection Time: 02/24/21  2:40 PM   Result Value Ref Range    HGB 7.4 (L) 11.5 - 16.0 g/dL    HCT 22.1 (L) 35.0 - 47.0 %   GLUCOSE, POC    Collection Time: 02/24/21  4:06 PM   Result Value Ref Range    Glucose (POC) 163 (H) 65 - 100 mg/dL    Performed by DARRICK BARKER    HGB & HCT    Collection Time: 02/24/21  7:20 PM   Result Value Ref Range    HGB 8.2 (L) 11.5 - 16.0 g/dL    HCT 25.4 (L) 35.0 - 47.0 %       Labs and radiology: images and reports reviewed      Assessment:   Proctocolitis presumably due to vasculitis. She has had a workup at [facility] without any obvious etiology. Plan:     Consider hematology or rheumatology consultation  Perhaps steroids + abx? Surgical options are not ideal. Diverting colostomy would at least divert stream of stool away from necrotic rectum, but the rectum would still be potentially necrotic if left in place. A low anterior resection or APR would be fairly morbid. Perhaps she should be transferred to [facility] or back down to [place]? This is a difficult situation. The fact that she is having rectal bleeding seems to indicate that the rectum is perfused at least. I think if she becomes septic, then our hand will be forced to operate and either perform a diverting colostomy or emergent APR. The fact that she has withstood this for the past several weeks would indicate that she may be able to survive this with medical treatment. I do not think a colonoscopy would be additive to the information we already have.  I think a colonoscopy may actually risk perforating the colon and forcing us into an emergent surgical situation. For now, would treat conservatively with blood transfusions and medical therapy. Will continue to follow.      Signed By: Meir Solorio MD     February 24, 2021

## 2021-02-25 NOTE — PROGRESS NOTES
[address]  [address]  [place], [address]  [phone]                     GI PROGRESS NOTE    Patient Name: Aimee Tabor      : 1955      MRN: [id number]  Admit Date: 2021  Today's Date: 2021    Subjective:     She is feeling a little better today. She is still passing BRB but not nearly as much as PTA, described as not as much blood and not as frequently. She denies abdominal and rectal pain. Hgb 7.0 this morning, down from 8.2 yesterday post-transfusion. Objective:     Blood pressure (!) 94/56, pulse 74, temperature 98.6 °F (37 °C), resp. rate 12, weight 97 kg (213 lb 13.5 oz), SpO2 98 %. Physical Exam:  General appearance: cooperative, no distress, appears stated age  Skin: Extremities and face reveal no rashes, jaundice  Some pallor  HEENT: Sclerae anicteric. Extra-occular muscles are intact. Respiratory: Comfortable breathing   GI: Abdomen nondistended  Musculoskeletal: Bilateral pitting edema of the lower legs. Neurological: Gross memory appears intact. Patient is alert and oriented. Psychiatric: Mood appears appropriate with good judgement. No anxiety or agitation.       Data Review:    Recent Results (from the past 24 hour(s))   GLUCOSE, POC    Collection Time: 21  4:06 PM   Result Value Ref Range    Glucose (POC) 163 (H) 65 - 100 mg/dL    Performed by Alexandra Rodgers    HGB & HCT    Collection Time: 21  7:20 PM   Result Value Ref Range    HGB 8.2 (L) 11.5 - 16.0 g/dL    HCT 25.4 (L) 35.0 - 47.0 %   GLUCOSE, POC    Collection Time: 21 10:50 PM   Result Value Ref Range    Glucose (POC) 108 (H) 65 - 100 mg/dL    Performed by Miladis Leyva    IRON PROFILE    Collection Time: 21  3:33 AM   Result Value Ref Range    Iron 64 35 - 150 ug/dL    TIBC 276 250 - 450 ug/dL    Iron % saturation 23 20 - 50 %   CBC WITH AUTOMATED DIFF    Collection Time: 21  3:33 AM   Result Value Ref Range    WBC 3.5 (L) 3.6 - 11.0 K/uL    RBC 2.27 (L) 3.80 - 5.20 M/uL    HGB 7.0 (L) 11.5 - 16.0 g/dL    HCT 21.2 (L) 35.0 - 47.0 %    MCV 93.4 80.0 - 99.0 FL    MCH 30.8 26.0 - 34.0 PG    MCHC 33.0 30.0 - 36.5 g/dL    RDW 16.0 (H) 11.5 - 14.5 %    PLATELET 724 (L) 740 - 400 K/uL    MPV 10.6 8.9 - 12.9 FL    NRBC 0.0 0  WBC    ABSOLUTE NRBC 0.00 0.00 - 0.01 K/uL    NEUTROPHILS 57 32 - 75 %    LYMPHOCYTES 21 12 - 49 %    MONOCYTES 16 (H) 5 - 13 %    EOSINOPHILS 4 0 - 7 %    BASOPHILS 1 0 - 1 %    IMMATURE GRANULOCYTES 1 (H) 0.0 - 0.5 %    ABS. NEUTROPHILS 2.1 1.8 - 8.0 K/UL    ABS. LYMPHOCYTES 0.7 (L) 0.8 - 3.5 K/UL    ABS. MONOCYTES 0.6 0.0 - 1.0 K/UL    ABS. EOSINOPHILS 0.1 0.0 - 0.4 K/UL    ABS. BASOPHILS 0.0 0.0 - 0.1 K/UL    ABS. IMM.  GRANS. 0.0 0.00 - 0.04 K/UL    DF SMEAR SCANNED      RBC COMMENTS ANISOCYTOSIS  1+       METABOLIC PANEL, BASIC    Collection Time: 02/25/21  3:33 AM   Result Value Ref Range    Sodium 132 (L) 136 - 145 mmol/L    Potassium 3.6 3.5 - 5.1 mmol/L    Chloride 101 97 - 108 mmol/L    CO2 21 21 - 32 mmol/L    Anion gap 10 5 - 15 mmol/L    Glucose 84 65 - 100 mg/dL    BUN 32 (H) 6 - 20 MG/DL    Creatinine 1.30 (H) 0.55 - 1.02 MG/DL    BUN/Creatinine ratio 25 (H) 12 - 20      GFR est AA 50 (L) >60 ml/min/1.73m2    GFR est non-AA 41 (L) >60 ml/min/1.73m2    Calcium 8.4 (L) 8.5 - 10.1 MG/DL   GLUCOSE, POC    Collection Time: 02/25/21  6:42 AM   Result Value Ref Range    Glucose (POC) 107 (H) 65 - 100 mg/dL    Performed by Leanne Fritz    GLUCOSE, POC    Collection Time: 02/25/21 11:55 AM   Result Value Ref Range    Glucose (POC) 106 (H) 65 - 100 mg/dL    Performed by Shawna Bowers    HGB & HCT    Collection Time: 02/25/21  2:18 PM   Result Value Ref Range    HGB 8.2 (L) 11.5 - 16.0 g/dL    HCT 24.7 (L) 35.0 - 47.0 %         Assessment / Plan :     Hematochezia, painless  Blood loss anemia  Hypotension  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 Harish Navarro 2/26/21 - tap water enema prep          Patient Active Hospital Problem List:   Principal Problem:    Rectal bleeding (2/23/2021)    Active Problems:    Rectal bleed (2/24/2021)               I have examined the patient. I have reviewed the chart and agree with the documentation recorded by the NP, including the assessment, treatment plan, and disposition.       Cristina Holstein, MD

## 2021-02-25 NOTE — PROGRESS NOTES
Physician Progress Note      Yee Diaz  CSN #:                  [id number]  :                       1955  ADMIT DATE:       2021 7:11 PM  [address] [place] DATE:  RESPONDING  PROVIDER #:        ANDRE Hensley MD          QUERY TEXT:    Dear Attending,    Pt admitted with rectal bleeding likely due to ischemic proctocolitis. H&P notes CKD Stage 3 in the Assessment and Plan, but the PMH notes both CKD Stage 4 and CKD Stage 3. Pt with GFR of 25-41 dating back to 21. Pt with GFR of 34-41 and sCr of 1.30-1.54 this admission. If possible, please document in progress notes and discharge summary further specificity regarding the CKD stage for this patient. The medical record reflects the following:  Risk Factors: CKD, HTN, DM  Clinical Indicators:  - H&P - Assessment & Plan: CKD III; PMH: CKD (chronic kidney disease), stage III, CKD (chronic kidney disease), stage IV  - GFR = 34 on , 41 on ; GFR dating back to 21 = 25-41  Treatment: Hold diuretics, serial monitoring of renal function and volume state on IVFs, gentle IVF    Thank-you,  Elvira Bush RN, [facility]  Clinical   [phone]  Options provided:  -- CKD Stage 3b GFR 30-44  -- CKD Stage 4 GFR 15-29  -- Other - I will add my own diagnosis  -- Disagree - Not applicable / Not valid  -- Disagree - Clinically unable to determine / Unknown  -- Refer to Clinical Documentation Reviewer    PROVIDER RESPONSE TEXT:    This patient has CKD Stage 3b.     Query created by: Jerson Reich on 2021 3:18 PM      Electronically signed by:  ANDRE Hensley MD 2021 4:05 PM

## 2021-02-26 ENCOUNTER — ANESTHESIA EVENT (OUTPATIENT)
Dept: ENDOSCOPY | Age: 66
DRG: 988 | End: 2021-02-26
Payer: COMMERCIAL

## 2021-02-26 ENCOUNTER — ANESTHESIA (OUTPATIENT)
Dept: ENDOSCOPY | Age: 66
DRG: 988 | End: 2021-02-26
Payer: COMMERCIAL

## 2021-02-26 LAB
ANION GAP SERPL CALC-SCNC: 7 MMOL/L (ref 5–15)
BASOPHILS # BLD: 0 K/UL (ref 0–0.1)
BASOPHILS NFR BLD: 1 % (ref 0–1)
BUN SERPL-MCNC: 29 MG/DL (ref 6–20)
BUN/CREAT SERPL: 19 (ref 12–20)
CALCIUM SERPL-MCNC: 8.3 MG/DL (ref 8.5–10.1)
CHLORIDE SERPL-SCNC: 102 MMOL/L (ref 97–108)
CO2 SERPL-SCNC: 23 MMOL/L (ref 21–32)
CREAT SERPL-MCNC: 1.52 MG/DL (ref 0.55–1.02)
DIFFERENTIAL METHOD BLD: ABNORMAL
EOSINOPHIL # BLD: 0.3 K/UL (ref 0–0.4)
EOSINOPHIL NFR BLD: 8 % (ref 0–7)
ERYTHROCYTE [DISTWIDTH] IN BLOOD BY AUTOMATED COUNT: 15.8 % (ref 11.5–14.5)
GLUCOSE BLD STRIP.AUTO-MCNC: 104 MG/DL (ref 65–100)
GLUCOSE BLD STRIP.AUTO-MCNC: 105 MG/DL (ref 65–100)
GLUCOSE BLD STRIP.AUTO-MCNC: 110 MG/DL (ref 65–100)
GLUCOSE BLD STRIP.AUTO-MCNC: 121 MG/DL (ref 65–100)
GLUCOSE SERPL-MCNC: 78 MG/DL (ref 65–100)
HCT VFR BLD AUTO: 22.8 % (ref 35–47)
HCT VFR BLD AUTO: 23.9 % (ref 35–47)
HGB BLD-MCNC: 7.4 G/DL (ref 11.5–16)
HGB BLD-MCNC: 7.9 G/DL (ref 11.5–16)
IMM GRANULOCYTES # BLD AUTO: 0 K/UL (ref 0–0.04)
IMM GRANULOCYTES NFR BLD AUTO: 1 % (ref 0–0.5)
LYMPHOCYTES # BLD: 0.9 K/UL (ref 0.8–3.5)
LYMPHOCYTES NFR BLD: 25 % (ref 12–49)
MCH RBC QN AUTO: 31.1 PG (ref 26–34)
MCHC RBC AUTO-ENTMCNC: 32.5 G/DL (ref 30–36.5)
MCV RBC AUTO: 95.8 FL (ref 80–99)
MONOCYTES # BLD: 0.5 K/UL (ref 0–1)
MONOCYTES NFR BLD: 14 % (ref 5–13)
NEUTS SEG # BLD: 1.9 K/UL (ref 1.8–8)
NEUTS SEG NFR BLD: 51 % (ref 32–75)
NRBC # BLD: 0 K/UL (ref 0–0.01)
NRBC BLD-RTO: 0 PER 100 WBC
PLATELET # BLD AUTO: 131 K/UL (ref 150–400)
PMV BLD AUTO: 10.3 FL (ref 8.9–12.9)
POTASSIUM SERPL-SCNC: 3.5 MMOL/L (ref 3.5–5.1)
RBC # BLD AUTO: 2.38 M/UL (ref 3.8–5.2)
SERVICE CMNT-IMP: ABNORMAL
SODIUM SERPL-SCNC: 132 MMOL/L (ref 136–145)
WBC # BLD AUTO: 3.7 K/UL (ref 3.6–11)

## 2021-02-26 PROCEDURE — 97116 GAIT TRAINING THERAPY: CPT

## 2021-02-26 PROCEDURE — 82962 GLUCOSE BLOOD TEST: CPT

## 2021-02-26 PROCEDURE — 0DJD8ZZ INSPECTION OF LOWER INTESTINAL TRACT, VIA NATURAL OR ARTIFICIAL OPENING ENDOSCOPIC: ICD-10-PCS | Performed by: INTERNAL MEDICINE

## 2021-02-26 PROCEDURE — 74011000250 HC RX REV CODE- 250: Performed by: NURSE ANESTHETIST, CERTIFIED REGISTERED

## 2021-02-26 PROCEDURE — 74011250636 HC RX REV CODE- 250/636: Performed by: NURSE ANESTHETIST, CERTIFIED REGISTERED

## 2021-02-26 PROCEDURE — 36415 COLL VENOUS BLD VENIPUNCTURE: CPT

## 2021-02-26 PROCEDURE — 85018 HEMOGLOBIN: CPT

## 2021-02-26 PROCEDURE — 80048 BASIC METABOLIC PNL TOTAL CA: CPT

## 2021-02-26 PROCEDURE — 97530 THERAPEUTIC ACTIVITIES: CPT

## 2021-02-26 PROCEDURE — 97165 OT EVAL LOW COMPLEX 30 MIN: CPT

## 2021-02-26 PROCEDURE — 65660000000 HC RM CCU STEPDOWN

## 2021-02-26 PROCEDURE — 74011250637 HC RX REV CODE- 250/637: Performed by: HOSPITALIST

## 2021-02-26 PROCEDURE — 97161 PT EVAL LOW COMPLEX 20 MIN: CPT

## 2021-02-26 PROCEDURE — 74011250636 HC RX REV CODE- 250/636: Performed by: INTERNAL MEDICINE

## 2021-02-26 PROCEDURE — 97535 SELF CARE MNGMENT TRAINING: CPT

## 2021-02-26 PROCEDURE — 76060000031 HC ANESTHESIA FIRST 0.5 HR: Performed by: INTERNAL MEDICINE

## 2021-02-26 PROCEDURE — 76040000019: Performed by: INTERNAL MEDICINE

## 2021-02-26 PROCEDURE — 85025 COMPLETE CBC W/AUTO DIFF WBC: CPT

## 2021-02-26 RX ORDER — DEXTROMETHORPHAN/PSEUDOEPHED 2.5-7.5/.8
1.2 DROPS ORAL
Status: DISCONTINUED | OUTPATIENT
Start: 2021-02-26 | End: 2021-02-26 | Stop reason: HOSPADM

## 2021-02-26 RX ORDER — FLUMAZENIL 0.1 MG/ML
0.2 INJECTION INTRAVENOUS
Status: DISCONTINUED | OUTPATIENT
Start: 2021-02-26 | End: 2021-02-26 | Stop reason: HOSPADM

## 2021-02-26 RX ORDER — NALOXONE HYDROCHLORIDE 0.4 MG/ML
0.4 INJECTION, SOLUTION INTRAMUSCULAR; INTRAVENOUS; SUBCUTANEOUS
Status: DISCONTINUED | OUTPATIENT
Start: 2021-02-26 | End: 2021-02-26 | Stop reason: HOSPADM

## 2021-02-26 RX ORDER — ATROPINE SULFATE 0.1 MG/ML
0.5 INJECTION INTRAVENOUS
Status: DISCONTINUED | OUTPATIENT
Start: 2021-02-26 | End: 2021-02-26 | Stop reason: HOSPADM

## 2021-02-26 RX ORDER — SODIUM CHLORIDE 9 MG/ML
INJECTION, SOLUTION INTRAVENOUS
Status: DISCONTINUED | OUTPATIENT
Start: 2021-02-26 | End: 2021-02-26 | Stop reason: HOSPADM

## 2021-02-26 RX ORDER — SODIUM CHLORIDE 0.9 % (FLUSH) 0.9 %
5-40 SYRINGE (ML) INJECTION AS NEEDED
Status: DISCONTINUED | OUTPATIENT
Start: 2021-02-26 | End: 2021-03-05 | Stop reason: HOSPADM

## 2021-02-26 RX ORDER — EPINEPHRINE 0.1 MG/ML
1 INJECTION INTRACARDIAC; INTRAVENOUS
Status: DISCONTINUED | OUTPATIENT
Start: 2021-02-26 | End: 2021-02-26 | Stop reason: HOSPADM

## 2021-02-26 RX ORDER — LIDOCAINE HYDROCHLORIDE 20 MG/ML
INJECTION, SOLUTION INFILTRATION; PERINEURAL AS NEEDED
Status: DISCONTINUED | OUTPATIENT
Start: 2021-02-26 | End: 2021-02-26 | Stop reason: HOSPADM

## 2021-02-26 RX ORDER — MIDAZOLAM HYDROCHLORIDE 1 MG/ML
.25-1 INJECTION, SOLUTION INTRAMUSCULAR; INTRAVENOUS
Status: DISCONTINUED | OUTPATIENT
Start: 2021-02-26 | End: 2021-02-26 | Stop reason: HOSPADM

## 2021-02-26 RX ORDER — FENTANYL CITRATE 50 UG/ML
100 INJECTION, SOLUTION INTRAMUSCULAR; INTRAVENOUS
Status: DISCONTINUED | OUTPATIENT
Start: 2021-02-26 | End: 2021-02-26 | Stop reason: HOSPADM

## 2021-02-26 RX ORDER — PROPOFOL 10 MG/ML
INJECTION, EMULSION INTRAVENOUS AS NEEDED
Status: DISCONTINUED | OUTPATIENT
Start: 2021-02-26 | End: 2021-02-26 | Stop reason: HOSPADM

## 2021-02-26 RX ORDER — SODIUM CHLORIDE 9 MG/ML
50 INJECTION, SOLUTION INTRAVENOUS CONTINUOUS
Status: DISCONTINUED | OUTPATIENT
Start: 2021-02-26 | End: 2021-02-26

## 2021-02-26 RX ORDER — SODIUM CHLORIDE 0.9 % (FLUSH) 0.9 %
5-40 SYRINGE (ML) INJECTION EVERY 8 HOURS
Status: DISCONTINUED | OUTPATIENT
Start: 2021-02-26 | End: 2021-03-05 | Stop reason: HOSPADM

## 2021-02-26 RX ADMIN — PANTOPRAZOLE SODIUM 40 MG: 40 TABLET, DELAYED RELEASE ORAL at 08:40

## 2021-02-26 RX ADMIN — Medication 10 ML: at 21:23

## 2021-02-26 RX ADMIN — PROPOFOL 25 MG: 10 INJECTION, EMULSION INTRAVENOUS at 15:34

## 2021-02-26 RX ADMIN — ATORVASTATIN CALCIUM 40 MG: 40 TABLET, FILM COATED ORAL at 08:40

## 2021-02-26 RX ADMIN — Medication 10 ML: at 13:37

## 2021-02-26 RX ADMIN — PROPOFOL 100 MG: 10 INJECTION, EMULSION INTRAVENOUS at 15:31

## 2021-02-26 RX ADMIN — LIDOCAINE HYDROCHLORIDE 80 MG: 20 INJECTION, SOLUTION INFILTRATION; PERINEURAL at 15:31

## 2021-02-26 RX ADMIN — PROPOFOL 25 MG: 10 INJECTION, EMULSION INTRAVENOUS at 15:33

## 2021-02-26 RX ADMIN — PANTOPRAZOLE SODIUM 40 MG: 40 TABLET, DELAYED RELEASE ORAL at 17:10

## 2021-02-26 RX ADMIN — Medication 10 ML: at 01:09

## 2021-02-26 RX ADMIN — SODIUM CHLORIDE: 900 INJECTION, SOLUTION INTRAVENOUS at 15:19

## 2021-02-26 RX ADMIN — Medication 10 ML: at 21:24

## 2021-02-26 NOTE — PROGRESS NOTES
Bedside shift change report given to Nohelia (oncoming nurse) by Severino Spivey (offgoing nurse). Report included the following information SBAR, Kardex, ED Summary, Intake/Output, MAR, Accordion, Recent Results, Med Rec Status and Cardiac Rhythm NSR.

## 2021-02-26 NOTE — ROUTINE PROCESS
TRANSFER - IN REPORT: 
 
Verbal report received from [facility] (name) on Rani Plascencia  being received from 4W(unit) for routine progression of care Report consisted of patients Situation, Background, Assessment and  
Recommendations(SBAR). Information from the following report(s) SBAR was reviewed with the receiving nurse. Opportunity for questions and clarification was provided. Assessment completed upon patients arrival to unit and care assumed.

## 2021-02-26 NOTE — PERIOP NOTES

## 2021-02-26 NOTE — PROGRESS NOTES
Problem: Self Care Deficits Care Plan (Adult)  Goal: *Acute Goals and Plan of Care (Insert Text)  Description:   FUNCTIONAL STATUS PRIOR TO ADMISSION: Patient was modified independent using a B SPC for functional mobility. Recent admission to [facility] with dc to [place]. Works at [employer] part time in the [employer]. HOME SUPPORT: The patient lived alone. Occupational Therapy Goals  Initiated 2/26/2021  1. Patient will perform bathing with supervision/set-up with AD within 7 day(s). 2.  Patient will perform lower body dressing with supervision/set-up within 7 day(s). 3.  Patient will perform toilet transfers with supervision/set-up within 7 day(s). 4.  Patient will perform all aspects of toileting with supervision/set-up within 7 day(s). 5. Patient will participate in upper extremity therapeutic exercise/activities with supervision/set-up for 8 minutes within 7 day(s). Outcome: Progressing Towards Goal   OCCUPATIONAL THERAPY EVALUATION  Patient: Maged Wilhelm (62 y.o. female)  Date: 2/26/2021  Primary Diagnosis: Rectal bleeding [K62.5]  Rectal bleed [K62.5]  Procedure(s) (LRB):  SIGMOIDOSCOPY FLEXIBLE (N/A) Day of Surgery   Precautions:  Fall    ASSESSMENT  Based on the objective data described below, the patient presents with impaired higher level functional mobility, activity tolerance distal LE reach, and balance necessary in ADL tasks. Patient admitted from [facility] for rectal bleeding. She demonstrated ability to complete toileting at [facility] level with CGA to toilet, hygiene with min A for full completion and clothing hygiene with min A. She had difficulty with LB dressing tasks secondary to BLE swelling and impaired distal reach. Left up in chair with nurse present. Anticipate good progression with OT service. Current Level of Function Impacting Discharge (ADLs/self-care): CGA toilet transfer, hygiene min A, clothing mgmt min A    Functional Outcome Measure:   The patient scored 50/100 on the Barthel index outcome measure which is indicative of moderate impairment with ADL tasks. Other factors to consider for discharge: Patient lives at home alone. Multiple hospitalization and SNF placement. Patient will benefit from skilled therapy intervention to address the above noted impairments. PLAN :  Recommendations and Planned Interventions: self care training, functional mobility training, therapeutic exercise, balance training, therapeutic activities, endurance activities, patient education, home safety training, and family training/education    Frequency/Duration: Patient will be followed by occupational therapy 5 times a week to address goals. Recommendation for discharge: (in order for the patient to meet his/her long term goals)  Therapy up to 5 days/week in SNF setting    This discharge recommendation:  Has not yet been discussed the attending provider and/or case management    IF patient discharges home will need the following DME: TBD SNF       SUBJECTIVE:   Patient stated I can't go home like this.     OBJECTIVE DATA SUMMARY:   HISTORY:   Past Medical History:   Diagnosis Date    Cancer ([facility] [address]     melanoma back stage II 2005    CKD (chronic kidney disease), stage III 3/1/2016    CKD (chronic kidney disease), stage IV ([address] 3/1/2016    Diabetes ([facility] [address]     HTN (hypertension) 11/2/2015    Hypertension     LBP (low back pain) 11/2/2015    Menopause     Microalbuminuria 11/21/2015    Obesity 11/2/2015    PAD (peripheral artery disease) ([facility] [address] 9/29/2016    Venous insufficiency 11/2/2015     Past Surgical History:   Procedure Laterality Date    COLONOSCOPY N/A 2/11/2021    COLONOSCOPY performed by Easton Molina MD at [facility] ENDOSCOPY    HX AMPUTATION TOE  01/13/2021    Left second toe amputation; Dr. Stephany Law. HX APPENDECTOMY      HX BACK SURGERY      HX BREAST BIOPSY      unsure of which breast or when.  Negative results    HX TONSILLECTOMY         Expanded or extensive additional review of patient history:     Home Situation  Home Environment: Private residence  # Steps to Enter: 4  Rails to Enter: Yes(not all the way up)  One/Two Story Residence: Two story, live on 1st floor  Living Alone: Yes  Current DME Used/Available at Home: Tub transfer bench, Walker, rolling, Cane, straight(dressing stick, uses B SPC)  Tub or Shower Type: Tub/Shower combination      EXAMINATION OF PERFORMANCE DEFICITS:  Cognitive/Behavioral Status:  Neurologic State: Alert; Appropriate for age               Range of Motion:  AROM: Generally decreased, functional  PROM: Within functional limits                      Strength:  Strength: Generally decreased, functional                Coordination:     Fine Motor Skills-Upper: Left Intact; Right Intact    Gross Motor Skills-Upper: Left Intact; Right Intact    Tone & Sensation:  BUE WDL                Balance:  Sitting: Intact  Standing: Intact; With support(B UE support)    Functional Mobility and Transfers for ADLs:  Bed Mobility:  Supine to Sit: Supervision    Transfers:  Sit to Stand: Contact guard assistance  Stand to Sit: Contact guard assistance  Bed to Chair: Contact guard assistance  Bathroom Mobility: Contact guard assistance  Toilet Transfer : Contact guard assistance(RW)    ADL Assessment:  Feeding: Independent    Oral Facial Hygiene/Grooming: Contact guard assistance    Bathing: Moderate assistance    Upper Body Dressing: Setup    Lower Body Dressing: Moderate assistance    Toileting: Moderate assistance                ADL Intervention and task modifications:   Patient instructed and indicated understanding the benefits of maintaining activity tolerance, functional mobility, and independence with self care tasks during acute stay  to ensure safe return home and to baseline.  Encouraged patient to increase frequency and duration OOB, be out of bed for all meals, perform daily ADLs (as approved by RN/MD regarding bathing etc), and performing functional mobility to/from bathroom. Provided education through multi-modal cues for RW safety including proper positioning, hand placement,  and safety. Patient able to perform sit <> stand with CGA assistance. Good understanding of RW use and safety. Grooming  Washing Hands: Contact guard assistance(forearms on sink)      Lower Body Dressing Assistance  Socks: Maximum assistance    Toileting  Bowel Hygiene: Minimum assistance(for full completion of task)  Clothing Management: Minimum assistance(tabbed brief)       Functional Measure:  Barthel Index:    Bathin  Bladder: 5  Bowels: 5  Groomin  Dressin  Feeding: 10  Mobility: 5  Stairs: 0  Toilet Use: 5  Transfer (Bed to Chair and Back): 10  Total: 50/100        The Barthel ADL Index: Guidelines  1. The index should be used as a record of what a patient does, not as a record of what a patient could do. 2. The main aim is to establish degree of independence from any help, physical or verbal, however minor and for whatever reason. 3. The need for supervision renders the patient not independent. 4. A patient's performance should be established using the best available evidence. Asking the patient, friends/relatives and nurses are the usual sources, but direct observation and common sense are also important. However direct testing is not needed. 5. Usually the patient's performance over the preceding 24-48 hours is important, but occasionally longer periods will be relevant. 6. Middle categories imply that the patient supplies over 50 per cent of the effort. 7. Use of aids to be independent is allowed. Jennifer Rose., Barthel, D.W. ([id number]). Functional evaluation: the Barthel Index. [address] [facility] (14)2. NICOLETTE Cruz, Rinku Boogie., Kaylah Ponce., [place], [address] (). Measuring the change indisability after inpatient rehabilitation; comparison of the responsiveness of the Barthel Index and Functional [place] Measure. Journal of Neurology, Neurosurgery, and Psychiatry, 66(4), 284-333. SHE Jennings.JOHN, AMBAR Sy, & [place] M.A. (2004.) Assessment of post-stroke quality of life in cost-effectiveness studies: The usefulness of the Barthel Index and the EuroQoL-5D. Quality of Life Research, 15, 387-47         Occupational Therapy Evaluation Charge Determination   History Examination Decision-Making   LOW Complexity : Brief history review  LOW Complexity : 1-3 performance deficits relating to physical, cognitive , or psychosocial skils that result in activity limitations and / or participation restrictions  LOW Complexity : No comorbidities that affect functional and no verbal or physical assistance needed to complete eval tasks       Based on the above components, the patient evaluation is determined to be of the following complexity level: LOW   Pain Rating:  No c/o pain--tender from \"boil\" in intergluteal cleft    Activity Tolerance:   Fair and SpO2 stable on RA    After treatment patient left in no apparent distress:    Sitting in chair and Call bell within reach    COMMUNICATION/EDUCATION:   The patients plan of care was discussed with: Physical therapist and Registered nurse. Home safety education was provided and the patient/caregiver indicated understanding. and Patient/family agree to work toward stated goals and plan of care. This patients plan of care is appropriate for delegation to [facility].     Thank you for this referral.  Joyce Murray, OT  Time Calculation: 23 mins

## 2021-02-26 NOTE — ROUTINE PROCESS
Bedside and Verbal shift change report given to Quinton (oncoming nurse) by Gil Mcarthur (offgoing nurse). Report included the following information SBAR, Kardex, Procedure Summary, Intake/Output, MAR, Recent Results and Cardiac Rhythm NSR.

## 2021-02-26 NOTE — PERIOP NOTES
TRANSFER - OUT REPORT:    Verbal report given to FRANK Fox on Nicci Jack  being transferred to [facility] [phone] for routine progression of care       Report consisted of patients Situation, Background, Assessment and   Recommendations(SBAR). Information from the following report(s) SBAR, Procedure Summary, Cardiac Rhythm SR and Quality Measures was reviewed with the receiving nurse. Lines:   Peripheral IV 02/24/21 Distal;Left;Posterior Forearm (Active)   Site Assessment Clean, dry, & intact 02/26/21 0837   Phlebitis Assessment 0 02/26/21 0837   Infiltration Assessment 0 02/26/21 0837   Dressing Status Clean, dry, & intact 02/26/21 0837   Dressing Type Transparent 02/26/21 0837   Hub Color/Line Status Blue;Capped 02/26/21 9601   Action Taken Open ports on tubing capped 02/24/21 1642   Alcohol Cap Used Yes 02/26/21 2052        Opportunity for questions and clarification was provided.       Patient transported with:   Vijay Hatch IV

## 2021-02-26 NOTE — ROUTINE PROCESS
1349:  First tap water enema of about 1500ml warm fluid completed. About 10 minutes later started second round of enema. TRANSFER - OUT REPORT: 
 
Verbal report given to Mac (name) on Shady Dale Fend  being transferred to ENDO (unit) for ordered procedure Report consisted of patients Situation, Background, Assessment and  
Recommendations(SBAR). Information from the following report(s) SBAR, Intake/Output and Recent Results was reviewed with the receiving nurse. Lines:  
Peripheral IV 02/24/21 Distal;Left;Posterior Forearm (Active) Site Assessment Clean, dry, & intact 02/26/21 9753 Phlebitis Assessment 0 02/26/21 0837 Infiltration Assessment 0 02/26/21 0837 Dressing Status Clean, dry, & intact 02/26/21 6850 Dressing Type Transparent 02/26/21 5519 Hub Color/Line Status Blue;Capped 02/26/21 8329 Action Taken Open ports on tubing capped 02/24/21 1642 Alcohol Cap Used Yes 02/26/21 5456 Opportunity for questions and clarification was provided. Patient transported with: 
 [employer]

## 2021-02-26 NOTE — PERIOP NOTES
TRANSFER - IN REPORT:    Verbal report received from FRANK Fox on Gaurav Dempsey  being received from [phone] for ordered procedure    Report consisted of patients Situation, Background, Assessment and   Recommendations(SBAR). Information from the following report(s) SBAR, Cardiac Rhythm SR, Pre Procedure Checklist and Quality Measures was reviewed with the receiving nurse. Opportunity for questions and clarification was provided. Assessment completed upon patients arrival to unit and care assumed.

## 2021-02-26 NOTE — ANESTHESIA POSTPROCEDURE EVALUATION
Procedure(s):  COLONOSCOPY  ESOPHAGOGASTRODUODENOSCOPY (EGD)  ESOPHAGOGASTRODUODENAL (EGD) BIOPSY  COLON BIOPSY. total IV anesthesia, general    Anesthesia Post Evaluation        Patient location during evaluation: PACU  Note status: adequate.   Level of consciousness: awake  Pain management: satisfactory to patient  Airway patency: patent  Anesthetic complications: no  Cardiovascular status: acceptable  Respiratory status: acceptable  Hydration status: acceptable  Post anesthesia nausea and vomiting:  controlled      INITIAL Post-op Vital signs:   Vitals Value Taken Time   /51 02/26/21 0545   Temp 36.9 °C (98.5 °F) 02/26/21 0545   Pulse 62 02/26/21 0545   Resp 14 02/26/21 0545   SpO2 98 % 02/26/21 0545

## 2021-02-26 NOTE — H&P
118 [facility].  217 [facility] 140 [facility], [address]  [phone]                                History and Physical     NAME: Renny Mora   :  1955   MRN:  [id number]     HPI:  The patient was seen and examined. Past Surgical History:   Procedure Laterality Date    COLONOSCOPY N/A 2021    COLONOSCOPY performed by Jorge Childers MD at [facility] ENDOSCOPY    HX AMPUTATION TOE  2021    Left second toe amputation; Dr. Alpesh Vega.  HX APPENDECTOMY      HX BACK SURGERY      HX BREAST BIOPSY      unsure of which breast or when. Negative results    HX TONSILLECTOMY       Past Medical History:   Diagnosis Date    Cancer ([facility] [address]     melanoma back stage II     CKD (chronic kidney disease), stage III 3/1/2016    CKD (chronic kidney disease), stage IV ([address] 3/1/2016    Diabetes ([facility] [address]     HTN (hypertension) 2015    Hypertension     LBP (low back pain) 2015    Menopause     Microalbuminuria 2015    Obesity 2015    PAD (peripheral artery disease) ([facility] [address] 2016    Venous insufficiency 2015     Social History     Tobacco Use    Smoking status: Former Smoker    Smokeless tobacco: Never Used   Substance Use Topics    Alcohol use:  Yes     Alcohol/week: 0.0 standard drinks     Frequency: Monthly or less     Drinks per session: 1 or 2     Binge frequency: Never     Comment: occassion    Drug use: No     No Known Allergies  Family History   Problem Relation Age of Onset    Arthritis-rheumatoid Mother     Dementia Father      Current Facility-Administered Medications   Medication Dose Route Frequency    0.9% sodium chloride infusion  50 mL/hr IntraVENous CONTINUOUS    sodium chloride (NS) flush 5-40 mL  5-40 mL IntraVENous Q8H    sodium chloride (NS) flush 5-40 mL  5-40 mL IntraVENous PRN    midazolam (VERSED) injection 0.25-10 mg  0.25-10 mg IntraVENous Multiple    fentaNYL citrate (PF) injection 100 mcg  100 mcg IntraVENous MULTIPLE DOSE GIVEN    naloxone (NARCAN) injection 0.4 mg  0.4 mg IntraVENous Multiple    flumazeniL (ROMAZICON) 0.1 mg/mL injection 0.2 mg  0.2 mg IntraVENous Multiple    simethicone (MYLICON) 20JM/1.9OS oral drops 80 mg  1.2 mL Oral Multiple    atropine injection 0.5 mg  0.5 mg IntraVENous ONCE PRN    EPINEPHrine (ADRENALIN) 0.1 mg/mL syringe 1 mg  1 mg Endoscopically ONCE PRN    0.9% sodium chloride infusion 250 mL  250 mL IntraVENous PRN    0.9% sodium chloride infusion 250 mL  250 mL IntraVENous PRN    0.9% sodium chloride infusion 250 mL  250 mL IntraVENous PRN    sodium chloride (NS) flush 5-40 mL  5-40 mL IntraVENous Q8H    sodium chloride (NS) flush 5-40 mL  5-40 mL IntraVENous PRN    acetaminophen (TYLENOL) tablet 650 mg  650 mg Oral Q6H PRN    Or    acetaminophen (TYLENOL) suppository 650 mg  650 mg Rectal Q6H PRN    polyethylene glycol (MIRALAX) packet 17 g  17 g Oral DAILY PRN    promethazine (PHENERGAN) tablet 12.5 mg  12.5 mg Oral Q6H PRN    Or    ondansetron (ZOFRAN) injection 4 mg  4 mg IntraVENous Q6H PRN    atorvastatin (LIPITOR) tablet 40 mg  40 mg Oral DAILY    pantoprazole (PROTONIX) tablet 40 mg  40 mg Oral BID    traMADoL (ULTRAM) tablet 50 mg  50 mg Oral Q6H PRN    insulin lispro (HUMALOG) injection   SubCUTAneous AC&HS    glucose chewable tablet 16 g  4 Tab Oral PRN    dextrose (D50W) injection syrg 12.5-25 g  12.5-25 g IntraVENous PRN    glucagon (GLUCAGEN) injection 1 mg  1 mg IntraMUSCular PRN         PHYSICAL EXAM:  General: WD, WN. Alert, cooperative, no acute distress    HEENT: NC, Atraumatic. PERRLA, EOMI. Anicteric sclerae. Lungs:  CTA Bilaterally. No Wheezing/Rhonchi/Rales. Heart:  Regular  rhythm,  No murmur, No Rubs, No Gallops  Abdomen: Soft, Non distended, Non tender. +Bowel sounds, no HSM  Extremities: No c/c/e  Neurologic:  CN 2-12 gi, Alert and oriented X 3. No acute neurological distress   Psych:   Good insight. Not anxious nor agitated.     The heart, lungs and mental status were satisfactory for the administration of MAC sedation and for the procedure. Mallampati score: 2     The patient was counseled at length about the risks of mariam Covid-19 in the sepideh-operative and post-operative states including the recovery window of their procedure. The patient was made aware that mariam Covid-19 after a surgical procedure may worsen their prognosis for recovering from the virus and lend to a higher morbidity and or mortality risk. The patient was given the options of postponing their procedure. All of the risks, benefits, and alternatives were discussed. The patient does wish to proceed with the procedure.       Assessment:   · Colonic ischemia, bleeding     Plan:   · Endoscopic procedure : flex sig  · MAC sedation

## 2021-02-26 NOTE — ANESTHESIA POSTPROCEDURE EVALUATION
Post-Anesthesia Evaluation and Assessment    Patient: Neftali Fox MRN: [id number]  SSN: [id number]    YOB: 1955  Age: 60 y.o. Sex: female      I have evaluated the patient and they are stable and ready for discharge from the PACU. Cardiovascular Function/Vital Signs  Visit Vitals  BP (!) 94/44   Pulse (!) 58   Temp 36.6 °C (97.8 °F)   Resp 14   Ht 5' 5.5\" (1.664 m)   Wt 97 kg (213 lb 13.5 oz)   SpO2 100%   BMI 35.04 kg/m²       Patient is status post MAC anesthesia for Procedure(s):  SIGMOIDOSCOPY FLEXIBLE. Nausea/Vomiting: None    Postoperative hydration reviewed and adequate. Pain:  Pain Scale 1: Numeric (0 - 10) (02/26/21 1540)  Pain Intensity 1: 0 (02/26/21 1540)   Managed    Neurological Status:   Neuro  Neurologic State: Alert; Appropriate for age (02/26/21 1300)   At baseline    Mental Status, Level of Consciousness: Alert and  oriented to person, place, and time    Pulmonary Status:   O2 Device: Room air (02/26/21 1540)   Adequate oxygenation and airway patent    Complications related to anesthesia: None    Post-anesthesia assessment completed.  No concerns    Signed By: Dylan Mcclellan MD     February 26, 2021

## 2021-02-26 NOTE — PROGRESS NOTES
[address] [facility]  Hospitalist Group                                                                                          Hospitalist Progress Note  Ailyn Alarcon NP  Answering service: [phone] from in house phone        Date of Service:  2021  NAME:  Medina Back  :  1955  MRN:  [id number]      Admission Summary:   Sydnee Fu is a 61 y.o lady [de-identified] PMH T2DM, HTN, PAD, CKD who was recently admitted here with PRABHU and GI bleed due to Ischemic Proctosigmoiditis, (last colonoscopy 21)who presented from [facility] rectal bleeding. She noted constant bright bloody stools with clots with associated chills and light-headedness. She denied abdominal pain, fever, n/v. She denied blood thinner use but is unsure if she is taking the aspirin on her med list.  Interval history / Subjective: Follow-up for issues listed below.  (Rectal bleeding, CKD 3, PAD, T2DM, Ischemic Proctosigmoiditis). Patient seen and examined sitting up in bed. States that she is doing ok. Has only had spotting of blood per rectal.  NPO for flex- sig today. Denies any pain   No new complaints. No overnight events. Denies any syncope, dizziness, shortness of breath, chest pain or tightness, nausea, or vomiting. Assessment & Plan:     Rectal bleeding 2/2 to Ischemic Proctosigmoiditis (POA)   -monitor H/H, transfuse to keep Hgb >7.0   -CTA abd/pelv : No CT evidence for acute GI bleeding at this time. Stable to slightly increased mucosal enhancement and wall thickening in the rectosigmoid colon consistent with proctocolitis since the prior study. Significant diverticulosis without acute diverticulitis. - GI following- Flex sig scheduled for today   - NPO      Acute Blood Loss Anemia: 2/2 to above, plan as above.   - Hgb 7.4 this am   - Repeat H&H at 1300   - Transfuse for hgb <7.0     Hyponatremia   - Mild, asymptomatic   - Improving   - Monitor labs.      CKD IIIb   -hold diuretics  -monitor renal function and volume state on IVFs     PAD: hold ASA 2/2 GIB. T2DM: hold metformin, ISS, accuchecks. ECHO 2/2021: LVEF 33%, normal systolic and dyastolic function.       Code status: Full   DVT prophylaxis: SCD    Care Plan discussed with: Patient/Family, Nurse and   Anticipated Disposition: ? Return to [facility]  Anticipated Discharge: Greater than 48 hours     Hospital Problems  Date Reviewed: 2/19/2021          Codes Class Noted POA    Rectal bleed ICD-10-CM: K62.5  ICD-9-CM: 569.3  2/24/2021 Unknown        * (Principal) Rectal bleeding ICD-10-CM: K62.5  ICD-9-CM: 569.3  2/23/2021 Unknown                Review of Systems:   A comprehensive review of systems was negative except for that written in the HPI. Vital Signs:    Last 24hrs VS reviewed since prior progress note. Most recent are:  Visit Vitals  BP (!) 109/52   Pulse 61   Temp 98.6 °F (37 °C)   Resp 16   Wt 97 kg (213 lb 13.5 oz)   SpO2 98%   BMI 34.52 kg/m²         Intake/Output Summary (Last 24 hours) at 2/26/2021 1144  Last data filed at 2/26/2021 1296  Gross per 24 hour   Intake 900 ml   Output 1400 ml   Net -500 ml        Physical Examination:             Constitutional:  No acute distress, cooperative, pleasant, answers questions    ENT:  Oral mucosa moist, oropharynx benign. Resp:  CTA bilaterally on room air. No wheezing/rhonchi/rales. No accessory muscle use   CV:  Regular rhythm, normal rate, no murmurs, gallops, rubs    GI:  Soft, non distended, non tender. normoactive bowel sounds,     Musculoskeletal:  No edema, warm, 2+ pulses throughout    Neurologic:  Moves all extremities. AAOx3, CN II-XII reviewed, follows commands. Psych:  Good insight, Not anxious nor agitated.        Data Review:    Review and/or order of clinical lab test  Review and/or order of tests in the medicine section of CPT      Labs:     Recent Labs     02/26/21  0611 02/25/21  2124 02/25/21  0333 02/25/21  0333   WBC 3.7  -- --  3.5*   HGB 7.4* 7.0*   < > 7.0*   HCT 22.8* 21.1*   < > 21.2*   *  --   --  119*    < > = values in this interval not displayed. Recent Labs     02/25/21 0333 02/23/21 1922   * 130*   K 3.6 4.1    97   CO2 21 24   BUN 32* 30*   CREA 1.30* 1.54*   GLU 84 96   CA 8.4* 9.0     Recent Labs     02/23/21 1922   ALT 19   AP 76   TBILI 0.6   TP 6.8   ALB 3.8   GLOB 3.0     No results for input(s): INR, PTP, APTT, INREXT in the last 72 hours. Recent Labs     02/25/21 0333   TIBC 276   PSAT 23      Lab Results   Component Value Date/Time    Folate 54.6 (H) 02/17/2021 03:49 PM      No results for input(s): PH, PCO2, PO2 in the last 72 hours. No results for input(s): CPK, CKNDX, TROIQ in the last 72 hours.     No lab exists for component: CPKMB  Lab Results   Component Value Date/Time    Cholesterol, total 165 09/29/2016 10:02 AM    HDL Cholesterol 34 09/29/2016 10:02 AM    LDL, calculated 91.2 09/29/2016 10:02 AM    Triglyceride 199 (H) 09/29/2016 10:02 AM    CHOL/HDL Ratio 4.9 09/29/2016 10:02 AM     Lab Results   Component Value Date/Time    Glucose (POC) 104 (H) 02/26/2021 06:35 AM    Glucose (POC) 134 (H) 02/25/2021 09:08 PM    Glucose (POC) 122 (H) 02/25/2021 04:19 PM    Glucose (POC) 106 (H) 02/25/2021 11:55 AM    Glucose (POC) 107 (H) 02/25/2021 06:42 AM     Lab Results   Component Value Date/Time    Color DARK YELLOW 02/04/2021 02:16 AM    Appearance CLOUDY (A) 02/04/2021 02:16 AM    Specific gravity 1.021 02/04/2021 02:16 AM    pH (UA) 5.0 02/04/2021 02:16 AM    Protein 30 (A) 02/04/2021 02:16 AM    Glucose Negative 02/04/2021 02:16 AM    Ketone Negative 02/04/2021 02:16 AM    Bilirubin NEGATIVE  09/29/2016 10:02 AM    Urobilinogen 0.2 02/04/2021 02:16 AM    Nitrites Negative 02/04/2021 02:16 AM    Leukocyte Esterase Negative 02/04/2021 02:16 AM    Epithelial cells FEW 02/04/2021 02:16 AM    Bacteria Negative 02/04/2021 02:16 AM    WBC 0-4 02/04/2021 02:16 AM    RBC 0-5 02/04/2021 02:16 AM         Medications Reviewed:     Current Facility-Administered Medications   Medication Dose Route Frequency    0.9% sodium chloride infusion 250 mL  250 mL IntraVENous PRN    0.9% sodium chloride infusion 250 mL  250 mL IntraVENous PRN    0.9% sodium chloride infusion 250 mL  250 mL IntraVENous PRN    sodium chloride (NS) flush 5-40 mL  5-40 mL IntraVENous Q8H    sodium chloride (NS) flush 5-40 mL  5-40 mL IntraVENous PRN    acetaminophen (TYLENOL) tablet 650 mg  650 mg Oral Q6H PRN    Or    acetaminophen (TYLENOL) suppository 650 mg  650 mg Rectal Q6H PRN    polyethylene glycol (MIRALAX) packet 17 g  17 g Oral DAILY PRN    promethazine (PHENERGAN) tablet 12.5 mg  12.5 mg Oral Q6H PRN    Or    ondansetron (ZOFRAN) injection 4 mg  4 mg IntraVENous Q6H PRN    atorvastatin (LIPITOR) tablet 40 mg  40 mg Oral DAILY    pantoprazole (PROTONIX) tablet 40 mg  40 mg Oral BID    traMADoL (ULTRAM) tablet 50 mg  50 mg Oral Q6H PRN    insulin lispro (HUMALOG) injection   SubCUTAneous AC&HS    glucose chewable tablet 16 g  4 Tab Oral PRN    dextrose (D50W) injection syrg 12.5-25 g  12.5-25 g IntraVENous PRN    glucagon (GLUCAGEN) injection 1 mg  1 mg IntraMUSCular PRN     ______________________________________________________________________  EXPECTED LENGTH OF STAY: 3d 0h  ACTUAL LENGTH OF STAY:          2                 Chelsea Peguero NP

## 2021-02-26 NOTE — PROCEDURES
[address] [facility] [address]  [address], [address]  [phone]                              Flexible Sigmoidoscopy Procedure Note      Indications:  History of recto-sigmoid ischemia, persistent rectal bleeding     :  Francisco Javier Pruitt MD    Staff: Endoscopy Technician-1: Lucy Grimes  Endoscopy RN-1: Tg Morocho RN    Referring Provider: Steva Spatz, MD    Sedation:  MAC anesthesia    Procedure Details:  After informed consent was obtained with all risks and benefits of procedure explained and preoperative exam completed, the patient was taken to the endoscopy suite and placed in the left lateral decubitus position. Upon sequential sedation as per above, a digital rectal exam was performed. The Olympus videocolonoscope was inserted in the rectum and carefully advanced to the sigmoid colon. The quality of preparation was poor. Findings:   Rectum:  Diffuse coalescing ulcerations involving all of rectum from dentate line to appx 10 cm from the anal verge. There is less pallor and areas of patchy erythema, improved from endoscopic exam 2/11/21. Brown stool in rectum precluding views  Sigmoid: brown stool, linear ischemic ulcers, did not transverse past 15 cm from the anal verge    Interventions: none           Specimens Removed:  * No specimens in log *    Complications: None. EBL:  Minimal     Impression:    See Postoperative diagnosis above    Recommendations:   - Advance diet  - Continue supportive care. - Colorectal surgery following. Hopefully will continue to slowly heal/improve over time.  Trend CBC, transfuse if neede     Francisco Javier Pruitt MD  2/26/2021  3:40 PM

## 2021-02-26 NOTE — PROGRESS NOTES
Patient doing better. Less bleeding  Visit Vitals  BP (!) 109/52   Pulse 61   Temp 98.6 °F (37 °C)   Resp 16   Wt 97 kg (213 lb 13.5 oz)   SpO2 98%   BMI 34.52 kg/m²     Date 02/25/21 0700 - 02/26/21 0659 02/26/21 0700 - 02/27/21 0659   Shift 6504-0075 6290-1476 24 Hour Total 2117-1386 5620-5670 24 Hour Total   INTAKE   P.O. 120 180 300 600  600     P. O. 120 180 300 600  600   Shift Total(mL/kg) 120(1.2) 180(1.9) 300(3.1) 600(6.2)  600(6.2)   OUTPUT   Urine(mL/kg/hr) 800(0.7) 900(0.8) 1700(0.7)        Urine Voided         Urine Occurrence(s) 2 x  2 x      Stool           Stool Occurrence(s) 1 x  1 x      Shift Total(mL/kg) 800(8.2) 900(9.3) 1700(17.5)      NET -680 -720 -1400 600  600   Weight (kg) 97 97 97 97 97 97     abd soft    A/p GI planning on scoping the patient. Continue conservative management.

## 2021-02-26 NOTE — ANESTHESIA PREPROCEDURE EVALUATION
Relevant Problems   CARDIOVASCULAR   (+) Hypertension complicating diabetes (HCC)   (+) PAD (peripheral artery disease) (HCC)      RENAL FAILURE   (+) CKD (chronic kidney disease), stage III      ENDOCRINE   (+) Obesity   (+) Type 2 diabetes mellitus with neurologic complication (HCC)   (+) Type 2 diabetes with nephropathy (HCC)      HEMATOLOGY   (+) Osteomyelitis of foot, left, acute (HCC)       Anesthetic History   No history of anesthetic complications            Review of Systems / Medical History  Patient summary reviewed, nursing notes reviewed and pertinent labs reviewed    Pulmonary  Within defined limits                 Neuro/Psych   Within defined limits           Cardiovascular  Within defined limits  Hypertension: well controlled              Exercise tolerance: >4 METS     GI/Hepatic/Renal  Within defined limits       Renal disease: CRI       Endo/Other  Within defined limits  Diabetes: type 2    Obesity and anemia     Other Findings            Physical Exam    Airway  Mallampati: II  TM Distance: > 6 cm  Neck ROM: normal range of motion   Mouth opening: Normal     Cardiovascular  Regular rate and rhythm,  S1 and S2 normal,  no murmur, click, rub, or gallop             Dental  No notable dental hx       Pulmonary  Breath sounds clear to auscultation               Abdominal  GI exam deferred       Other Findings            Anesthetic Plan    ASA: 3  Anesthesia type: MAC          Induction: Intravenous  Anesthetic plan and risks discussed with: Patient

## 2021-02-26 NOTE — PROGRESS NOTES
Karine Lasha  1955  [id number]    Situation:  Verbal report received from: Paxton Dormanga  Procedure: Procedure(s):  SIGMOIDOSCOPY FLEXIBLE    Background:    Preoperative diagnosis: Rectal Bleeding  Postoperative diagnosis: 1)Rectal ischemia    :  Dr. Alex Sepulveda  Assistant(s): Endoscopy Technician-1: James Branch  Endoscopy RN-1: Sawyer Ronquillo RN    Specimens: * No specimens in log *  H. Pylori  no    Assessment:    Anesthesia gave intra-procedure sedation and medications, see anesthesia flow sheet yes    Intravenous fluids: NS@ KVO     Vital signs stable     Abdominal assessment: round and soft     Recommendation:  Discharge patient per MD order.   Return to floor    Permission to share finding with family or friend yes

## 2021-02-26 NOTE — PROGRESS NOTES
Problem: Mobility Impaired (Adult and Pediatric)  Goal: *Acute Goals and Plan of Care (Insert Text)  Description: FUNCTIONAL STATUS PRIOR TO ADMISSION: Patient was modified independent using a B canes for functional mobility. HOME SUPPORT PRIOR TO ADMISSION: The patient lived alone with no local support. Working part time at [employer] in [place] [facility]. Physical Therapy Goals  Initiated 2/26/2021  1. Patient will move from supine to sit and sit to supine , scoot up and down, and roll side to side in bed with independence within 7 day(s). 2.  Patient will transfer from bed to chair and chair to bed with modified independence using the least restrictive device within 7 day(s). 3.  Patient will perform sit to stand with modified independence within 7 day(s). 4.  Patient will ambulate with modified independence for 300 feet with the least restrictive device within 7 day(s). 5.  Patient will ascend/descend 4 stairs with 1 handrail(s) with modified independence within 7 day(s). Outcome: Progressing Towards Goal     PHYSICAL THERAPY EVALUATION  Patient: Maged Wilhelm (15 y.o. female)  Date: 2/26/2021  Primary Diagnosis: Rectal bleeding [K62.5]  Rectal bleed [K62.5]  Procedure(s) (LRB):  SIGMOIDOSCOPY FLEXIBLE (N/A)     Precautions:   Fall    ASSESSMENT  Based on the objective data described below, the patient presents with generalized weakness, impaired balance, decreased activity tolerance, and overall impaired mobility from baseline following admission for rectal bleeding. Patient is overall CGA to supervision for bed mobility, transfers, and gait with RW. Patient states at baseline she has to walk 1/4 mile at work to reach restroom. She is significantly below functional baseline and will benefit from SNF at discharge. Current Level of Function Impacting Discharge (mobility/balance): CGA to supervision    Functional Outcome Measure:   The patient scored 15 seconds on the TUG outcome measure which is indicative of increased fall risk. Other factors to consider for discharge:      Patient will benefit from skilled therapy intervention to address the above noted impairments. PLAN :  Recommendations and Planned Interventions: bed mobility training, transfer training, gait training, therapeutic exercises, neuromuscular re-education, patient and family training/education, and therapeutic activities      Frequency/Duration: Patient will be followed by physical therapy:  3 times a week to address goals. Recommendation for discharge: (in order for the patient to meet his/her long term goals)  Therapy up to 5 days/week in SNF setting    This discharge recommendation:  Has not yet been discussed the attending provider and/or case management    IF patient discharges home will need the following DME: patient owns DME required for discharge         SUBJECTIVE:   Patient stated I want to go back to [facility], my father had rehab there.     OBJECTIVE DATA SUMMARY:   HISTORY:    Past Medical History:   Diagnosis Date    Cancer ([facility] [address]     melanoma back stage II 2005    CKD (chronic kidney disease), stage III 3/1/2016    CKD (chronic kidney disease), stage IV ([facility] [address] 3/1/2016    Diabetes ([facility] [address]     HTN (hypertension) 11/2/2015    Hypertension     LBP (low back pain) 11/2/2015    Menopause     Microalbuminuria 11/21/2015    Obesity 11/2/2015    PAD (peripheral artery disease) ([facility] [address] 9/29/2016    Venous insufficiency 11/2/2015     Past Surgical History:   Procedure Laterality Date    COLONOSCOPY N/A 2/11/2021    COLONOSCOPY performed by Remy Santo MD at [facility] ENDOSCOPY    HX AMPUTATION TOE  01/13/2021    Left second toe amputation; Dr. Kamala Villalba. HX APPENDECTOMY      HX BACK SURGERY      HX BREAST BIOPSY      unsure of which breast or when.  Negative results    HX TONSILLECTOMY         Personal factors and/or comorbidities impacting plan of care:     Home Situation  Home Environment: Private residence  # Steps to Enter: 4  Rails to [place]: Yes(not all the way up)  One/Two Story Residence: Two story, live on 1st floor  Living Alone: Yes  Current DME Used/Available at Home: Tub transfer bench, Walker, rolling, Cane, straight(dressing stick, uses B SPC)  Tub or Shower Type: Tub/Shower combination    EXAMINATION/PRESENTATION/DECISION MAKING:   Critical Behavior:              Hearing:     Skin:    Edema:   Range Of Motion:  AROM: Generally decreased, functional           PROM: Within functional limits           Strength:    Strength: Generally decreased, functional                    Tone & Sensation:                                  Coordination:     Vision:      Functional Mobility:  Bed Mobility:     Supine to Sit: Supervision        Transfers:  Sit to Stand: Contact guard assistance  Stand to Sit: Contact guard assistance  Stand Pivot Transfers: Contact guard assistance                    Balance:   Sitting: Intact  Standing: Intact; With support(B UE support)  Ambulation/Gait Training:  Distance (ft): 25 Feet (ft)(x 2; in and out of bathroom)  Assistive Device: Gait belt;Walker, rolling  Ambulation - Level of Assistance: Contact guard assistance        Gait Abnormalities: Decreased step clearance        Base of Support: Widened     Speed/Karen: Pace decreased (<100 feet/min)  Step Length: Right shortened;Left shortened                     Stairs: Therapeutic Exercises:       Functional Measure:  Timed up and go:    Timed Get Up And Go Test: 15(inferred )       < than 10 seconds=Normal  Greater then 13.5 seconds (in elderly)=Increased fall risk   Kylah Noel [place] M. Predicting the probability for falls in community dwelling older adults using the Timed Up and Go Test. Phys Ther. 2000;80:896-903.      Activity Tolerance:   Fair    After treatment patient left in no apparent distress:   Sitting in chair and Call bell within reach    COMMUNICATION/EDUCATION:   The patients plan of care was discussed with: Occupational therapist and Registered nurse. Fall prevention education was provided and the patient/caregiver indicated understanding., Patient/family have participated as able in goal setting and plan of care. , and Patient/family agree to work toward stated goals and plan of care.     Thank you for this referral.  Steven Cabrera, PT   Time Calculation: 28 mins

## 2021-02-26 NOTE — PROGRESS NOTES
Transitions of Care: 23% risk of re-admission      -Patient wishes to discharge back to [facility] when medically stable   -[facility] vs. [facility] transportation    -[facility]    The CM reviewed previous CM notes, as well as notes from previous admission- 2/03-2/19/2021- the patient discharged to [facility] for rehabilitation. The CM met with the patient at bedside, she endorses she hopes to return to [facility] when medically stable to complete her rehabilitation- referral has been placed in [facility], CM contacted attending MD requesting PT/OT notes- patient will require authorization. The patient endorses that at baseline she lives alone in own home, independent with ADLs- ambulates with cane, in the community utilizes two canes for balance. The patient's brother is emergency contact. The patient works for [employer] doing [employer] work- pharmacy is [facility] in [place]. Patient will require authorization to return to [facility] called and spoke with Charli Torres Liaison- she endorses they will accept the patient back, has negative COVID from 2/25- no new test needed at this time, discharge date TBD. CM will keep Will Kleber updated regarding anticipated discharge, will need PT/OT for authorization to return to [facility]. CM will continue to follow for transitions of care.  HOLLY Kapoor

## 2021-02-27 LAB
ABO + RH BLD: NORMAL
ANION GAP SERPL CALC-SCNC: 10 MMOL/L (ref 5–15)
BASOPHILS # BLD: 0 K/UL (ref 0–0.1)
BASOPHILS NFR BLD: 1 % (ref 0–1)
BLD PROD TYP BPU: NORMAL
BLD PROD TYP BPU: NORMAL
BLOOD GROUP ANTIBODIES SERPL: NORMAL
BPU ID: NORMAL
BPU ID: NORMAL
BUN SERPL-MCNC: 26 MG/DL (ref 6–20)
BUN/CREAT SERPL: 20 (ref 12–20)
CALCIUM SERPL-MCNC: 8.2 MG/DL (ref 8.5–10.1)
CHLORIDE SERPL-SCNC: 100 MMOL/L (ref 97–108)
CO2 SERPL-SCNC: 23 MMOL/L (ref 21–32)
CREAT SERPL-MCNC: 1.31 MG/DL (ref 0.55–1.02)
CROSSMATCH RESULT,%XM: NORMAL
CROSSMATCH RESULT,%XM: NORMAL
DIFFERENTIAL METHOD BLD: ABNORMAL
EOSINOPHIL # BLD: 0.3 K/UL (ref 0–0.4)
EOSINOPHIL NFR BLD: 9 % (ref 0–7)
ERYTHROCYTE [DISTWIDTH] IN BLOOD BY AUTOMATED COUNT: 15.7 % (ref 11.5–14.5)
GLUCOSE BLD STRIP.AUTO-MCNC: 101 MG/DL (ref 65–100)
GLUCOSE BLD STRIP.AUTO-MCNC: 107 MG/DL (ref 65–100)
GLUCOSE BLD STRIP.AUTO-MCNC: 108 MG/DL (ref 65–100)
GLUCOSE BLD STRIP.AUTO-MCNC: 112 MG/DL (ref 65–100)
GLUCOSE SERPL-MCNC: 83 MG/DL (ref 65–100)
HCT VFR BLD AUTO: 21.1 % (ref 35–47)
HCT VFR BLD AUTO: 24.4 % (ref 35–47)
HGB BLD-MCNC: 7 G/DL (ref 11.5–16)
HGB BLD-MCNC: 8.1 G/DL (ref 11.5–16)
IMM GRANULOCYTES # BLD AUTO: 0 K/UL (ref 0–0.04)
IMM GRANULOCYTES NFR BLD AUTO: 1 % (ref 0–0.5)
LYMPHOCYTES # BLD: 0.8 K/UL (ref 0.8–3.5)
LYMPHOCYTES NFR BLD: 21 % (ref 12–49)
MCH RBC QN AUTO: 30.7 PG (ref 26–34)
MCHC RBC AUTO-ENTMCNC: 33.2 G/DL (ref 30–36.5)
MCV RBC AUTO: 92.5 FL (ref 80–99)
MONOCYTES # BLD: 0.6 K/UL (ref 0–1)
MONOCYTES NFR BLD: 14 % (ref 5–13)
NEUTS SEG # BLD: 2.2 K/UL (ref 1.8–8)
NEUTS SEG NFR BLD: 54 % (ref 32–75)
NRBC # BLD: 0 K/UL (ref 0–0.01)
NRBC BLD-RTO: 0 PER 100 WBC
PLATELET # BLD AUTO: 133 K/UL (ref 150–400)
PMV BLD AUTO: 10.5 FL (ref 8.9–12.9)
POTASSIUM SERPL-SCNC: 3.6 MMOL/L (ref 3.5–5.1)
RBC # BLD AUTO: 2.28 M/UL (ref 3.8–5.2)
SERVICE CMNT-IMP: ABNORMAL
SODIUM SERPL-SCNC: 133 MMOL/L (ref 136–145)
SPECIMEN EXP DATE BLD: NORMAL
STATUS OF UNIT,%ST: NORMAL
STATUS OF UNIT,%ST: NORMAL
UNIT DIVISION, %UDIV: 0
UNIT DIVISION, %UDIV: 0
WBC # BLD AUTO: 3.9 K/UL (ref 3.6–11)

## 2021-02-27 PROCEDURE — 80048 BASIC METABOLIC PNL TOTAL CA: CPT

## 2021-02-27 PROCEDURE — 65660000000 HC RM CCU STEPDOWN

## 2021-02-27 PROCEDURE — 85025 COMPLETE CBC W/AUTO DIFF WBC: CPT

## 2021-02-27 PROCEDURE — 74011250637 HC RX REV CODE- 250/637: Performed by: HOSPITALIST

## 2021-02-27 PROCEDURE — 82962 GLUCOSE BLOOD TEST: CPT

## 2021-02-27 PROCEDURE — 85014 HEMATOCRIT: CPT

## 2021-02-27 PROCEDURE — 36415 COLL VENOUS BLD VENIPUNCTURE: CPT

## 2021-02-27 RX ADMIN — PANTOPRAZOLE SODIUM 40 MG: 40 TABLET, DELAYED RELEASE ORAL at 08:55

## 2021-02-27 RX ADMIN — ATORVASTATIN CALCIUM 40 MG: 40 TABLET, FILM COATED ORAL at 08:55

## 2021-02-27 RX ADMIN — Medication 10 ML: at 21:15

## 2021-02-27 RX ADMIN — PANTOPRAZOLE SODIUM 40 MG: 40 TABLET, DELAYED RELEASE ORAL at 17:03

## 2021-02-27 RX ADMIN — Medication 10 ML: at 05:47

## 2021-02-27 NOTE — PROGRESS NOTES
Problem: Falls - Risk of  Goal: *Absence of Falls  Description: Document Jose Led Fall Risk and appropriate interventions in the flowsheet. Outcome: Progressing Towards Goal  Note: Fall Risk Interventions:  Mobility Interventions: Patient to call before getting OOB, Communicate number of staff needed for ambulation/transfer         Medication Interventions: Patient to call before getting OOB    Elimination Interventions: Call light in reach, Patient to call for help with toileting needs    History of Falls Interventions: Investigate reason for fall         Problem: Risk for Spread of Infection  Goal: Prevent transmission of infectious organism to others  Description: Prevent the transmission of infectious organisms to other patients, staff members, and visitors.   Outcome: Progressing Towards Goal

## 2021-02-27 NOTE — PROGRESS NOTES
[address]        GI PROGRESS NOTE    Patient Name: John Mariano      : [id number]      MRN: [id number]  Admit Date: 2021  Today's Date: 2021    Subjective:   Flex sig yesterday with relative improvement. She reports having two small stools. No blood. Hgb 8.1. She denies abdominal and rectal pain. Objective:     Blood pressure 121/70, pulse 63, temperature 98.2 °F (36.8 °C), resp. rate 14, height 5' 5.5\" (1.664 m), weight 97 kg (213 lb 13.5 oz), SpO2 100 %. Physical Exam:  General appearance: cooperative, no distress, appears stated age  Skin: Extremities and face reveal no rashes, jaundice   HEENT: Sclerae anicteric. Extra-occular muscles are intact. Respiratory: Comfortable breathing   GI: Abdomen nondistended    Neurological: Gross memory appears intact. Patient is alert and oriented. Psychiatric: Mood appears appropriate with good judgement. No anxiety or agitation.       Data Review:    Recent Results (from the past 24 hour(s))   HGB & HCT    Collection Time: 21  1:34 PM   Result Value Ref Range    HGB 7.9 (L) 11.5 - 16.0 g/dL    HCT 23.9 (L) 35.0 - 47.0 %   GLUCOSE, POC    Collection Time: 21  4:32 PM   Result Value Ref Range    Glucose (POC) 105 (H) 65 - 100 mg/dL    Performed by Rich Andrea    GLUCOSE, POC    Collection Time: 21  9:01 PM   Result Value Ref Range    Glucose (POC) 121 (H) 65 - 100 mg/dL    Performed by David Pablo    CBC WITH AUTOMATED DIFF    Collection Time: 21  1:47 AM   Result Value Ref Range    WBC 3.9 3.6 - 11.0 K/uL    RBC 2.28 (L) 3.80 - 5.20 M/uL    HGB 7.0 (L) 11.5 - 16.0 g/dL    HCT 21.1 (L) 35.0 - 47.0 %    MCV 92.5 80.0 - 99.0 FL    MCH 30.7 26.0 - 34.0 PG    MCHC 33.2 30.0 - 36.5 g/dL    RDW 15.7 (H) 11.5 - 14.5 %    PLATELET 713 (L) 975 - 400 K/uL    MPV 10.5 8.9 - 12.9 FL    NRBC 0.0 0  WBC    ABSOLUTE NRBC 0.00 0.00 - 0.01 K/uL    NEUTROPHILS 54 32 - 75 %    LYMPHOCYTES 21 12 - 49 %    MONOCYTES 14 (H) 5 - 13 %    EOSINOPHILS 9 (H) 0 - 7 %    BASOPHILS 1 0 - 1 %    IMMATURE GRANULOCYTES 1 (H) 0.0 - 0.5 %    ABS. NEUTROPHILS 2.2 1.8 - 8.0 K/UL    ABS. LYMPHOCYTES 0.8 0.8 - 3.5 K/UL    ABS. MONOCYTES 0.6 0.0 - 1.0 K/UL    ABS. EOSINOPHILS 0.3 0.0 - 0.4 K/UL    ABS. BASOPHILS 0.0 0.0 - 0.1 K/UL    ABS. IMM. GRANS. 0.0 0.00 - 0.04 K/UL    DF AUTOMATED     METABOLIC PANEL, BASIC    Collection Time: 02/27/21  1:47 AM   Result Value Ref Range    Sodium 133 (L) 136 - 145 mmol/L    Potassium 3.6 3.5 - 5.1 mmol/L    Chloride 100 97 - 108 mmol/L    CO2 23 21 - 32 mmol/L    Anion gap 10 5 - 15 mmol/L    Glucose 83 65 - 100 mg/dL    BUN 26 (H) 6 - 20 MG/DL    Creatinine 1.31 (H) 0.55 - 1.02 MG/DL    BUN/Creatinine ratio 20 12 - 20      GFR est AA 50 (L) >60 ml/min/1.73m2    GFR est non-AA 41 (L) >60 ml/min/1.73m2    Calcium 8.2 (L) 8.5 - 10.1 MG/DL   GLUCOSE, POC    Collection Time: 02/27/21  5:58 AM   Result Value Ref Range    Glucose (POC) 101 (H) 65 - 100 mg/dL    Performed by [facility] Dash    GLUCOSE, POC    Collection Time: 02/27/21 11:56 AM   Result Value Ref Range    Glucose (POC) 108 (H) 65 - 100 mg/dL    Performed by Kody Navarro  PCT    HGB & HCT    Collection Time: 02/27/21 12:14 PM   Result Value Ref Range    HGB 8.1 (L) 11.5 - 16.0 g/dL    HCT 24.4 (L) 35.0 - 47.0 %         Assessment / Plan :     Ischemic proctosigmoiditis - flex sig 2/26 shows relative improvement  - Hgb 8.1 (improved)  - Monitor and transfuse prn if Hgb < 7  - continue current diet  - Will see on request Sunday. Dr. Laurent Bellamy to follow on Monday.            Oscar Guardado MD

## 2021-02-27 NOTE — PROGRESS NOTES
CRS  Pt without new complaints  Flowsheet, labs, notes reviewed  Abd: soft, nt, nd    Plan:  Clinically stable.  Will see again Monday- or otherwise on request Sunday

## 2021-02-27 NOTE — PROGRESS NOTES
[address] [facility] Adult  Hospitalist Group                                                                                          Hospitalist Progress Note  Carlyn Vale NP  Answering service: [phone] from in house phone        Date of Service:  2021  NAME:  Darian Nunes  :  1955  MRN:  [id number]      Admission Summary:   Natacha Mo is a 61 y.o lady [de-identified] PMH T2DM, HTN, PAD, CKD who was recently admitted here with PRABHU and GI bleed due to Ischemic Proctosigmoiditis, (last colonoscopy 21)who presented from [facility] rectal bleeding. She noted constant bright bloody stools with clots with associated chills and light-headedness. She denied abdominal pain, fever, n/v. She denied blood thinner use but is unsure if she is taking the aspirin on her med list    Interval history / Subjective: Follow-up for issues listed below.  (Rectal bleeding, CKD 3, PAD, T2DM, Ischemic Proctosigmoiditis). Seen and examined sitting up in bed. States that she is feeling much better. Had two brown bowel movements with only minimal amount of blood noted. Right buttock raised area appears to be getting bigger in size with increased tenderness. No open area noted. Encouraged patient to be out of bed to bedside chair. No new complaints. No overnight events noted. Assessment & Plan:     Rectal bleeding 2/2 to Ischemic Proctosigmoiditis (POA)   -monitor H/H, transfuse to keep Hgb >7.0   -CTA abd/pelv : No CT evidence for acute GI bleeding at this time. Stable to slightly increased mucosal enhancement and wall thickening in the rectosigmoid colon consistent with proctocolitis since the prior study. Significant diverticulosis without acute diverticulitis. - GI following- Flex sig done ()   - GI lite diet.      Acute Blood Loss Anemia: 2/2 to above, plan as above.   - Hgb 7.0 this am   - Repeat H&H at 1200, 8.1.   - Monitor labs in am   - Transfuse for hgb <7.0    Hyponatremia   - Mild, asymptomatic   - Improving   - Monitor labs.      CKD IIIb   -hold diuretics  -monitor renal function and volume state on IVFs     PAD: hold ASA 2/2 GIB. T2DM: hold metformin, ISS, accuchecks. ECHO 2/2021: LVEF 85%, normal systolic and dyastolic function.     Right buttock/cleft tender region   - Wound care following   - Encouraged patient to increase movement to relieve pressure. Code status: Full   DVT prophylaxis: SCDs    Care Plan discussed with: Patient/Family and Nurse  Anticipated Disposition:Return to [facility]   Anticipated Discharge: 24 hours to 48 hours     Hospital Problems  Date Reviewed: 2/26/2021          Codes Class Noted POA    Rectal bleed ICD-10-CM: K62.5  ICD-9-CM: 569.3  2/24/2021 Unknown        * (Principal) Rectal bleeding ICD-10-CM: K62.5  ICD-9-CM: 569.3  2/23/2021 Unknown                Review of Systems:   A comprehensive review of systems was negative except for that written in the HPI. Vital Signs:    Last 24hrs VS reviewed since prior progress note. Most recent are:  Visit Vitals  /70   Pulse 63   Temp 98.2 °F (36.8 °C)   Resp 14   Ht 5' 5.5\" (1.664 m)   Wt 97 kg (213 lb 13.5 oz)   SpO2 100%   BMI 35.04 kg/m²         Intake/Output Summary (Last 24 hours) at 2/27/2021 1244  Last data filed at 2/26/2021 2027  Gross per 24 hour   Intake 499.17 ml   Output 450 ml   Net 49.17 ml        Physical Examination:             Constitutional:  No acute distress, cooperative, pleasant, answers questions    ENT:  Oral mucosa moist, oropharynx benign. Resp:  CTA bilaterally. No wheezing/rhonchi/rales. No accessory muscle use   CV:  Regular rhythm, normal rate, no murmurs, gallops, rubs    GI:  Soft, non distended, non tender. normoactive bowel sounds    Musculoskeletal: BLE edema +2, warm, 2+ pulses throughout    Neurologic:  Moves all extremities. AAOx3, CN II-XII reviewed, follows commands     Psych:  Good insight, Not anxious nor agitated.   Skin: Right buttock/cleft region- firm tender area noted with surrounding errythema        Data Review:    Review and/or order of clinical lab test  Review and/or order of tests in the radiology section of CPT  Review and/or order of tests in the medicine section of CPT      Labs:     Recent Labs     02/27/21  1214 02/27/21  0147 02/26/21  0611 02/26/21  0611   WBC  --  3.9  --  3.7   HGB 8.1* 7.0*   < > 7.4*   HCT 24.4* 21.1*   < > 22.8*   PLT  --  133*  --  131*    < > = values in this interval not displayed. Recent Labs     02/27/21  0147 02/26/21  0611 02/25/21  0333   * 132* 132*   K 3.6 3.5 3.6    102 101   CO2 23 23 21   BUN 26* 29* 32*   CREA 1.31* 1.52* 1.30*   GLU 83 78 84   CA 8.2* 8.3* 8.4*     No results for input(s): ALT, AP, TBIL, TBILI, TP, ALB, GLOB, GGT, AML, LPSE in the last 72 hours. No lab exists for component: SGOT, GPT, AMYP, HLPSE  No results for input(s): INR, PTP, APTT, INREXT in the last 72 hours. Recent Labs     02/25/21 0333   TIBC 276   PSAT 23      Lab Results   Component Value Date/Time    Folate 54.6 (H) 02/17/2021 03:49 PM      No results for input(s): PH, PCO2, PO2 in the last 72 hours. No results for input(s): CPK, CKNDX, TROIQ in the last 72 hours.     No lab exists for component: CPKMB  Lab Results   Component Value Date/Time    Cholesterol, total 165 09/29/2016 10:02 AM    HDL Cholesterol 34 09/29/2016 10:02 AM    LDL, calculated 91.2 09/29/2016 10:02 AM    Triglyceride 199 (H) 09/29/2016 10:02 AM    CHOL/HDL Ratio 4.9 09/29/2016 10:02 AM     Lab Results   Component Value Date/Time    Glucose (POC) 108 (H) 02/27/2021 11:56 AM    Glucose (POC) 101 (H) 02/27/2021 05:58 AM    Glucose (POC) 121 (H) 02/26/2021 09:01 PM    Glucose (POC) 105 (H) 02/26/2021 04:32 PM    Glucose (POC) 110 (H) 02/26/2021 11:46 AM     Lab Results   Component Value Date/Time    Color DARK YELLOW 02/04/2021 02:16 AM    Appearance CLOUDY (A) 02/04/2021 02:16 AM    Specific gravity 1.021 02/04/2021 02:16 AM    pH (UA) 5.0 02/04/2021 02:16 AM    Protein 30 (A) 02/04/2021 02:16 AM    Glucose Negative 02/04/2021 02:16 AM    Ketone Negative 02/04/2021 02:16 AM    Bilirubin NEGATIVE  09/29/2016 10:02 AM    Urobilinogen 0.2 02/04/2021 02:16 AM    Nitrites Negative 02/04/2021 02:16 AM    Leukocyte Esterase Negative 02/04/2021 02:16 AM    Epithelial cells FEW 02/04/2021 02:16 AM    Bacteria Negative 02/04/2021 02:16 AM    WBC 0-4 02/04/2021 02:16 AM    RBC 0-5 02/04/2021 02:16 AM         Medications Reviewed:     Current Facility-Administered Medications   Medication Dose Route Frequency    sodium chloride (NS) flush 5-40 mL  5-40 mL IntraVENous Q8H    sodium chloride (NS) flush 5-40 mL  5-40 mL IntraVENous PRN    0.9% sodium chloride infusion 250 mL  250 mL IntraVENous PRN    0.9% sodium chloride infusion 250 mL  250 mL IntraVENous PRN    0.9% sodium chloride infusion 250 mL  250 mL IntraVENous PRN    sodium chloride (NS) flush 5-40 mL  5-40 mL IntraVENous Q8H    sodium chloride (NS) flush 5-40 mL  5-40 mL IntraVENous PRN    acetaminophen (TYLENOL) tablet 650 mg  650 mg Oral Q6H PRN    Or    acetaminophen (TYLENOL) suppository 650 mg  650 mg Rectal Q6H PRN    polyethylene glycol (MIRALAX) packet 17 g  17 g Oral DAILY PRN    promethazine (PHENERGAN) tablet 12.5 mg  12.5 mg Oral Q6H PRN    Or    ondansetron (ZOFRAN) injection 4 mg  4 mg IntraVENous Q6H PRN    atorvastatin (LIPITOR) tablet 40 mg  40 mg Oral DAILY    pantoprazole (PROTONIX) tablet 40 mg  40 mg Oral BID    traMADoL (ULTRAM) tablet 50 mg  50 mg Oral Q6H PRN    insulin lispro (HUMALOG) injection   SubCUTAneous AC&HS    glucose chewable tablet 16 g  4 Tab Oral PRN    dextrose (D50W) injection syrg 12.5-25 g  12.5-25 g IntraVENous PRN    glucagon (GLUCAGEN) injection 1 mg  1 mg IntraMUSCular PRN     ______________________________________________________________________  EXPECTED LENGTH OF STAY: 3d 0h  ACTUAL LENGTH OF STAY: [address], NP

## 2021-02-28 LAB
ANION GAP SERPL CALC-SCNC: 6 MMOL/L (ref 5–15)
BASOPHILS # BLD: 0 K/UL (ref 0–0.1)
BASOPHILS NFR BLD: 0 % (ref 0–1)
BUN SERPL-MCNC: 22 MG/DL (ref 6–20)
BUN/CREAT SERPL: 16 (ref 12–20)
CALCIUM SERPL-MCNC: 7.8 MG/DL (ref 8.5–10.1)
CHLORIDE SERPL-SCNC: 102 MMOL/L (ref 97–108)
CO2 SERPL-SCNC: 25 MMOL/L (ref 21–32)
CREAT SERPL-MCNC: 1.39 MG/DL (ref 0.55–1.02)
DIFFERENTIAL METHOD BLD: ABNORMAL
EOSINOPHIL # BLD: 0.4 K/UL (ref 0–0.4)
EOSINOPHIL NFR BLD: 8 % (ref 0–7)
ERYTHROCYTE [DISTWIDTH] IN BLOOD BY AUTOMATED COUNT: 15.7 % (ref 11.5–14.5)
GLUCOSE BLD STRIP.AUTO-MCNC: 101 MG/DL (ref 65–100)
GLUCOSE BLD STRIP.AUTO-MCNC: 121 MG/DL (ref 65–100)
GLUCOSE BLD STRIP.AUTO-MCNC: 141 MG/DL (ref 65–100)
GLUCOSE BLD STRIP.AUTO-MCNC: 165 MG/DL (ref 65–100)
GLUCOSE SERPL-MCNC: 83 MG/DL (ref 65–100)
HCT VFR BLD AUTO: 21.6 % (ref 35–47)
HGB BLD-MCNC: 7.1 G/DL (ref 11.5–16)
IMM GRANULOCYTES # BLD AUTO: 0 K/UL (ref 0–0.04)
IMM GRANULOCYTES NFR BLD AUTO: 1 % (ref 0–0.5)
LYMPHOCYTES # BLD: 1 K/UL (ref 0.8–3.5)
LYMPHOCYTES NFR BLD: 22 % (ref 12–49)
MCH RBC QN AUTO: 31.1 PG (ref 26–34)
MCHC RBC AUTO-ENTMCNC: 32.9 G/DL (ref 30–36.5)
MCV RBC AUTO: 94.7 FL (ref 80–99)
MONOCYTES # BLD: 0.6 K/UL (ref 0–1)
MONOCYTES NFR BLD: 13 % (ref 5–13)
NEUTS SEG # BLD: 2.6 K/UL (ref 1.8–8)
NEUTS SEG NFR BLD: 56 % (ref 32–75)
NRBC # BLD: 0 K/UL (ref 0–0.01)
NRBC BLD-RTO: 0 PER 100 WBC
PLATELET # BLD AUTO: 124 K/UL (ref 150–400)
PMV BLD AUTO: 10.4 FL (ref 8.9–12.9)
POTASSIUM SERPL-SCNC: 3.8 MMOL/L (ref 3.5–5.1)
RBC # BLD AUTO: 2.28 M/UL (ref 3.8–5.2)
SERVICE CMNT-IMP: ABNORMAL
SODIUM SERPL-SCNC: 133 MMOL/L (ref 136–145)
WBC # BLD AUTO: 4.6 K/UL (ref 3.6–11)

## 2021-02-28 PROCEDURE — 65660000000 HC RM CCU STEPDOWN

## 2021-02-28 PROCEDURE — 74011000258 HC RX REV CODE- 258: Performed by: COLON & RECTAL SURGERY

## 2021-02-28 PROCEDURE — 74011250637 HC RX REV CODE- 250/637: Performed by: HOSPITALIST

## 2021-02-28 PROCEDURE — 36415 COLL VENOUS BLD VENIPUNCTURE: CPT

## 2021-02-28 PROCEDURE — 74011250636 HC RX REV CODE- 250/636: Performed by: COLON & RECTAL SURGERY

## 2021-02-28 PROCEDURE — 74011636637 HC RX REV CODE- 636/637: Performed by: HOSPITALIST

## 2021-02-28 PROCEDURE — 85025 COMPLETE CBC W/AUTO DIFF WBC: CPT

## 2021-02-28 PROCEDURE — 82962 GLUCOSE BLOOD TEST: CPT

## 2021-02-28 PROCEDURE — 80048 BASIC METABOLIC PNL TOTAL CA: CPT

## 2021-02-28 RX ADMIN — ATORVASTATIN CALCIUM 40 MG: 40 TABLET, FILM COATED ORAL at 08:19

## 2021-02-28 RX ADMIN — Medication 10 ML: at 21:16

## 2021-02-28 RX ADMIN — Medication 10 ML: at 05:35

## 2021-02-28 RX ADMIN — PIPERACILLIN AND TAZOBACTAM 3.38 G: 3; .375 INJECTION, POWDER, FOR SOLUTION INTRAVENOUS at 12:59

## 2021-02-28 RX ADMIN — PIPERACILLIN AND TAZOBACTAM 3.38 G: 3; .375 INJECTION, POWDER, FOR SOLUTION INTRAVENOUS at 21:16

## 2021-02-28 RX ADMIN — Medication 10 ML: at 05:36

## 2021-02-28 RX ADMIN — PANTOPRAZOLE SODIUM 40 MG: 40 TABLET, DELAYED RELEASE ORAL at 16:51

## 2021-02-28 RX ADMIN — PANTOPRAZOLE SODIUM 40 MG: 40 TABLET, DELAYED RELEASE ORAL at 08:19

## 2021-02-28 RX ADMIN — INSULIN LISPRO 2 UNITS: 100 INJECTION, SOLUTION INTRAVENOUS; SUBCUTANEOUS at 12:59

## 2021-02-28 RX ADMIN — ACETAMINOPHEN 650 MG: 325 TABLET ORAL at 16:51

## 2021-02-28 NOTE — PROGRESS NOTES
Problem: Falls - Risk of  Goal: *Absence of Falls  Description: Document Jayden Alcazar Fall Risk and appropriate interventions in the flowsheet. Outcome: Progressing Towards Goal  Note: Fall Risk Interventions:  Mobility Interventions: Patient to call before getting OOB, Communicate number of staff needed for ambulation/transfer         Medication Interventions: Patient to call before getting OOB    Elimination Interventions: Call light in reach, Patient to call for help with toileting needs    History of Falls Interventions: Investigate reason for fall         Problem: Risk for Spread of Infection  Goal: Prevent transmission of infectious organism to others  Description: Prevent the transmission of infectious organisms to other patients, staff members, and visitors.   Outcome: Progressing Towards Goal

## 2021-02-28 NOTE — PROGRESS NOTES
[address] [facility] Adult  Hospitalist Group                                                                                          Hospitalist Progress Note  Shanice Webb NP  Answering service: [phone] from in house phone        Date of Service:  2021  NAME:  Hemal Rebolledo  :  1955  MRN:  [id number]      Admission Summary:   Bishop Wild is a 61 y.o lady [de-identified] PMH T2DM, HTN, PAD, CKD who was recently admitted here with PRABHU and GI bleed due to Ischemic Proctosigmoiditis, (last colonoscopy 21)who presented from [facility] rectal bleeding. She noted constant bright bloody stools with clots with associated chills and light-headedness. She denied abdominal pain, fever, n/v. She denied blood thinner use but is unsure if she is taking the aspirin on her med list     Interval history / Subjective:      Follow-up for issues listed below.  (Rectal bleeding, CKD 3, PAD, T2DM, Ischemic Proctosigmoiditis).   Seen and examined patient. States that she is feeling ok this morning. Having increased pain in right buttock. States shes feels like it is increasing in size since yesterday. No open area noted. Erythema noted surrounding area. Has had abscess in past, in same area that required I&D. Denies any rectal bleeding. Has not had bowel movement since yesterday AM.   No new complaints. No overnight events. Assessment & Plan:     Rectal bleeding 2/2 to Ischemic Proctosigmoiditis (POA)   -monitor H/H, transfuse to keep Hgb >7.0   -CTA abd/pelv : No CT evidence for acute GI bleeding at this time. Stable to slightly increased mucosal enhancement and wall thickening in the rectosigmoid colon consistent with proctocolitis since the prior study. Significant diverticulosis without acute diverticulitis. - GI following- Flex sig done ()   - GI lite diet.      Acute Blood Loss Anemia: 2/2 to above, plan as above.   - Hgb 7.1 this am   - Monitor labs in am   - Transfuse for hgb <7.0      Hyponatremia   - Mild, asymptomatic   - Improving   - Monitor labs.       CKD IIIb   -hold diuretics  -monitor renal function and volume state on IVFs     PAD: hold ASA 2/2 GIB. T2DM: hold metformin, ISS, accuchecks. ECHO 2/2021: LVEF 23%, normal systolic and dyastolic function.      Right buttock/cleft tender region   - Wound care following   - Encouraged patient to increase movement to relieve pressure  - Notify CR surgery for possible I&D      Code status: Full   DVT prophylaxis: SCDs    Care Plan discussed with: Patient/Family and Nurse  Anticipated Disposition: Return to [facility]  Anticipated Discharge: 24 hours to 48 hours     Hospital Problems  Date Reviewed: 2/26/2021          Codes Class Noted POA    Rectal bleed ICD-10-CM: K62.5  ICD-9-CM: 569.3  2/24/2021 Unknown        * (Principal) Rectal bleeding ICD-10-CM: K62.5  ICD-9-CM: 569.3  2/23/2021 Unknown                Review of Systems:   A comprehensive review of systems was negative except for that written in the HPI. Vital Signs:    Last 24hrs VS reviewed since prior progress note. Most recent are:  Visit Vitals  /64   Pulse 78   Temp 98.7 °F (37.1 °C)   Resp 16   Ht 5' 5.5\" (1.664 m)   Wt 97 kg (213 lb 13.5 oz)   SpO2 94%   BMI 35.04 kg/m²         Intake/Output Summary (Last 24 hours) at 2/28/2021 4132  Last data filed at 2/27/2021 2001  Gross per 24 hour   Intake 120 ml   Output    Net 120 ml        Physical Examination:             Constitutional:  No acute distress, cooperative, pleasant, answers questions    ENT:  Oral mucosa moist, oropharynx benign. Resp:  CTA bilaterally. No wheezing/rhonchi/rales. No accessory muscle use   CV:  Regular rhythm, normal rate, no murmurs, gallops, rubs    GI:  Soft, non distended, non tender. normoactive bowel sounds, no hepatosplenomegaly     Musculoskeletal:  BLE edema +2, warm, 2+ pulses throughout    Neurologic:  Moves all extremities.   AAOx3, CN II-XII reviewed, follows commands Psych:  Good insight, Not anxious nor agitated. Skin:  Right buttock/cleft region- firm tender area noted with surrounding errythema        Data Review:    Review and/or order of clinical lab test  Review and/or order of tests in the medicine section of [facility]      Labs:     Recent Labs     02/28/21  0252 02/27/21  1214 02/27/21  0147   WBC 4.6  --  3.9   HGB 7.1* 8.1* 7.0*   HCT 21.6* 24.4* 21.1*   *  --  133*     Recent Labs     02/28/21  0252 02/27/21  0147 02/26/21  0611   * 133* 132*   K 3.8 3.6 3.5    100 102   CO2 25 23 23   BUN 22* 26* 29*   CREA 1.39* 1.31* 1.52*   GLU 83 83 78   CA 7.8* 8.2* 8.3*     No results for input(s): ALT, AP, TBIL, TBILI, TP, ALB, GLOB, GGT, AML, LPSE in the last 72 hours. No lab exists for component: SGOT, GPT, AMYP, HLPSE  No results for input(s): INR, PTP, APTT, INREXT in the last 72 hours. No results for input(s): FE, TIBC, PSAT, FERR in the last 72 hours. Lab Results   Component Value Date/Time    Folate 54.6 (H) 02/17/2021 03:49 PM      No results for input(s): PH, PCO2, PO2 in the last 72 hours. No results for input(s): CPK, CKNDX, TROIQ in the last 72 hours.     No lab exists for component: CPKMB  Lab Results   Component Value Date/Time    Cholesterol, total 165 09/29/2016 10:02 AM    HDL Cholesterol 34 09/29/2016 10:02 AM    LDL, calculated 91.2 09/29/2016 10:02 AM    Triglyceride 199 (H) 09/29/2016 10:02 AM    CHOL/HDL Ratio 4.9 09/29/2016 10:02 AM     Lab Results   Component Value Date/Time    Glucose (POC) 101 (H) 02/28/2021 06:05 AM    Glucose (POC) 112 (H) 02/27/2021 09:06 PM    Glucose (POC) 107 (H) 02/27/2021 04:19 PM    Glucose (POC) 108 (H) 02/27/2021 11:56 AM    Glucose (POC) 101 (H) 02/27/2021 05:58 AM     Lab Results   Component Value Date/Time    Color DARK YELLOW 02/04/2021 02:16 AM    Appearance CLOUDY (A) 02/04/2021 02:16 AM    Specific gravity 1.021 02/04/2021 02:16 AM    pH (UA) 5.0 02/04/2021 02:16 AM Protein 30 (A) 02/04/2021 02:16 AM    Glucose Negative 02/04/2021 02:16 AM    Ketone Negative 02/04/2021 02:16 AM    Bilirubin NEGATIVE  09/29/2016 10:02 AM    Urobilinogen 0.2 02/04/2021 02:16 AM    Nitrites Negative 02/04/2021 02:16 AM    Leukocyte Esterase Negative 02/04/2021 02:16 AM    Epithelial cells FEW 02/04/2021 02:16 AM    Bacteria Negative 02/04/2021 02:16 AM    WBC 0-4 02/04/2021 02:16 AM    RBC 0-5 02/04/2021 02:16 AM         Medications Reviewed:     Current Facility-Administered Medications   Medication Dose Route Frequency    sodium chloride (NS) flush 5-40 mL  5-40 mL IntraVENous Q8H    sodium chloride (NS) flush 5-40 mL  5-40 mL IntraVENous PRN    0.9% sodium chloride infusion 250 mL  250 mL IntraVENous PRN    0.9% sodium chloride infusion 250 mL  250 mL IntraVENous PRN    0.9% sodium chloride infusion 250 mL  250 mL IntraVENous PRN    sodium chloride (NS) flush 5-40 mL  5-40 mL IntraVENous Q8H    sodium chloride (NS) flush 5-40 mL  5-40 mL IntraVENous PRN    acetaminophen (TYLENOL) tablet 650 mg  650 mg Oral Q6H PRN    Or    acetaminophen (TYLENOL) suppository 650 mg  650 mg Rectal Q6H PRN    polyethylene glycol (MIRALAX) packet 17 g  17 g Oral DAILY PRN    promethazine (PHENERGAN) tablet 12.5 mg  12.5 mg Oral Q6H PRN    Or    ondansetron (ZOFRAN) injection 4 mg  4 mg IntraVENous Q6H PRN    atorvastatin (LIPITOR) tablet 40 mg  40 mg Oral DAILY    pantoprazole (PROTONIX) tablet 40 mg  40 mg Oral BID    traMADoL (ULTRAM) tablet 50 mg  50 mg Oral Q6H PRN    insulin lispro (HUMALOG) injection   SubCUTAneous AC&HS    glucose chewable tablet 16 g  4 Tab Oral PRN    dextrose (D50W) injection syrg 12.5-25 g  12.5-25 g IntraVENous PRN    glucagon (GLUCAGEN) injection 1 mg  1 mg IntraMUSCular PRN     ______________________________________________________________________  EXPECTED LENGTH OF STAY: 3d 0h  ACTUAL LENGTH OF STAY:          4                 Frandy Pena NP

## 2021-02-28 NOTE — PROGRESS NOTES
CRS  Pt with c/o pain around anus  Flowsheet, labs reviewed  Perianal: folliculitis approx [id number] on right buttock                 Spontaneously draining, I further expressed mor purulence out    Plan: Folliculitis- doubt classic perianal abscess. Regardless, soak in sitz bath TID. Start zosyn given diabetes, overall deconditioned state.  Will ask PharmD to renal adjust dose (thank you)

## 2021-03-01 LAB
ANION GAP SERPL CALC-SCNC: 9 MMOL/L (ref 5–15)
BASOPHILS # BLD: 0 K/UL (ref 0–0.1)
BASOPHILS NFR BLD: 1 % (ref 0–1)
BUN SERPL-MCNC: 22 MG/DL (ref 6–20)
BUN/CREAT SERPL: 15 (ref 12–20)
CALCIUM SERPL-MCNC: 8.1 MG/DL (ref 8.5–10.1)
CHLORIDE SERPL-SCNC: 100 MMOL/L (ref 97–108)
CO2 SERPL-SCNC: 23 MMOL/L (ref 21–32)
CREAT SERPL-MCNC: 1.51 MG/DL (ref 0.55–1.02)
DIFFERENTIAL METHOD BLD: ABNORMAL
EOSINOPHIL # BLD: 0.5 K/UL (ref 0–0.4)
EOSINOPHIL NFR BLD: 8 % (ref 0–7)
ERYTHROCYTE [DISTWIDTH] IN BLOOD BY AUTOMATED COUNT: 15.7 % (ref 11.5–14.5)
GLUCOSE BLD STRIP.AUTO-MCNC: 122 MG/DL (ref 65–100)
GLUCOSE BLD STRIP.AUTO-MCNC: 123 MG/DL (ref 65–100)
GLUCOSE BLD STRIP.AUTO-MCNC: 131 MG/DL (ref 65–100)
GLUCOSE BLD STRIP.AUTO-MCNC: 99 MG/DL (ref 65–100)
GLUCOSE SERPL-MCNC: 79 MG/DL (ref 65–100)
HCT VFR BLD AUTO: 21.7 % (ref 35–47)
HGB BLD-MCNC: 7.1 G/DL (ref 11.5–16)
IMM GRANULOCYTES # BLD AUTO: 0.1 K/UL (ref 0–0.04)
IMM GRANULOCYTES NFR BLD AUTO: 1 % (ref 0–0.5)
LYMPHOCYTES # BLD: 1.3 K/UL (ref 0.8–3.5)
LYMPHOCYTES NFR BLD: 20 % (ref 12–49)
MCH RBC QN AUTO: 30.7 PG (ref 26–34)
MCHC RBC AUTO-ENTMCNC: 32.7 G/DL (ref 30–36.5)
MCV RBC AUTO: 93.9 FL (ref 80–99)
MONOCYTES # BLD: 0.8 K/UL (ref 0–1)
MONOCYTES NFR BLD: 12 % (ref 5–13)
NEUTS SEG # BLD: 3.8 K/UL (ref 1.8–8)
NEUTS SEG NFR BLD: 58 % (ref 32–75)
NRBC # BLD: 0 K/UL (ref 0–0.01)
NRBC BLD-RTO: 0 PER 100 WBC
PLATELET # BLD AUTO: 123 K/UL (ref 150–400)
PMV BLD AUTO: 10.3 FL (ref 8.9–12.9)
POTASSIUM SERPL-SCNC: 3.6 MMOL/L (ref 3.5–5.1)
RBC # BLD AUTO: 2.31 M/UL (ref 3.8–5.2)
SERVICE CMNT-IMP: ABNORMAL
SERVICE CMNT-IMP: NORMAL
SODIUM SERPL-SCNC: 132 MMOL/L (ref 136–145)
WBC # BLD AUTO: 6.5 K/UL (ref 3.6–11)

## 2021-03-01 PROCEDURE — 80048 BASIC METABOLIC PNL TOTAL CA: CPT

## 2021-03-01 PROCEDURE — 74011250637 HC RX REV CODE- 250/637: Performed by: HOSPITALIST

## 2021-03-01 PROCEDURE — 97530 THERAPEUTIC ACTIVITIES: CPT

## 2021-03-01 PROCEDURE — 82962 GLUCOSE BLOOD TEST: CPT

## 2021-03-01 PROCEDURE — 74011000258 HC RX REV CODE- 258: Performed by: COLON & RECTAL SURGERY

## 2021-03-01 PROCEDURE — 97116 GAIT TRAINING THERAPY: CPT

## 2021-03-01 PROCEDURE — 85025 COMPLETE CBC W/AUTO DIFF WBC: CPT

## 2021-03-01 PROCEDURE — 36415 COLL VENOUS BLD VENIPUNCTURE: CPT

## 2021-03-01 PROCEDURE — 65270000029 HC RM PRIVATE

## 2021-03-01 PROCEDURE — 74011250636 HC RX REV CODE- 250/636: Performed by: COLON & RECTAL SURGERY

## 2021-03-01 RX ADMIN — Medication 10 ML: at 05:36

## 2021-03-01 RX ADMIN — PANTOPRAZOLE SODIUM 40 MG: 40 TABLET, DELAYED RELEASE ORAL at 17:19

## 2021-03-01 RX ADMIN — PANTOPRAZOLE SODIUM 40 MG: 40 TABLET, DELAYED RELEASE ORAL at 09:37

## 2021-03-01 RX ADMIN — PIPERACILLIN AND TAZOBACTAM 3.38 G: 3; .375 INJECTION, POWDER, FOR SOLUTION INTRAVENOUS at 21:10

## 2021-03-01 RX ADMIN — PIPERACILLIN AND TAZOBACTAM 3.38 G: 3; .375 INJECTION, POWDER, FOR SOLUTION INTRAVENOUS at 12:35

## 2021-03-01 RX ADMIN — ACETAMINOPHEN 650 MG: 325 TABLET ORAL at 17:22

## 2021-03-01 RX ADMIN — ATORVASTATIN CALCIUM 40 MG: 40 TABLET, FILM COATED ORAL at 09:36

## 2021-03-01 RX ADMIN — PIPERACILLIN AND TAZOBACTAM 3.38 G: 3; .375 INJECTION, POWDER, FOR SOLUTION INTRAVENOUS at 05:36

## 2021-03-01 NOTE — PROGRESS NOTES
118 [facility].  217 [facility] 140 [facility], [address]  [phone]                     GI PROGRESS NOTE    Patient Name: Pricila Guzman      : 1955      MRN: [id number]  Admit Date: 2021  Today's Date: 3/1/2021    Subjective:     She developed a buttock abscess over the weekend, which was expressed yesterday by CRS Dr. Cammy Guzman. She is getting IV Zosyn for that. This morning she states that she feels like it has gotten bigger and firmer. She denies blood from the rectum, describing having small amounts of tan-colored liquid discharge. There is blood coming from the abscess area on the right buttock, though. Objective:     Blood pressure 102/60, pulse 60, temperature 99 °F (37.2 °C), resp. rate 18, height 5' 5.5\" (1.664 m), weight 97 kg (213 lb 13.5 oz), SpO2 97 %. Physical Exam:  General appearance: cooperative, no distress, appears stated age  Skin: Extremities and face reveal no rashes, jaundice, pallor  HEENT: Sclerae anicteric. Extra-occular muscles are intact. Respiratory: Comfortable breathing   GI: Abdomen non-distended. Right buttock induration with BRB expressed by hospitalist NP  Musculoskeletal: Bilateral edema of the lower extremities    Neurological: Gross memory appears intact. Patient is alert and oriented. Psychiatric: Mood appears appropriate with good judgement. No anxiety or agitation.       Data Review:    Recent Results (from the past 24 hour(s))   GLUCOSE, POC    Collection Time: 21 11:58 AM   Result Value Ref Range    Glucose (POC) 165 (H) 65 - 100 mg/dL    Performed by [employer]  PCT    GLUCOSE, POC    Collection Time: 21  4:39 PM   Result Value Ref Range    Glucose (POC) 121 (H) 65 - 100 mg/dL    Performed by [employer]  PCT    GLUCOSE, POC    Collection Time: 21  9:01 PM   Result Value Ref Range    Glucose (POC) 141 (H) 65 - 100 mg/dL    Performed by Roque Enrique    CBC WITH AUTOMATED DIFF    Collection Time: 03/01/21  2:13 AM   Result Value Ref Range    WBC 6.5 3.6 - 11.0 K/uL    RBC 2.31 (L) 3.80 - 5.20 M/uL    HGB 7.1 (L) 11.5 - 16.0 g/dL    HCT 21.7 (L) 35.0 - 47.0 %    MCV 93.9 80.0 - 99.0 FL    MCH 30.7 26.0 - 34.0 PG    MCHC 32.7 30.0 - 36.5 g/dL    RDW 15.7 (H) 11.5 - 14.5 %    PLATELET 500 (L) 312 - 400 K/uL    MPV 10.3 8.9 - 12.9 FL    NRBC 0.0 0  WBC    ABSOLUTE NRBC 0.00 0.00 - 0.01 K/uL    NEUTROPHILS 58 32 - 75 %    LYMPHOCYTES 20 12 - 49 %    MONOCYTES 12 5 - 13 %    EOSINOPHILS 8 (H) 0 - 7 %    BASOPHILS 1 0 - 1 %    IMMATURE GRANULOCYTES 1 (H) 0.0 - 0.5 %    ABS. NEUTROPHILS 3.8 1.8 - 8.0 K/UL    ABS. LYMPHOCYTES 1.3 0.8 - 3.5 K/UL    ABS. MONOCYTES 0.8 0.0 - 1.0 K/UL    ABS. EOSINOPHILS 0.5 (H) 0.0 - 0.4 K/UL    ABS. BASOPHILS 0.0 0.0 - 0.1 K/UL    ABS. IMM. GRANS. 0.1 (H) 0.00 - 0.04 K/UL    DF AUTOMATED     METABOLIC PANEL, BASIC    Collection Time: 03/01/21  2:13 AM   Result Value Ref Range    Sodium 132 (L) 136 - 145 mmol/L    Potassium 3.6 3.5 - 5.1 mmol/L    Chloride 100 97 - 108 mmol/L    CO2 23 21 - 32 mmol/L    Anion gap 9 5 - 15 mmol/L    Glucose 79 65 - 100 mg/dL    BUN 22 (H) 6 - 20 MG/DL    Creatinine 1.51 (H) 0.55 - 1.02 MG/DL    BUN/Creatinine ratio 15 12 - 20      GFR est AA 42 (L) >60 ml/min/1.73m2    GFR est non-AA 35 (L) >60 ml/min/1.73m2    Calcium 8.1 (L) 8.5 - 10.1 MG/DL   GLUCOSE, POC    Collection Time: 03/01/21  6:03 AM   Result Value Ref Range    Glucose (POC) 99 65 - 100 mg/dL    Performed by Kashif Borders / Plan :     Hematochezia, painless  Blood loss anemia  Ischemic proctosigmoiditis - colonoscopy 2/11/21  - Flex sig 2/26/21 Dr. Sheela John - Diffuse coalescing ulcerations involving all of rectum from dentate line to appx 10 cm from the anal verge. There is less pallor and areas of patchy erythema, improved from colonoscopy 2/11/21.  Brown stool in rectum precluding views  Sigmoid with brown stool, linear ischemic ulcers, did not transverse past 15 cm from the anal verge  - Hgb stable 7.1 with no further rectal bleeding  - Contrasted CT 2/24/21 - Stable to slightly increased mucosal enhancement and wall thickening in the rectosigmoid colon consistent with proctocolitis since the prior study (2/21)  - No dietary restrictions from GI  - OK for discharge from GI perspective once cleared by CRS    Abscess right buttock  - on IV Zosyn  - Defer to Colorectal management

## 2021-03-01 NOTE — PROGRESS NOTES
Complaining of right buttock pain  Visit Vitals  /64 (BP 1 Location: Left upper arm, BP Patient Position: Sitting; At rest)   Pulse 60   Temp 98 °F (36.7 °C)   Resp 16   Ht 5' 5.5\" (1.664 m)   Wt 97 kg (213 lb 13.5 oz)   SpO2 100%   BMI 35.04 kg/m²     Date 02/28/21 0700 - 03/01/21 0659 03/01/21 0700 - 03/02/21 0659   Shift [id number] 1900-0659 24 Hour Total 1111-2095 7354-5968 24 Hour Total   INTAKE   P.O.  120 120 120  120     P. O.  120 120 120  120   I. V.(mL/kg/hr)  100(0.1) 100(0) 200  200     Volume (piperacillin-tazobactam (ZOSYN) 3.375 g in 0.9% sodium chloride (MBP/ADV) 100 mL MBP)  100 100 200  200   Shift Total(mL/kg)  220(2.3) 220(2.3) 320(3.3)  320(3.3)   OUTPUT   Shift Total(mL/kg)         NET  220 220 320  320   Weight (kg) 97 97 97 97 97 97     abd soft  Perirectal exam demonstrates a phlegmon on the right buttock. A/p buttock abscess  Will get this drained tomorrow as she has had a diet already today  abx are not working so surgical drainage is the next option.

## 2021-03-01 NOTE — PROGRESS NOTES
[facility]  RUR-25% Moderate      CM followed up with Isabel in admissions at [facility]. Patient is planned for re-admission pending medical stability and insurance auth. Patient will also need a new rapid COVID test closer to discharge. CM to monitor.      Josselin Guaman, MS

## 2021-03-01 NOTE — PROGRESS NOTES
[address] [facility] Adult  Hospitalist Group                                                                                          Hospitalist Progress Note  Yung Murguia NP  Answering service: [phone] from in house phone        Date of Service:  3/1/2021  NAME:  Susanne De Paz  :  1955  MRN:  [id number]      Admission Summary:   Ana Paula Tran is a 61 y.o lady [de-identified] PMH T2DM, HTN, PAD, CKD who was recently admitted here with PRABHU and GI bleed due to Ischemic Proctosigmoiditis, (last colonoscopy 21)who presented from [facility] rectal bleeding. She noted constant bright bloody stools with clots with associated chills and light-headedness. She denied abdominal pain, fever, n/v. She denied blood thinner use but is unsure if she is taking the aspirin on her med list     Interval history / Subjective: Follow-up for issues listed below.  (Rectal bleeding, CKD 3, PAD, T2DM, Ischemic Proctosigmoiditis).   Seen and examined patient. States that she is feeling good today. Has not noted any blood in bowel movement. Abscess on right buttock examined- appears to be bigger in size than yesterday. States she is having increased pain. Discussed with RN to notify CR for further management. No new complaints noted. No overnight events noted. Assessment & Plan:     Rectal bleeding 2/2 to Ischemic Proctosigmoiditis (POA)   -monitor H/H, transfuse to keep Hgb >7.0   -CTA abd/pelv : No CT evidence for acute GI bleeding at this time. Stable to slightly increased mucosal enhancement and wall thickening in the rectosigmoid colon consistent with proctocolitis since the prior study. Significant diverticulosis without acute diverticulitis. - GI following- Flex sig done ()   - GI lite diet.      Acute Blood Loss Anemia: 2/2 to above, plan as above.   - Hgb 7.1 this am   - Monitor labs in am   - Transfuse for hgb <7.0      Hyponatremia   - Mild, asymptomatic   - Improving   - Monitor labs.      CKD IIIb   -hold diuretics  -monitor renal function      PAD: hold ASA 2/2 GIB. T2DM: hold metformin, ISS, accuchecks. ECHO 2/2021: LVEF 18%, normal systolic and dyastolic function.      Right buttock/cleft Abscess   - Wound care following   - Encouraged patient to increase movement to relieve pressure  - CR following- for I&D in am   - Continue antibiotics. Code status: Full   DVT prophylaxis: SCDs    Care Plan discussed with: Patient/Family and Nurse  Anticipated Disposition: Return to [place]   Anticipated Discharge: 24 hours to 48 hours     Hospital Problems  Date Reviewed: 2/26/2021          Codes Class Noted POA    Rectal bleed ICD-10-CM: K62.5  ICD-9-CM: 569.3  2/24/2021 Unknown        * (Principal) Rectal bleeding ICD-10-CM: K62.5  ICD-9-CM: 569.3  2/23/2021 Unknown                Review of Systems:   A comprehensive review of systems was negative except for that written in the HPI. Vital Signs:    Last 24hrs VS reviewed since prior progress note. Most recent are:  Visit Vitals  /64 (BP 1 Location: Left upper arm, BP Patient Position: Sitting; At rest)   Pulse 60   Temp 98 °F (36.7 °C)   Resp 16   Ht 5' 5.5\" (1.664 m)   Wt 97 kg (213 lb 13.5 oz)   SpO2 100%   BMI 35.04 kg/m²         Intake/Output Summary (Last 24 hours) at 3/1/2021 1806  Last data filed at 3/1/2021 1879  Gross per 24 hour   Intake 540 ml   Output    Net 540 ml        Physical Examination:             Constitutional:  No acute distress, cooperative, pleasant, answers questions    ENT:  Oral mucosa moist, oropharynx benign. Resp:  CTA bilaterally on room air. No wheezing/rhonchi/rales. No accessory muscle use   CV:  Regular rhythm, normal rate, no murmurs, gallops, rubs    GI:  Soft, non distended, non tender. normoactive bowel sounds    Musculoskeletal:  BLE edema +2, warm, 2+ pulses throughout    Neurologic:  Moves all extremities. AAOx3, CN II-XII reviewed, follows commands.       Psych:  Good insight, Not anxious nor agitated. Skin:  Right buttock abscess noted with surrounding erythema. Tenderness to touch       Data Review:    Review and/or order of clinical lab test  Review and/or order of tests in the medicine section of [facility]      Labs:     Recent Labs     03/01/21 0213 02/28/21  0252   WBC 6.5 4.6   HGB 7.1* 7.1*   HCT 21.7* 21.6*   * 124*     Recent Labs     03/01/21 0213 02/28/21 0252 02/27/21  0147   * 133* 133*   K 3.6 3.8 3.6    102 100   CO2 23 25 23   BUN 22* 22* 26*   CREA 1.51* 1.39* 1.31*   GLU 79 83 83   CA 8.1* 7.8* 8.2*     No results for input(s): ALT, AP, TBIL, TBILI, TP, ALB, GLOB, GGT, AML, LPSE in the last 72 hours. No lab exists for component: SGOT, GPT, AMYP, HLPSE  No results for input(s): INR, PTP, APTT, INREXT in the last 72 hours. No results for input(s): FE, TIBC, PSAT, FERR in the last 72 hours. Lab Results   Component Value Date/Time    Folate 54.6 (H) 02/17/2021 03:49 PM      No results for input(s): PH, PCO2, PO2 in the last 72 hours. No results for input(s): CPK, CKNDX, TROIQ in the last 72 hours.     No lab exists for component: CPKMB  Lab Results   Component Value Date/Time    Cholesterol, total 165 09/29/2016 10:02 AM    HDL Cholesterol 34 09/29/2016 10:02 AM    LDL, calculated 91.2 09/29/2016 10:02 AM    Triglyceride 199 (H) 09/29/2016 10:02 AM    CHOL/HDL Ratio 4.9 09/29/2016 10:02 AM     Lab Results   Component Value Date/Time    Glucose (POC) 122 (H) 03/01/2021 04:32 PM    Glucose (POC) 131 (H) 03/01/2021 11:18 AM    Glucose (POC) 99 03/01/2021 06:03 AM    Glucose (POC) 141 (H) 02/28/2021 09:01 PM    Glucose (POC) 121 (H) 02/28/2021 04:39 PM     Lab Results   Component Value Date/Time    Color DARK YELLOW 02/04/2021 02:16 AM    Appearance CLOUDY (A) 02/04/2021 02:16 AM    Specific gravity 1.021 02/04/2021 02:16 AM    pH (UA) 5.0 02/04/2021 02:16 AM    Protein 30 (A) 02/04/2021 02:16 AM    Glucose Negative 02/04/2021 02:16 AM    Ketone Negative 02/04/2021 02:16 AM    Bilirubin NEGATIVE  09/29/2016 10:02 AM    Urobilinogen 0.2 02/04/2021 02:16 AM    Nitrites Negative 02/04/2021 02:16 AM    Leukocyte Esterase Negative 02/04/2021 02:16 AM    Epithelial cells FEW 02/04/2021 02:16 AM    Bacteria Negative 02/04/2021 02:16 AM    WBC 0-4 02/04/2021 02:16 AM    RBC 0-5 02/04/2021 02:16 AM         Medications Reviewed:     Current Facility-Administered Medications   Medication Dose Route Frequency    piperacillin-tazobactam (ZOSYN) 3.375 g in 0.9% sodium chloride (MBP/ADV) 100 mL MBP  3.375 g IntraVENous Q8H    sodium chloride (NS) flush 5-40 mL  5-40 mL IntraVENous Q8H    sodium chloride (NS) flush 5-40 mL  5-40 mL IntraVENous PRN    0.9% sodium chloride infusion 250 mL  250 mL IntraVENous PRN    0.9% sodium chloride infusion 250 mL  250 mL IntraVENous PRN    0.9% sodium chloride infusion 250 mL  250 mL IntraVENous PRN    sodium chloride (NS) flush 5-40 mL  5-40 mL IntraVENous Q8H    sodium chloride (NS) flush 5-40 mL  5-40 mL IntraVENous PRN    acetaminophen (TYLENOL) tablet 650 mg  650 mg Oral Q6H PRN    Or    acetaminophen (TYLENOL) suppository 650 mg  650 mg Rectal Q6H PRN    polyethylene glycol (MIRALAX) packet 17 g  17 g Oral DAILY PRN    promethazine (PHENERGAN) tablet 12.5 mg  12.5 mg Oral Q6H PRN    Or    ondansetron (ZOFRAN) injection 4 mg  4 mg IntraVENous Q6H PRN    atorvastatin (LIPITOR) tablet 40 mg  40 mg Oral DAILY    pantoprazole (PROTONIX) tablet 40 mg  40 mg Oral BID    traMADoL (ULTRAM) tablet 50 mg  50 mg Oral Q6H PRN    insulin lispro (HUMALOG) injection   SubCUTAneous AC&HS    glucose chewable tablet 16 g  4 Tab Oral PRN    dextrose (D50W) injection syrg 12.5-25 g  12.5-25 g IntraVENous PRN    glucagon (GLUCAGEN) injection 1 mg  1 mg IntraMUSCular PRN     ______________________________________________________________________  EXPECTED LENGTH OF STAY: 3d 0h  ACTUAL LENGTH OF STAY:          [address], NP

## 2021-03-01 NOTE — PROGRESS NOTES
Was informed by Gabby Colón NP, that the patient's abscess/cyst seems to have grown in size. She was able to express some sanguinous fluid from it, but is recommending colorectal surgery to come assess it again. Attempted to call Dr. Giorgio Schultz via [employer] twice now, and phone went directly to busy signal. Will attempt to call again at a later time. 36- called [facility] and talked with the nurse to notify Dr. Amari Yeager about patient's abscess. Dr. Giorgio Schultz was on call for the weekend only.

## 2021-03-01 NOTE — PROGRESS NOTES
Problem: Falls - Risk of  Goal: *Absence of Falls  Description: Document Johnson Josephine Fall Risk and appropriate interventions in the flowsheet. Outcome: Progressing Towards Goal  Note: Fall Risk Interventions:  Mobility Interventions: Patient to call before getting OOB, Communicate number of staff needed for ambulation/transfer         Medication Interventions: Patient to call before getting OOB    Elimination Interventions: Call light in reach, Patient to call for help with toileting needs    History of Falls Interventions: Investigate reason for fall         Problem: Risk for Spread of Infection  Goal: Prevent transmission of infectious organism to others  Description: Prevent the transmission of infectious organisms to other patients, staff members, and visitors.   Outcome: Progressing Towards Goal

## 2021-03-01 NOTE — PROGRESS NOTES
Problem: Mobility Impaired (Adult and Pediatric)  Goal: *Acute Goals and Plan of Care (Insert Text)  Description: FUNCTIONAL STATUS PRIOR TO ADMISSION: Patient was modified independent using a B canes for functional mobility. HOME SUPPORT PRIOR TO ADMISSION: The patient lived alone with no local support. Working part time at [employer] in [place] [employer] [facility] [employer]. Physical Therapy Goals  Initiated 2/26/2021  1. Patient will move from supine to sit and sit to supine , scoot up and down, and roll side to side in bed with independence within 7 day(s). 2.  Patient will transfer from bed to chair and chair to bed with modified independence using the least restrictive device within 7 day(s). 3.  Patient will perform sit to stand with modified independence within 7 day(s). 4.  Patient will ambulate with modified independence for 300 feet with the least restrictive device within 7 day(s). 5.  Patient will ascend/descend 4 stairs with 1 handrail(s) with modified independence within 7 day(s). Outcome: Progressing Towards Goal   PHYSICAL THERAPY TREATMENT  Patient: Adonis Willard (51 y.o. female)  Date: 3/1/2021  Diagnosis: Rectal bleeding [K62.5]  Rectal bleed [K62.5] Rectal bleeding  Procedure(s) (LRB):  SIGMOIDOSCOPY FLEXIBLE (N/A) 3 Days Post-Op  Precautions: Fall  Chart, physical therapy assessment, plan of care and goals were reviewed. ASSESSMENT  Patient continues with skilled PT services and is slowly progressing towards goals. Pt presents with decreased generalized strength, endurance, balance, and impaired functional mobility below her baseline. Pt has history of BLE lymphedema and reports the edema has improved a little since yesterday. Pt received sitting in chair and agreeable to work with therapy. Pt tolerated increased ambulation distance with a walker with slow gait with decreased step clearance. Pt returned to bedside chair.   She requested to recline chair however in that position pt was very uncomfortable due to her abscess on her buttock which she developed over the weekend per chart review. Pt provided with a waffle cushion to increase her comfort when sitting. Current Level of Function Impacting Discharge (mobility/balance): transfers with CGA, ambulation with walker x 90 feet with CGA    Other factors to consider for discharge: below her baseline, fall risk         PLAN :  Patient continues to benefit from skilled intervention to address the above impairments. Continue treatment per established plan of care. to address goals. Recommendation for discharge: (in order for the patient to meet his/her long term goals)  Therapy up to 5 days/week in SNF setting    This discharge recommendation:  Has not yet been discussed the attending provider and/or case management    IF patient discharges home will need the following DME: patient owns DME required for discharge       SUBJECTIVE:   Patient stated This hurts more(sitting in reclined position).     OBJECTIVE DATA SUMMARY:   Critical Behavior:  Neurologic State: Alert           Functional Mobility Training:  Bed Mobility:      Not assessed, OOB in chair               Transfers:  Sit to Stand: Supervision;Assist x1  Stand to Sit: Supervision;Assist x1                             Balance:  Sitting: Intact  Standing: Impaired  Standing - Static: Constant support;Good  Standing - Dynamic : Constant support;Good  Ambulation/Gait Training:  Distance (ft): 90 Feet (ft)  Assistive Device: Gait belt;Walker, rolling  Ambulation - Level of Assistance: Contact guard assistance;Assist x1        Gait Abnormalities: Decreased step clearance        Base of Support: Widened     Speed/Karen: Slow  Step Length: Right shortened;Left shortened             Pain Rating:  Verbal: no report of pain with ambulation, has buttock discomfort when sitting, increased when reclined in chair to elevate BLE, pt did not rate pain    Activity Tolerance:   Fair    After treatment patient left in no apparent distress:   Sitting in chair and Call bell within reach    COMMUNICATION/COLLABORATION:   The patients plan of care was discussed with: Registered nurse.      Linda Green   Time Calculation: 25 mins

## 2021-03-02 ENCOUNTER — ANESTHESIA (OUTPATIENT)
Dept: SURGERY | Age: 66
DRG: 988 | End: 2021-03-02
Payer: COMMERCIAL

## 2021-03-02 ENCOUNTER — ANESTHESIA EVENT (OUTPATIENT)
Dept: SURGERY | Age: 66
DRG: 988 | End: 2021-03-02
Payer: COMMERCIAL

## 2021-03-02 LAB
ANION GAP SERPL CALC-SCNC: 9 MMOL/L (ref 5–15)
BASOPHILS # BLD: 0 K/UL (ref 0–0.1)
BASOPHILS NFR BLD: 0 % (ref 0–1)
BUN SERPL-MCNC: 22 MG/DL (ref 6–20)
BUN/CREAT SERPL: 14 (ref 12–20)
CALCIUM SERPL-MCNC: 8.4 MG/DL (ref 8.5–10.1)
CHLORIDE SERPL-SCNC: 101 MMOL/L (ref 97–108)
CO2 SERPL-SCNC: 22 MMOL/L (ref 21–32)
CREAT SERPL-MCNC: 1.58 MG/DL (ref 0.55–1.02)
DIFFERENTIAL METHOD BLD: ABNORMAL
EOSINOPHIL # BLD: 0.7 K/UL (ref 0–0.4)
EOSINOPHIL NFR BLD: 11 % (ref 0–7)
ERYTHROCYTE [DISTWIDTH] IN BLOOD BY AUTOMATED COUNT: 15.5 % (ref 11.5–14.5)
GLUCOSE BLD STRIP.AUTO-MCNC: 101 MG/DL (ref 65–100)
GLUCOSE BLD STRIP.AUTO-MCNC: 106 MG/DL (ref 65–100)
GLUCOSE BLD STRIP.AUTO-MCNC: 107 MG/DL (ref 65–100)
GLUCOSE BLD STRIP.AUTO-MCNC: 192 MG/DL (ref 65–100)
GLUCOSE BLD STRIP.AUTO-MCNC: 85 MG/DL (ref 65–100)
GLUCOSE SERPL-MCNC: 78 MG/DL (ref 65–100)
HCT VFR BLD AUTO: 25.7 % (ref 35–47)
HGB BLD-MCNC: 7.6 G/DL (ref 11.5–16)
IMM GRANULOCYTES # BLD AUTO: 0.1 K/UL (ref 0–0.04)
IMM GRANULOCYTES NFR BLD AUTO: 1 % (ref 0–0.5)
LYMPHOCYTES # BLD: 1.3 K/UL (ref 0.8–3.5)
LYMPHOCYTES NFR BLD: 20 % (ref 12–49)
MCH RBC QN AUTO: 31.7 PG (ref 26–34)
MCHC RBC AUTO-ENTMCNC: 29.6 G/DL (ref 30–36.5)
MCV RBC AUTO: 107.1 FL (ref 80–99)
MONOCYTES # BLD: 0.7 K/UL (ref 0–1)
MONOCYTES NFR BLD: 10 % (ref 5–13)
NEUTS SEG # BLD: 3.7 K/UL (ref 1.8–8)
NEUTS SEG NFR BLD: 58 % (ref 32–75)
NRBC # BLD: 0 K/UL (ref 0–0.01)
NRBC BLD-RTO: 0 PER 100 WBC
PLATELET # BLD AUTO: 144 K/UL (ref 150–400)
PMV BLD AUTO: 10.7 FL (ref 8.9–12.9)
POTASSIUM SERPL-SCNC: 4.2 MMOL/L (ref 3.5–5.1)
RBC # BLD AUTO: 2.4 M/UL (ref 3.8–5.2)
RBC MORPH BLD: ABNORMAL
RBC MORPH BLD: ABNORMAL
SERVICE CMNT-IMP: ABNORMAL
SERVICE CMNT-IMP: NORMAL
SODIUM SERPL-SCNC: 132 MMOL/L (ref 136–145)
WBC # BLD AUTO: 6.5 K/UL (ref 3.6–11)

## 2021-03-02 PROCEDURE — 2709999900 HC NON-CHARGEABLE SUPPLY: Performed by: COLON & RECTAL SURGERY

## 2021-03-02 PROCEDURE — 74011000250 HC RX REV CODE- 250: Performed by: COLON & RECTAL SURGERY

## 2021-03-02 PROCEDURE — 85025 COMPLETE CBC W/AUTO DIFF WBC: CPT

## 2021-03-02 PROCEDURE — 74011000250 HC RX REV CODE- 250: Performed by: NURSE ANESTHETIST, CERTIFIED REGISTERED

## 2021-03-02 PROCEDURE — 0J990ZZ DRAINAGE OF BUTTOCK SUBCUTANEOUS TISSUE AND FASCIA, OPEN APPROACH: ICD-10-PCS | Performed by: COLON & RECTAL SURGERY

## 2021-03-02 PROCEDURE — 77030042556 HC PNCL CAUT -B: Performed by: COLON & RECTAL SURGERY

## 2021-03-02 PROCEDURE — 82962 GLUCOSE BLOOD TEST: CPT

## 2021-03-02 PROCEDURE — 76060000033 HC ANESTHESIA 1 TO 1.5 HR: Performed by: COLON & RECTAL SURGERY

## 2021-03-02 PROCEDURE — 87075 CULTR BACTERIA EXCEPT BLOOD: CPT

## 2021-03-02 PROCEDURE — 65270000029 HC RM PRIVATE

## 2021-03-02 PROCEDURE — 87186 SC STD MICRODIL/AGAR DIL: CPT

## 2021-03-02 PROCEDURE — 74011250637 HC RX REV CODE- 250/637: Performed by: ANESTHESIOLOGY

## 2021-03-02 PROCEDURE — 87077 CULTURE AEROBIC IDENTIFY: CPT

## 2021-03-02 PROCEDURE — 74011250636 HC RX REV CODE- 250/636: Performed by: COLON & RECTAL SURGERY

## 2021-03-02 PROCEDURE — 74011250636 HC RX REV CODE- 250/636: Performed by: NURSE ANESTHETIST, CERTIFIED REGISTERED

## 2021-03-02 PROCEDURE — 36415 COLL VENOUS BLD VENIPUNCTURE: CPT

## 2021-03-02 PROCEDURE — 76210000006 HC OR PH I REC 0.5 TO 1 HR: Performed by: COLON & RECTAL SURGERY

## 2021-03-02 PROCEDURE — 80048 BASIC METABOLIC PNL TOTAL CA: CPT

## 2021-03-02 PROCEDURE — 87205 SMEAR GRAM STAIN: CPT

## 2021-03-02 PROCEDURE — 76010000138 HC OR TIME 0.5 TO 1 HR: Performed by: COLON & RECTAL SURGERY

## 2021-03-02 PROCEDURE — 77030008684 HC TU ET CUF COVD -B: Performed by: ANESTHESIOLOGY

## 2021-03-02 PROCEDURE — 74011000258 HC RX REV CODE- 258: Performed by: COLON & RECTAL SURGERY

## 2021-03-02 RX ORDER — FENTANYL CITRATE 50 UG/ML
INJECTION, SOLUTION INTRAMUSCULAR; INTRAVENOUS AS NEEDED
Status: DISCONTINUED | OUTPATIENT
Start: 2021-03-02 | End: 2021-03-02 | Stop reason: HOSPADM

## 2021-03-02 RX ORDER — MIDAZOLAM HYDROCHLORIDE 1 MG/ML
1 INJECTION, SOLUTION INTRAMUSCULAR; INTRAVENOUS AS NEEDED
Status: DISCONTINUED | OUTPATIENT
Start: 2021-03-02 | End: 2021-03-02 | Stop reason: HOSPADM

## 2021-03-02 RX ORDER — OXYCODONE AND ACETAMINOPHEN 5; 325 MG/1; MG/1
1 TABLET ORAL AS NEEDED
Status: DISCONTINUED | OUTPATIENT
Start: 2021-03-02 | End: 2021-03-02 | Stop reason: HOSPADM

## 2021-03-02 RX ORDER — SODIUM CHLORIDE, SODIUM LACTATE, POTASSIUM CHLORIDE, CALCIUM CHLORIDE 600; 310; 30; 20 MG/100ML; MG/100ML; MG/100ML; MG/100ML
125 INJECTION, SOLUTION INTRAVENOUS CONTINUOUS
Status: DISCONTINUED | OUTPATIENT
Start: 2021-03-02 | End: 2021-03-02 | Stop reason: HOSPADM

## 2021-03-02 RX ORDER — LIDOCAINE HYDROCHLORIDE 20 MG/ML
INJECTION, SOLUTION EPIDURAL; INFILTRATION; INTRACAUDAL; PERINEURAL AS NEEDED
Status: DISCONTINUED | OUTPATIENT
Start: 2021-03-02 | End: 2021-03-02 | Stop reason: HOSPADM

## 2021-03-02 RX ORDER — ROCURONIUM BROMIDE 10 MG/ML
INJECTION, SOLUTION INTRAVENOUS AS NEEDED
Status: DISCONTINUED | OUTPATIENT
Start: 2021-03-02 | End: 2021-03-02 | Stop reason: HOSPADM

## 2021-03-02 RX ORDER — HYDROMORPHONE HYDROCHLORIDE 1 MG/ML
0.2 INJECTION, SOLUTION INTRAMUSCULAR; INTRAVENOUS; SUBCUTANEOUS
Status: DISCONTINUED | OUTPATIENT
Start: 2021-03-02 | End: 2021-03-02 | Stop reason: HOSPADM

## 2021-03-02 RX ORDER — SODIUM CHLORIDE 9 MG/ML
25 INJECTION, SOLUTION INTRAVENOUS CONTINUOUS
Status: DISCONTINUED | OUTPATIENT
Start: 2021-03-02 | End: 2021-03-02 | Stop reason: HOSPADM

## 2021-03-02 RX ORDER — EPHEDRINE SULFATE/0.9% NACL/PF 50 MG/5 ML
SYRINGE (ML) INTRAVENOUS AS NEEDED
Status: DISCONTINUED | OUTPATIENT
Start: 2021-03-02 | End: 2021-03-02 | Stop reason: HOSPADM

## 2021-03-02 RX ORDER — SUCCINYLCHOLINE CHLORIDE 20 MG/ML
INJECTION INTRAMUSCULAR; INTRAVENOUS AS NEEDED
Status: DISCONTINUED | OUTPATIENT
Start: 2021-03-02 | End: 2021-03-02 | Stop reason: HOSPADM

## 2021-03-02 RX ORDER — DIPHENHYDRAMINE HYDROCHLORIDE 50 MG/ML
12.5 INJECTION, SOLUTION INTRAMUSCULAR; INTRAVENOUS AS NEEDED
Status: DISCONTINUED | OUTPATIENT
Start: 2021-03-02 | End: 2021-03-02 | Stop reason: HOSPADM

## 2021-03-02 RX ORDER — FENTANYL CITRATE 50 UG/ML
50 INJECTION, SOLUTION INTRAMUSCULAR; INTRAVENOUS AS NEEDED
Status: DISCONTINUED | OUTPATIENT
Start: 2021-03-02 | End: 2021-03-02 | Stop reason: HOSPADM

## 2021-03-02 RX ORDER — BUPIVACAINE HYDROCHLORIDE AND EPINEPHRINE 2.5; 5 MG/ML; UG/ML
INJECTION, SOLUTION EPIDURAL; INFILTRATION; INTRACAUDAL; PERINEURAL AS NEEDED
Status: DISCONTINUED | OUTPATIENT
Start: 2021-03-02 | End: 2021-03-02 | Stop reason: HOSPADM

## 2021-03-02 RX ORDER — SODIUM CHLORIDE, SODIUM LACTATE, POTASSIUM CHLORIDE, CALCIUM CHLORIDE 600; 310; 30; 20 MG/100ML; MG/100ML; MG/100ML; MG/100ML
INJECTION, SOLUTION INTRAVENOUS
Status: DISCONTINUED | OUTPATIENT
Start: 2021-03-02 | End: 2021-03-02 | Stop reason: HOSPADM

## 2021-03-02 RX ORDER — LIDOCAINE HYDROCHLORIDE 10 MG/ML
0.1 INJECTION, SOLUTION EPIDURAL; INFILTRATION; INTRACAUDAL; PERINEURAL AS NEEDED
Status: DISCONTINUED | OUTPATIENT
Start: 2021-03-02 | End: 2021-03-02 | Stop reason: HOSPADM

## 2021-03-02 RX ORDER — MORPHINE SULFATE 2 MG/ML
2 INJECTION, SOLUTION INTRAMUSCULAR; INTRAVENOUS
Status: DISCONTINUED | OUTPATIENT
Start: 2021-03-02 | End: 2021-03-02 | Stop reason: HOSPADM

## 2021-03-02 RX ORDER — ACETAMINOPHEN 325 MG/1
650 TABLET ORAL ONCE
Status: COMPLETED | OUTPATIENT
Start: 2021-03-02 | End: 2021-03-02

## 2021-03-02 RX ORDER — MIDAZOLAM HYDROCHLORIDE 1 MG/ML
0.5 INJECTION INTRAMUSCULAR; INTRAVENOUS
Status: DISCONTINUED | OUTPATIENT
Start: 2021-03-02 | End: 2021-03-02 | Stop reason: HOSPADM

## 2021-03-02 RX ORDER — FENTANYL CITRATE 50 UG/ML
25 INJECTION, SOLUTION INTRAMUSCULAR; INTRAVENOUS
Status: DISCONTINUED | OUTPATIENT
Start: 2021-03-02 | End: 2021-03-02 | Stop reason: HOSPADM

## 2021-03-02 RX ORDER — SODIUM CHLORIDE 0.9 % (FLUSH) 0.9 %
5-40 SYRINGE (ML) INJECTION AS NEEDED
Status: DISCONTINUED | OUTPATIENT
Start: 2021-03-02 | End: 2021-03-02 | Stop reason: HOSPADM

## 2021-03-02 RX ORDER — MIDAZOLAM HYDROCHLORIDE 1 MG/ML
INJECTION, SOLUTION INTRAMUSCULAR; INTRAVENOUS AS NEEDED
Status: DISCONTINUED | OUTPATIENT
Start: 2021-03-02 | End: 2021-03-02 | Stop reason: HOSPADM

## 2021-03-02 RX ORDER — DEXAMETHASONE SODIUM PHOSPHATE 4 MG/ML
INJECTION, SOLUTION INTRA-ARTICULAR; INTRALESIONAL; INTRAMUSCULAR; INTRAVENOUS; SOFT TISSUE AS NEEDED
Status: DISCONTINUED | OUTPATIENT
Start: 2021-03-02 | End: 2021-03-02 | Stop reason: HOSPADM

## 2021-03-02 RX ORDER — SODIUM CHLORIDE 0.9 % (FLUSH) 0.9 %
5-40 SYRINGE (ML) INJECTION EVERY 8 HOURS
Status: DISCONTINUED | OUTPATIENT
Start: 2021-03-02 | End: 2021-03-02 | Stop reason: HOSPADM

## 2021-03-02 RX ORDER — ONDANSETRON 2 MG/ML
4 INJECTION INTRAMUSCULAR; INTRAVENOUS AS NEEDED
Status: DISCONTINUED | OUTPATIENT
Start: 2021-03-02 | End: 2021-03-02 | Stop reason: HOSPADM

## 2021-03-02 RX ORDER — PROPOFOL 10 MG/ML
INJECTION, EMULSION INTRAVENOUS AS NEEDED
Status: DISCONTINUED | OUTPATIENT
Start: 2021-03-02 | End: 2021-03-02 | Stop reason: HOSPADM

## 2021-03-02 RX ORDER — ONDANSETRON 2 MG/ML
INJECTION INTRAMUSCULAR; INTRAVENOUS AS NEEDED
Status: DISCONTINUED | OUTPATIENT
Start: 2021-03-02 | End: 2021-03-02 | Stop reason: HOSPADM

## 2021-03-02 RX ADMIN — SUCCINYLCHOLINE CHLORIDE 120 MG: 20 INJECTION, SOLUTION INTRAMUSCULAR; INTRAVENOUS at 16:50

## 2021-03-02 RX ADMIN — PIPERACILLIN AND TAZOBACTAM 3.38 G: 3; .375 INJECTION, POWDER, FOR SOLUTION INTRAVENOUS at 21:08

## 2021-03-02 RX ADMIN — Medication 10 ML: at 22:00

## 2021-03-02 RX ADMIN — MIDAZOLAM 2 MG: 1 INJECTION INTRAMUSCULAR; INTRAVENOUS at 16:38

## 2021-03-02 RX ADMIN — PROPOFOL 150 MG: 10 INJECTION, EMULSION INTRAVENOUS at 16:50

## 2021-03-02 RX ADMIN — ROCURONIUM BROMIDE 5 MG: 10 SOLUTION INTRAVENOUS at 16:50

## 2021-03-02 RX ADMIN — Medication 10 ML: at 13:32

## 2021-03-02 RX ADMIN — PIPERACILLIN AND TAZOBACTAM 3.38 G: 3; .375 INJECTION, POWDER, FOR SOLUTION INTRAVENOUS at 04:46

## 2021-03-02 RX ADMIN — Medication 10 MG: at 17:17

## 2021-03-02 RX ADMIN — PIPERACILLIN AND TAZOBACTAM 3.38 G: 3; .375 INJECTION, POWDER, FOR SOLUTION INTRAVENOUS at 12:47

## 2021-03-02 RX ADMIN — LIDOCAINE HYDROCHLORIDE 60 MG: 20 INJECTION, SOLUTION EPIDURAL; INFILTRATION; INTRACAUDAL; PERINEURAL at 16:50

## 2021-03-02 RX ADMIN — SODIUM CHLORIDE, POTASSIUM CHLORIDE, SODIUM LACTATE AND CALCIUM CHLORIDE: 600; 310; 30; 20 INJECTION, SOLUTION INTRAVENOUS at 16:32

## 2021-03-02 RX ADMIN — ONDANSETRON HYDROCHLORIDE 4 MG: 2 INJECTION, SOLUTION INTRAMUSCULAR; INTRAVENOUS at 17:01

## 2021-03-02 RX ADMIN — FENTANYL CITRATE 100 MCG: 50 INJECTION, SOLUTION INTRAMUSCULAR; INTRAVENOUS at 16:50

## 2021-03-02 RX ADMIN — ACETAMINOPHEN 650 MG: 325 TABLET ORAL at 15:53

## 2021-03-02 RX ADMIN — DEXAMETHASONE SODIUM PHOSPHATE 4 MG: 4 INJECTION, SOLUTION INTRAMUSCULAR; INTRAVENOUS at 17:01

## 2021-03-02 NOTE — BRIEF OP NOTE
Brief Postoperative Note    Patient: Maria Guadalupe Braun  YOB: 1955  MRN: [id number]    Date of Procedure: 3/2/2021     Pre-Op Diagnosis: PERIRECTAL ABSCSS    Post-Op Diagnosis: Same as preoperative diagnosis. Procedure(s):  I&D PERIRECTAL ABSCESS  ESSENTIAL  REQ 1600    Surgeon(s):  Jana Garcia MD    Surgical Assistant: Surg Asst-1: Milton Osborne    Anesthesia: General     Estimated Blood Loss (mL): Minimal    Complications: None    Specimens:   ID Type Source Tests Collected by Time Destination   1 : Right Buttock Abscess   AFB CULTURE, CULTURE, WOUND W GRAM STAIN Jana Garcia MD 3/2/2021 0153 Microbiology        Implants: * No implants in log *    Drains: * No LDAs found *    Findings: right buttock abscess approximately 2cm in size. Cultures sent.       Electronically Signed by Vania Fontanez MD on 3/2/2021 at 5:15 PM

## 2021-03-02 NOTE — PERIOP NOTES
TRANSFER - OUT REPORT:    Verbal report given to Kristina Denney RN(name) on Maged Wilhelm  being transferred to 5 W(unit) for routine post - op       Report consisted of patients Situation, Background, Assessment and   Recommendations(SBAR). Time Pre op antibiotic given:N  Anesthesia Stop time: N  Romero Present on Transfer to floor:N  Order for Romero on Chart:N  Discharge Prescriptions with Chart:N    Information from the following report(s) SBAR was reviewed with the receiving nurse. Opportunity for questions and clarification was provided. Is the patient on 02? NO       L/Min       Other     Is the patient on a monitor? NO    Is the nurse transporting with the patient? NO    Surgical Waiting Area notified of patient's transfer from PACU? NO      The following personal items collected during your admission accompanied patient upon transfer:   Dental Appliance: Dental Appliances: None  Vision:    Hearing Aid:    Jewelry: Jewelry: None  Clothing: Clothing: None(no belongings to preop)  Other Valuables:  Other Valuables: None  Valuables sent to safe:

## 2021-03-02 NOTE — ANESTHESIA POSTPROCEDURE EVALUATION
Procedure(s):  INCISION AND DRAINAGE PERIRECTAL ABSCESS. general    Anesthesia Post Evaluation        Patient participation: complete - patient participated  Level of consciousness: awake  Pain management: adequate  Airway patency: patent  Anesthetic complications: no  Cardiovascular status: hemodynamically stable  Respiratory status: acceptable  Hydration status: acceptable  Comments: The patient is ready for PACU discharge. Norma Montoya DO                   Post anesthesia nausea and vomiting:  controlled      INITIAL Post-op Vital signs:   Vitals Value Taken Time   /59 03/02/21 1755   Temp 36.6 °C (97.9 °F) 03/02/21 1738   Pulse 72 03/02/21 1758   Resp 15 03/02/21 1758   SpO2 98 % 03/02/21 1758   Vitals shown include unvalidated device data.

## 2021-03-02 NOTE — PROGRESS NOTES
Physical Therapy    Chart reviewed in prep for PT mobility session. Patient presents up in chair, awaiting I & D surgical procedure today. Patient declined PT this morning.   Jayla Hennessy

## 2021-03-02 NOTE — ANESTHESIA PREPROCEDURE EVALUATION
Relevant Problems   CARDIOVASCULAR   (+) Hypertension complicating diabetes (HCC)   (+) PAD (peripheral artery disease) (HCC)      RENAL FAILURE   (+) CKD (chronic kidney disease), stage III      ENDOCRINE   (+) Obesity   (+) Type 2 diabetes mellitus with neurologic complication (HCC)   (+) Type 2 diabetes with nephropathy (HCC)      HEMATOLOGY   (+) Osteomyelitis of foot, left, acute (HCC)       Anesthetic History   No history of anesthetic complications            Review of Systems / Medical History  Patient summary reviewed, nursing notes reviewed and pertinent labs reviewed    Pulmonary  Within defined limits                 Neuro/Psych   Within defined limits           Cardiovascular  Within defined limits  Hypertension: well controlled              Exercise tolerance: >4 METS     GI/Hepatic/Renal  Within defined limits       Renal disease: CRI       Endo/Other  Within defined limits  Diabetes: type 2    Obesity and anemia     Other Findings              Physical Exam    Airway  Mallampati: II  TM Distance: > 6 cm  Neck ROM: normal range of motion   Mouth opening: Normal     Cardiovascular  Regular rate and rhythm,  S1 and S2 normal,  no murmur, click, rub, or gallop             Dental  No notable dental hx       Pulmonary  Breath sounds clear to auscultation               Abdominal  GI exam deferred       Other Findings            Anesthetic Plan    ASA: 3  Anesthesia type: general          Induction: Intravenous  Anesthetic plan and risks discussed with: Patient

## 2021-03-02 NOTE — PROGRESS NOTES
Chart reviewed. Spoke with PT. Pt is declining therapy today until she has her I & D surgical procedure. Will f/u tomrorow. Thanks!

## 2021-03-02 NOTE — ROUTINE PROCESS
TRANSFER - IN REPORT: 
 
Verbal report received from Deja Ambriz RN(name) on Neftali Fiddler  being received from [facility](unit) for ordered procedure Report consisted of patients Situation, Background, Assessment and  
Recommendations(SBAR). Information from the following report(s) SBAR, Kardex, ED Summary, Procedure Summary, Intake/Output, MAR and Recent Results was reviewed with the receiving nurse. Opportunity for questions and clarification was provided. Assessment completed upon patients arrival to unit and care assumed.

## 2021-03-02 NOTE — PROGRESS NOTES
[facility] Adult  Hospitalist Group                                                                                          Hospitalist Progress Note  Yung Murguia NP  Answering service: [phone] OR 36 from in house phone        Date of Service:  3/2/2021  NAME:  Susanne De Paz  :  1955  MRN:  [id number]      Admission Summary:   Ana Paula Tran is a 61 y.o lady [de-identified] PMH T2DM, HTN, PAD, CKD who was recently admitted here with PRABHU and GI bleed due to Ischemic Proctosigmoiditis, (last colonoscopy 21)who presented from [facility] rectal bleeding. She noted constant bright bloody stools with clots with associated chills and light-headedness. She denied abdominal pain, fever, n/v. She denied blood thinner use but is unsure if she is taking the aspirin on her med list   Interval history / Subjective: Follow-up for issues listed below.  (Rectal bleeding, CKD 3, PAD, T2DM, Ischemic Proctosigmoiditis). Seen and examined patient. States that she is feeling good today. Has been NPO for I&D today of the right buttock abscess. No new complaints noted. No overnight events noted. Assessment & Plan:     Rectal bleeding 2/2 to Ischemic Proctosigmoiditis (POA)   -monitor H/H, transfuse to keep Hgb >7.0   -CTA abd/pelv : No CT evidence for acute GI bleeding at this time. Stable to slightly increased mucosal enhancement and wall thickening in the rectosigmoid colon consistent with proctocolitis since the prior study. Significant diverticulosis without acute diverticulitis. - GI following- Flex sig done ()   - GI lite diet.      Acute Blood Loss Anemia: 2/2 to above, plan as above. - Hgb 7.6 this am   - Monitor labs in am   - Transfuse for hgb <7.0      Hyponatremia   - Mild, asymptomatic   - Improving   - Monitor labs.       CKD IIIb   -hold diuretics  - Creatine up to 1.58 today   - Avoid nephro toxins and renal dose medications  -monitor renal function      PAD: hold ASA 2/2 GIB. T2DM: hold metformin, ISS, accuchecks. ECHO 2/2021: LVEF 00%, normal systolic and dyastolic function.      Right buttock/cleft Abscess   - Wound care following   - Encouraged patient to increase movement to relieve pressure  - CR following- for I&D today   - Continue antibiotics.      Code status: Full   DVT prophylaxis: SCDs    Care Plan discussed with: Patient/Family, Nurse and   Anticipated Disposition: Return to [place]   Anticipated Discharge: 24 hours to 48 hours     Hospital Problems  Date Reviewed: 2/26/2021          Codes Class Noted POA    Rectal bleed ICD-10-CM: K62.5  ICD-9-CM: 569.3  2/24/2021 Unknown        * (Principal) Rectal bleeding ICD-10-CM: K62.5  ICD-9-CM: 569.3  2/23/2021 Unknown                Review of Systems:   A comprehensive review of systems was negative except for that written in the HPI. Vital Signs:    Last 24hrs VS reviewed since prior progress note. Most recent are:  Visit Vitals  /66   Pulse 60   Temp 98.6 °F (37 °C)   Resp 18   Ht 5' 5.5\" (1.664 m)   Wt 97 kg (213 lb 13.5 oz)   SpO2 98%   BMI 35.04 kg/m²       No intake or output data in the 24 hours ending 03/02/21 1250     Physical Examination:             Constitutional:  No acute distress, cooperative, pleasant    ENT:  Oral mucosa moist, oropharynx benign. Resp:  CTA bilaterally on room air No wheezing/rhonchi/rales. No accessory muscle use   CV:  Regular rhythm, normal rate, no murmurs, gallops, rubs    GI:  Soft, non distended, non tender. normoactive bowel sounds    Musculoskeletal:  BLE edema +2, warm, 2+ pulses throughout    Neurologic:  Moves all extremities. AAOx3, CN II-XII reviewed, follows commands. Psych:  Good insight, Not anxious nor agitated. Skin:  Right buttock abscess noted with surrounding erythema, tender to touch.         Data Review:    Review and/or order of clinical lab test  Review and/or order of tests in the medicine section of CPT      Labs:     Recent Labs     03/02/21 0211 03/01/21 0213   WBC 6.5 6.5   HGB 7.6* 7.1*   HCT 25.7* 21.7*   * 123*     Recent Labs     03/02/21 0211 03/01/21 0213 02/28/21 0252   * 132* 133*   K 4.2 3.6 3.8    100 102   CO2 22 23 25   BUN 22* 22* 22*   CREA 1.58* 1.51* 1.39*   GLU 78 79 83   CA 8.4* 8.1* 7.8*     No results for input(s): ALT, AP, TBIL, TBILI, TP, ALB, GLOB, GGT, AML, LPSE in the last 72 hours. No lab exists for component: SGOT, GPT, AMYP, HLPSE  No results for input(s): INR, PTP, APTT, INREXT in the last 72 hours. No results for input(s): FE, TIBC, PSAT, FERR in the last 72 hours. Lab Results   Component Value Date/Time    Folate 54.6 (H) 02/17/2021 03:49 PM      No results for input(s): PH, PCO2, PO2 in the last 72 hours. No results for input(s): CPK, CKNDX, TROIQ in the last 72 hours.     No lab exists for component: CPKMB  Lab Results   Component Value Date/Time    Cholesterol, total 165 09/29/2016 10:02 AM    HDL Cholesterol 34 09/29/2016 10:02 AM    LDL, calculated 91.2 09/29/2016 10:02 AM    Triglyceride 199 (H) 09/29/2016 10:02 AM    CHOL/HDL Ratio 4.9 09/29/2016 10:02 AM     Lab Results   Component Value Date/Time    Glucose (POC) 107 (H) 03/02/2021 11:39 AM    Glucose (POC) 106 (H) 03/02/2021 06:29 AM    Glucose (POC) 123 (H) 03/01/2021 09:18 PM    Glucose (POC) 122 (H) 03/01/2021 04:32 PM    Glucose (POC) 131 (H) 03/01/2021 11:18 AM     Lab Results   Component Value Date/Time    Color DARK YELLOW 02/04/2021 02:16 AM    Appearance CLOUDY (A) 02/04/2021 02:16 AM    Specific gravity 1.021 02/04/2021 02:16 AM    pH (UA) 5.0 02/04/2021 02:16 AM    Protein 30 (A) 02/04/2021 02:16 AM    Glucose Negative 02/04/2021 02:16 AM    Ketone Negative 02/04/2021 02:16 AM    Bilirubin NEGATIVE  09/29/2016 10:02 AM    Urobilinogen 0.2 02/04/2021 02:16 AM    Nitrites Negative 02/04/2021 02:16 AM    Leukocyte Esterase Negative 02/04/2021 02:16 AM    Epithelial cells FEW 02/04/2021 02:16 AM Bacteria Negative 02/04/2021 02:16 AM    WBC 0-4 02/04/2021 02:16 AM    RBC 0-5 02/04/2021 02:16 AM         Medications Reviewed:     Current Facility-Administered Medications   Medication Dose Route Frequency    piperacillin-tazobactam (ZOSYN) 3.375 g in 0.9% sodium chloride (MBP/ADV) 100 mL MBP  3.375 g IntraVENous Q8H    sodium chloride (NS) flush 5-40 mL  5-40 mL IntraVENous Q8H    sodium chloride (NS) flush 5-40 mL  5-40 mL IntraVENous PRN    0.9% sodium chloride infusion 250 mL  250 mL IntraVENous PRN    0.9% sodium chloride infusion 250 mL  250 mL IntraVENous PRN    0.9% sodium chloride infusion 250 mL  250 mL IntraVENous PRN    sodium chloride (NS) flush 5-40 mL  5-40 mL IntraVENous Q8H    sodium chloride (NS) flush 5-40 mL  5-40 mL IntraVENous PRN    acetaminophen (TYLENOL) tablet 650 mg  650 mg Oral Q6H PRN    Or    acetaminophen (TYLENOL) suppository 650 mg  650 mg Rectal Q6H PRN    polyethylene glycol (MIRALAX) packet 17 g  17 g Oral DAILY PRN    promethazine (PHENERGAN) tablet 12.5 mg  12.5 mg Oral Q6H PRN    Or    ondansetron (ZOFRAN) injection 4 mg  4 mg IntraVENous Q6H PRN    atorvastatin (LIPITOR) tablet 40 mg  40 mg Oral DAILY    pantoprazole (PROTONIX) tablet 40 mg  40 mg Oral BID    traMADoL (ULTRAM) tablet 50 mg  50 mg Oral Q6H PRN    insulin lispro (HUMALOG) injection   SubCUTAneous AC&HS    glucose chewable tablet 16 g  4 Tab Oral PRN    dextrose (D50W) injection syrg 12.5-25 g  12.5-25 g IntraVENous PRN    glucagon (GLUCAGEN) injection 1 mg  1 mg IntraMUSCular PRN     ______________________________________________________________________  EXPECTED LENGTH OF STAY: 3d 0h  ACTUAL LENGTH OF STAY:          6                 Alison Graham NP

## 2021-03-02 NOTE — ROUTINE PROCESS
Patient: Pricila Guzman MRN: [id number]  SSN: [id number] YOB: 1955  Age: 60 y.o. Sex: female Patient is status post Procedure(s): 
INCISION AND DRAINAGE PERIRECTAL ABSCESS. Surgeon(s) and Role: John Thomas MD - Primary Local/Dose/Irrigation:  SEE MAR Peripheral IV 02/24/21 Distal;Left;Posterior Forearm (Active) Site Assessment Clean, dry, & intact 02/28/21 2012 Phlebitis Assessment 0 02/28/21 2012 Infiltration Assessment 0 02/28/21 2012 Dressing Status Clean, dry, & intact 02/28/21 2012 Dressing Type Transparent 02/28/21 2012 Hub Color/Line Status Infusing 02/28/21 2012 Action Taken Open ports on tubing capped 02/28/21 0758 Alcohol Cap Used Yes 02/28/21 0758 Peripheral IV 03/01/21 Anterior;Right Forearm (Active) Airway - Endotracheal Tube 03/02/21 Oral (Active) Dressing/Packing:  Incision 03/02/21 Buttocks Right-Dressing/Treatment: Gauze dressing/dressing sponge;Tape/Soft cloth adhesive tape (03/02/21 5166) Splint/Cast:  ] Other:

## 2021-03-03 LAB
ANION GAP SERPL CALC-SCNC: 7 MMOL/L (ref 5–15)
BASOPHILS # BLD: 0 K/UL (ref 0–0.1)
BASOPHILS NFR BLD: 0 % (ref 0–1)
BUN SERPL-MCNC: 19 MG/DL (ref 6–20)
BUN/CREAT SERPL: 12 (ref 12–20)
CALCIUM SERPL-MCNC: 8.4 MG/DL (ref 8.5–10.1)
CHLORIDE SERPL-SCNC: 104 MMOL/L (ref 97–108)
CO2 SERPL-SCNC: 22 MMOL/L (ref 21–32)
COVID-19 RAPID TEST, COVR: NOT DETECTED
CREAT SERPL-MCNC: 1.56 MG/DL (ref 0.55–1.02)
DIFFERENTIAL METHOD BLD: ABNORMAL
EOSINOPHIL # BLD: 0 K/UL (ref 0–0.4)
EOSINOPHIL NFR BLD: 0 % (ref 0–7)
ERYTHROCYTE [DISTWIDTH] IN BLOOD BY AUTOMATED COUNT: 15.5 % (ref 11.5–14.5)
GLUCOSE BLD STRIP.AUTO-MCNC: 118 MG/DL (ref 65–100)
GLUCOSE BLD STRIP.AUTO-MCNC: 132 MG/DL (ref 65–100)
GLUCOSE BLD STRIP.AUTO-MCNC: 174 MG/DL (ref 65–100)
GLUCOSE BLD STRIP.AUTO-MCNC: 214 MG/DL (ref 65–100)
GLUCOSE SERPL-MCNC: 223 MG/DL (ref 65–100)
HCT VFR BLD AUTO: 23.4 % (ref 35–47)
HGB BLD-MCNC: 7.6 G/DL (ref 11.5–16)
IMM GRANULOCYTES # BLD AUTO: 0.1 K/UL (ref 0–0.04)
IMM GRANULOCYTES NFR BLD AUTO: 1 % (ref 0–0.5)
LYMPHOCYTES # BLD: 0.4 K/UL (ref 0.8–3.5)
LYMPHOCYTES NFR BLD: 7 % (ref 12–49)
MCH RBC QN AUTO: 31 PG (ref 26–34)
MCHC RBC AUTO-ENTMCNC: 32.5 G/DL (ref 30–36.5)
MCV RBC AUTO: 95.5 FL (ref 80–99)
MONOCYTES # BLD: 0.1 K/UL (ref 0–1)
MONOCYTES NFR BLD: 2 % (ref 5–13)
NEUTS SEG # BLD: 5.8 K/UL (ref 1.8–8)
NEUTS SEG NFR BLD: 90 % (ref 32–75)
NRBC # BLD: 0 K/UL (ref 0–0.01)
NRBC BLD-RTO: 0 PER 100 WBC
PLATELET # BLD AUTO: 136 K/UL (ref 150–400)
PMV BLD AUTO: 10.5 FL (ref 8.9–12.9)
POTASSIUM SERPL-SCNC: 4.6 MMOL/L (ref 3.5–5.1)
RBC # BLD AUTO: 2.45 M/UL (ref 3.8–5.2)
RBC MORPH BLD: ABNORMAL
RBC MORPH BLD: ABNORMAL
SERVICE CMNT-IMP: ABNORMAL
SODIUM SERPL-SCNC: 133 MMOL/L (ref 136–145)
SOURCE, COVRS: NORMAL
WBC # BLD AUTO: 6.4 K/UL (ref 3.6–11)

## 2021-03-03 PROCEDURE — 74011636637 HC RX REV CODE- 636/637: Performed by: HOSPITALIST

## 2021-03-03 PROCEDURE — 74011250637 HC RX REV CODE- 250/637: Performed by: NURSE PRACTITIONER

## 2021-03-03 PROCEDURE — 77030041076 HC DRSG AG OPTICELL MDII -A

## 2021-03-03 PROCEDURE — 85025 COMPLETE CBC W/AUTO DIFF WBC: CPT

## 2021-03-03 PROCEDURE — 80048 BASIC METABOLIC PNL TOTAL CA: CPT

## 2021-03-03 PROCEDURE — 36415 COLL VENOUS BLD VENIPUNCTURE: CPT

## 2021-03-03 PROCEDURE — 74011000258 HC RX REV CODE- 258: Performed by: COLON & RECTAL SURGERY

## 2021-03-03 PROCEDURE — 74011250636 HC RX REV CODE- 250/636: Performed by: COLON & RECTAL SURGERY

## 2021-03-03 PROCEDURE — 87635 SARS-COV-2 COVID-19 AMP PRB: CPT

## 2021-03-03 PROCEDURE — 74011250637 HC RX REV CODE- 250/637: Performed by: HOSPITALIST

## 2021-03-03 PROCEDURE — 82962 GLUCOSE BLOOD TEST: CPT

## 2021-03-03 PROCEDURE — 65270000029 HC RM PRIVATE

## 2021-03-03 RX ORDER — DOCUSATE SODIUM 100 MG/1
100 CAPSULE, LIQUID FILLED ORAL DAILY
Status: DISCONTINUED | OUTPATIENT
Start: 2021-03-03 | End: 2021-03-05 | Stop reason: HOSPADM

## 2021-03-03 RX ADMIN — INSULIN LISPRO 2 UNITS: 100 INJECTION, SOLUTION INTRAVENOUS; SUBCUTANEOUS at 12:32

## 2021-03-03 RX ADMIN — PIPERACILLIN AND TAZOBACTAM 3.38 G: 3; .375 INJECTION, POWDER, FOR SOLUTION INTRAVENOUS at 04:52

## 2021-03-03 RX ADMIN — Medication 10 ML: at 14:04

## 2021-03-03 RX ADMIN — Medication 10 ML: at 22:06

## 2021-03-03 RX ADMIN — INSULIN LISPRO 3 UNITS: 100 INJECTION, SOLUTION INTRAVENOUS; SUBCUTANEOUS at 07:07

## 2021-03-03 RX ADMIN — ATORVASTATIN CALCIUM 40 MG: 40 TABLET, FILM COATED ORAL at 09:08

## 2021-03-03 RX ADMIN — PIPERACILLIN AND TAZOBACTAM 3.38 G: 3; .375 INJECTION, POWDER, FOR SOLUTION INTRAVENOUS at 12:32

## 2021-03-03 RX ADMIN — PANTOPRAZOLE SODIUM 40 MG: 40 TABLET, DELAYED RELEASE ORAL at 17:38

## 2021-03-03 RX ADMIN — DOCUSATE SODIUM 100 MG: 100 CAPSULE, LIQUID FILLED ORAL at 11:23

## 2021-03-03 RX ADMIN — PIPERACILLIN AND TAZOBACTAM 3.38 G: 3; .375 INJECTION, POWDER, FOR SOLUTION INTRAVENOUS at 22:06

## 2021-03-03 RX ADMIN — PANTOPRAZOLE SODIUM 40 MG: 40 TABLET, DELAYED RELEASE ORAL at 09:09

## 2021-03-03 NOTE — OP NOTES
[address]  OPERATIVE REPORT    Name:  Nicolas Spencer  MR#:  [id number]  :  1955  ACCOUNT #:  [de-identified]  DATE OF SERVICE:  2021      PREOPERATIVE DIAGNOSIS:  Right buttock abscess. POSTOPERATIVE DIAGNOSIS:  Right buttock abscess. PROCEDURE PERFORMED:  Incision and drainage of right buttock abscess. SURGEON:  Romario Santo MD    ASSISTANT:  None. ANESTHESIA:  General plus 30 mL of 0.25% Marcaine with epinephrine. COMPLICATIONS:  None apparent. SPECIMENS REMOVED:  Culture of right buttock abscess, sent to pathology. IMPLANTS:  None. ESTIMATED BLOOD LOSS:  2 mL. DISPOSITION:  The patient tolerated the procedure well and was transferred to the recovery room in stable condition. FINDINGS:  2-cm abscess right buttock. INDICATIONS:  This is a 81-year-old female who has been admitted for some undetermined vasculitis creating ulcers through the esophagus, small bowel, and ischemia to the rectum and sigmoid colon. She has been in the hospital with this diagnosis and rectal bleeding. While she seems to be recovering from this, she developed a right buttock abscess. This had been tender and painful and not responding to antibiotics and therefore she was consented for an incision and drainage of this abscess. Risks and benefits were discussed with her. PROCEDURE:  She was consented, taken to the operating room. She was intubated and flipped in the prone jackknife position. The buttocks were taped apart and the perineum was prepped and draped in usual fashion. Time-out was performed. 0.25% Marcaine with epinephrine was infiltrated in the skin to create a local anesthetic block around the abscess. A cruciate incision was made. Pus was evacuated. This was cultured. I cut the corners off of the edge of the cruciate incision to allow for better drainage. The wound was explored. Anoscope exam was performed which was negative.   This do not appear to fistulize to the anal canal.  Sterile dressings were applied and sterile gauze. She will need the Sitz bath postoperatively. She was extubated and taken to recovery room in stable condition.         Ramya Hernandez MD      [id number]  D:  03/02/2021 17:29  T:  03/03/2021 0:34  JOB #:  [id number]

## 2021-03-03 NOTE — PROGRESS NOTES
Doing well after I and d of buttock abscess  Visit Vitals  BP (!) (P) 155/73 (BP 1 Location: Left upper arm)   Pulse (P) 66   Temp (P) 97.6 °F (36.4 °C)   Resp (P) 16   Ht 5' 5.5\" (1.664 m)   Wt 97 kg (213 lb 13.5 oz)   SpO2 (P) 96%   BMI 35.04 kg/m²     Date 03/02/21 0700 - 03/03/21 0659 03/03/21 0700 - 03/04/21 0659   Shift 2000-2457 9769-1414 24 Hour Total 9673-5761 5644-0398 24 Hour Total   INTAKE   I.V.(mL/kg/hr) 500(0.4)  500(0.2)        Volume (lactated Ringers infusion) 500  500      Shift Total(mL/kg) 500(5.2)  500(5.2)      OUTPUT   Shift Total(mL/kg)           500      Weight (kg) 97 97 97 97 97 97     A/p continue local wound care  Sparkle baths  Ok to be discharged from my standpoint. She probably doesn't need any further abx since she has been completely drained. Cultures are pending in case she recurs. She can follow up with me in the office regarding the buttock wound. I think she would benefit from going back to [facility] to be evaluated for the proctosigmoiditis.

## 2021-03-03 NOTE — PROGRESS NOTES
[facility], pending insurance auth and negative COVID test result  RUR-23% Moderate    YADIRA contacted [employer] [phone] with [employer] to inform patient is medically ready for DC. Isabel confirmed Rhenda Kelsi would be initiated. Isabel also requested updated rapid COVID test.     Opal Mcmahon MS    3:15 YADIRA placed call to Isabel regarding auth and was informed it is still pending. Will plan for admission 3/4/21. YADIRA to monitor.      Opal Mcmahon MS

## 2021-03-03 NOTE — WOUND CARE
WOCN Note: Follow-up visit at request of NP to make recommendation for Right buttock wound. S/p I&D of right buttock abscess. Assessed in room 545. PPE: face shield, mask and gloves. Chart reviewed. Admitted DX:  Rectal bleeding Past Medical History:  
Diagnosis Date  Cancer ([facility] [address] melanoma back stage II 2005  CKD (chronic kidney disease), stage III 3/1/2016  CKD (chronic kidney disease), stage IV ([facility] [address] 3/1/2016  [facility]  HTN (hypertension) 11/2/2015  Hypertension  LBP (low back pain) 11/2/2015  Menopause  Microalbuminuria 11/21/2015  Obesity 11/2/2015  PAD (peripheral artery disease) ([facility] [address] 9/29/2016  Venous insufficiency 11/2/2015 Assessment:  
Patient is A&O x 3, communicative, continent and ambulates with walker. Bed: foam mattress Patient reports no pain. Sacrum intact without erythema. 1.  Right buttock, abscess site s/p I&D : 1.2 x 1.6 x 0.3 cm  100% maroon; moderate serosanguinous exudate; induration to periwound. Treatment: 
Cleansed with saline; applied Opticel AG (silver alginate); covered with dry dressing. Wound, Pressure Prevention & Skin Care Recommendations: 1. Minimize layers of linen/pads under patient to optimize support surface. 2.  Turn/reposition approximately every 2 hours and offload heels. 3.  Manage moisture/ Keep skin folds clean and dry 4. Right buttock wound:  Sitz bath TID; after sitz bath pat dry, apply Opticel Ag; cover with dry gauze and secure with tape. Discussed above plan with patient and Rory Zapata. Transition of Care: Plan to follow as needed while admitted to hospital. 
 
DONTRELL Juarez RN [facility] Inpatient Wound Care Available on Perfect Serve Pager [phone] Office [phone]

## 2021-03-03 NOTE — PROGRESS NOTES
[facility] Adult  Hospitalist Group                                                                                          Hospitalist Progress Note  Jayla Bonner NP  Answering service: [phone] OR [phone] from in house phone        Date of Service:  3/3/2021  NAME:  Raheel Jung  :  1955  MRN:  [id number]      Admission Summary:   Vane Welch is a 61 y.o lady [de-identified] PMH T2DM, HTN, PAD, CKD who was recently admitted here with PRABHU and GI bleed due to Ischemic Proctosigmoiditis, (last colonoscopy 21)who presented from [facility] rectal bleeding. She noted constant bright bloody stools with clots with associated chills and light-headedness. She denied abdominal pain, fever, n/v. She denied blood thinner use but is unsure if she is taking the aspirin on her med list     Interval history / Subjective: Follow-up for issues listed below.  (Rectal bleeding, CKD 3, PAD, T2DM, Ischemic Proctosigmoiditis). Seen and examined patient sitting on side of bed, states that she is feeling \"great\" today. S/p I&D of right buttock abscess. Dressing is intact. States it feels tender but not painful. Denies any rectal bleeding. Had one small bowel movement this morning. No new complaints. No overnight events noted. Stable for discharge- Pending insurance auth. Assessment & Plan:     Rectal bleeding 2/2 to Ischemic Proctosigmoiditis (POA)   -monitor H/H, transfuse to keep Hgb >7.0   -CTA abd/pelv : No CT evidence for acute GI bleeding at this time. Stable to slightly increased mucosal enhancement and wall thickening in the rectosigmoid colon consistent with proctocolitis since the prior study. Significant diverticulosis without acute diverticulitis. - GI following- Flex sig done ()   - GI lite diet.      Acute Blood Loss Anemia: 2/2 to above, plan as above.   - Hgb 7.6 this am   - Monitor labs in am   - Transfuse for hgb <7.0      Hyponatremia   - Mild, asymptomatic   - Improving   - Monitor labs.       CKD IIIb   -hold diuretics  - Creatine 1.56  - Avoid nephro toxins and renal dose medications  -monitor renal function   - Follow up with nephrology outpatient- already has appointment.      PAD: hold ASA 2/2 GIB. T2DM: hold metformin, ISS, accuchecks. ECHO 2/2021: LVEF 43%, normal systolic and dyastolic function.      Right buttock/cleft Abscess   - Wound care following   - Encouraged patient to increase movement to relieve pressure  - CR following- s/p I&D 03/02/21  - Continue antibiotics.       Code status: Full   DVT prophylaxis: SCDs    Care Plan discussed with: Patient/Family, Nurse and   Anticipated Disposition: Return to [place] [id number] pending   Anticipated Discharge: 24 hours to 48 hours     Hospital Problems  Date Reviewed: 2/26/2021          Codes Class Noted POA    Rectal bleed ICD-10-CM: K62.5  ICD-9-CM: 569.3  2/24/2021 Unknown        * (Principal) Rectal bleeding ICD-10-CM: K62.5  ICD-9-CM: 569.3  2/23/2021 Unknown                Review of Systems:   A comprehensive review of systems was negative except for that written in the HPI. Vital Signs:    Last 24hrs VS reviewed since prior progress note. Most recent are:  Visit Vitals  BP (!) 141/71   Pulse 62   Temp 98 °F (36.7 °C)   Resp 18   Ht 5' 5.5\" (1.664 m)   Wt 97 kg (213 lb 13.5 oz)   SpO2 97%   BMI 35.04 kg/m²       No intake or output data in the 24 hours ending 03/03/21 0992     Physical Examination:             Constitutional:  No acute distress, cooperative, pleasant, answers questions. ENT:  Oral mucosa moist, oropharynx benign. Resp:  CTA bilaterally. No wheezing/rhonchi/rales. No accessory muscle use   CV:  Regular rhythm, normal rate, no murmurs, gallops, rubs    GI:  Soft, non distended, non tender. normoactive bowel sounds    Musculoskeletal:  No edema, warm, 2+ pulses throughout    Neurologic:  Moves all extremities.   AAOx3, CN II-XII reviewed, follows commands     Psych: Good insight, Not anxious nor agitated. Skin: See wound care noted       Data Review:    Review and/or order of clinical lab test  Review and/or order of tests in the medicine section of [facility]      Labs:     Recent Labs     03/03/21 0211 03/02/21 0211   WBC 6.4 6.5   HGB 7.6* 7.6*   HCT 23.4* 25.7*   * 144*     Recent Labs     03/03/21 0211 03/02/21 0211 03/01/21 0213   * 132* 132*   K 4.6 4.2 3.6    101 100   CO2 22 22 23   BUN 19 22* 22*   CREA 1.56* 1.58* 1.51*   * 78 79   CA 8.4* 8.4* 8.1*     No results for input(s): ALT, AP, TBIL, TBILI, TP, ALB, GLOB, GGT, AML, LPSE in the last 72 hours. No lab exists for component: SGOT, GPT, AMYP, HLPSE  No results for input(s): INR, PTP, APTT, INREXT in the last 72 hours. No results for input(s): FE, TIBC, PSAT, FERR in the last 72 hours. Lab Results   Component Value Date/Time    Folate 54.6 (H) 02/17/2021 03:49 PM      No results for input(s): PH, PCO2, PO2 in the last 72 hours. No results for input(s): CPK, CKNDX, TROIQ in the last 72 hours.     No lab exists for component: CPKMB  Lab Results   Component Value Date/Time    Cholesterol, total 165 09/29/2016 10:02 AM    HDL Cholesterol 34 09/29/2016 10:02 AM    LDL, calculated 91.2 09/29/2016 10:02 AM    Triglyceride 199 (H) 09/29/2016 10:02 AM    CHOL/HDL Ratio 4.9 09/29/2016 10:02 AM     Lab Results   Component Value Date/Time    Glucose (POC) 118 (H) 03/03/2021 05:32 PM    Glucose (POC) 174 (H) 03/03/2021 11:58 AM    Glucose (POC) 214 (H) 03/03/2021 06:37 AM    Glucose (POC) 192 (H) 03/02/2021 08:27 PM    Glucose (POC) 101 (H) 03/02/2021 05:45 PM     Lab Results   Component Value Date/Time    Color DARK YELLOW 02/04/2021 02:16 AM    Appearance CLOUDY (A) 02/04/2021 02:16 AM    Specific gravity 1.021 02/04/2021 02:16 AM    pH (UA) 5.0 02/04/2021 02:16 AM    Protein 30 (A) 02/04/2021 02:16 AM    Glucose Negative 02/04/2021 02:16 AM    Ketone Negative 02/04/2021 02:16 AM    Bilirubin NEGATIVE  09/29/2016 10:02 AM    Urobilinogen 0.2 02/04/2021 02:16 AM    Nitrites Negative 02/04/2021 02:16 AM    Leukocyte Esterase Negative 02/04/2021 02:16 AM    Epithelial cells FEW 02/04/2021 02:16 AM    Bacteria Negative 02/04/2021 02:16 AM    WBC 0-4 02/04/2021 02:16 AM    RBC 0-5 02/04/2021 02:16 AM         Medications Reviewed:     Current Facility-Administered Medications   Medication Dose Route Frequency    docusate sodium (COLACE) capsule 100 mg  100 mg Oral DAILY    piperacillin-tazobactam (ZOSYN) 3.375 g in 0.9% sodium chloride (MBP/ADV) 100 mL MBP  3.375 g IntraVENous Q8H    sodium chloride (NS) flush 5-40 mL  5-40 mL IntraVENous Q8H    sodium chloride (NS) flush 5-40 mL  5-40 mL IntraVENous PRN    0.9% sodium chloride infusion 250 mL  250 mL IntraVENous PRN    0.9% sodium chloride infusion 250 mL  250 mL IntraVENous PRN    0.9% sodium chloride infusion 250 mL  250 mL IntraVENous PRN    sodium chloride (NS) flush 5-40 mL  5-40 mL IntraVENous Q8H    sodium chloride (NS) flush 5-40 mL  5-40 mL IntraVENous PRN    acetaminophen (TYLENOL) tablet 650 mg  650 mg Oral Q6H PRN    Or    acetaminophen (TYLENOL) suppository 650 mg  650 mg Rectal Q6H PRN    polyethylene glycol (MIRALAX) packet 17 g  17 g Oral DAILY PRN    promethazine (PHENERGAN) tablet 12.5 mg  12.5 mg Oral Q6H PRN    Or    ondansetron (ZOFRAN) injection 4 mg  4 mg IntraVENous Q6H PRN    atorvastatin (LIPITOR) tablet 40 mg  40 mg Oral DAILY    pantoprazole (PROTONIX) tablet 40 mg  40 mg Oral BID    traMADoL (ULTRAM) tablet 50 mg  50 mg Oral Q6H PRN    insulin lispro (HUMALOG) injection   SubCUTAneous AC&HS    glucose chewable tablet 16 g  4 Tab Oral PRN    dextrose (D50W) injection syrg 12.5-25 g  12.5-25 g IntraVENous PRN    glucagon (GLUCAGEN) injection 1 mg  1 mg IntraMUSCular PRN     ______________________________________________________________________  EXPECTED LENGTH OF STAY: 3d 0h  ACTUAL LENGTH OF STAY: Randy Vides, NP

## 2021-03-03 NOTE — PROGRESS NOTES
[address] [facility].  [address] [facility] [address]  [place], [address]  [phone]                     GI PROGRESS NOTE    Patient Name: Dionisio Jo      : 1955      MRN: [id number]  Admit Date: 2021  Today's Date: 3/3/2021    Subjective:     Chart review:   She had operative I&D of right buttock abscess yesterday by Dr. Leesa Balderrama. Anoscopy was negative for fistulous tract. No report of blood from the rectum. Plan for d/c to rehab, hopefully today. Objective:     Blood pressure (!) 157/72, pulse 60, temperature 98.3 °F (36.8 °C), resp. rate 18, height 5' 5.5\" (1.664 m), weight 97 kg (213 lb 13.5 oz), SpO2 98 %.     Physical Exam:        Data Review:    Recent Results (from the past 24 hour(s))   GLUCOSE, POC    Collection Time: 21 11:39 AM   Result Value Ref Range    Glucose (POC) 107 (H) 65 - 100 mg/dL    Performed by Amy Hernández    GLUCOSE, POC    Collection Time: 21  3:37 PM   Result Value Ref Range    Glucose (POC) 85 65 - 100 mg/dL    Performed by Lexy Duke    CULTURE, WOUND [place] STAIN    Collection Time: 21  5:09 PM    Specimen: Abscess    RIGHT   Result Value Ref Range    Special Requests: NO SPECIAL REQUESTS      GRAM STAIN RARE WBCS SEEN      GRAM STAIN NO ORGANISMS SEEN      Culture result: PENDING    CULTURE, ANAEROBIC    Collection Time: 21  5:09 PM    Specimen: Abscess    RIGHT   Result Value Ref Range    Special Requests: NO SPECIAL REQUESTS      Culture result: PENDING    GLUCOSE, POC    Collection Time: 21  5:45 PM   Result Value Ref Range    Glucose (POC) 101 (H) 65 - 100 mg/dL    Performed by Kike Roberson, POC    Collection Time: 21  8:27 PM   Result Value Ref Range    Glucose (POC) 192 (H) 65 - 100 mg/dL    Performed by Edy Oneal    CBC WITH AUTOMATED DIFF    Collection Time: 21  2:11 AM   Result Value Ref Range    WBC 6.4 3.6 - 11.0 K/uL    RBC 2.45 (L) 3.80 - 5.20 M/uL    HGB 7.6 (L) 11.5 - 16.0 g/dL    HCT 23.4 (L) 35.0 - 47.0 %    MCV 95.5 80.0 - 99.0 FL    MCH 31.0 26.0 - 34.0 PG    MCHC 32.5 30.0 - 36.5 g/dL    RDW 15.5 (H) 11.5 - 14.5 %    PLATELET 406 (L) 145 - 400 K/uL    MPV 10.5 8.9 - 12.9 FL    NRBC 0.0 0  WBC    ABSOLUTE NRBC 0.00 0.00 - 0.01 K/uL    NEUTROPHILS 90 (H) 32 - 75 %    LYMPHOCYTES 7 (L) 12 - 49 %    MONOCYTES 2 (L) 5 - 13 %    EOSINOPHILS 0 0 - 7 %    BASOPHILS 0 0 - 1 %    IMMATURE GRANULOCYTES 1 (H) 0.0 - 0.5 %    ABS. NEUTROPHILS 5.8 1.8 - 8.0 K/UL    ABS. LYMPHOCYTES 0.4 (L) 0.8 - 3.5 K/UL    ABS. MONOCYTES 0.1 0.0 - 1.0 K/UL    ABS. EOSINOPHILS 0.0 0.0 - 0.4 K/UL    ABS. BASOPHILS 0.0 0.0 - 0.1 K/UL    ABS. IMM. GRANS. 0.1 (H) 0.00 - 0.04 K/UL    DF SMEAR SCANNED      RBC COMMENTS ANISOCYTOSIS  1+        RBC COMMENTS MACROCYTOSIS  1+       METABOLIC PANEL, BASIC    Collection Time: 03/03/21  2:11 AM   Result Value Ref Range    Sodium 133 (L) 136 - 145 mmol/L    Potassium 4.6 3.5 - 5.1 mmol/L    Chloride 104 97 - 108 mmol/L    CO2 22 21 - 32 mmol/L    Anion gap 7 5 - 15 mmol/L    Glucose 223 (H) 65 - 100 mg/dL    BUN 19 6 - 20 MG/DL    Creatinine 1.56 (H) 0.55 - 1.02 MG/DL    BUN/Creatinine ratio 12 12 - 20      GFR est AA 40 (L) >60 ml/min/1.73m2    GFR est non-AA 33 (L) >60 ml/min/1.73m2    Calcium 8.4 (L) 8.5 - 10.1 MG/DL   GLUCOSE, POC    Collection Time: 03/03/21  6:37 AM   Result Value Ref Range    Glucose (POC) 214 (H) 65 - 100 mg/dL    Performed by Shannan Abraham / Plan :     Hematochezia, painless  Blood loss anemia  Ischemic proctosigmoiditis - colonoscopy 2/11/21  - Flex sig 2/26/21 Dr. Yohan Shelton - Diffuse coalescing ulcerations involving all of rectum from dentate line to appx 10 cm from the anal verge. There is less pallor and areas of patchy erythema, improved from colonoscopy 2/11/21.  Brown stool in rectum precluding views  Sigmoid with brown stool, linear ischemic ulcers, did not transverse past 15 cm from the anal verge  - Hgb stable 7.1 with no further rectal bleeding  - Contrasted CT 2/24/21 - Stable to slightly increased mucosal enhancement and wall thickening in the rectosigmoid colon consistent with proctocolitis since the prior study (2/21)  - No dietary restrictions from GI  - OK for discharge from GI perspective once cleared by CRS    Abscess right buttock  - on IV Zosyn  - Post I&D 3/2/21 by Dr. Selene Nunn

## 2021-03-04 LAB
BACTERIA SPEC CULT: ABNORMAL
BACTERIA SPEC CULT: NORMAL
GLUCOSE BLD STRIP.AUTO-MCNC: 100 MG/DL (ref 65–100)
GLUCOSE BLD STRIP.AUTO-MCNC: 112 MG/DL (ref 65–100)
GLUCOSE BLD STRIP.AUTO-MCNC: 94 MG/DL (ref 65–100)
GLUCOSE BLD STRIP.AUTO-MCNC: 95 MG/DL (ref 65–100)
GRAM STN SPEC: ABNORMAL
GRAM STN SPEC: ABNORMAL
SERVICE CMNT-IMP: ABNORMAL
SERVICE CMNT-IMP: ABNORMAL
SERVICE CMNT-IMP: NORMAL

## 2021-03-04 PROCEDURE — 74011250637 HC RX REV CODE- 250/637: Performed by: HOSPITALIST

## 2021-03-04 PROCEDURE — 74011250636 HC RX REV CODE- 250/636: Performed by: COLON & RECTAL SURGERY

## 2021-03-04 PROCEDURE — 82962 GLUCOSE BLOOD TEST: CPT

## 2021-03-04 PROCEDURE — 74011250637 HC RX REV CODE- 250/637: Performed by: NURSE PRACTITIONER

## 2021-03-04 PROCEDURE — 97535 SELF CARE MNGMENT TRAINING: CPT

## 2021-03-04 PROCEDURE — 65270000029 HC RM PRIVATE

## 2021-03-04 PROCEDURE — 74011000258 HC RX REV CODE- 258: Performed by: COLON & RECTAL SURGERY

## 2021-03-04 PROCEDURE — 97116 GAIT TRAINING THERAPY: CPT

## 2021-03-04 RX ORDER — DOXYCYCLINE HYCLATE 100 MG
100 TABLET ORAL EVERY 12 HOURS
Status: DISCONTINUED | OUTPATIENT
Start: 2021-03-04 | End: 2021-03-05 | Stop reason: HOSPADM

## 2021-03-04 RX ADMIN — PANTOPRAZOLE SODIUM 40 MG: 40 TABLET, DELAYED RELEASE ORAL at 16:57

## 2021-03-04 RX ADMIN — Medication 10 ML: at 13:00

## 2021-03-04 RX ADMIN — ACETAMINOPHEN 650 MG: 325 TABLET ORAL at 09:15

## 2021-03-04 RX ADMIN — PIPERACILLIN AND TAZOBACTAM 3.38 G: 3; .375 INJECTION, POWDER, FOR SOLUTION INTRAVENOUS at 12:58

## 2021-03-04 RX ADMIN — PIPERACILLIN AND TAZOBACTAM 3.38 G: 3; .375 INJECTION, POWDER, FOR SOLUTION INTRAVENOUS at 04:42

## 2021-03-04 RX ADMIN — PANTOPRAZOLE SODIUM 40 MG: 40 TABLET, DELAYED RELEASE ORAL at 09:09

## 2021-03-04 RX ADMIN — DOCUSATE SODIUM 100 MG: 100 CAPSULE, LIQUID FILLED ORAL at 09:09

## 2021-03-04 RX ADMIN — DOXYCYCLINE HYCLATE 100 MG: 100 TABLET, COATED ORAL at 16:15

## 2021-03-04 RX ADMIN — Medication 10 ML: at 23:05

## 2021-03-04 RX ADMIN — ACETAMINOPHEN 650 MG: 325 TABLET ORAL at 14:45

## 2021-03-04 RX ADMIN — ATORVASTATIN CALCIUM 40 MG: 40 TABLET, FILM COATED ORAL at 09:09

## 2021-03-04 NOTE — PROGRESS NOTES
Problem: Mobility Impaired (Adult and Pediatric)  Goal: *Acute Goals and Plan of Care (Insert Text)  Description: FUNCTIONAL STATUS PRIOR TO ADMISSION: Patient was modified independent using a B canes for functional mobility. HOME SUPPORT PRIOR TO ADMISSION: The patient lived alone with no local support. Working part time at [employer] in [place] [facility]. Physical Therapy Goals  Initiated 2/26/2021  1. Patient will move from supine to sit and sit to supine , scoot up and down, and roll side to side in bed with independence within 7 day(s). 2.  Patient will transfer from bed to chair and chair to bed with modified independence using the least restrictive device within 7 day(s). 3.  Patient will perform sit to stand with modified independence within 7 day(s). 4.  Patient will ambulate with modified independence for 300 feet with the least restrictive device within 7 day(s). 5.  Patient will ascend/descend 4 stairs with 1 handrail(s) with modified independence within 7 day(s). Outcome: Progressing Towards Goal     PHYSICAL THERAPY TREATMENT  Patient: Lou Guzman (76 y.o. female)  Date: 3/4/2021  Diagnosis: Rectal bleeding [K62.5]  Rectal bleed [K62.5] Rectal bleeding  Procedure(s) (LRB):  INCISION AND DRAINAGE PERIRECTAL ABSCESS (N/A) 2 Days Post-Op  Precautions: Fall  Chart, physical therapy assessment, plan of care and goals were reviewed. ASSESSMENT  Patient continues with skilled PT services and is progressing towards goals. Pt found in bathroom, had been assisted to toilet by RN, able to come into standing with CGA and UE assistance at grab bar. Pt tolerates standing at sink and performing hygiene. Pt ambulates in hallway with RW and shortened steps, increased and quick onset fatigue, able to perform standing rest breaks against the wall for fatigue management. Pt tolerates mobility well to fair with increased cues for pacing.  Pt is an excellent candidate for therapy to improve mobility and return to PLOF    Current Level of Function Impacting Discharge (mobility/balance): CGA and cues    Other factors to consider for discharge:          PLAN :  Patient continues to benefit from skilled intervention to address the above impairments. Continue treatment per established plan of care. to address goals. Recommendation for discharge: (in order for the patient to meet his/her long term goals)  Therapy up to 5 days/week in SNF setting    This discharge recommendation:  Has been made in collaboration with the attending provider and/or case management    IF patient discharges home will need the following DME: to be determined (TBD)       SUBJECTIVE:   Patient stated I still feel heron rough from all nora been through.     OBJECTIVE DATA SUMMARY:   Critical Behavior:  Neurologic State: Alert, Appropriate for age           Functional Mobility Training:  Bed Mobility:        Sit to Supine: Moderate assistance(B LE lifting)           Transfers:  Sit to Stand: Contact guard assistance  Stand to Sit: Contact guard assistance                             Balance:  Sitting: Intact  Standing: Intact  Standing - Static: Good  Standing - Dynamic : Fair;Constant support  Ambulation/Gait Training:  Distance (ft): 100 Feet (ft)(100+60+40 with standing rest breaks inbetween)  Assistive Device: Gait belt;Walker, rolling  Ambulation - Level of Assistance: Contact guard assistance;Assist x1        Gait Abnormalities: Decreased step clearance        Base of Support: Widened     Speed/Karen: Slow;Pace decreased (<100 feet/min)  Step Length: Left shortened;Right shortened            Pain Rating:  None reported    Activity Tolerance:   Good, Fair, and requires rest breaks    After treatment patient left in no apparent distress:   Sitting in chair and Call bell within reach    COMMUNICATION/COLLABORATION:   The patients plan of care was discussed with: Registered nurse and Case management.      [facility], PT   Time Calculation: 22 mins

## 2021-03-04 NOTE — PROGRESS NOTES
[address] [facility] Adult  Hospitalist Group                                                                                          Hospitalist Progress Note  Debi Stafford NP  Answering service: [phone] from in house phone        Date of Service:  3/4/2021  NAME:  Efren Canada  :  1955  MRN:  [id number]      Admission Summary: This is a 60 y.o lady with a PMH of  T2DM, HTN, PAD and CKD, who was recently admitted here with PRABHU and GI bleed 2/2 to Ischemic Proctocolitis, who presented with rectal bleeding. She noted constant bright bloody stools with clots on . She's starting to feel cold and light-headed, denied abdominal pain, fever, n/v. She denied blood thinner use but is unsure if she is taking the aspirin on her med list.    Interval history / Subjective: Follow-up for issues listed below. (Rectal bleeding, CKD 3, PAD, T2DM, Ischemic Proctosigmoiditis)  -Patient seen and examined, no c/o's. Assessment & Plan:     Rectal Bleeding 2/2 to Ischemic Proctosigmoiditis:  -monitor H/H, transfuse to keep Hgb >7.0   -CTA abd/pelv : No CT evidence for acute GI bleeding at this time. Stable to slightly increased mucosal enhancement and wall thickening in the rectosigmoid colon consistent with proctocolitis since the prior study. Significant diverticulosis without acute diverticulitis. -GI following:Flex Sig (): Diffuse coalescing ulcerations involving all of rectum from dentate line to appx 10 cm from the anal verge. There is less pallor and areas of patchy erythema, improved from colonoscopy 21. Brown stool in rectum precluding views.      ABL Anemia: 2/2 to above, plan as above. Hyponatremia: mild, asymptomatic, monitor.      CKD 3b:   -hold diuretics  -avoid nephro toxins, renal dose meds, monitor renal function, f/u Nephrology outpatient-  appt set      PAD: hold ASA 2/2 GIB. T2DM: hold metformin, ISS, accuchecks.  ECHO 2021: LVEF 15%, normal systolic and dyastolic function.      Right Buttock/Cleft Abscess:  -wound care following   -s/p I&D 03/02/21, Anoscopy was negative for fistulous tract. -IV ABT zosyn d/c 3/4  -MRSA+ wound culture: doxycycline 3/4    DVTppx: SCDs  GIppx: Protonix  Code Status: Full Code  Diet: GI Lite slowly advane as tolerated- DM diet  Activity: OOB to chair TID and PRN, PT/OT  Discharge: return to [facility], pending placement  Ambulates: [facility]  Date Reviewed: 2/26/2021          Codes Class Noted POA    Rectal bleed ICD-10-CM: K62.5  ICD-9-CM: 569.3  2/24/2021 Unknown        * (Principal) Rectal bleeding ICD-10-CM: K62.5  ICD-9-CM: 569.3  2/23/2021 Unknown                Review of Systems:   A comprehensive review of systems was negative except for that written in the HPI. Vital Signs:    Last 24hrs VS reviewed since prior progress note. Most recent are:  Visit Vitals  BP (!) 144/64 (BP 1 Location: Left upper arm)   Pulse (!) 58   Temp 98.1 °F (36.7 °C)   Resp 14   Ht 5' 5.5\" (1.664 m)   Wt 97 kg (213 lb 13.5 oz)   SpO2 98%   BMI 35.04 kg/m²       No intake or output data in the 24 hours ending 03/04/21 1300     Physical Examination:     I had a face to face encounter with this patient and independently examined them on 3/4/2021 as outlined below:    Constitutional:  No acute distress, cooperative, pleasant    ENT:  Oral mucosa moist.    Resp:  CTA bilaterally. No wheezing/rhonchi/rales. No accessory muscle use. CV:  Regular rhythm, normal rate, S1,S2.    GI/:  Soft, obese, non tender, no guarding, BS present, blood tinged underwear. Voids Freely. Musculoskeletal:  BLE edema, erythema, warm, 2+ pulses throughout. Neurologic:  Moves all extremities. AAOx3, CN II-XII reviewed. Skin:  Good turgor, right inner butt cheek: ulcer  Psych:  Good insight, Not anxious nor agitated.                Data Review:    Review and/or order of clinical lab test      Labs:     Recent Labs     03/03/21  [id number] 03/02/21 0211   WBC 6.4 6.5   HGB 7.6* 7.6*   HCT 23.4* 25.7*   * 144*     Recent Labs     03/03/21 0211 03/02/21 0211   * 132*   K 4.6 4.2    101   CO2 22 22   BUN 19 22*   CREA 1.56* 1.58*   * 78   CA 8.4* 8.4*     No results for input(s): ALT, AP, TBIL, TBILI, TP, ALB, GLOB, GGT, AML, LPSE in the last 72 hours. No lab exists for component: SGOT, GPT, AMYP, HLPSE  No results for input(s): INR, PTP, APTT, INREXT in the last 72 hours. No results for input(s): FE, TIBC, PSAT, FERR in the last 72 hours. Lab Results   Component Value Date/Time    Folate 54.6 (H) 02/17/2021 03:49 PM      No results for input(s): PH, PCO2, PO2 in the last 72 hours. No results for input(s): CPK, CKNDX, TROIQ in the last 72 hours.     No lab exists for component: CPKMB  Lab Results   Component Value Date/Time    Cholesterol, total 165 09/29/2016 10:02 AM    HDL Cholesterol 34 09/29/2016 10:02 AM    LDL, calculated 91.2 09/29/2016 10:02 AM    Triglyceride 199 (H) 09/29/2016 10:02 AM    CHOL/HDL Ratio 4.9 09/29/2016 10:02 AM     Lab Results   Component Value Date/Time    Glucose (POC) 94 03/04/2021 11:19 AM    Glucose (POC) 100 03/04/2021 07:16 AM    Glucose (POC) 132 (H) 03/03/2021 09:57 PM    Glucose (POC) 118 (H) 03/03/2021 05:32 PM    Glucose (POC) 174 (H) 03/03/2021 11:58 AM     Lab Results   Component Value Date/Time    Color DARK YELLOW 02/04/2021 02:16 AM    Appearance CLOUDY (A) 02/04/2021 02:16 AM    Specific gravity 1.021 02/04/2021 02:16 AM    pH (UA) 5.0 02/04/2021 02:16 AM    Protein 30 (A) 02/04/2021 02:16 AM    Glucose Negative 02/04/2021 02:16 AM    Ketone Negative 02/04/2021 02:16 AM    Bilirubin NEGATIVE  09/29/2016 10:02 AM    Urobilinogen 0.2 02/04/2021 02:16 AM    Nitrites Negative 02/04/2021 02:16 AM    Leukocyte Esterase Negative 02/04/2021 02:16 AM    Epithelial cells FEW 02/04/2021 02:16 AM    Bacteria Negative 02/04/2021 02:16 AM    WBC 0-4 02/04/2021 02:16 AM    RBC 0-5 02/04/2021 02:16 AM         Medications Reviewed:     Current Facility-Administered Medications   Medication Dose Route Frequency    docusate sodium (COLACE) capsule 100 mg  100 mg Oral DAILY    piperacillin-tazobactam (ZOSYN) 3.375 g in 0.9% sodium chloride (MBP/ADV) 100 mL MBP  3.375 g IntraVENous Q8H    sodium chloride (NS) flush 5-40 mL  5-40 mL IntraVENous Q8H    sodium chloride (NS) flush 5-40 mL  5-40 mL IntraVENous PRN    0.9% sodium chloride infusion 250 mL  250 mL IntraVENous PRN    0.9% sodium chloride infusion 250 mL  250 mL IntraVENous PRN    0.9% sodium chloride infusion 250 mL  250 mL IntraVENous PRN    sodium chloride (NS) flush 5-40 mL  5-40 mL IntraVENous Q8H    sodium chloride (NS) flush 5-40 mL  5-40 mL IntraVENous PRN    acetaminophen (TYLENOL) tablet 650 mg  650 mg Oral Q6H PRN    Or    acetaminophen (TYLENOL) suppository 650 mg  650 mg Rectal Q6H PRN    polyethylene glycol (MIRALAX) packet 17 g  17 g Oral DAILY PRN    promethazine (PHENERGAN) tablet 12.5 mg  12.5 mg Oral Q6H PRN    Or    ondansetron (ZOFRAN) injection 4 mg  4 mg IntraVENous Q6H PRN    atorvastatin (LIPITOR) tablet 40 mg  40 mg Oral DAILY    pantoprazole (PROTONIX) tablet 40 mg  40 mg Oral BID    traMADoL (ULTRAM) tablet 50 mg  50 mg Oral Q6H PRN    insulin lispro (HUMALOG) injection   SubCUTAneous AC&HS    glucose chewable tablet 16 g  4 Tab Oral PRN    dextrose (D50W) injection syrg 12.5-25 g  12.5-25 g IntraVENous PRN    glucagon (GLUCAGEN) injection 1 mg  1 mg IntraMUSCular PRN     ______________________________________________________________________  EXPECTED LENGTH OF STAY: 3d 0h  ACTUAL LENGTH OF STAY:          8                 Meggan Andrews NP

## 2021-03-04 NOTE — PROGRESS NOTES
Problem: Self Care Deficits Care Plan (Adult)  Goal: *Acute Goals and Plan of Care (Insert Text)  Description:   FUNCTIONAL STATUS PRIOR TO ADMISSION: Patient was modified independent using a B SPC for functional mobility. Recent admission to [facility] with dc to [place]. HOME SUPPORT: The patient lived alone. Occupational Therapy Goals  Initiated 2/26/2021  1. Patient will perform bathing with supervision/set-up with AD within 7 day(s). 2.  Patient will perform lower body dressing with supervision/set-up within 7 day(s). 3.  Patient will perform toilet transfers with supervision/set-up within 7 day(s). 4.  Patient will perform all aspects of toileting with supervision/set-up within 7 day(s). 5. Patient will participate in upper extremity therapeutic exercise/activities with supervision/set-up for 8 minutes within 7 day(s). Outcome: Progressing Towards Goal   OCCUPATIONAL THERAPY TREATMENT  Patient: Keyonna Sinclair (32 y.o. female)  Date: 3/4/2021  Diagnosis: Rectal bleeding [K62.5]  Rectal bleed [K62.5] Rectal bleeding  Procedure(s) (LRB):  INCISION AND DRAINAGE PERIRECTAL ABSCESS (N/A) 2 Days Post-Op  Precautions: Fall  Chart, occupational therapy assessment, plan of care, and goals were reviewed. ASSESSMENT  Patient continues with skilled OT services and is progressing towards goals. Patient tolerating up to bathroom with CGA to SBA with RW for tolieting and hygiene at sink, remains with fair activity tolerance and poor functional reach to LB for ADls, continue to recommend SNF prior to home alone. Current Level of Function Impacting Discharge (ADLs): max A for LB ADls, CGA to SBA with RW for functional mobility    Other factors to consider for discharge: lives alone         PLAN :  Patient continues to benefit from skilled intervention to address the above impairments. Continue treatment per established plan of care. to address goals.     Recommend with staff: bathroom for toileting    Recommend next OT session: standing ADls, sit to stand, AE for LB ADLs    Recommendation for discharge: (in order for the patient to meet his/her long term goals)  Therapy up to 5 days/week in SNF setting    This discharge recommendation:  Has been made in collaboration with the attending provider and/or case management    IF patient discharges home will need the following DME: AE: long handled dressing and patient owns DME required for discharge       SUBJECTIVE:   Patient stated I am ready for rehab.     OBJECTIVE DATA SUMMARY:   Cognitive/Behavioral Status:      alert                Functional Mobility and Transfers for ADLs:  Bed Mobility:  Sit to Supine: Moderate assistance(B LE lifting)    Transfers:     Functional Transfers  Toilet Transfer : Stand-by assistance       Balance:  Sitting: Intact  Standing: Intact  Standing - Static: Good  Standing - Dynamic : Fair    ADL Intervention:       Grooming  Position Performed: Standing  Washing Face: Set-up; Stand-by assistance  Washing Hands: Stand-by assistance  Brushing Teeth: Stand-by assistance          Patient instructed and indicated understanding the benefits of maintaining activity tolerance, functional mobility, and independence with self care tasks during acute stay  to ensure safe return home and to baseline. Encouraged patient to increase frequency and duration OOB, be out of bed for all meals, perform daily ADLs (as approved by RN/MD regarding bathing etc), and performing functional mobility to/from bathroom. Patient instructed and demonstrated understanding in order to increase independence with lower body dressing, in a seated position, reach down Bilateral LE slowly to prevent tearing/shearing until slight pull is felt, hold at end range 10 seconds, and return to starting upright position 3 reps, 3 sets daily with rest breaks between.               Lower Body Dressing Assistance  Socks: Compensatory technique training;Maximum assistance  Leg Crossed Method Used: Yes  Position Performed: Seated in chair    Toileting  Bladder Hygiene: Stand-by assistance  Clothing Management: Contact guard assistance         Therapeutic Exercises:       Pain:  None noted    Activity Tolerance:   Fair and requires rest breaks    After treatment patient left in no apparent distress:   Sitting in chair and Call bell within reach    COMMUNICATION/COLLABORATION:   The patients plan of care was discussed with: Physical therapist and Registered nurse.      Chela Link, JORJE  Time Calculation: 13 mins

## 2021-03-04 NOTE — PROGRESS NOTES
[employer], auth pending  RUR-23% Moderate    CM received update from [facility] with [place] that insurance Piedad [employer] is pending updated therapy notes. CM informed nurse and updated therapy. CM to monitor.      Meera Gonzales MS

## 2021-03-05 VITALS
SYSTOLIC BLOOD PRESSURE: 162 MMHG | TEMPERATURE: 98.5 F | RESPIRATION RATE: 17 BRPM | DIASTOLIC BLOOD PRESSURE: 73 MMHG | OXYGEN SATURATION: 100 % | HEART RATE: 63 BPM | WEIGHT: 213.85 LBS | BODY MASS INDEX: 34.37 KG/M2 | HEIGHT: 66 IN

## 2021-03-05 LAB
ALBUMIN SERPL-MCNC: 2.9 G/DL (ref 3.5–5)
ALBUMIN/GLOB SERPL: 1 {RATIO} (ref 1.1–2.2)
ALP SERPL-CCNC: 62 U/L (ref 45–117)
ALT SERPL-CCNC: 18 U/L (ref 12–78)
ANION GAP SERPL CALC-SCNC: 9 MMOL/L (ref 5–15)
AST SERPL-CCNC: 19 U/L (ref 15–37)
BASOPHILS # BLD: 0.1 K/UL (ref 0–0.1)
BASOPHILS NFR BLD: 1 % (ref 0–1)
BILIRUB SERPL-MCNC: 0.3 MG/DL (ref 0.2–1)
BUN SERPL-MCNC: 17 MG/DL (ref 6–20)
BUN/CREAT SERPL: 12 (ref 12–20)
CALCIUM SERPL-MCNC: 8.7 MG/DL (ref 8.5–10.1)
CHLORIDE SERPL-SCNC: 105 MMOL/L (ref 97–108)
CO2 SERPL-SCNC: 22 MMOL/L (ref 21–32)
CREAT SERPL-MCNC: 1.38 MG/DL (ref 0.55–1.02)
DIFFERENTIAL METHOD BLD: ABNORMAL
EOSINOPHIL # BLD: 0.3 K/UL (ref 0–0.4)
EOSINOPHIL NFR BLD: 5 % (ref 0–7)
ERYTHROCYTE [DISTWIDTH] IN BLOOD BY AUTOMATED COUNT: 15.8 % (ref 11.5–14.5)
GLOBULIN SER CALC-MCNC: 2.9 G/DL (ref 2–4)
GLUCOSE BLD STRIP.AUTO-MCNC: 101 MG/DL (ref 65–100)
GLUCOSE BLD STRIP.AUTO-MCNC: 95 MG/DL (ref 65–100)
GLUCOSE SERPL-MCNC: 91 MG/DL (ref 65–100)
HCT VFR BLD AUTO: 21.1 % (ref 35–47)
HGB BLD-MCNC: 6.9 G/DL (ref 11.5–16)
HISTORY CHECKED?,CKHIST: NORMAL
IMM GRANULOCYTES # BLD AUTO: 0.1 K/UL (ref 0–0.04)
IMM GRANULOCYTES NFR BLD AUTO: 1 % (ref 0–0.5)
LYMPHOCYTES # BLD: 1.5 K/UL (ref 0.8–3.5)
LYMPHOCYTES NFR BLD: 25 % (ref 12–49)
MCH RBC QN AUTO: 31.2 PG (ref 26–34)
MCHC RBC AUTO-ENTMCNC: 32.7 G/DL (ref 30–36.5)
MCV RBC AUTO: 95.5 FL (ref 80–99)
MONOCYTES # BLD: 0.5 K/UL (ref 0–1)
MONOCYTES NFR BLD: 9 % (ref 5–13)
NEUTS SEG # BLD: 3.3 K/UL (ref 1.8–8)
NEUTS SEG NFR BLD: 59 % (ref 32–75)
NRBC # BLD: 0 K/UL (ref 0–0.01)
NRBC BLD-RTO: 0 PER 100 WBC
PLATELET # BLD AUTO: 138 K/UL (ref 150–400)
PMV BLD AUTO: 10.5 FL (ref 8.9–12.9)
POTASSIUM SERPL-SCNC: 4.2 MMOL/L (ref 3.5–5.1)
PROT SERPL-MCNC: 5.8 G/DL (ref 6.4–8.2)
RBC # BLD AUTO: 2.21 M/UL (ref 3.8–5.2)
RBC MORPH BLD: ABNORMAL
RBC MORPH BLD: ABNORMAL
SERVICE CMNT-IMP: ABNORMAL
SERVICE CMNT-IMP: NORMAL
SODIUM SERPL-SCNC: 136 MMOL/L (ref 136–145)
WBC # BLD AUTO: 5.8 K/UL (ref 3.6–11)

## 2021-03-05 PROCEDURE — 80053 COMPREHEN METABOLIC PANEL: CPT

## 2021-03-05 PROCEDURE — 86901 BLOOD TYPING SEROLOGIC RH(D): CPT

## 2021-03-05 PROCEDURE — 82962 GLUCOSE BLOOD TEST: CPT

## 2021-03-05 PROCEDURE — 36415 COLL VENOUS BLD VENIPUNCTURE: CPT

## 2021-03-05 PROCEDURE — 85025 COMPLETE CBC W/AUTO DIFF WBC: CPT

## 2021-03-05 PROCEDURE — P9016 RBC LEUKOCYTES REDUCED: HCPCS

## 2021-03-05 PROCEDURE — 86923 COMPATIBILITY TEST ELECTRIC: CPT

## 2021-03-05 PROCEDURE — 74011250637 HC RX REV CODE- 250/637: Performed by: NURSE PRACTITIONER

## 2021-03-05 PROCEDURE — 74011250637 HC RX REV CODE- 250/637: Performed by: HOSPITALIST

## 2021-03-05 PROCEDURE — 36430 TRANSFUSION BLD/BLD COMPNT: CPT

## 2021-03-05 RX ORDER — SODIUM CHLORIDE 9 MG/ML
250 INJECTION, SOLUTION INTRAVENOUS AS NEEDED
Status: DISCONTINUED | OUTPATIENT
Start: 2021-03-05 | End: 2021-03-05 | Stop reason: HOSPADM

## 2021-03-05 RX ORDER — DOXYCYCLINE 100 MG/1
100 CAPSULE ORAL 2 TIMES DAILY
Qty: 12 CAP | Refills: 0 | Status: SHIPPED | OUTPATIENT
Start: 2021-03-05 | End: 2021-03-11

## 2021-03-05 RX ORDER — LOPERAMIDE HYDROCHLORIDE 2 MG/1
2 CAPSULE ORAL
Qty: 30 CAP | Refills: 0 | Status: SHIPPED | OUTPATIENT
Start: 2021-03-05 | End: 2021-03-15

## 2021-03-05 RX ADMIN — ATORVASTATIN CALCIUM 40 MG: 40 TABLET, FILM COATED ORAL at 09:07

## 2021-03-05 RX ADMIN — Medication 10 ML: at 13:59

## 2021-03-05 RX ADMIN — DOXYCYCLINE HYCLATE 100 MG: 100 TABLET, COATED ORAL at 09:07

## 2021-03-05 RX ADMIN — PANTOPRAZOLE SODIUM 40 MG: 40 TABLET, DELAYED RELEASE ORAL at 09:07

## 2021-03-05 RX ADMIN — PANTOPRAZOLE SODIUM 40 MG: 40 TABLET, DELAYED RELEASE ORAL at 17:52

## 2021-03-05 RX ADMIN — ACETAMINOPHEN 650 MG: 325 TABLET ORAL at 09:07

## 2021-03-05 RX ADMIN — DOCUSATE SODIUM 100 MG: 100 CAPSULE, LIQUID FILLED ORAL at 09:07

## 2021-03-05 NOTE — DISCHARGE SUMMARY
Discharge Summary       PATIENT ID: Valorie Amin  MRN: [id number]   YOB: 1955    DATE OF ADMISSION: 2/23/2021  7:11 PM    DATE OF DISCHARGE: 3/5/2021  PRIMARY CARE PROVIDER: Wilber Saldivar MD     ATTENDING PHYSICIAN: Linda Hennessy MD    DISCHARGING PROVIDER: Linda Hennessy MD    To contact this individual call [phone] and ask the  to page. If unavailable ask to be transferred the Adult Hospitalist Department. CONSULTATIONS:   IP CONSULT TO GASTROENTEROLOGY    IP CONSULT TO COLORECTAL SURGERY      PROCEDURES/SURGERIES: Procedure(s):  INCISION AND DRAINAGE PERIRECTAL ABSCESS    ADMITTING DIAGNOSES & HOSPITAL COURSE:   60 y.o lady with DM, HTN, PAD, CKD, recently admitted here with PRABHU and GI bleed due to ischemic proctocolitis, who presents with rectal bleeding. She noted constant bright bloody stools with clota today. She's starting to feel cold and light-headed. She denies abdominal pain, fever, n/v. She denies blood thinner use but is unsure if she is taking the aspirin on her med list.   CT abd and pelvis reported  1. No CT evidence for acute GI bleeding at this time. 2. Stable to slightly increased mucosal enhancement and wall thickening in the rectosigmoid colon consistent with proctocolitis since the prior study. 3. Significant diverticulosis without acute diverticulitis. 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 Stable to slightly increased mucosal enhancement and wall thickening in the rectosigmoid colon consistent with proctocolitis since the prior study. Significant diverticulosis without acute diverticulitis. -Flex Sig (02/26): Diffuse coalescing ulcerations involving all of rectum from dentate line to appx 10 cm from the anal verge. There is less pallor and areas of patchy erythema, improved from colonoscopy 2/11/21.  Brown stool in rectum precluding views. Pathology from 2/11 colonoscopy revealed ischemic colitis. -monitor H/H, transfuse to keep Hgb >7.0    -GI recommendations appreciated. Patient will benefit from GI follow-up in 2 weeks from discharge    Anemia, thrombocytopenia, chronic  Status post 2 units of PRBC, last one on day of discharge  F/U with hematology on discharge   No further bleeding on discharge    Hyponatremia:  -mild, asymptomatic, resolved.       CKD stage III:   -resume Bumex at 3 mg BID,  As per prior Nephro recs  -avoid nephro toxins, renal dose meds, monitor renal function, f/u Nephrology outpatient     PAD:   ASA held 2/2 GIB, will resume on DC. ECHO 2/2021: LVEF 18%, normal systolic and dyastolic function.     D2XX:   Resume Metformin at 500 mg twice daily with monitoring of renal function, if renal function gets worse may consider switching to glipizide     Right Buttock/Cleft Abscess:  -s/p I&D 03/02/21, Anoscopy was negative for fistulous tract. Follow-up with colorectal surgeon and gastroenterology in 2 weeks after discharge.  -On admission patient was on zosyn d/c 3/4, and started on doxycycline given positive MRSA from wound culture. Continue Doxy for 6 more days. Wound care recs:  1. Minimize layers of linen/pads under patient to optimize support surface. 2.  Turn/reposition approximately every 2 hours and offload heels. 3.  Manage moisture/ Keep skin folds clean and dry  4. Right buttock wound:  Sitz bath TID; after sitz bath pat dry, apply Opticel Ag; cover with dry gauze and secure with tape.          PENDING TEST RESULTS:   None     FOLLOW UP APPOINTMENTS:    Follow-up Information     Follow up With Specialties Details Why Contact Info    [address] [address]  [address] [facility] [address]    Alondra Rebolledo MD Family Medicine In 1 week  [address] [id number]      Nikky Lemon, CARLI Gastroenterology In 2 weeks  [address] [facility]      Allison Rick MD Colon and Rectal Surgery In 2 weeks  [address] [facility]  [place] [address]  [phone]             ADDITIONAL CARE RECOMMENDATIONS:     DIET: Diabetic/cardiac Diet    ACTIVITY: Activity as tolerated    WOUND CARE:   Wound care recs:  1   Minimize layers of linen/pads under patient to optimize support surface. 2.  Turn/reposition approximately every 2 hours and offload heels. 3.  Manage moisture/ Keep skin folds clean and dry  4. Right buttock wound:  Sitz bath TID; after sitz bath pat dry, apply Opticel Ag; cover with dry gauze and secure with tape. DISCHARGE MEDICATIONS:  Current Discharge Medication List      START taking these medications    Details   doxycycline (MONODOX) 100 mg capsule Take 1 Cap by mouth two (2) times a day for 6 days. Qty: 12 Cap, Refills: 0         CONTINUE these medications which have CHANGED    Details   loperamide (IMODIUM) 2 mg capsule Take 1 Cap by mouth every eight (8) hours as needed for Diarrhea for up to 10 days. Qty: 30 Cap, Refills: 0         CONTINUE these medications which have NOT CHANGED    Details   traMADoL (ULTRAM) 50 mg tablet Take 1 Tab by mouth every six (6) hours as needed for Pain for up to 30 days. Max Daily Amount: 200 mg. Indications: neuropathic pain  Qty: 100 Tab, Refills: 5    Associated Diagnoses: Neuropathic pain      bumetanide (BUMEX) 1 mg tablet Take 3 Tabs by mouth two (2) times a day. Qty: 180 Tab, Refills: 0      cholestyramine-aspartame (QUESTRAN LIGHT) 4 gram packet Take 1 Packet by mouth three (3) times daily (with meals) for 30 days. Qty: 90 Packet, Refills: 0      L.acid,para-B. bifidum-S.therm (RISAQUAD) 8 billion cell cap cap Take 1 Cap by mouth daily for 30 days. Qty: 30 Cap, Refills: 0      pantoprazole (Protonix) 40 mg tablet Take 1 Tab by mouth two (2) times a day for 30 days.   Qty: 60 Tab, Refills: 0      atorvastatin (LIPITOR) 40 mg tablet TAKE 1 TABLET BY MOUTH EVERY DAY  Qty: 90 Tab, Refills: 3      potassium chloride SR (KLOR-CON 10) 10 mEq tablet Take 1 mEq by mouth daily. metFORMIN (GLUCOPHAGE) 500 mg tablet TAKE 1 TABLET BY MOUTH TWICE A DAY WITH MEALS  Qty: 180 Tab, Refills: 4      !! ONETOUCH ULTRA TEST strip TEST BLOOD SUGAR TWICE A DAY  Qty: 50 Strip, Refills: 11      ONE TOUCH DELICA 33 gauge misc USE TO TEST BLOOD SUGAR EVERY DAY  Qty: 30 Lancet, Refills: 11      !! ONETOUCH ULTRA TEST strip TEST BLOOD SUGAR TWICE A DAY  Qty: 50 Strip, Refills: 10      levomefolate-B6-meB12-algal oil 3 mg-35 mg-2 mg -90.314 mg cap Take 2 Caps by mouth daily. CHILDREN'S ASPIRIN 81 mg chewable tablet CHEW 1 TABLET BY MOUTH EVERY DAY  Qty: 30 Tab, Refills: 10      acetaminophen (TYLENOL) 325 mg tablet Take 2 Tabs by mouth every four (4) hours as needed. Qty: 30 Tab, Refills: 11       !! - Potential duplicate medications found. Please discuss with provider. NOTIFY YOUR PHYSICIAN FOR ANY OF THE FOLLOWING:   Fever over 101 degrees for 24 hours. Chest pain, shortness of breath, fever, chills, nausea, vomiting, diarrhea, change in mentation, falling, weakness, bleeding. Severe pain or pain not relieved by medications. Or, any other signs or symptoms that you may have questions about. DISPOSITION:    Home With:   OT  PT  HH  RN       Long term SNF/Inpatient Rehab    Independent/assisted living    Hospice    Other:       PATIENT CONDITION AT DISCHARGE:     Functional status    Poor     Deconditioned     Independent      Cognition     Lucid     Forgetful     Dementia      Catheters/lines (plus indication)    Romero     PICC     PEG     None      Code status     Full code     DNR      PHYSICAL EXAMINATION AT DISCHARGE:     Constitutional:  No acute distress, cooperative, pleasant    ENT:  Oral mucosa moist.    Resp:  CTA bilaterally. No wheezing/rhonchi/rales. No accessory muscle use.    CV: Regular rhythm, normal rate, S1,S2.    GI/:  Soft, obese, non tender, no guarding, BS present. Patient mentioned mucousy rectal leak, that is improving gradually. Musculoskeletal:  BLE edema +2, erythema, warm,     Neurologic:  Moves all extremities. AAOx3, CN II-XII reviewed.        CHRONIC MEDICAL DIAGNOSES:  Problem List as of 3/5/2021 Date Reviewed: 2/26/2021          Codes Class Noted - Resolved    Rectal bleed ICD-10-CM: K62.5  ICD-9-CM: 569.3  2/24/2021 - Present        * (Principal) Rectal bleeding ICD-10-CM: K62.5  ICD-9-CM: 569.3  2/23/2021 - Present        Type 2 diabetes with nephropathy ([facility] ICD-10-CM: E11.21  ICD-9-CM: 250.40, 583.81  2/19/2018 - Present        Hypertension complicating diabetes ([facility] ICD-10-CM: E11.59, I10  ICD-9-CM: 250.80, 401.9  8/21/2017 - Present        CKD (chronic kidney disease), stage III ICD-10-CM: N18.30  ICD-9-CM: 585.3  4/20/2017 - Present        Osteomyelitis of foot, left, acute ([facility] ICD-10-CM: M86.172  ICD-9-CM: 730.07  9/30/2016 - Present    Overview Addendum 10/3/2016  7:28 AM by Ysabel Crane MD     First metatarsal head - proteus mirabilis             Diabetic foot ulcer ([facility] ICD-10-CM: E11.621, L97.509  ICD-9-CM: 250.80, 707.15  9/29/2016 - Present        Hypokalemia ICD-10-CM: E87.6  ICD-9-CM: 276.8  9/29/2016 - Present        PAD (peripheral artery disease) ([facility] ICD-10-CM: I73.9  ICD-9-CM: 443.9  9/29/2016 - Present        Type 2 diabetes mellitus with neurologic complication ([facility] [id number]-[id number]: E11.49  ICD-9-CM: 250.60  11/3/2015 - Present    Overview Addendum 9/29/2016 12:48 PM by Ysabel Crane MD     Peripheral neuropathy and charcot arthropathy left ankle             Obesity ICD-10-CM: E66.9  ICD-9-CM: 278.00  11/2/2015 - Present        Venous insufficiency ICD-10-CM: D51.5  ICD-9-CM: 459.81  11/2/2015 - Present        LBP (low back pain) ICD-10-CM: M54.5  ICD-9-CM: 724.2  11/2/2015 - Present        RESOLVED: Acute blood loss anemia ICD-10-CM: D62  ICD-9-CM: 285.1  2/11/2021 - 2/19/2021        RESOLVED: Syncope ICD-10-CM: R55  ICD-9-CM: 780.2  2/4/2021 - 2/19/2021        RESOLVED: CKD (chronic kidney disease), stage IV (HCC) ICD-10-CM: N18.4  ICD-9-CM: 585.4  3/1/2016 - 4/20/2017        RESOLVED: Essential hypertension ICD-10-CM: I10  ICD-9-CM: 401.9  2/29/2016 - 8/20/2018        RESOLVED: HTN (hypertension) ICD-10-CM: I10  ICD-9-CM: 401.9  11/2/2015 - 2/29/2016              Greater than 35 minutes were spent with the patient on counseling and coordination of care    Signed:   James Yanez MD  3/5/2021  2:35 PM

## 2021-03-05 NOTE — PROGRESS NOTES
Physician Progress Note      Kaden Murray  CSN #:                  [id number]  :                       1955  ADMIT DATE:       2021 7:11 PM  [address] [place] DATE:  RESPONDING  PROVIDER #:        Jane Araujo MD          QUERY TEXT:    Dear Dr. Selene Nunn,    Pt admitted with rectal bleeding likely due to ischemic proctocolitis. Pt developed a right buttock abscess during the admission. Per Op note dated 3/2/21 documentation of I&D. To accurately reflect the procedure performed please further specify the depth of tissue incised and drained: The medical record reflects the following:  Risk Factors: Incision and drainage of right buttock abscess  Clinical Indicators:  - Op Note - PROCEDURE:  She was consented, taken to the operating room. She was intubated and flipped in the prone jackknife position. The buttocks were taped apart and the perineum was prepped and draped in usual fashion. Time-out was performed. 0.25% Marcaine with epinephrine was infiltrated in the skin to create a local anesthetic block around the abscess. A cruciate incision was made. Pus was evacuated. This was cultured. I cut the corners off of the edge of the cruciate incision to allow for better drainage. The wound was explored. Anoscope exam was performed which was negative. This do not appear to fistulize to the anal canal.  Sterile dressings were applied and sterile gauze. She will need the Sitz bath postoperatively. She was extubated and taken to recovery room in stable condition. ?  Treatment:  Incision and drainage of right buttock abscess    Thank-you,  Ranjan Khan RN, [facility]  Clinical   [phone]  Options provided:  -- Skin only  -- Subcutaneous tissue  -- Fascia  -- Muscle  -- Other - I will add my own diagnosis  -- Disagree - Not applicable / Not valid  -- Disagree - Clinically unable to determine / Unknown  -- Refer to Clinical Documentation Reviewer    PROVIDER RESPONSE TEXT:    Addendum to 3/2/21 procedure note The depth of the drainage to right buttock abscess was down to and including subcutaneous tissue.     Query created by: Melissa Purcell on 3/3/2021 3:39 PM      Electronically signed by:  Kaylyn Latif MD 3/5/2021 7:58 AM

## 2021-03-05 NOTE — DISCHARGE INSTRUCTIONS
Patient Education   Patient Education   Patient Education        Perirectal Abscess: Care Instructions  Your Care Instructions  A perirectal abscess is an infection that causes a pocket of pus near the anus. The area may itch and be quite painful. Most abscesses are caused by a blocked anal gland that gets infected. It also can be caused by a tear, or fissure, in the anus. Diseases that affect the colon, such as Crohn's disease, also may cause the condition. Your doctor may have drained the abscess to help treat the infection. He or she also may have prescribed antibiotics. Care at home can help you heal.  You may have had a sedative to help you relax. You may be unsteady after having sedation. It can take a few hours for the effects of the medicine to wear off. Common side effects of sedation include nausea, vomiting, and feeling sleepy or tired. The doctor has checked you carefully, but problems can develop later. If you notice any problems or new symptoms, get medical treatment right away. Follow-up care is a key part of your treatment and safety. Be sure to make and go to all appointments, and call your doctor if you are having problems. It's also a good idea to know your test results and keep a list of the medicines you take. How can you care for yourself at home? · If the doctor gave you a sedative:  ? For 24 hours, don't do anything that requires attention to detail. This includes going to work, making important decisions, or signing any legal documents. It takes time for the medicine's effects to completely wear off.  ? For your safety, do not drive or operate any machinery that could be dangerous. Wait until the medicine wears off and you can think clearly and react easily. · Sit in a few inches of warm water (sitz bath) 3 times a day and after bowel movements. The warm water helps with pain and itching. · If your doctor prescribed antibiotics, take them exactly as directed.  Do not stop taking them just because you feel better. You need to take the full course of antibiotics. · Follow your doctor's instructions if you were sent home with a drain or packing in the abscess. · Be safe with medicines. Take pain medicines exactly as directed. ? If the doctor gave you a prescription medicine for pain, take it as prescribed. ? If you are not taking a prescription pain medicine, ask your doctor if you can take an over-the-counter medicine. · Use stool softeners as directed. · Avoid scented and colored toilet paper, which may irritate the anal area. · Clean the area gently with wet cotton balls, a warm washcloth, baby wipes, or wipes such as Preparation H or Tucks. When should you call for help? Call 911 anytime you think you may need emergency care. For example, call if:    · You have trouble breathing.     · You passed out (lost consciousness). Call your doctor now or seek immediate medical care if:    · You have symptoms of infection, such as:  ? Increased pain, swelling, warmth, or redness. ? Red streaks leading from the area. ? Pus draining from the area. ? A fever. Watch closely for changes in your health, and be sure to contact your doctor if:    · You do not get better as expected. Where can you learn more? Go to http://www.gray.com/  Enter E234 in the search box to learn more about \"Perirectal Abscess: Care Instructions. \"  Current as of: April 15, 2020               Content Version: 12.6  © 3884-4422 [employer]. Care instructions adapted under license by [employer] (which disclaims liability or warranty for this information). If you have questions about a medical condition or this instruction, always ask your healthcare professional. [place] [address] any warranty or liability for your use of this information. Learning About Colitis  What is colitis? Colitis is swelling (inflammation) of the colon.  The colon makes up most of the large intestine. Many conditions can cause colitis. What are some types of colitis? · Infectious colitis is a type that appears suddenly. It's caused by a bacteria or virus, such as salmonella, shigella, or campylobacter. · Ischemic colitis is caused by problems with blood flow to the colon. This can happen after surgery. It can also be caused by other health problems. · Microscopic colitis doesn't always have a clear cause. It can only be found with special tests. · Crohn's disease and ulcerative colitis are lifelong diseases. They can cause swelling, inflammation, and deep sores in the lining of the digestive tract. What are the symptoms? Symptoms may include fever, diarrhea that may be bloody, or belly pain. You may also have an urgent need to move your bowels or pain when you move your bowels. Or you may have bleeding from the rectum or weight loss. Your symptoms may depend on the type of colitis you have. For example, microscopic colitis may cause watery diarrhea. Sometimes symptoms go away on their own. If they don't go away, or if you have bleeding or severe pain, call your doctor right away. How is it diagnosed? You may need blood tests or a stool test. You also may need imaging tests like a CT scan. You may have a colonoscopy so that a doctor can look inside your colon. In some cases, the doctor may want to test a sample of tissue from the intestine. This test is called a biopsy. How is it treated? Treatment for colitis depends on the condition that is causing it. · Antibiotics may be used to treat an infection. · Diet changes may help with symptoms. · Medicines can also help to relieve inflammation and control symptoms. · In some cases, surgery to remove parts of the intestine may be needed. Follow-up care is a key part of your treatment and safety. Be sure to make and go to all appointments, and call your doctor if you are having problems.  It's also a good idea to know your test results and keep a list of the medicines you take. Where can you learn more? Go to http://www.gray.com/  Enter C130 in the search box to learn more about \"Learning About Colitis. \"  Current as of: April 15, 2020               Content Version: 12.6  © 4360-6982 [employer]. Care instructions adapted under license by [employer] (which disclaims liability or warranty for this information). If you have questions about a medical condition or this instruction, always ask your healthcare professional. [address] any warranty or liability for your use of this information. Rectal Bleeding: Care Instructions  Your Care Instructions     Rectal bleeding in small amounts is common. You may see red spotting on toilet paper or drops of blood in the toilet. Rectal bleeding has many possible causes, from something as minor as hemorrhoids to something as serious as colon cancer. You may need more tests to find the cause of your bleeding. Follow-up care is a key part of your treatment and safety. Be sure to make and go to all appointments, and call your doctor if you are having problems. It's also a good idea to know your test results and keep a list of the medicines you take. How can you care for yourself at home? · Avoid aspirin and other nonsteroidal anti-inflammatory drugs (NSAIDs), such as ibuprofen (Advil, Motrin) and naproxen (Aleve). They can cause you to bleed more. Ask your doctor if you can take acetaminophen (Tylenol). Read and follow all instructions on the label. · Use a stool softener that contains bran or psyllium. You can save money by buying bran or psyllium (available in bulk at most health food stores) and sprinkling it on foods or stirring it into fruit juice. You can also use a product such as Metamucil or Citrucel. · Take your medicines exactly as directed.  Call your doctor if you think you are having a problem with your medicine. When should you call for help? Call 911 anytime you think you may need emergency care. For example, call if:    · You passed out (lost consciousness). Call your doctor now or seek immediate medical care if:    · You have new or worse pain.     · You have new or worse bleeding from the rectum.     · You are dizzy or light-headed, or you feel like you may faint. Watch closely for changes in your health, and be sure to contact your doctor if:    · You cannot pass stools or gas.     · You do not get better as expected. Where can you learn more? Go to http://www.gray.com/  Enter C958 in the search box to learn more about \"Rectal Bleeding: Care Instructions. \"  Current as of: April 15, 2020               Content Version: 12.6  © 2739-0235 [employer]. Care instructions adapted under license by [employer] (which disclaims liability or warranty for this information). If you have questions about a medical condition or this instruction, always ask your healthcare professional. [address] any warranty or liability for your use of this information.

## 2021-03-05 NOTE — ROUTINE PROCESS
TRANSFER - OUT REPORT: 
 
Verbal report given to Fani Vences) on John Mariano  being transferred to [facility]) for routine progression of care Report consisted of patients Situation, Background, Assessment and  
Recommendations(SBAR). Information from the following report(s) SBAR was reviewed with the receiving nurse. Lines:    
 
Opportunity for questions and clarification was provided. Patient transported with: 
 [employer]

## 2021-03-05 NOTE — PROGRESS NOTES
[facility]  RUR-21%    YADIRA received phone call from [place] with [address], patient has insurance auth for SNF admission and has been assigned to room 101A-nurse to call report to [phone], wing 1. CM notified attending who stated that he will plan for DC later today. Negative COVID result 3/3/21. CM will arrange for wheelchair Macrina Akash once DC is order is placed per patient request.     Meghan Number, MS     Transition of Care Plan to SNF/Rehab    SNF/Rehab Transition:  Patient has been accepted to [facility] and meets criteria for admission. Patient will transported by [facility] to Home wheelchair [address] [place] [address] [phone] and expected to leave at 1900. Communication to Patient/Family:  Met with patient and they are agreeable to the transition plan. Communication to SNF/Rehab:  Bedside RN has been notified to update the transition plan to the facility and call report [phone], wing 1. Discharge information has been updated on the AVS.     Discharge instructions to be fax'd to facility at [phone]. Nursing Please include all hard scripts for controlled substances, med rec and dc summary, and AVS in packet.      Reviewed and confirmed with [place] and facility can manage the patient care needs for the following:     SNF/Rehab Transition:    Jurgen Marshall with (X) only those applicable:    Medication:  [x]  Medications will be available at the facility  []  IV Antibiotics   []  Controlled Substance - hard copy to be sent with patient   []  Weekly Labs   Documents:  [x] Hard RX  [x] MAR  [x] Kardex  [x] AVS  [x]Transfer Summary  []Discharge   Equipment:  []  CPAP/BiPAP  []  Wound Vacuum  []  Romero or Urinary Device  []  PICC/Central Line  []  Nebulizer  []  Ventilator   Treatment:  []Isolation (for MRSA, VRE, etc.)  []Surgical Drain Management  []Tracheostomy Care  []Dressing Changes  []Dialysis with transportation and chair time   []PEG Care  []Oxygen  []Daily Weights for Heart Failure   Dietary:  []Any diet limitations  []Tube Feedings   []Total Parenteral Management (TPN)   Eligible for Medicaid Long Term Services and Supports  Yes:  [] Eligible for medical assistance or will become eligible within 180 days and UAI completed. [] Provider/Patient and/or support system has requested screening. [] UAI copy provided to patient or responsible party  [] UAI unavailable at discharge will send once processed to SNF provider. [] UAI unavailable at discharged mailed to patient  No:   [] Private pay and is not financially eligible for Medicaid within the next 180 days. [] Reside out-of-state.   [] A residents of a state owned/operated facility that is licensed  by [facility] or [facility]  [] Enrollment in [facility] services  [] [address] [place] [address] [facility]  [] Patient /Family declines to have screening completed or provide financial information for screening     Financial Resources:  Medicaid    [] Initiated and application pending   [] Full coverage     Advanced Care Plan:  []Surrogate Decision Maker of Care  []POA  []Communicated Code Status Full code   Other     Rogena Meigs, MS

## 2021-03-06 LAB
ABO + RH BLD: NORMAL
BLD PROD TYP BPU: NORMAL
BLOOD GROUP ANTIBODIES SERPL: NORMAL
BPU ID: NORMAL
CROSSMATCH RESULT,%XM: NORMAL
SPECIMEN EXP DATE BLD: NORMAL
STATUS OF UNIT,%ST: NORMAL
UNIT DIVISION, %UDIV: 0

## 2021-03-09 NOTE — ADT AUTH CERT NOTES
Gastrointestinal Bleeding, Lower - Care Day 4 (2/26/2021) by Te Elmore RN 
 
  
Review Status Review Entered Completed 2/26/2021 13:58  
  
Criteria Review Care Day: 4 Care Date: 2/26/2021 Level of Care: Intermediate Care Guideline Day 2 Level Of Care (X) Floor 2/26/2021 13:58:31 EST by Macrina Maretl   
  2/26 remote tele Clinical Status   
(X) * Hypotension absent 2/26/2021 13:58:31 EST by Macrina Martel   
  labile 91/43 106/51 127/49  
98.6 61 109/52 16 98% ra ( ) * Bleeding absent or reduced 2/26/2021 13:58:31 EST by Macrina Martel   
  monitoring Activity (X) Activity as tolerated Routes ( ) * Oral hydration and diet tolerated 2/26/2021 13:58:31 EST by Macrina Martel   
  npo flex sig 
tap water enema Interventions   
(X) Hgb/Hct   
2/26/2021 13:58:31 EST by Macrina Martel   
  H&H 7.4 22.8 platelets 577 na 548 bun 29 cr 1.52   
* Milestone Additional Notes 2/26/21 LOC IP REMOTE TELE   
  
PER COLORECTAL Patient doing better. Less bleeding A/p GI planning on scoping the patient. Continue conservative management Per attending Rectal bleeding, CKD 3, PAD, T2DM, Ischemic Proctosigmoiditis). Patient seen and examined sitting up in bed. States that she is doing ok. Has only had spotting of blood per rectal.  NPO for flex- sig today. Rectal bleeding 2/2 to Ischemic Proctosigmoiditis (POA)   
-monitor H/H, transfuse to keep Hgb >7.0   
-CTA abd/pelv 2/24: No CT evidence for acute GI bleeding at this time. Stable to slightly increased mucosal enhancement and wall thickening in the rectosigmoid colon consistent with proctocolitis since the prior study. Significant diverticulosis without acute diverticulitis. - GI following- Flex sig scheduled for today   
- NPO   
   
Acute Blood Loss Anemia: 2/2 to above, plan as above.   
- Hgb 7.4 this am   
- Repeat H&H at 1300   
- Transfuse for hgb <7.0   
   
CKD IIIb   
 -hold diuretics -monitor renal function and volume state on IVFs  
   
PAD: hold ASA 2/2 GIB. Constitutional: No acute distress, cooperative, pleasant, answers questions ENT: Oral mucosa moist, oropharynx benign. Resp: CTA bilaterally on room air. No wheezing/rhonchi/rales. No accessory muscle use CV: Regular rhythm, normal rate, no murmurs, gallops, rubs  
 GI: Soft, non distended, non tender. normoactive bowel sounds,   
 Musculoskeletal: No edema, warm, 2+ pulses throughout  
 Neurologic: Moves all extremities.  AAOx3, CN II-XII reviewed, follows commands.   
                      Psych:  Good insight, Not anxious nor agitated.  
   
  
  
T2DM: hold metformin, ISS, accuchecks. ECHO 2/2021: LVEF 47%, normal systolic and dyastolic function.  
  
 
 
2/27 Clinical Review by Mariel Velasquez RN 
 
  
Review Entered Review Status 3/9/2021 08:07 In Primary  
  
Criteria Review 2/27/2021 LOC  IP  telemetry IM note: 
Seen and examined sitting up in bed. States that she is feeling much better. Had two brown bowel movements with only minimal amount of blood noted. Right buttock raised area appears to be getting bigger in size with increased tenderness. No open area noted. Encouraged patient to be out of bed to bedside chair. No new complaints. No overnight events noted.  
  
Assessment & Plan: 
  
Rectal bleeding 2/2 to Ischemic Proctosigmoiditis (POA)  
-monitor H/H, transfuse to keep Hgb >7.0  
-CTA abd/pelv 2/24: No CT evidence for acute GI bleeding at this time. Stable to slightly increased mucosal enhancement and wall thickening in the rectosigmoid colon consistent with proctocolitis since the prior study. Significant diverticulosis without acute diverticulitis. - GI following- Flex sig done (02/27)  
- GI lite diet.  
  
Acute Blood Loss Anemia: 2/2 to above, plan as above.  
- Hgb 7.0 this am  
- Repeat H&H at 1200, 8.1.  
- Monitor labs in am  
- Transfuse for hgb <7.0  
  
 Hyponatremia - Mild, asymptomatic - Improving - Monitor labs.   
  
CKD IIIb  
-hold diuretics -monitor renal function and volume state on IVFs 
  
PAD: hold ASA 2/2 GIB. T2DM: hold metformin, ISS, accuchecks. ECHO 2/2021: LVEF 89%, normal systolic and dyastolic function.  
  
Right buttock/cleft tender region - Wound care following - Encouraged patient to increase movement to relieve pressure.  
  
Code status: Full DVT prophylaxis: SCDs 
  
Care Plan discussed with: Patient/Family and Nurse Anticipated Disposition:Return to [facility]  
  
  
Constitutional:  No acute distress, cooperative, pleasant, answers questions  
ENT:     Oral mucosa moist, oropharynx benign. Resp:    CTA bilaterally. No wheezing/rhonchi/rales. No accessory muscle use CV:       Regular rhythm, normal rate, no murmurs, gallops, rubs GI:       Soft, non distended, non tender. normoactive bowel sounds Musculoskeletal:        BLE edema +2, warm, 2+ pulses throughout Neurologic:      Moves all extremities. AAOx3, CN II-XII reviewed, follows commands Psych:  Good insight, Not anxious nor agitated. Skin:  Right buttock/cleft region- firm tender area noted with surrounding erythema 
  
GI note: 
Subjective: 
Flex sig yesterday with relative improvement. She reports having two small stools. No blood. Hgb 8.1. She denies abdominal and rectal pain. Assessment / Plan : 
  
Ischemic proctosigmoiditis - flex sig 2/26 shows relative improvement 
- Hgb 8.1 (improved) - Monitor and transfuse prn if Hgb < 7 
- continue current diet - Will see on request Sunday. Dr. Yohan Shelton to follow on Monday 
  
  
Vitals: 98.3, HR 65, RR 18, BP 99/48, 94% on RA 
  
RBC: 2.28 (L) HGB: 7.0 (L) HCT: 21.1 (L) RDW: 15.7 (H) PLATELET: 993 (L) MONOCYTES: 14 (H) EOSINOPHILS: 9 (H) IMMATURE GRANULOCYTES: 1 (H) 
  
Sodium: 133 (L) BUN: 26 (H) Creatinine: 1.31 (H) Calcium: 8.2 (L) 
GFR est non-AA: 41 (L) GFR est AA: 50 (L) 
  
Meds: 
Lipitor 40mg PO daily Humalog SC 4x daily SSI Protonix 40mg PO BID  
 
 
 
2/28 Clinical Review by Dev Gusman RN 
 
  
Review Entered Review Status 3/9/2021 08:12 In Primary  
  
Criteria Review 2/28/2021 LOC  IP  telemetry IM note: 
Seen and examined patient. States that she is feeling ok this morning. Having increased pain in right buttock. States shes feels like it is increasing in size since yesterday. No open area noted. Erythema noted surrounding area. Has had abscess in past, in same area that required I&D. Denies any rectal bleeding. Has not had bowel movement since yesterday AM. No new complaints. No overnight events.  
  
Assessment & Plan: 
  
Rectal bleeding 2/2 to Ischemic Proctosigmoiditis (POA)  
-monitor H/H, transfuse to keep Hgb >7.0  
-CTA abd/pelv 2/24: No CT evidence for acute GI bleeding at this time. Stable to slightly increased mucosal enhancement and wall thickening in the rectosigmoid colon consistent with proctocolitis since the prior study. Significant diverticulosis without acute diverticulitis. - GI following- Flex sig done (02/27)  
- GI lite diet.  
  
Acute Blood Loss Anemia: 2/2 to above, plan as above. - Hgb 7.1 this am  
- Monitor labs in am  
- Transfuse for hgb <7.0  
  
Hyponatremia - Mild, asymptomatic - Improving - Monitor labs.   
  
CKD IIIb  
-hold diuretics -monitor renal function and volume state on IVFs 
  
PAD: hold ASA 2/2 GIB. T2DM: hold metformin, ISS, accuchecks. ECHO 2/2021: LVEF 61%, normal systolic and dyastolic function.  
  
Right buttock/cleft tender region - Wound care following  
- Encouraged patient to increase movement to relieve pressure - Notify CR surgery for possible I&D 
  
  
Constitutional:  No acute distress, cooperative, pleasant, answers questions ENT:     Oral mucosa moist, oropharynx benign. Resp:    CTA bilaterally. No wheezing/rhonchi/rales. No accessory muscle use CV:       Regular rhythm, normal rate, no murmurs, gallops, rubs GI:       Soft, non distended, non tender. normoactive bowel sounds, no hepatosplenomegaly Musculoskeletal:         BLE edema +2, warm, 2+ pulses throughout Neurologic:      Moves all extremities. AAOx3, CN II-XII reviewed, follows commands Psych:  Good insight, Not anxious nor agitated. Skin:  Right buttock/cleft region- firm tender area noted with surrounding errythema  
  
Colon/rectal surgery note: CRS Pt with c/o pain around anus Perianal: folliculitis approx [id number] on right buttock Spontaneously draining, I further expressed mor purulence out 
  
Plan: Folliculitis- doubt classic perianal abscess. Regardless, soak in sitz bath TID. Start zosyn given diabetes, overall deconditioned state. Will ask PharmD to renal adjust dose 
  
Vitals: 100.7, HR 71, RR 18, /64, 100% on RA 
  
RBC: 2.28 (L) HGB: 7.1 (L) HCT: 21.6 (L) RDW: 15.7 (H) PLATELET: 022 (L) EOSINOPHILS: 8 (H) IMMATURE GRANULOCYTES: 1 (H) 
  
Sodium: 133 (L) BUN: 22 (H) Creatinine: 1.39 (H) Calcium: 7.8 (L) GFR est non-AA: 38 (L) GFR est AA: 46 (L) 
  
Meds: 
Tylenol 650mg PO q6h PRN x 1 Lipitor 40mg PO daily Humalog SC 4x daily SSI Protonix 40mg PO BID Zosyn 3.375g IV q8h  
  
Gastrointestinal Bleeding, Lower - Care Day 7 (3/1/2021) by Elizabet Tejeda RN 
 
  
Review Entered Review Status 3/2/2021 12:01 Completed  
  
Criteria Review Care Day: 7 Care Date: 3/1/2021 Level of Care: Intermediate Care Guideline Day 2 Level Of Care (X) Floor 3/2/2021 12:01:37 EST by Katya vela medical   
Clinical Status   
(X) * Hypotension absent 3/2/2021 12:01:37 EST by Molly Rangel   
  /54 (X) * Bleeding absent or reduced 3/2/2021 12:01:37 EST by Katya Ramires   
  Has not noted any blood in bowel movement. Activity (X) Activity as tolerated 3/2/2021 12:01:37 EST by Krisitne Ybarra   
  as tolerated w/ assistance Routes   
(X) * Oral hydration and diet tolerated 3/2/2021 12:01:37 EST by Kristine Ybarra   
  gi lite diet Interventions   
(X) Hgb/Hct * Milestone Additional Notes 3/1/2021 LOC IP MEDICAL  
VS  98.4 65 108/54 18 96% ROOM AIR  
LABS    
RBC: 2.31 (L) HGB: 7.1 (L) HCT: 21.7 (L) MCV: 93.9 MCH: 30.7 MCHC: 32.7 RDW: 15.7 (H) PLATELET: 152 (L) Sodium: 132 (L) Potassium: 3.6 Chloride: 100 CO2: 23 Anion gap: 9 Glucose: 79 BUN: 22 (H) Creatinine: 1.51 (H) BUN/Creatinine ratio: 15 Calcium: 8.1 (L)  
GFR est non-AA: 35 (L)  
GFR est AA: 42 (L) MEDS Tylenol 650mg po q6 prn x1 Lipitor 40mg po qday Protonix 40mg po bid Zosyn 3.375g iv q8  
  
ATTENDING NOTE Interval history / Subjective:  
 Follow-up for issues listed below. (Rectal bleeding, CKD 3, PAD, T2DM, Ischemic Proctosigmoiditis). Seen and examined patient. States that she is feeling good today. Has not noted any blood in bowel movement. Abscess on right buttock examined- appears to be bigger in size than yesterday. States she is having increased pain. Discussed with RN to notify CR for further management. No new complaints noted. No overnight events noted.  
   
Assessment & Plan:  
   
Rectal bleeding 2/2 to Ischemic Proctosigmoiditis (POA) -monitor H/H, transfuse to keep Hgb >7.0   
-CTA abd/pelv 2/24: No CT evidence for acute GI bleeding at this time. Stable to slightly increased mucosal enhancement and wall thickening in the rectosigmoid colon consistent with proctocolitis since the prior study. Significant diverticulosis without acute diverticulitis. - GI following- Flex sig done (02/27) - GI lite diet.   
   
Acute Blood Loss Anemia: 2/2 to above, plan as above. - Hgb 7.1 this am   
- Monitor labs in am   
- Transfuse for hgb <7.0   
   
Hyponatremia - Mild, asymptomatic - Improving - Monitor labs.   
   
CKD IIIb   
-hold diuretics -monitor renal function   
   
PAD: hold ASA 2/2 GIB. T2DM: hold metformin, ISS, accuchecks. ECHO 2/2021: LVEF 98%, normal systolic and dyastolic function.   
   
Right buttock/cleft Abscess - Wound care following - Encouraged patient to increase movement to relieve pressure - CR following- for I&D in am   
- Continue antibiotics.    
   
Constitutional: No acute distress, cooperative, pleasant, answers questions ENT: Oral mucosa moist, oropharynx benign. Resp: CTA bilaterally on room air. No wheezing/rhonchi/rales. No accessory muscle use CV: Regular rhythm, normal rate, no murmurs, gallops, rubs  
 GI: Soft, non distended, non tender. normoactive bowel sounds  
 Musculoskeletal: BLE edema +2, warm, 2+ pulses throughout  
 Neurologic: Moves all extremities.  AAOx3, CN II-XII reviewed, follows commands.   
                      Psych:  Good insight, Not anxious nor agitated. Skin:  Right buttock abscess noted with surrounding erythema. Tenderness to touch GASTROENTEROLOGY NOTE Subjective:  
   
She developed a buttock abscess over the weekend, which was expressed yesterday by CRS Dr. Elizabeth Tobias. She is getting IV Zosyn for that.  This morning she states that she feels like it has gotten bigger and firmer.    
   
She denies blood from the rectum, describing having small amounts of tan-colored liquid discharge. Aracely Loc is blood coming from the abscess area on the right buttock, though. Assessment / Plan :  
   
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  
- Flex sig 2/26/21 Dr. Ezequiel Bowers - Diffuse coalescing ulcerations involving all of rectum from dentate line to appx 10 cm from the anal verge. There is less pallor and areas of patchy erythema, improved from colonoscopy 2/11/21. Brown stool in rectum precluding views Sigmoid with brown stool, linear ischemic ulcers, did not transverse past 15 cm from the anal verge  
- Hgb stable 7.1 with no further rectal bleeding  
- Contrasted CT 2/24/21 - Stable to slightly increased mucosal enhancement and wall thickening in the rectosigmoid colon consistent with proctocolitis since the prior study (2/21) - No dietary restrictions from GI  
- OK for discharge from GI perspective once cleared by CRS  
   
Abscess right buttock  
- on IV Zosyn - Defer to Colorectal management COLON AND RECTAL SURGERY Complaining of right buttock pain  
  
abd soft Perirectal exam demonstrates a phlegmon on the right buttock.   
   
A/p buttock abscess Will get this drained tomorrow as she has had a diet already today  
abx are not working so surgical drainage is the next option. Cellulitis - Care Day 1 (3/2/2021) by Jay Jay Mckeon RN 
 
  
Review Entered Review Status 3/5/2021 09:28 Completed  
  
Criteria Review Care Day: 1 Care Date: 3/2/2021 Level of Care: Inpatient Floor Guideline Day 1 Level Of Care (X) Floor [C]   
3/5/2021 09:28:49 EST by Shobha Cobb   
  IP FLOOR Clinical Status   
(X) * Clinical Indications met [D]   
3/5/2021 09:28:49 EST by Molly Rangel   
  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  she developed a right buttock abscess Activity (X) Activity as tolerated 3/5/2021 09:28:49 EST by Shobha Cobb   
  with assistance Routes (X) Diet as tolerated 3/5/2021 09:28:49 EST by Shobha Cobb   
  NPO for procedure diabetic diet Interventions (X) Possible lesion and blood cultures 3/5/2021 09:28:49 EST by Molly Rangel   
  WOUND CX  Culture result: Abnormal   
LIGHT * METHICILLIN RESISTANT STAPHYLOCOCCUS AUREUS * Medications (X) IV antibiotics 3/5/2021 09:28:49 EST by JoseIvánMolly Moreira   
  zoysn 3.375g iv q8 * Milestone Additional Notes 3/2/2021 LOC IP MEDICAL  
VS 98 52 18 111/66 98% ROOM AIR  
LABS               
WBC: 6.5 NRBC: 0.0  
RBC: 2.40 (L) HGB: 7.6 (L) HCT: 25.7 (L) MCV: 107.1 (H) MCH: 31.7 MCHC: 29.6 (L) RDW: 15.5 (H) PLATELET: 515 (L) Sodium: 132 (L) Potassium: 4.2 Chloride: 101 CO2: 22 Anion gap: 9 Glucose: 78 BUN: 22 (H) Creatinine: 1.58 (H) BUN/Creatinine ratio: 14  
Calcium: 8.4 (L)  
GFR est non-AA: 33 (L)  
GFR est AA: 40 (L) MEDS Tylenol 650mg po x1  
zoysn 3.375g iv q8  
  
  
ATTENDING NOTE Follow-up for issues listed below. (Rectal bleeding, CKD 3, PAD, T2DM, Ischemic Proctosigmoiditis). Seen and examined patient. States that she is feeling good today. Has been NPO for I&D today of the right buttock abscess. No new complaints noted. No overnight events noted. Assessment & Plan:  
   
Rectal bleeding 2/2 to Ischemic Proctosigmoiditis (POA) -monitor H/H, transfuse to keep Hgb >7.0   
-CTA abd/pelv 2/24: No CT evidence for acute GI bleeding at this time. Stable to slightly increased mucosal enhancement and wall thickening in the rectosigmoid colon consistent with proctocolitis since the prior study. Significant diverticulosis without acute diverticulitis. - GI following- Flex sig done (02/27) - GI lite diet.   
   
Acute Blood Loss Anemia: 2/2 to above, plan as above. - Hgb 7.6 this am   
- Monitor labs in am   
- Transfuse for hgb <7.0   
   
Hyponatremia - Mild, asymptomatic - Improving - Monitor labs.   
   
CKD IIIb   
-hold diuretics - Creatine up to 1.58 today - Avoid nephro toxins and renal dose medications  
-monitor renal function   
   
PAD: hold ASA 2/2 GIB. T2DM: hold metformin, ISS, accuchecks. ECHO 2/2021: LVEF 27%, normal systolic and dyastolic function.   
   
Right buttock/cleft Abscess - Wound care following - Encouraged patient to increase movement to relieve pressure - CR following- for I&D today   
- Continue antibiotics.   
   
Constitutional: No acute distress, cooperative, pleasant ENT: Oral mucosa moist, oropharynx benign. Resp: CTA bilaterally on room air No wheezing/rhonchi/rales. No accessory muscle use CV: Regular rhythm, normal rate, no murmurs, gallops, rubs  
 GI: Soft, non distended, non tender. normoactive bowel sounds  
 Musculoskeletal: BLE edema +2, warm, 2+ pulses throughout  
 Neurologic: Moves all extremities.  AAOx3, CN II-XII reviewed, follows commands.   
                      Psych:  Good insight, Not anxious nor agitated. Skin:  Right buttock abscess noted with surrounding erythema, tender to touch. OP NOTE PREOPERATIVE DIAGNOSIS:  Right buttock abscess.  
   
POSTOPERATIVE DIAGNOSIS:  Right buttock abscess.  
   
PROCEDURE PERFORMED:  Incision and drainage of right buttock abscess. COMPLICATIONS:  None apparent.  
   
SPECIMENS REMOVED:  Culture of right buttock abscess, sent to pathology.  
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 Ankit Tsang has been in the hospital with this diagnosis and rectal bleeding.  While she seems to be recovering from this, she developed a right buttock abscess.  This had been tender and painful and not responding to antibiotics and therefore she was consented for an incision and drainage of this abscess.  Risks and benefits were discussed with her. Cellulitis - Care Day 2 (3/3/2021) by Sami Grewal RN 
 
  
Review Entered Review Status 3/5/2021 09:34 Completed  
  
Criteria Review Care Day: 2 Care Date: 3/3/2021 Level of Care: Inpatient Floor Guideline Day 2 Level Of Care (X) Floor 3/5/2021 09:34:44 EST by Aneta Pugh   IP medical   
Clinical Status   
(X) * Dehydration absent   
(X) * Mental status at baseline 3/5/2021 09:34:44 EST by Molly Rangel   
  alert,oriented   
(X) * Hemodynamic stability 3/5/2021 09:34:44 EST by Molly Rangel   
  98.3 62 141/71 18 97% room air   
(X) * Afebrile or fever improved 3/5/2021 09:34:44 EST by Molly Rangel   
  temp 98.3 Activity (X) Activity as tolerated 3/5/2021 09:34:44 EST by Elton Banda   
  with assistance Routes   
(X) * Oral hydration, medications 3/5/2021 09:34:44 EST by Elton Banda   
  MEDS Lipitor 40mg po qday Colace 100mg po qday SSI x2 Protonix 40mg po bid (X) Diet as tolerated 3/5/2021 09:34:44 EST by Elton Banda   
  diabetic diet Interventions (X) WBC   
3/5/2021 09:34:44 EST by Molly Rangel   
  WBC 6.4 Medications (X) IV antibiotics 3/5/2021 09:34:44 EST by Molly Rangel   
  zoysn 3.375g iv q8 * Milestone Additional Notes 3/3/2021 LOC IP MEDICAL  
VS  98.3 62 141/71 18 97% room air LABS       
WBC: 6.4 NRBC: 0.0  
RBC: 2.45 (L) HGB: 7.6 (L) HCT: 23.4 (L) MCV: 95.5 MCH: 31.0 MCHC: 32.5  
RDW: 15.5 (H) PLATELET: 176 (L)      
Sodium: 133 (L) Potassium: 4.6 Chloride: 104 CO2: 22 Anion gap: 7 Glucose: 223 (H) BUN: 19 Creatinine: 1.56 (H) BUN/Creatinine ratio: 12 Calcium: 8.4 (L)  
GFR est non-AA: 33 (L)  
GFR est AA: 40 (L) ATTENDING NOTE Follow-up for issues listed below. (Rectal bleeding, CKD 3, PAD, T2DM, Ischemic Proctosigmoiditis). Seen and examined patient sitting on side of bed, states that she is feeling \"great\" today. S/p I&D of right buttock abscess. Dressing is intact. States it feels tender but not painful. Denies any rectal bleeding. Had one small bowel movement this morning. No new complaints. No overnight events noted. Stable for discharge- Pending insurance auth. Assessment & Plan:  
   
Rectal bleeding 2/2 to Ischemic Proctosigmoiditis (POA) -monitor H/H, transfuse to keep Hgb >7.0   
-CTA abd/pelv 2/24: No CT evidence for acute GI bleeding at this time. Stable to slightly increased mucosal enhancement and wall thickening in the rectosigmoid colon consistent with proctocolitis since the prior study. Significant diverticulosis without acute diverticulitis. - GI following- Flex sig done (02/27) - GI lite diet.   
   
Acute Blood Loss Anemia: 2/2 to above, plan as above. - Hgb 7.6 this am   
- Monitor labs in am   
- Transfuse for hgb <7.0   
   
Hyponatremia - Mild, asymptomatic - Improving - Monitor labs.   
   
CKD IIIb   
-hold diuretics - Creatine 1.56  
- Avoid nephro toxins and renal dose medications  
-monitor renal function - Follow up with nephrology outpatient- already has appointment.   
   
PAD: hold ASA 2/2 GIB. T2DM: hold metformin, ISS, accuchecks. ECHO 2/2021: LVEF 94%, normal systolic and dyastolic function.   
   
Right buttock/cleft Abscess - Wound care following - Encouraged patient to increase movement to relieve pressure - CR following- s/p I&D 03/02/21  
- Continue antibiotics. Constitutional: No acute distress, cooperative, pleasant, answers questions. ENT: Oral mucosa moist, oropharynx benign. Resp: CTA bilaterally. No wheezing/rhonchi/rales. No accessory muscle use CV: Regular rhythm, normal rate, no murmurs, gallops, rubs  
 GI: Soft, non distended, non tender. normoactive bowel sounds  
 Musculoskeletal: No edema, warm, 2+ pulses throughout  
 Neurologic: Moves all extremities.  AAOx3, CN II-XII reviewed, follows commands  
                      Psych:  Good insight, Not anxious nor agitated. Skin: See wound care noted  
   
  
Gastroenterology note Chart review: She had operative I&D of right buttock abscess yesterday by Dr. Selene Nunn. Anoscopy was negative for fistulous tract.  No report of blood from the rectum. Plan for d/c to rehab, hopefully today. Assessment / Plan :  
   
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  
- Flex sig 2/26/21 Dr. Esqueda Smoker - Diffuse coalescing ulcerations involving all of rectum from dentate line to appx 10 cm from the anal verge. There is less pallor and areas of patchy erythema, improved from colonoscopy 2/11/21. Brown stool in rectum precluding views Sigmoid with brown stool, linear ischemic ulcers, did not transverse past 15 cm from the anal verge  
- Hgb stable 7.1 with no further rectal bleeding  
- Contrasted CT 2/24/21 - Stable to slightly increased mucosal enhancement and wall thickening in the rectosigmoid colon consistent with proctocolitis since the prior study (2/21) - No dietary restrictions from GI  
- OK for discharge from GI perspective once cleared by CRS GENERAL SURGERY NOTE Doing well after I and d of buttock abscess A/p continue local wound care Sitz baths Ok to be discharged from my standpoint. She probably doesn't need any further abx since she has been completely drained. Cultures are pending in case she recurs. She can follow up with me in the office regarding the buttock wound. I think she would benefit from going back to [facility] to be evaluated for the proctosigmoiditis. Cellulitis - Care Day 3 (3/4/2021) by Bradley Friend RN 
 
  
Review Entered Review Status 3/5/2021 14:04 Completed  
  
Criteria Review Care Day: 3 Care Date: 3/4/2021 Level of Care: Inpatient Floor Guideline Day 3 Level Of Care (X) Floor to discharge [E]   
3/5/2021 14:04:39 EST by Andrew VAUGHN medical   
Clinical Status   
(X) * Hemodynamic stability 3/5/2021 14:04:39 EST by Andrew Ortiz   98.3 58 144/64 18 96% ROOM AIR   
(X) * Afebrile or fever improved 3/5/2021 14:04:39 EST by Molly Rangel   
  temp 98.3   
(X) * Skin exam stable or improved 3/5/2021 14:04:39 EST by Jairo Ramos   
  s/p I&D of right buttock abscess   
(X) * Mental status at baseline 3/5/2021 14:04:39 EST by Jairo Ramos   
  alert/oriented ( ) * Antibiotic treatment needs appropriate for next level of care   
(X) * Pain absent or manageable at next level of care 3/5/2021 14:04:39 EST by Jairo Ramos   
  Tylenol 650mg po q6 prn x2   
( ) * Discharge plans and education understood Activity   
(X) * Ambulatory or acceptable for next level of care 3/5/2021 14:04:39 EST by Jairo Ramos   
  as tolerated w/ assistance Routes   
(X) * Oral hydration, medications, [F] and diet 3/5/2021 14:04:39 EST by Jairo Ramos   
  diabetic diet Medications (X) Parenteral or oral antibiotics [B]   
3/5/2021 14:04:39 EST by Molly Rangel   
  Zosyn 3.375g iv q8 * Milestone Additional Notes 3/4/2021 LOC IP MEDICAL  
VS  98.3 58 144/64 18 96% ROOM AIR  
LABS NO LABS/IMAGING  
  
  
MEDS Tylenol 650mg po q6 prn x2 Lipitor 40mg po qday Vibra tabs 100mg po q12 Protonix 40mg po bid ATTENDING NOTE Interval history / Subjective: Follow-up for issues listed below. (Rectal bleeding, CKD 3, PAD, T2DM, Ischemic Proctosigmoiditis)  
-Patient seen and examined, no c/o's.   
   
Assessment & Plan:  
   
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  
-monitor H/H, transfuse to keep Hgb >7.0   
-CTA abd/pelv 2/24: No CT evidence for acute GI bleeding at this time. Stable to slightly increased mucosal enhancement and wall thickening in the rectosigmoid colon consistent with proctocolitis since the prior study. Significant diverticulosis without acute diverticulitis. -GI following:Flex Sig (02/27): Diffuse coalescing ulcerations involving all of rectum from dentate line to appx 10 cm from the anal verge. There is less pallor and areas of patchy erythema, improved from colonoscopy 2/11/21. Brown stool in rectum precluding views.   
   
ABL Anemia: 2/2 to above, plan as above. Hyponatremia: mild, asymptomatic, monitor.   
   
CKD 3b:   
-hold diuretics  
-avoid nephro toxins, renal dose meds, monitor renal function, f/u Nephrology outpatient-  appt set   
   
PAD: hold ASA 2/2 GIB. T2DM: hold metformin, ISS, accuchecks. ECHO 2/2021: LVEF 33%, normal systolic and dyastolic function.   
   
Right Buttock/Cleft Abscess:  
-wound care following   
-s/p I&D 03/02/21, Anoscopy was negative for fistulous tract. -IV ABT zosyn d/c 3/4  
-MRSA+ wound culture: doxycycline 3/4  
   
DVTppx: SCDs GIppx: Protonix Code Status: Full Code Diet: GI Lite slowly advane as tolerated- DM diet Activity: OOB to chair TID and PRN, PT/OT Constitutional: No acute distress, cooperative, pleasant ENT: Oral mucosa moist.   
Resp: CTA bilaterally. No wheezing/rhonchi/rales. No accessory muscle use. CV: Regular rhythm, normal rate, S1,S2.  
 GI/: Soft, obese, non tender, no guarding, BS present, blood tinged underwear. Voids Freely.  
 Musculoskeletal: BLE edema, erythema, warm, 2+ pulses throughout.  
 Neurologic: Moves all extremities. AAOx3, CN II-XII reviewed. Skin:  Good turgor, right inner butt cheek: ulcer Psych:  Good insight, Not anxious nor agitated.

## 2021-03-09 NOTE — ADT AUTH CERT NOTES
Gastrointestinal Bleeding, Lower - Care Day 4 (2/26/2021) by Niels Duarte RN 
 
  
Review Status Review Entered Completed 2/26/2021 13:58  
  
Criteria Review Care Day: 4 Care Date: 2/26/2021 Level of Care: Intermediate Care Guideline Day 2 Level Of Care (X) Floor 2/26/2021 13:58:31 EST by Estefania Lazcano   
  2/26 remote tele Clinical Status   
(X) * Hypotension absent 2/26/2021 13:58:31 EST by Estefania Lazcano   
  labile 91/43 106/51 127/49  
98.6 61 109/52 16 98% ra ( ) * Bleeding absent or reduced 2/26/2021 13:58:31 EST by Estefania Lazcano   
  monitoring Activity (X) Activity as tolerated Routes ( ) * Oral hydration and diet tolerated 2/26/2021 13:58:31 EST by Estefania Lazcano   
  npo flex sig 
tap water enema Interventions   
(X) Hgb/Hct   
2/26/2021 13:58:31 EST by Estefania Lazcano   
  H&H 7.4 22.8 platelets 978 na 693 bun 29 cr 1.52   
* Milestone Additional Notes 2/26/21 LOC IP REMOTE TELE   
  
PER COLORECTAL Patient doing better. Less bleeding A/p GI planning on scoping the patient. Continue conservative management Per attending Rectal bleeding, CKD 3, PAD, T2DM, Ischemic Proctosigmoiditis). Patient seen and examined sitting up in bed. States that she is doing ok. Has only had spotting of blood per rectal.  NPO for flex- sig today. Rectal bleeding 2/2 to Ischemic Proctosigmoiditis (POA)   
-monitor H/H, transfuse to keep Hgb >7.0   
-CTA abd/pelv 2/24: No CT evidence for acute GI bleeding at this time. Stable to slightly increased mucosal enhancement and wall thickening in the rectosigmoid colon consistent with proctocolitis since the prior study. Significant diverticulosis without acute diverticulitis. - GI following- Flex sig scheduled for today   
- NPO   
   
Acute Blood Loss Anemia: 2/2 to above, plan as above.   
- Hgb 7.4 this am   
- Repeat H&H at 1300   
- Transfuse for hgb <7.0   
   
CKD IIIb   
 -hold diuretics -monitor renal function and volume state on IVFs  
   
PAD: hold ASA 2/2 GIB. Constitutional: No acute distress, cooperative, pleasant, answers questions ENT: Oral mucosa moist, oropharynx benign. Resp: CTA bilaterally on room air. No wheezing/rhonchi/rales. No accessory muscle use CV: Regular rhythm, normal rate, no murmurs, gallops, rubs  
 GI: Soft, non distended, non tender. normoactive bowel sounds,   
 Musculoskeletal: No edema, warm, 2+ pulses throughout  
 Neurologic: Moves all extremities.  AAOx3, CN II-XII reviewed, follows commands.   
                      Psych:  Good insight, Not anxious nor agitated.  
   
  
  
T2DM: hold metformin, ISS, accuchecks. ECHO 2/2021: LVEF 38%, normal systolic and dyastolic function.  
  
 
 
2/27 Clinical Review by Brijesh Adrian RN 
 
  
Review Entered Review Status 3/9/2021 08:07 In Primary  
  
Criteria Review 2/27/2021 LOC  IP  telemetry IM note: 
Seen and examined sitting up in bed. States that she is feeling much better. Had two brown bowel movements with only minimal amount of blood noted. Right buttock raised area appears to be getting bigger in size with increased tenderness. No open area noted. Encouraged patient to be out of bed to bedside chair. No new complaints. No overnight events noted.  
  
Assessment & Plan: 
  
Rectal bleeding 2/2 to Ischemic Proctosigmoiditis (POA)  
-monitor H/H, transfuse to keep Hgb >7.0  
-CTA abd/pelv 2/24: No CT evidence for acute GI bleeding at this time. Stable to slightly increased mucosal enhancement and wall thickening in the rectosigmoid colon consistent with proctocolitis since the prior study. Significant diverticulosis without acute diverticulitis. - GI following- Flex sig done (02/27)  
- GI lite diet.  
  
Acute Blood Loss Anemia: 2/2 to above, plan as above.  
- Hgb 7.0 this am  
- Repeat H&H at 1200, 8.1.  
- Monitor labs in am  
- Transfuse for hgb <7.0  
  
 Hyponatremia - Mild, asymptomatic - Improving - Monitor labs.   
  
CKD IIIb  
-hold diuretics -monitor renal function and volume state on IVFs 
  
PAD: hold ASA 2/2 GIB. T2DM: hold metformin, ISS, accuchecks. ECHO 2/2021: LVEF 19%, normal systolic and dyastolic function.  
  
Right buttock/cleft tender region - Wound care following - Encouraged patient to increase movement to relieve pressure.  
  
Code status: Full DVT prophylaxis: SCDs 
  
Care Plan discussed with: Patient/Family and Nurse Anticipated Disposition:Return to [place]  
  
  
Constitutional:  No acute distress, cooperative, pleasant, answers questions  
ENT:     Oral mucosa moist, oropharynx benign. Resp:    CTA bilaterally. No wheezing/rhonchi/rales. No accessory muscle use CV:       Regular rhythm, normal rate, no murmurs, gallops, rubs GI:       Soft, non distended, non tender. normoactive bowel sounds Musculoskeletal:        BLE edema +2, warm, 2+ pulses throughout Neurologic:      Moves all extremities. AAOx3, CN II-XII reviewed, follows commands Psych:  Good insight, Not anxious nor agitated. Skin:  Right buttock/cleft region- firm tender area noted with surrounding erythema 
  
GI note: 
Subjective: 
Flex sig yesterday with relative improvement. She reports having two small stools. No blood. Hgb 8.1. She denies abdominal and rectal pain. Assessment / Plan : 
  
Ischemic proctosigmoiditis - flex sig 2/26 shows relative improvement 
- Hgb 8.1 (improved) - Monitor and transfuse prn if Hgb < 7 
- continue current diet - Will see on request Sunday. Dr. June Harmon to follow on Monday 
  
  
Vitals: 98.3, HR 65, RR 18, BP 99/48, 94% on RA 
  
RBC: 2.28 (L) HGB: 7.0 (L) HCT: 21.1 (L) RDW: 15.7 (H) PLATELET: 343 (L) MONOCYTES: 14 (H) EOSINOPHILS: 9 (H) IMMATURE GRANULOCYTES: 1 (H) 
  
Sodium: 133 (L) BUN: 26 (H) Creatinine: 1.31 (H) Calcium: 8.2 (L) 
GFR est non-AA: 41 (L) GFR est AA: 50 (L) 
  
Meds: 
Lipitor 40mg PO daily Humalog SC 4x daily SSI Protonix 40mg PO BID  
 
 
 
2/28 Clinical Review by Dev Gusman RN 
 
  
Review Entered Review Status 3/9/2021 08:12 In Primary  
  
Criteria Review 2/28/2021 LOC  IP  telemetry IM note: 
Seen and examined patient. States that she is feeling ok this morning. Having increased pain in right buttock. States shes feels like it is increasing in size since yesterday. No open area noted. Erythema noted surrounding area. Has had abscess in past, in same area that required I&D. Denies any rectal bleeding. Has not had bowel movement since yesterday AM. No new complaints. No overnight events.  
  
Assessment & Plan: 
  
Rectal bleeding 2/2 to Ischemic Proctosigmoiditis (POA)  
-monitor H/H, transfuse to keep Hgb >7.0  
-CTA abd/pelv 2/24: No CT evidence for acute GI bleeding at this time. Stable to slightly increased mucosal enhancement and wall thickening in the rectosigmoid colon consistent with proctocolitis since the prior study. Significant diverticulosis without acute diverticulitis. - GI following- Flex sig done (02/27)  
- GI lite diet.  
  
Acute Blood Loss Anemia: 2/2 to above, plan as above. - Hgb 7.1 this am  
- Monitor labs in am  
- Transfuse for hgb <7.0  
  
Hyponatremia - Mild, asymptomatic - Improving - Monitor labs.   
  
CKD IIIb  
-hold diuretics -monitor renal function and volume state on IVFs 
  
PAD: hold ASA 2/2 GIB. T2DM: hold metformin, ISS, accuchecks. ECHO 2/2021: LVEF 39%, normal systolic and dyastolic function.  
  
Right buttock/cleft tender region - Wound care following  
- Encouraged patient to increase movement to relieve pressure - Notify CR surgery for possible I&D 
  
  
Constitutional:  No acute distress, cooperative, pleasant, answers questions ENT:     Oral mucosa moist, oropharynx benign. Resp:    CTA bilaterally. No wheezing/rhonchi/rales. No accessory muscle use CV:       Regular rhythm, normal rate, no murmurs, gallops, rubs GI:       Soft, non distended, non tender. normoactive bowel sounds, no hepatosplenomegaly Musculoskeletal:         BLE edema +2, warm, 2+ pulses throughout Neurologic:      Moves all extremities. AAOx3, CN II-XII reviewed, follows commands Psych:  Good insight, Not anxious nor agitated. Skin:  Right buttock/cleft region- firm tender area noted with surrounding errythema  
  
Colon/rectal surgery note: CRS Pt with c/o pain around anus Perianal: folliculitis approx [id number] on right buttock Spontaneously draining, I further expressed mor purulence out 
  
Plan: Folliculitis- doubt classic perianal abscess. Regardless, soak in sitz bath TID. Start zosyn given diabetes, overall deconditioned state. Will ask PharmD to renal adjust dose 
  
Vitals: 100.7, HR 71, RR 18, /64, 100% on RA 
  
RBC: 2.28 (L) HGB: 7.1 (L) HCT: 21.6 (L) RDW: 15.7 (H) PLATELET: 821 (L) EOSINOPHILS: 8 (H) IMMATURE GRANULOCYTES: 1 (H) 
  
Sodium: 133 (L) BUN: 22 (H) Creatinine: 1.39 (H) Calcium: 7.8 (L) GFR est non-AA: 38 (L) GFR est AA: 46 (L) 
  
Meds: 
Tylenol 650mg PO q6h PRN x 1 Lipitor 40mg PO daily Humalog SC 4x daily SSI Protonix 40mg PO BID Zosyn 3.375g IV q8h  
  
Gastrointestinal Bleeding, Lower - Care Day 7 (3/1/2021) by Radha Flannery RN 
 
  
Review Entered Review Status 3/2/2021 12:01 Completed  
  
Criteria Review Care Day: 7 Care Date: 3/1/2021 Level of Care: Intermediate Care Guideline Day 2 Level Of Care (X) Floor 3/2/2021 12:01:37 EST by Vincent Estrada   
  ip medical   
Clinical Status   
(X) * Hypotension absent 3/2/2021 12:01:37 EST by Molly Rangel   
  /54 (X) * Bleeding absent or reduced 3/2/2021 12:01:37 EST by Vincent Estrada   
  Has not noted any blood in bowel movement. Activity (X) Activity as tolerated 3/2/2021 12:01:37 EST by Fili Mcnally   
  as tolerated w/ assistance Routes   
(X) * Oral hydration and diet tolerated 3/2/2021 12:01:37 EST by Fili Mcnally   
  gi lite diet Interventions   
(X) Hgb/Hct * Milestone Additional Notes 3/1/2021 LOC IP MEDICAL  
VS  98.4 65 108/54 18 96% ROOM AIR  
LABS    
RBC: 2.31 (L) HGB: 7.1 (L) HCT: 21.7 (L) MCV: 93.9 MCH: 30.7 MCHC: 32.7 RDW: 15.7 (H) PLATELET: 608 (L) Sodium: 132 (L) Potassium: 3.6 Chloride: 100 CO2: 23 Anion gap: 9 Glucose: 79 BUN: 22 (H) Creatinine: 1.51 (H) BUN/Creatinine ratio: 15 Calcium: 8.1 (L)  
GFR est non-AA: 35 (L)  
GFR est AA: 42 (L) MEDS Tylenol 650mg po q6 prn x1 Lipitor 40mg po qday Protonix 40mg po bid Zosyn 3.375g iv q8  
  
ATTENDING NOTE Interval history / Subjective:  
 Follow-up for issues listed below. (Rectal bleeding, CKD 3, PAD, T2DM, Ischemic Proctosigmoiditis). Seen and examined patient. States that she is feeling good today. Has not noted any blood in bowel movement. Abscess on right buttock examined- appears to be bigger in size than yesterday. States she is having increased pain. Discussed with RN to notify CR for further management. No new complaints noted. No overnight events noted.  
   
Assessment & Plan:  
   
Rectal bleeding 2/2 to Ischemic Proctosigmoiditis (POA) -monitor H/H, transfuse to keep Hgb >7.0   
-CTA abd/pelv 2/24: No CT evidence for acute GI bleeding at this time. Stable to slightly increased mucosal enhancement and wall thickening in the rectosigmoid colon consistent with proctocolitis since the prior study. Significant diverticulosis without acute diverticulitis. - GI following- Flex sig done (02/27) - GI lite diet.   
   
Acute Blood Loss Anemia: 2/2 to above, plan as above. - Hgb 7.1 this am   
- Monitor labs in am   
- Transfuse for hgb <7.0   
   
Hyponatremia - Mild, asymptomatic - Improving - Monitor labs.   
   
CKD IIIb   
-hold diuretics -monitor renal function   
   
PAD: hold ASA 2/2 GIB. T2DM: hold metformin, ISS, accuchecks. ECHO 2/2021: LVEF 85%, normal systolic and dyastolic function.   
   
Right buttock/cleft Abscess - Wound care following - Encouraged patient to increase movement to relieve pressure - CR following- for I&D in am   
- Continue antibiotics.    
   
Constitutional: No acute distress, cooperative, pleasant, answers questions ENT: Oral mucosa moist, oropharynx benign. Resp: CTA bilaterally on room air. No wheezing/rhonchi/rales. No accessory muscle use CV: Regular rhythm, normal rate, no murmurs, gallops, rubs  
 GI: Soft, non distended, non tender. normoactive bowel sounds  
 Musculoskeletal: BLE edema +2, warm, 2+ pulses throughout  
 Neurologic: Moves all extremities.  AAOx3, CN II-XII reviewed, follows commands.   
                      Psych:  Good insight, Not anxious nor agitated. Skin:  Right buttock abscess noted with surrounding erythema. Tenderness to touch GASTROENTEROLOGY NOTE Subjective:  
   
She developed a buttock abscess over the weekend, which was expressed yesterday by CRS Dr. Alyssa Cisneros. She is getting IV Zosyn for that.  This morning she states that she feels like it has gotten bigger and firmer.    
   
She denies blood from the rectum, describing having small amounts of tan-colored liquid discharge. Marsha Korey is blood coming from the abscess area on the right buttock, though. Assessment / Plan :  
   
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  
- Flex sig 2/26/21 Dr. Bret Goddard - Diffuse coalescing ulcerations involving all of rectum from dentate line to appx 10 cm from the anal verge. There is less pallor and areas of patchy erythema, improved from colonoscopy 2/11/21. Brown stool in rectum precluding views Sigmoid with brown stool, linear ischemic ulcers, did not transverse past 15 cm from the anal verge  
- Hgb stable 7.1 with no further rectal bleeding  
- Contrasted CT 2/24/21 - Stable to slightly increased mucosal enhancement and wall thickening in the rectosigmoid colon consistent with proctocolitis since the prior study (2/21) - No dietary restrictions from GI  
- OK for discharge from GI perspective once cleared by CRS  
   
Abscess right buttock  
- on IV Zosyn - Defer to Colorectal management COLON AND RECTAL SURGERY Complaining of right buttock pain  
  
abd soft Perirectal exam demonstrates a phlegmon on the right buttock.   
   
A/p buttock abscess Will get this drained tomorrow as she has had a diet already today  
abx are not working so surgical drainage is the next option. Cellulitis - Care Day 1 (3/2/2021) by Jerson Rodney RN 
 
  
Review Entered Review Status 3/5/2021 09:28 Completed  
  
Criteria Review Care Day: 1 Care Date: 3/2/2021 Level of Care: Inpatient Floor Guideline Day 1 Level Of Care (X) Floor [C]   
3/5/2021 09:28:49 EST by Tim Batista   
  IP FLOOR Clinical Status   
(X) * Clinical Indications met [D]   
3/5/2021 09:28:49 EST by Molly Rangel   
  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  she developed a right buttock abscess Activity (X) Activity as tolerated 3/5/2021 09:28:49 EST by Tim Batista   
  with assistance Routes (X) Diet as tolerated 3/5/2021 09:28:49 EST by Tim Batista   
  NPO for procedure diabetic diet Interventions (X) Possible lesion and blood cultures 3/5/2021 09:28:49 EST by Molly Rangel   
  WOUND CX  Culture result: Abnormal   
LIGHT * METHICILLIN RESISTANT STAPHYLOCOCCUS AUREUS * Medications (X) IV antibiotics 3/5/2021 09:28:49 EST by JoseIvánMolly Moreira   
  zoysn 3.375g iv q8 * Milestone Additional Notes 3/2/2021 LOC IP MEDICAL  
VS 98 52 18 111/66 98% ROOM AIR  
LABS               
WBC: 6.5 NRBC: 0.0  
RBC: 2.40 (L) HGB: 7.6 (L) HCT: 25.7 (L) MCV: 107.1 (H) MCH: 31.7 MCHC: 29.6 (L) RDW: 15.5 (H) PLATELET: 257 (L) Sodium: 132 (L) Potassium: 4.2 Chloride: 101 CO2: 22 Anion gap: 9 Glucose: 78 BUN: 22 (H) Creatinine: 1.58 (H) BUN/Creatinine ratio: 14  
Calcium: 8.4 (L)  
GFR est non-AA: 33 (L)  
GFR est AA: 40 (L) MEDS Tylenol 650mg po x1  
zoysn 3.375g iv q8  
  
  
ATTENDING NOTE Follow-up for issues listed below. (Rectal bleeding, CKD 3, PAD, T2DM, Ischemic Proctosigmoiditis). Seen and examined patient. States that she is feeling good today. Has been NPO for I&D today of the right buttock abscess. No new complaints noted. No overnight events noted. Assessment & Plan:  
   
Rectal bleeding 2/2 to Ischemic Proctosigmoiditis (POA) -monitor H/H, transfuse to keep Hgb >7.0   
-CTA abd/pelv 2/24: No CT evidence for acute GI bleeding at this time. Stable to slightly increased mucosal enhancement and wall thickening in the rectosigmoid colon consistent with proctocolitis since the prior study. Significant diverticulosis without acute diverticulitis. - GI following- Flex sig done (02/27) - GI lite diet.   
   
Acute Blood Loss Anemia: 2/2 to above, plan as above. - Hgb 7.6 this am   
- Monitor labs in am   
- Transfuse for hgb <7.0   
   
Hyponatremia - Mild, asymptomatic - Improving - Monitor labs.   
   
CKD IIIb   
-hold diuretics - Creatine up to 1.58 today - Avoid nephro toxins and renal dose medications  
-monitor renal function   
   
PAD: hold ASA 2/2 GIB. T2DM: hold metformin, ISS, accuchecks. ECHO 2/2021: LVEF 45%, normal systolic and dyastolic function.   
   
Right buttock/cleft Abscess - Wound care following - Encouraged patient to increase movement to relieve pressure - CR following- for I&D today   
- Continue antibiotics.   
   
Constitutional: No acute distress, cooperative, pleasant ENT: Oral mucosa moist, oropharynx benign. Resp: CTA bilaterally on room air No wheezing/rhonchi/rales. No accessory muscle use CV: Regular rhythm, normal rate, no murmurs, gallops, rubs  
 GI: Soft, non distended, non tender. normoactive bowel sounds  
 Musculoskeletal: BLE edema +2, warm, 2+ pulses throughout  
 Neurologic: Moves all extremities.  AAOx3, CN II-XII reviewed, follows commands.   
                      Psych:  Good insight, Not anxious nor agitated. Skin:  Right buttock abscess noted with surrounding erythema, tender to touch. OP NOTE PREOPERATIVE DIAGNOSIS:  Right buttock abscess.  
   
POSTOPERATIVE DIAGNOSIS:  Right buttock abscess.  
   
PROCEDURE PERFORMED:  Incision and drainage of right buttock abscess. COMPLICATIONS:  None apparent.  
   
SPECIMENS REMOVED:  Culture of right buttock abscess, sent to pathology.  
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 Ingrid Flores has been in the hospital with this diagnosis and rectal bleeding.  While she seems to be recovering from this, she developed a right buttock abscess.  This had been tender and painful and not responding to antibiotics and therefore she was consented for an incision and drainage of this abscess.  Risks and benefits were discussed with her. Cellulitis - Care Day 2 (3/3/2021) by Katlyn Norton RN 
 
  
Review Entered Review Status 3/5/2021 09:34 Completed  
  
Criteria Review Care Day: 2 Care Date: 3/3/2021 Level of Care: Inpatient Floor Guideline Day 2 Level Of Care (X) Floor 3/5/2021 09:34:44 EST by Real Stock   IP medical   
Clinical Status   
(X) * Dehydration absent   
(X) * Mental status at baseline 3/5/2021 09:34:44 EST by Molly Rangel   
  alert,oriented   
(X) * Hemodynamic stability 3/5/2021 09:34:44 EST by Molly Rangel   
  98.3 62 141/71 18 97% room air   
(X) * Afebrile or fever improved 3/5/2021 09:34:44 EST by Molly Rangel   
  temp 98.3 Activity (X) Activity as tolerated 3/5/2021 09:34:44 EST by Oksana Estes   
  with assistance Routes   
(X) * Oral hydration, medications 3/5/2021 09:34:44 EST by Oksana Estes   
  MEDS Lipitor 40mg po qday Colace 100mg po qday SSI x2 Protonix 40mg po bid (X) Diet as tolerated 3/5/2021 09:34:44 EST by Oksana Estes   
  diabetic diet Interventions (X) WBC   
3/5/2021 09:34:44 EST by Molly Rangel   
  WBC 6.4 Medications (X) IV antibiotics 3/5/2021 09:34:44 EST by Molly Rangel   
  zoysn 3.375g iv q8 * Milestone Additional Notes 3/3/2021 LOC IP MEDICAL  
VS  98.3 62 141/71 18 97% room air LABS       
WBC: 6.4 NRBC: 0.0  
RBC: 2.45 (L) HGB: 7.6 (L) HCT: 23.4 (L) MCV: 95.5 MCH: 31.0 MCHC: 32.5  
RDW: 15.5 (H) PLATELET: 842 (L)      
Sodium: 133 (L) Potassium: 4.6 Chloride: 104 CO2: 22 Anion gap: 7 Glucose: 223 (H) BUN: 19 Creatinine: 1.56 (H) BUN/Creatinine ratio: 12 Calcium: 8.4 (L)  
GFR est non-AA: 33 (L)  
GFR est AA: 40 (L) ATTENDING NOTE Follow-up for issues listed below. (Rectal bleeding, CKD 3, PAD, T2DM, Ischemic Proctosigmoiditis). Seen and examined patient sitting on side of bed, states that she is feeling \"great\" today. S/p I&D of right buttock abscess. Dressing is intact. States it feels tender but not painful. Denies any rectal bleeding. Had one small bowel movement this morning. No new complaints. No overnight events noted. Stable for discharge- Pending insurance auth. Assessment & Plan:  
   
Rectal bleeding 2/2 to Ischemic Proctosigmoiditis (POA) -monitor H/H, transfuse to keep Hgb >7.0   
-CTA abd/pelv 2/24: No CT evidence for acute GI bleeding at this time. Stable to slightly increased mucosal enhancement and wall thickening in the rectosigmoid colon consistent with proctocolitis since the prior study. Significant diverticulosis without acute diverticulitis. - GI following- Flex sig done (02/27) - GI lite diet.   
   
Acute Blood Loss Anemia: 2/2 to above, plan as above. - Hgb 7.6 this am   
- Monitor labs in am   
- Transfuse for hgb <7.0   
   
Hyponatremia - Mild, asymptomatic - Improving - Monitor labs.   
   
CKD IIIb   
-hold diuretics - Creatine 1.56  
- Avoid nephro toxins and renal dose medications  
-monitor renal function - Follow up with nephrology outpatient- already has appointment.   
   
PAD: hold ASA 2/2 GIB. T2DM: hold metformin, ISS, accuchecks. ECHO 2/2021: LVEF 27%, normal systolic and dyastolic function.   
   
Right buttock/cleft Abscess - Wound care following - Encouraged patient to increase movement to relieve pressure - CR following- s/p I&D 03/02/21  
- Continue antibiotics. Constitutional: No acute distress, cooperative, pleasant, answers questions. ENT: Oral mucosa moist, oropharynx benign. Resp: CTA bilaterally. No wheezing/rhonchi/rales. No accessory muscle use CV: Regular rhythm, normal rate, no murmurs, gallops, rubs  
 GI: Soft, non distended, non tender. normoactive bowel sounds  
 Musculoskeletal: No edema, warm, 2+ pulses throughout  
 Neurologic: Moves all extremities.  AAOx3, CN II-XII reviewed, follows commands  
                      Psych:  Good insight, Not anxious nor agitated. Skin: See wound care noted  
   
  
Gastroenterology note Chart review: She had operative I&D of right buttock abscess yesterday by Dr. Josi Coto. Anoscopy was negative for fistulous tract.  No report of blood from the rectum. Plan for d/c to rehab, hopefully today. Assessment / Plan :  
   
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  
- Flex sig 2/26/21 Dr. Karley Rodney - Diffuse coalescing ulcerations involving all of rectum from dentate line to appx 10 cm from the anal verge. There is less pallor and areas of patchy erythema, improved from colonoscopy 2/11/21. Brown stool in rectum precluding views Sigmoid with brown stool, linear ischemic ulcers, did not transverse past 15 cm from the anal verge  
- Hgb stable 7.1 with no further rectal bleeding  
- Contrasted CT 2/24/21 - Stable to slightly increased mucosal enhancement and wall thickening in the rectosigmoid colon consistent with proctocolitis since the prior study (2/21) - No dietary restrictions from GI  
- OK for discharge from GI perspective once cleared by CRS GENERAL SURGERY NOTE Doing well after I and d of buttock abscess A/p continue local wound care Sitz baths Ok to be discharged from my standpoint. She probably doesn't need any further abx since she has been completely drained. Cultures are pending in case she recurs. She can follow up with me in the office regarding the buttock wound. I think she would benefit from going back to [facility] to be evaluated for the proctosigmoiditis. Cellulitis - Care Day 3 (3/4/2021) by Ivy Giron RN 
 
  
Review Entered Review Status 3/5/2021 14:04 Completed  
  
Criteria Review Care Day: 3 Care Date: 3/4/2021 Level of Care: Inpatient Floor Guideline Day 3 Level Of Care (X) Floor to discharge [E]   
3/5/2021 14:04:39 EST by Litzy VAUGHN medical   
Clinical Status   
(X) * Hemodynamic stability 3/5/2021 14:04:39 EST by Litzy Aponte   98.3 58 144/64 18 96% ROOM AIR   
(X) * Afebrile or fever improved 3/5/2021 14:04:39 EST by Molly Rangel   
  temp 98.3   
(X) * Skin exam stable or improved 3/5/2021 14:04:39 EST by Tim Batista   
  s/p I&D of right buttock abscess   
(X) * Mental status at baseline 3/5/2021 14:04:39 EST by Tim Batista   
  alert/oriented ( ) * Antibiotic treatment needs appropriate for next level of care   
(X) * Pain absent or manageable at next level of care 3/5/2021 14:04:39 EST by Tim Batista   
  Tylenol 650mg po q6 prn x2   
( ) * Discharge plans and education understood Activity   
(X) * Ambulatory or acceptable for next level of care 3/5/2021 14:04:39 EST by Tim Batista   
  as tolerated w/ assistance Routes   
(X) * Oral hydration, medications, [F] and diet 3/5/2021 14:04:39 EST by Tim Batista   
  diabetic diet Medications (X) Parenteral or oral antibiotics [B]   
3/5/2021 14:04:39 EST by Molly Rangel   
  Zosyn 3.375g iv q8 * Milestone Additional Notes 3/4/2021 LOC IP MEDICAL  
VS  98.3 58 144/64 18 96% ROOM AIR  
LABS NO LABS/IMAGING  
  
  
MEDS Tylenol 650mg po q6 prn x2 Lipitor 40mg po qday Vibra tabs 100mg po q12 Protonix 40mg po bid ATTENDING NOTE Interval history / Subjective: Follow-up for issues listed below. (Rectal bleeding, CKD 3, PAD, T2DM, Ischemic Proctosigmoiditis)  
-Patient seen and examined, no c/o's.   
   
Assessment & Plan:  
   
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  
-monitor H/H, transfuse to keep Hgb >7.0   
-CTA abd/pelv 2/24: No CT evidence for acute GI bleeding at this time. Stable to slightly increased mucosal enhancement and wall thickening in the rectosigmoid colon consistent with proctocolitis since the prior study. Significant diverticulosis without acute diverticulitis. -GI following:Flex Sig (02/27): Diffuse coalescing ulcerations involving all of rectum from dentate line to appx 10 cm from the anal verge. There is less pallor and areas of patchy erythema, improved from colonoscopy 2/11/21. Brown stool in rectum precluding views.   
   
ABL Anemia: 2/2 to above, plan as above. Hyponatremia: mild, asymptomatic, monitor.   
   
CKD 3b:   
-hold diuretics  
-avoid nephro toxins, renal dose meds, monitor renal function, f/u Nephrology outpatient-  appt set   
   
PAD: hold ASA 2/2 GIB. T2DM: hold metformin, ISS, accuchecks. ECHO 2/2021: LVEF 53%, normal systolic and dyastolic function.   
   
Right Buttock/Cleft Abscess:  
-wound care following   
-s/p I&D 03/02/21, Anoscopy was negative for fistulous tract. -IV ABT zosyn d/c 3/4  
-MRSA+ wound culture: doxycycline 3/4  
   
DVTppx: SCDs GIppx: Protonix Code Status: Full Code Diet: GI Lite slowly advane as tolerated- DM diet Activity: OOB to chair TID and PRN, PT/OT Constitutional: No acute distress, cooperative, pleasant ENT: Oral mucosa moist.   
Resp: CTA bilaterally. No wheezing/rhonchi/rales. No accessory muscle use. CV: Regular rhythm, normal rate, S1,S2.  
 GI/: Soft, obese, non tender, no guarding, BS present, blood tinged underwear. Voids Freely.  
 Musculoskeletal: BLE edema, erythema, warm, 2+ pulses throughout.  
 Neurologic: Moves all extremities. AAOx3, CN II-XII reviewed. Skin:  Good turgor, right inner butt cheek: ulcer Psych:  Good insight, Not anxious nor agitated.

## 2021-05-19 ENCOUNTER — ANCILLARY PROCEDURE (OUTPATIENT)
Dept: CARDIOLOGY CLINIC | Age: 66
End: 2021-05-19
Payer: COMMERCIAL

## 2021-05-19 VITALS — BODY MASS INDEX: 33.32 KG/M2 | WEIGHT: 200 LBS | HEIGHT: 65 IN

## 2021-05-19 DIAGNOSIS — R01.1 MURMUR, CARDIAC: ICD-10-CM

## 2021-05-19 PROCEDURE — 93306 TTE W/DOPPLER COMPLETE: CPT | Performed by: INTERNAL MEDICINE

## 2021-05-20 LAB
ECHO AO ASC DIAM: 3.21 CM
ECHO AO ROOT DIAM: 3.35 CM
ECHO AV AREA PEAK VELOCITY: 2.04 CM2
ECHO AV AREA VTI: 1.96 CM2
ECHO AV AREA/BSA PEAK VELOCITY: 1 CM2/M2
ECHO AV AREA/BSA VTI: 1 CM2/M2
ECHO AV MEAN GRADIENT: 9.88 MMHG
ECHO AV PEAK GRADIENT: 15.88 MMHG
ECHO AV PEAK VELOCITY: 199.25 CM/S
ECHO AV VTI: 49.04 CM
ECHO IVC PROX: 2.01 CM
ECHO LA AREA 4C: 25.14 CM2
ECHO LA VOL 2C: 97.12 ML (ref 22–52)
ECHO LA VOL 4C: 89.33 ML (ref 22–52)
ECHO LA VOL BP: 105.35 ML (ref 22–52)
ECHO LA VOL/BSA BIPLANE: 53.21 ML/M2 (ref 16–28)
ECHO LA VOLUME INDEX A2C: 49.05 ML/M2 (ref 16–28)
ECHO LA VOLUME INDEX A4C: 45.12 ML/M2 (ref 16–28)
ECHO LV E' LATERAL VELOCITY: 10.09 CM/S
ECHO LV E' SEPTAL VELOCITY: 6.92 CM/S
ECHO LV EDV A2C: 97.57 ML
ECHO LV EDV A4C: 96.61 ML
ECHO LV EDV BP: 98.56 ML (ref 56–104)
ECHO LV EDV INDEX A4C: 48.8 ML/M2
ECHO LV EDV INDEX BP: 49.8 ML/M2
ECHO LV EDV NDEX A2C: 49.3 ML/M2
ECHO LV EJECTION FRACTION A2C: 65 PERCENT
ECHO LV EJECTION FRACTION A4C: 64 PERCENT
ECHO LV EJECTION FRACTION BIPLANE: 65 PERCENT (ref 55–100)
ECHO LV ESV A2C: 34.18 ML
ECHO LV ESV A4C: 34.35 ML
ECHO LV ESV BP: 34.47 ML (ref 19–49)
ECHO LV ESV INDEX A2C: 17.3 ML/M2
ECHO LV ESV INDEX A4C: 17.3 ML/M2
ECHO LV ESV INDEX BP: 17.4 ML/M2
ECHO LV INTERNAL DIMENSION DIASTOLIC: 3.91 CM (ref 3.9–5.3)
ECHO LV INTERNAL DIMENSION SYSTOLIC: 2.9 CM
ECHO LV IVSD: 1.31 CM (ref 0.6–0.9)
ECHO LV MASS 2D: 200 G (ref 67–162)
ECHO LV MASS INDEX 2D: 101 G/M2 (ref 43–95)
ECHO LV POSTERIOR WALL DIASTOLIC: 1.47 CM (ref 0.6–0.9)
ECHO LVOT DIAM: 2.1 CM
ECHO LVOT PEAK GRADIENT: 5.44 MMHG
ECHO LVOT PEAK VELOCITY: 116.59 CM/S
ECHO LVOT SV: 96.3 ML
ECHO LVOT VTI: 27.7 CM
ECHO MV A VELOCITY: 136.86 CM/S
ECHO MV E DECELERATION TIME (DT): 386.56 MS
ECHO MV E VELOCITY: 110.93 CM/S
ECHO MV E/A RATIO: 0.81
ECHO MV E/E' LATERAL: 10.99
ECHO MV E/E' RATIO (AVERAGED): 13.51
ECHO MV E/E' SEPTAL: 16.03
ECHO PV MAX VELOCITY: 98.7 CM/S
ECHO PV PEAK INSTANTANEOUS GRADIENT SYSTOLIC: 3.9 MMHG
ECHO PV REGURGITANT MAX VELOCITY: 120.06 CM/S
ECHO RV TAPSE: 2.71 CM (ref 1.5–2)
LA VOL DISK BP: 98.58 ML (ref 22–52)

## 2021-05-21 ENCOUNTER — TELEPHONE (OUTPATIENT)
Dept: CARDIOLOGY CLINIC | Age: 66
End: 2021-05-21

## 2021-05-21 NOTE — TELEPHONE ENCOUNTER
----- Message from Tamir Montenegro MD sent at 5/21/2021  1:21 PM EDT -----  Normal left ventricular function with mild to moderate valvular abnormalities consider follow-up in 1 year/for additional symptoms

## 2021-05-21 NOTE — PROGRESS NOTES
Normal left ventricular function with mild to moderate valvular abnormalities consider follow-up in 1 year/for additional symptoms

## 2021-05-21 NOTE — TELEPHONE ENCOUNTER
Identifiers x 2. Informed of test results. Verbalized understanding. Follow up scheduled.       Future Appointments   Date Time Provider Zoë Marianna   5/17/2022  9:20 AM MD ANNALEE Suárez AMB

## 2022-03-19 PROBLEM — E11.59 HYPERTENSION COMPLICATING DIABETES (HCC): Status: ACTIVE | Noted: 2017-08-21

## 2022-03-19 PROBLEM — N18.30 CKD (CHRONIC KIDNEY DISEASE), STAGE III (HCC): Status: ACTIVE | Noted: 2017-04-20

## 2022-03-19 PROBLEM — I15.2 HYPERTENSION COMPLICATING DIABETES (HCC): Status: ACTIVE | Noted: 2017-08-21

## 2022-03-19 PROBLEM — K62.5 RECTAL BLEED: Status: ACTIVE | Noted: 2021-02-24

## 2022-03-19 PROBLEM — K62.5 RECTAL BLEEDING: Status: ACTIVE | Noted: 2021-02-23

## 2022-03-20 PROBLEM — E11.21 TYPE 2 DIABETES WITH NEPHROPATHY (HCC): Status: ACTIVE | Noted: 2018-02-19

## 2023-01-18 NOTE — WOUND CARE
WOCN Note:  
 
New consult placed for assessment of right buttock. Assessed in room 433. PPE: face shield, mask and gloves. Chart reviewed. Admitted DX: Rectal bleeding Past Medical History:  
Diagnosis Date  Cancer ([facility] [address] melanoma back stage II 2005  CKD (chronic kidney disease), stage III 3/1/2016  CKD (chronic kidney disease), stage IV ([address] 3/1/2016  Diabetes ([facility] [address]  HTN (hypertension) 11/2/2015  Hypertension  LBP (low back pain) 11/2/2015  Menopause  Microalbuminuria 11/21/2015  Obesity 11/2/2015  PAD (peripheral artery disease) ([facility] [address] 9/29/2016  Venous insufficiency 11/2/2015 Assessment:  
Patient is A&O x 3, communicative, continent and ambulates with walker. Bed: foam mattress Patient wearing briefs for incontinence. Heels intact with blanching pink erythema. 1.  Buttocks and gluteal cleft:  1 tender firm raised area to right buttock measuring < 0.5 cm surrounded by blanching pink erythema. Wound, Pressure Prevention & Skin Care Recommendations: 1. Minimize layers of linen/pads under patient to optimize support surface. 2.  Turn/reposition approximately every 2 hours and offload heels. 3.  Manage moisture/ Keep skin folds clean and dry. 4.  Gluteal cleft and buttocks:  Every 8 hours and as needed cleanse and apply Zinc cream. 
 
Discussed above plan with patient and Charlie [id number]. Transition of Care: Plan to follow as needed while admitted to hospital. 
 
YURY HannonN RN [facility] Inpatient Wound Care Available on Perfect Serve Pager [phone] Office [phone] MOSLEY:  Labs non-actionable.  Mild lft elevation and bili elevation but no abdominal pain or tenderness.  Low c/f acute chadd.  Trop 9, no c/f acs as no cp.  Return precautions given.  Patient currently asymptomatic.  Return precautions given.

## 2023-03-09 NOTE — ROUTINE PROCESS
1501:  Attempt calling report. Pt going to 545. Rachael to call back in 10 minutes. 1603: Attempted to call report for pt going to 545. TRANSFER - OUT REPORT: 
 
Verbal report given to Diego (name) on Lou Guzman  being transferred to 5S room 545(unit) for routine progression of care Report consisted of patients Situation, Background, Assessment and  
Recommendations(SBAR). Information from the following report(s) SBAR, Kardex, Procedure Summary, Intake/Output, MAR, Recent Results and Cardiac Rhythm NSR was reviewed with the receiving nurse. Lines:  
Peripheral IV 02/24/21 Distal;Left;Posterior Forearm (Active) Site Assessment Clean, dry, & intact 02/26/21 8822 Phlebitis Assessment 0 02/26/21 0837 Infiltration Assessment 0 02/26/21 0837 Dressing Status Clean, dry, & intact 02/26/21 7437 Dressing Type Transparent 02/26/21 3833 Hub Color/Line Status Blue;Capped 02/26/21 0628 Action Taken Open ports on tubing capped 02/24/21 1642 Alcohol Cap Used Yes 02/26/21 5857 Opportunity for questions and clarification was provided. Patient transported with: 
 [employer] Pt has her cell phone/, eyeglass, couple bags of personal belongings. No sign of heart failure on exam today

## 2023-07-28 ENCOUNTER — HOSPITAL ENCOUNTER (INPATIENT)
Facility: HOSPITAL | Age: 68
LOS: 3 days | Discharge: HOME | DRG: 683 | End: 2023-07-31
Attending: STUDENT IN AN ORGANIZED HEALTH CARE EDUCATION/TRAINING PROGRAM | Admitting: FAMILY MEDICINE
Payer: MEDICARE

## 2023-07-28 ENCOUNTER — APPOINTMENT (OUTPATIENT)
Facility: HOSPITAL | Age: 68
DRG: 683 | End: 2023-07-28
Payer: MEDICARE

## 2023-07-28 DIAGNOSIS — N17.9 AKI (ACUTE KIDNEY INJURY) (HCC): Primary | ICD-10-CM

## 2023-07-28 DIAGNOSIS — E87.1 HYPONATREMIA: ICD-10-CM

## 2023-07-28 PROBLEM — N18.9 ACUTE KIDNEY INJURY SUPERIMPOSED ON CKD (HCC): Status: ACTIVE | Noted: 2023-07-28

## 2023-07-28 LAB
ALBUMIN SERPL-MCNC: 4 G/DL (ref 3.5–5)
ALBUMIN/GLOB SERPL: 1.3 (ref 1.1–2.2)
ALP SERPL-CCNC: 44 U/L (ref 45–117)
ALT SERPL-CCNC: 19 U/L (ref 12–78)
ANION GAP SERPL CALC-SCNC: 12 MMOL/L (ref 5–15)
APPEARANCE UR: CLEAR
AST SERPL-CCNC: 16 U/L (ref 15–37)
BACTERIA URNS QL MICRO: NEGATIVE /HPF
BASOPHILS # BLD: 0 K/UL (ref 0–0.1)
BASOPHILS NFR BLD: 1 % (ref 0–1)
BILIRUB SERPL-MCNC: 0.3 MG/DL (ref 0.2–1)
BILIRUB UR QL: NEGATIVE
BUN SERPL-MCNC: 97 MG/DL (ref 6–20)
BUN/CREAT SERPL: 19 (ref 12–20)
CALCIUM SERPL-MCNC: 9 MG/DL (ref 8.5–10.1)
CHLORIDE SERPL-SCNC: 97 MMOL/L (ref 97–108)
CO2 SERPL-SCNC: 17 MMOL/L (ref 21–32)
COLOR UR: ABNORMAL
CREAT SERPL-MCNC: 5.15 MG/DL (ref 0.55–1.02)
DIFFERENTIAL METHOD BLD: ABNORMAL
EOSINOPHIL # BLD: 0.5 K/UL (ref 0–0.4)
EOSINOPHIL NFR BLD: 10 % (ref 0–7)
EPITH CASTS URNS QL MICRO: ABNORMAL /LPF
ERYTHROCYTE [DISTWIDTH] IN BLOOD BY AUTOMATED COUNT: 15.6 % (ref 11.5–14.5)
GLOBULIN SER CALC-MCNC: 3.2 G/DL (ref 2–4)
GLUCOSE SERPL-MCNC: 81 MG/DL (ref 65–100)
GLUCOSE UR STRIP.AUTO-MCNC: NEGATIVE MG/DL
HCT VFR BLD AUTO: 28.6 % (ref 35–47)
HGB BLD-MCNC: 9.2 G/DL (ref 11.5–16)
HGB UR QL STRIP: NEGATIVE
IMM GRANULOCYTES # BLD AUTO: 0 K/UL (ref 0–0.04)
IMM GRANULOCYTES NFR BLD AUTO: 0 % (ref 0–0.5)
KETONES UR QL STRIP.AUTO: NEGATIVE MG/DL
LEUKOCYTE ESTERASE UR QL STRIP.AUTO: ABNORMAL
LYMPHOCYTES # BLD: 1.2 K/UL (ref 0.8–3.5)
LYMPHOCYTES NFR BLD: 26 % (ref 12–49)
MCH RBC QN AUTO: 30.9 PG (ref 26–34)
MCHC RBC AUTO-ENTMCNC: 32.2 G/DL (ref 30–36.5)
MCV RBC AUTO: 96 FL (ref 80–99)
MONOCYTES # BLD: 0.5 K/UL (ref 0–1)
MONOCYTES NFR BLD: 11 % (ref 5–13)
NEUTS SEG # BLD: 2.5 K/UL (ref 1.8–8)
NEUTS SEG NFR BLD: 52 % (ref 32–75)
NITRITE UR QL STRIP.AUTO: NEGATIVE
NRBC # BLD: 0 K/UL (ref 0–0.01)
NRBC BLD-RTO: 0 PER 100 WBC
PH UR STRIP: 5 (ref 5–8)
PLATELET # BLD AUTO: 137 K/UL (ref 150–400)
PMV BLD AUTO: 9.6 FL (ref 8.9–12.9)
POTASSIUM SERPL-SCNC: 4.1 MMOL/L (ref 3.5–5.1)
PROT SERPL-MCNC: 7.2 G/DL (ref 6.4–8.2)
PROT UR STRIP-MCNC: 30 MG/DL
RBC # BLD AUTO: 2.98 M/UL (ref 3.8–5.2)
RBC #/AREA URNS HPF: ABNORMAL /HPF (ref 0–5)
SODIUM SERPL-SCNC: 126 MMOL/L (ref 136–145)
SP GR UR REFRACTOMETRY: 1.02 (ref 1–1.03)
SPECIMEN HOLD: NORMAL
UROBILINOGEN UR QL STRIP.AUTO: 0.2 EU/DL (ref 0.2–1)
WBC # BLD AUTO: 4.8 K/UL (ref 3.6–11)
WBC URNS QL MICRO: ABNORMAL /HPF (ref 0–4)

## 2023-07-28 PROCEDURE — 85025 COMPLETE CBC W/AUTO DIFF WBC: CPT

## 2023-07-28 PROCEDURE — 99285 EMERGENCY DEPT VISIT HI MDM: CPT

## 2023-07-28 PROCEDURE — 93005 ELECTROCARDIOGRAM TRACING: CPT | Performed by: FAMILY MEDICINE

## 2023-07-28 PROCEDURE — 80053 COMPREHEN METABOLIC PANEL: CPT

## 2023-07-28 PROCEDURE — 36415 COLL VENOUS BLD VENIPUNCTURE: CPT

## 2023-07-28 PROCEDURE — 2060000000 HC ICU INTERMEDIATE R&B

## 2023-07-28 PROCEDURE — 1100000000 HC RM PRIVATE

## 2023-07-28 PROCEDURE — 2580000003 HC RX 258: Performed by: FAMILY MEDICINE

## 2023-07-28 PROCEDURE — 76770 US EXAM ABDO BACK WALL COMP: CPT

## 2023-07-28 PROCEDURE — 81001 URINALYSIS AUTO W/SCOPE: CPT

## 2023-07-28 RX ORDER — ACETAMINOPHEN 650 MG/1
650 SUPPOSITORY RECTAL EVERY 6 HOURS PRN
Status: DISCONTINUED | OUTPATIENT
Start: 2023-07-28 | End: 2023-07-31 | Stop reason: HOSPADM

## 2023-07-28 RX ORDER — SODIUM CHLORIDE 0.9 % (FLUSH) 0.9 %
5-40 SYRINGE (ML) INJECTION EVERY 12 HOURS SCHEDULED
Status: DISCONTINUED | OUTPATIENT
Start: 2023-07-28 | End: 2023-07-31 | Stop reason: HOSPADM

## 2023-07-28 RX ORDER — ONDANSETRON 4 MG/1
4 TABLET, ORALLY DISINTEGRATING ORAL EVERY 8 HOURS PRN
Status: DISCONTINUED | OUTPATIENT
Start: 2023-07-28 | End: 2023-07-31 | Stop reason: HOSPADM

## 2023-07-28 RX ORDER — HEPARIN SODIUM 5000 [USP'U]/ML
5000 INJECTION, SOLUTION INTRAVENOUS; SUBCUTANEOUS EVERY 8 HOURS SCHEDULED
Status: DISCONTINUED | OUTPATIENT
Start: 2023-07-28 | End: 2023-07-31 | Stop reason: HOSPADM

## 2023-07-28 RX ORDER — ACETAMINOPHEN 325 MG/1
650 TABLET ORAL EVERY 6 HOURS PRN
Status: DISCONTINUED | OUTPATIENT
Start: 2023-07-28 | End: 2023-07-31 | Stop reason: HOSPADM

## 2023-07-28 RX ORDER — SODIUM CHLORIDE 0.9 % (FLUSH) 0.9 %
5-40 SYRINGE (ML) INJECTION PRN
Status: DISCONTINUED | OUTPATIENT
Start: 2023-07-28 | End: 2023-07-31 | Stop reason: HOSPADM

## 2023-07-28 RX ORDER — POLYETHYLENE GLYCOL 3350 17 G/17G
17 POWDER, FOR SOLUTION ORAL DAILY PRN
Status: DISCONTINUED | OUTPATIENT
Start: 2023-07-28 | End: 2023-07-31 | Stop reason: HOSPADM

## 2023-07-28 RX ORDER — 0.9 % SODIUM CHLORIDE 0.9 %
1000 INTRAVENOUS SOLUTION INTRAVENOUS ONCE
Status: COMPLETED | OUTPATIENT
Start: 2023-07-28 | End: 2023-07-29

## 2023-07-28 RX ORDER — ONDANSETRON 2 MG/ML
4 INJECTION INTRAMUSCULAR; INTRAVENOUS EVERY 6 HOURS PRN
Status: DISCONTINUED | OUTPATIENT
Start: 2023-07-28 | End: 2023-07-31 | Stop reason: HOSPADM

## 2023-07-28 RX ORDER — SODIUM CHLORIDE 9 MG/ML
INJECTION, SOLUTION INTRAVENOUS PRN
Status: DISCONTINUED | OUTPATIENT
Start: 2023-07-28 | End: 2023-07-31 | Stop reason: HOSPADM

## 2023-07-28 RX ORDER — FERROUS SULFATE 325(65) MG
325 TABLET ORAL
Status: DISCONTINUED | OUTPATIENT
Start: 2023-07-29 | End: 2023-07-31 | Stop reason: HOSPADM

## 2023-07-28 RX ADMIN — SODIUM CHLORIDE 1000 ML: 9 INJECTION, SOLUTION INTRAVENOUS at 21:30

## 2023-07-28 ASSESSMENT — PAIN - FUNCTIONAL ASSESSMENT: PAIN_FUNCTIONAL_ASSESSMENT: 0-10

## 2023-07-28 ASSESSMENT — ENCOUNTER SYMPTOMS
SHORTNESS OF BREATH: 0
BACK PAIN: 0
COUGH: 0
ABDOMINAL PAIN: 0
VOMITING: 0
NAUSEA: 1

## 2023-07-28 ASSESSMENT — PAIN SCALES - GENERAL: PAINLEVEL_OUTOF10: 7

## 2023-07-28 NOTE — ED PROVIDER NOTES
further management. Nephrology at bedside recommending admission. Amount and/or Complexity of Data Reviewed  Labs: ordered. ECG/medicine tests: ordered. Risk  Prescription drug management. Decision regarding hospitalization. [facility] Consult for Admission  9:43 PM    ED Room Number: R32/R32  Patient Name and age:  Ann Marie Roman 78 y.o.  female  Working Diagnosis:   1. ANGELIKA (acute kidney injury) ([address])    2. Hyponatremia        COVID-19 Suspicion: No  Sepsis present:  No  Reassessment needed: No  Code Status:  Full Code  Readmission: No  Isolation Requirements: no  Recommended Level of Care: telemetry  Department: [address] [place] [address],[address] Floor Adult ED - [phone]  Consulting Provider:     Other:  78year old female sent by nephrology for new ANEGLIKA.  Na +126, Creatitine 5.15 (up from 2.3)           REASSESSMENT     ED Course as of 07/28/23 1843   Fri Jul 28, 2023   1825 ECG performed at [facility] shows normal sinus rhythm, rate of 70, normal intervals no STEMI [WG]      ED Course User Index  [WG] Marnee Louder, DO         (Please note that portions of this note were completed with a voice recognition program.  Efforts were made to edit the dictations but occasionally words are mis-transcribed.)    JACOBO Yip NP (electronically signed)  Nurse Practitioner      JACOBO Yip NP  07/29/23 8122

## 2023-07-28 NOTE — ED TRIAGE NOTES
Patient received a call from her nephrologist stating her kidney labs (Cr, and GFR) were abnormal and to be seen in the emergency room for further evaluation.

## 2023-07-29 LAB
ANION GAP SERPL CALC-SCNC: 13 MMOL/L (ref 5–15)
ANION GAP SERPL CALC-SCNC: 6 MMOL/L (ref 5–15)
BASOPHILS # BLD: 0 K/UL (ref 0–0.1)
BASOPHILS NFR BLD: 0 % (ref 0–1)
BUN SERPL-MCNC: 84 MG/DL (ref 6–20)
BUN SERPL-MCNC: 88 MG/DL (ref 6–20)
BUN/CREAT SERPL: 21 (ref 12–20)
BUN/CREAT SERPL: 21 (ref 12–20)
CALCIUM SERPL-MCNC: 8.2 MG/DL (ref 8.5–10.1)
CALCIUM SERPL-MCNC: 8.3 MG/DL (ref 8.5–10.1)
CHLORIDE SERPL-SCNC: 100 MMOL/L (ref 97–108)
CHLORIDE SERPL-SCNC: 102 MMOL/L (ref 97–108)
CO2 SERPL-SCNC: 16 MMOL/L (ref 21–32)
CO2 SERPL-SCNC: 21 MMOL/L (ref 21–32)
CREAT SERPL-MCNC: 4.07 MG/DL (ref 0.55–1.02)
CREAT SERPL-MCNC: 4.15 MG/DL (ref 0.55–1.02)
DIFFERENTIAL METHOD BLD: ABNORMAL
EOSINOPHIL # BLD: 0.1 K/UL (ref 0–0.4)
EOSINOPHIL NFR BLD: 2 % (ref 0–7)
ERYTHROCYTE [DISTWIDTH] IN BLOOD BY AUTOMATED COUNT: 15.5 % (ref 11.5–14.5)
EST. AVERAGE GLUCOSE BLD GHB EST-MCNC: 85 MG/DL
EST. AVERAGE GLUCOSE BLD GHB EST-MCNC: 88 MG/DL
GLUCOSE BLD STRIP.AUTO-MCNC: 133 MG/DL (ref 65–117)
GLUCOSE BLD STRIP.AUTO-MCNC: 144 MG/DL (ref 65–117)
GLUCOSE BLD STRIP.AUTO-MCNC: 93 MG/DL (ref 65–117)
GLUCOSE BLD STRIP.AUTO-MCNC: 99 MG/DL (ref 65–117)
GLUCOSE SERPL-MCNC: 140 MG/DL (ref 65–100)
GLUCOSE SERPL-MCNC: 87 MG/DL (ref 65–100)
HBA1C MFR BLD: 4.6 % (ref 4–5.6)
HBA1C MFR BLD: 4.7 % (ref 4–5.6)
HCT VFR BLD AUTO: 26.6 % (ref 35–47)
HGB BLD-MCNC: 8.5 G/DL (ref 11.5–16)
IMM GRANULOCYTES # BLD AUTO: 0 K/UL
IMM GRANULOCYTES NFR BLD AUTO: 0 %
LYMPHOCYTES # BLD: 1.2 K/UL (ref 0.8–3.5)
LYMPHOCYTES NFR BLD: 28 % (ref 12–49)
MCH RBC QN AUTO: 30.9 PG (ref 26–34)
MCHC RBC AUTO-ENTMCNC: 32 G/DL (ref 30–36.5)
MCV RBC AUTO: 96.7 FL (ref 80–99)
MONOCYTES # BLD: 0.4 K/UL (ref 0–1)
MONOCYTES NFR BLD: 10 % (ref 5–13)
NEUTS SEG # BLD: 2.7 K/UL (ref 1.8–8)
NEUTS SEG NFR BLD: 60 % (ref 32–75)
NRBC # BLD: 0 K/UL (ref 0–0.01)
NRBC BLD-RTO: 0 PER 100 WBC
PLATELET # BLD AUTO: 114 K/UL (ref 150–400)
PMV BLD AUTO: 9.4 FL (ref 8.9–12.9)
POTASSIUM SERPL-SCNC: 3.6 MMOL/L (ref 3.5–5.1)
POTASSIUM SERPL-SCNC: 3.9 MMOL/L (ref 3.5–5.1)
RBC # BLD AUTO: 2.75 M/UL (ref 3.8–5.2)
RBC MORPH BLD: ABNORMAL
RBC MORPH BLD: ABNORMAL
SERVICE CMNT-IMP: ABNORMAL
SERVICE CMNT-IMP: ABNORMAL
SERVICE CMNT-IMP: NORMAL
SERVICE CMNT-IMP: NORMAL
SODIUM SERPL-SCNC: 127 MMOL/L (ref 136–145)
SODIUM SERPL-SCNC: 131 MMOL/L (ref 136–145)
WBC # BLD AUTO: 4.4 K/UL (ref 3.6–11)

## 2023-07-29 PROCEDURE — 97116 GAIT TRAINING THERAPY: CPT

## 2023-07-29 PROCEDURE — 6360000002 HC RX W HCPCS: Performed by: FAMILY MEDICINE

## 2023-07-29 PROCEDURE — 85025 COMPLETE CBC W/AUTO DIFF WBC: CPT

## 2023-07-29 PROCEDURE — 82962 GLUCOSE BLOOD TEST: CPT

## 2023-07-29 PROCEDURE — 2580000003 HC RX 258: Performed by: FAMILY MEDICINE

## 2023-07-29 PROCEDURE — 6370000000 HC RX 637 (ALT 250 FOR IP): Performed by: NURSE PRACTITIONER

## 2023-07-29 PROCEDURE — 83036 HEMOGLOBIN GLYCOSYLATED A1C: CPT

## 2023-07-29 PROCEDURE — P9047 ALBUMIN (HUMAN), 25%, 50ML: HCPCS | Performed by: INTERNAL MEDICINE

## 2023-07-29 PROCEDURE — 2500000003 HC RX 250 WO HCPCS: Performed by: NURSE PRACTITIONER

## 2023-07-29 PROCEDURE — 97161 PT EVAL LOW COMPLEX 20 MIN: CPT

## 2023-07-29 PROCEDURE — 6360000002 HC RX W HCPCS: Performed by: INTERNAL MEDICINE

## 2023-07-29 PROCEDURE — 2060000000 HC ICU INTERMEDIATE R&B

## 2023-07-29 PROCEDURE — 36415 COLL VENOUS BLD VENIPUNCTURE: CPT

## 2023-07-29 PROCEDURE — 80048 BASIC METABOLIC PNL TOTAL CA: CPT

## 2023-07-29 PROCEDURE — 6370000000 HC RX 637 (ALT 250 FOR IP): Performed by: FAMILY MEDICINE

## 2023-07-29 PROCEDURE — 2580000003 HC RX 258: Performed by: NURSE PRACTITIONER

## 2023-07-29 PROCEDURE — 6370000000 HC RX 637 (ALT 250 FOR IP): Performed by: INTERNAL MEDICINE

## 2023-07-29 RX ORDER — INSULIN LISPRO 100 [IU]/ML
0-4 INJECTION, SOLUTION INTRAVENOUS; SUBCUTANEOUS NIGHTLY
Status: DISCONTINUED | OUTPATIENT
Start: 2023-07-29 | End: 2023-07-31 | Stop reason: HOSPADM

## 2023-07-29 RX ORDER — ASPIRIN 81 MG/1
81 TABLET, CHEWABLE ORAL DAILY
Status: DISCONTINUED | OUTPATIENT
Start: 2023-07-29 | End: 2023-07-31 | Stop reason: HOSPADM

## 2023-07-29 RX ORDER — ALBUMIN (HUMAN) 12.5 G/50ML
25 SOLUTION INTRAVENOUS ONCE
Status: COMPLETED | OUTPATIENT
Start: 2023-07-29 | End: 2023-07-29

## 2023-07-29 RX ORDER — DEXTROSE MONOHYDRATE 100 MG/ML
INJECTION, SOLUTION INTRAVENOUS CONTINUOUS PRN
Status: DISCONTINUED | OUTPATIENT
Start: 2023-07-29 | End: 2023-07-31 | Stop reason: HOSPADM

## 2023-07-29 RX ORDER — ATORVASTATIN CALCIUM 20 MG/1
40 TABLET, FILM COATED ORAL DAILY
Status: DISCONTINUED | OUTPATIENT
Start: 2023-07-29 | End: 2023-07-31 | Stop reason: HOSPADM

## 2023-07-29 RX ORDER — INSULIN LISPRO 100 [IU]/ML
0-4 INJECTION, SOLUTION INTRAVENOUS; SUBCUTANEOUS
Status: DISCONTINUED | OUTPATIENT
Start: 2023-07-29 | End: 2023-07-31 | Stop reason: HOSPADM

## 2023-07-29 RX ADMIN — HEPARIN SODIUM 5000 UNITS: 5000 INJECTION INTRAVENOUS; SUBCUTANEOUS at 21:43

## 2023-07-29 RX ADMIN — SODIUM CHLORIDE, PRESERVATIVE FREE 10 ML: 5 INJECTION INTRAVENOUS at 21:20

## 2023-07-29 RX ADMIN — ACETAMINOPHEN 650 MG: 325 TABLET ORAL at 10:09

## 2023-07-29 RX ADMIN — HEPARIN SODIUM 5000 UNITS: 5000 INJECTION INTRAVENOUS; SUBCUTANEOUS at 13:59

## 2023-07-29 RX ADMIN — SODIUM BICARBONATE: 84 INJECTION, SOLUTION INTRAVENOUS at 00:42

## 2023-07-29 RX ADMIN — SODIUM BICARBONATE: 84 INJECTION, SOLUTION INTRAVENOUS at 19:12

## 2023-07-29 RX ADMIN — ALBUMIN (HUMAN) 25 G: 0.25 INJECTION, SOLUTION INTRAVENOUS at 09:06

## 2023-07-29 RX ADMIN — ASPIRIN 81 MG 81 MG: 81 TABLET ORAL at 13:59

## 2023-07-29 RX ADMIN — HEPARIN SODIUM 5000 UNITS: 5000 INJECTION INTRAVENOUS; SUBCUTANEOUS at 06:46

## 2023-07-29 RX ADMIN — ATORVASTATIN CALCIUM 40 MG: 20 TABLET, FILM COATED ORAL at 13:59

## 2023-07-29 RX ADMIN — SODIUM CHLORIDE, PRESERVATIVE FREE 10 ML: 5 INJECTION INTRAVENOUS at 09:05

## 2023-07-29 RX ADMIN — FERROUS SULFATE TAB 325 MG (65 MG ELEMENTAL FE) 325 MG: 325 (65 FE) TAB at 09:05

## 2023-07-29 ASSESSMENT — PAIN SCALES - GENERAL
PAINLEVEL_OUTOF10: 0
PAINLEVEL_OUTOF10: 0
PAINLEVEL_OUTOF10: 7
PAINLEVEL_OUTOF10: 6

## 2023-07-29 ASSESSMENT — PAIN DESCRIPTION - LOCATION
LOCATION: BACK
LOCATION: BACK

## 2023-07-29 ASSESSMENT — PAIN DESCRIPTION - DESCRIPTORS
DESCRIPTORS: ACHING
DESCRIPTORS: ACHING

## 2023-07-29 ASSESSMENT — PAIN DESCRIPTION - ORIENTATION
ORIENTATION: POSTERIOR
ORIENTATION: POSTERIOR

## 2023-07-29 ASSESSMENT — PAIN DESCRIPTION - FREQUENCY: FREQUENCY: CONTINUOUS

## 2023-07-29 ASSESSMENT — PAIN DESCRIPTION - ONSET: ONSET: ON-GOING

## 2023-07-29 NOTE — H&P
History and Physical    Date of Service:  7/29/2023  Primary Care Provider: Dilan Trivedi MD  Source of information: patient, electronic medical record    Chief Complaint: Flank Pain (Abnormal Labs)      History of Presenting Illness:   Sena Faith is a 78 y.o. female medical history of DM 2, hypertension, peripheral artery disease, CKD IV, and past melanoma who presents the request of her nephrologist for worsening creatinine on labs done in clinic. The ED, patient vital signs stable. Labs are significant for hemoglobin 9.2, BUN 97, creatinine 5.15 (b/l 1.3-1.5), CO2 17, and UA with 30 protein, and small leukocyte esterase. Renal ultrasound shows findings consistent with medical renal disease, and no obstructing nephrolithiasis or hydronephrosis. In the ED, nephrology was consulted, and started a bicarb drip. Note suggests patient has prerenal azotemia due to diuretic use, and decreased p.o. intake. REVIEW OF SYSTEMS:  A comprehensive review of systems was negative except for that written in the History of Present Illness. Past Medical History:   Diagnosis Date    Cancer ([address])     melanoma back stage II 2005    CKD (chronic kidney disease), stage III ([address]) 3/1/2016    CKD (chronic kidney disease), stage IV ([address]) 3/1/2016    Diabetes ([address])     HTN (hypertension) 11/2/2015    Hypertension     LBP (low back pain) 11/2/2015    Menopause     Microalbuminuria 11/21/2015    Obesity 11/2/2015    PAD (peripheral artery disease) ([address]) 9/29/2016    Venous insufficiency 11/2/2015      Past Surgical History:   Procedure Laterality Date    APPENDECTOMY      BACK SURGERY      BREAST BIOPSY      unsure of which breast or when.  Negative results    COLONOSCOPY N/A 2/11/2021    COLONOSCOPY performed by Jorge Luis Foley MD at [address] [place] [address] 2/26/2021    SIGMOIDOSCOPY FLEXIBLE performed by Jorge Luis Foley MD at [facility] ENDOSCOPY    TOE AMPUTATION  01/13/2021    Left second

## 2023-07-30 LAB
ANION GAP SERPL CALC-SCNC: 5 MMOL/L (ref 5–15)
BASOPHILS # BLD: 0 K/UL (ref 0–0.1)
BASOPHILS NFR BLD: 1 % (ref 0–1)
BUN SERPL-MCNC: 72 MG/DL (ref 6–20)
BUN/CREAT SERPL: 24 (ref 12–20)
CALCIUM SERPL-MCNC: 8.4 MG/DL (ref 8.5–10.1)
CHLORIDE SERPL-SCNC: 101 MMOL/L (ref 97–108)
CO2 SERPL-SCNC: 23 MMOL/L (ref 21–32)
CREAT SERPL-MCNC: 3.01 MG/DL (ref 0.55–1.02)
DIFFERENTIAL METHOD BLD: ABNORMAL
EOSINOPHIL # BLD: 0.3 K/UL (ref 0–0.4)
EOSINOPHIL NFR BLD: 8 % (ref 0–7)
ERYTHROCYTE [DISTWIDTH] IN BLOOD BY AUTOMATED COUNT: 15.2 % (ref 11.5–14.5)
GLUCOSE BLD STRIP.AUTO-MCNC: 103 MG/DL (ref 65–117)
GLUCOSE BLD STRIP.AUTO-MCNC: 103 MG/DL (ref 65–117)
GLUCOSE BLD STRIP.AUTO-MCNC: 126 MG/DL (ref 65–117)
GLUCOSE BLD STRIP.AUTO-MCNC: 90 MG/DL (ref 65–117)
GLUCOSE SERPL-MCNC: 102 MG/DL (ref 65–100)
HCT VFR BLD AUTO: 24.8 % (ref 35–47)
HGB BLD-MCNC: 8 G/DL (ref 11.5–16)
IMM GRANULOCYTES # BLD AUTO: 0 K/UL (ref 0–0.04)
IMM GRANULOCYTES NFR BLD AUTO: 0 % (ref 0–0.5)
LYMPHOCYTES # BLD: 1.3 K/UL (ref 0.8–3.5)
LYMPHOCYTES NFR BLD: 32 % (ref 12–49)
MAGNESIUM SERPL-MCNC: 2 MG/DL (ref 1.6–2.4)
MCH RBC QN AUTO: 30.5 PG (ref 26–34)
MCHC RBC AUTO-ENTMCNC: 32.3 G/DL (ref 30–36.5)
MCV RBC AUTO: 94.7 FL (ref 80–99)
MONOCYTES # BLD: 0.4 K/UL (ref 0–1)
MONOCYTES NFR BLD: 10 % (ref 5–13)
NEUTS SEG # BLD: 2 K/UL (ref 1.8–8)
NEUTS SEG NFR BLD: 49 % (ref 32–75)
NRBC # BLD: 0 K/UL (ref 0–0.01)
NRBC BLD-RTO: 0 PER 100 WBC
PLATELET # BLD AUTO: 105 K/UL (ref 150–400)
PMV BLD AUTO: 10 FL (ref 8.9–12.9)
POTASSIUM SERPL-SCNC: 3.3 MMOL/L (ref 3.5–5.1)
RBC # BLD AUTO: 2.62 M/UL (ref 3.8–5.2)
SERVICE CMNT-IMP: ABNORMAL
SERVICE CMNT-IMP: NORMAL
SODIUM SERPL-SCNC: 129 MMOL/L (ref 136–145)
WBC # BLD AUTO: 4 K/UL (ref 3.6–11)

## 2023-07-30 PROCEDURE — 36415 COLL VENOUS BLD VENIPUNCTURE: CPT

## 2023-07-30 PROCEDURE — 2580000003 HC RX 258: Performed by: INTERNAL MEDICINE

## 2023-07-30 PROCEDURE — 2060000000 HC ICU INTERMEDIATE R&B

## 2023-07-30 PROCEDURE — 80048 BASIC METABOLIC PNL TOTAL CA: CPT

## 2023-07-30 PROCEDURE — 85025 COMPLETE CBC W/AUTO DIFF WBC: CPT

## 2023-07-30 PROCEDURE — 83735 ASSAY OF MAGNESIUM: CPT

## 2023-07-30 PROCEDURE — 6370000000 HC RX 637 (ALT 250 FOR IP): Performed by: NURSE PRACTITIONER

## 2023-07-30 PROCEDURE — 82962 GLUCOSE BLOOD TEST: CPT

## 2023-07-30 PROCEDURE — 2580000003 HC RX 258: Performed by: FAMILY MEDICINE

## 2023-07-30 PROCEDURE — 6360000002 HC RX W HCPCS: Performed by: FAMILY MEDICINE

## 2023-07-30 PROCEDURE — 6370000000 HC RX 637 (ALT 250 FOR IP): Performed by: FAMILY MEDICINE

## 2023-07-30 PROCEDURE — 6370000000 HC RX 637 (ALT 250 FOR IP): Performed by: INTERNAL MEDICINE

## 2023-07-30 RX ORDER — POTASSIUM CHLORIDE 750 MG/1
40 TABLET, FILM COATED, EXTENDED RELEASE ORAL ONCE
Status: COMPLETED | OUTPATIENT
Start: 2023-07-30 | End: 2023-07-30

## 2023-07-30 RX ORDER — SODIUM CHLORIDE, SODIUM LACTATE, POTASSIUM CHLORIDE, CALCIUM CHLORIDE 600; 310; 30; 20 MG/100ML; MG/100ML; MG/100ML; MG/100ML
INJECTION, SOLUTION INTRAVENOUS CONTINUOUS
Status: DISCONTINUED | OUTPATIENT
Start: 2023-07-30 | End: 2023-07-31 | Stop reason: HOSPADM

## 2023-07-30 RX ADMIN — POTASSIUM CHLORIDE 40 MEQ: 750 TABLET, FILM COATED, EXTENDED RELEASE ORAL at 10:39

## 2023-07-30 RX ADMIN — SODIUM CHLORIDE, PRESERVATIVE FREE 10 ML: 5 INJECTION INTRAVENOUS at 22:45

## 2023-07-30 RX ADMIN — SODIUM CHLORIDE, POTASSIUM CHLORIDE, SODIUM LACTATE AND CALCIUM CHLORIDE: 600; 310; 30; 20 INJECTION, SOLUTION INTRAVENOUS at 14:36

## 2023-07-30 RX ADMIN — HEPARIN SODIUM 5000 UNITS: 5000 INJECTION INTRAVENOUS; SUBCUTANEOUS at 13:57

## 2023-07-30 RX ADMIN — FERROUS SULFATE TAB 325 MG (65 MG ELEMENTAL FE) 325 MG: 325 (65 FE) TAB at 09:27

## 2023-07-30 RX ADMIN — ACETAMINOPHEN 650 MG: 325 TABLET ORAL at 22:45

## 2023-07-30 RX ADMIN — HEPARIN SODIUM 5000 UNITS: 5000 INJECTION INTRAVENOUS; SUBCUTANEOUS at 07:33

## 2023-07-30 RX ADMIN — HEPARIN SODIUM 5000 UNITS: 5000 INJECTION INTRAVENOUS; SUBCUTANEOUS at 22:45

## 2023-07-30 RX ADMIN — ATORVASTATIN CALCIUM 40 MG: 20 TABLET, FILM COATED ORAL at 09:27

## 2023-07-30 RX ADMIN — ASPIRIN 81 MG 81 MG: 81 TABLET ORAL at 09:27

## 2023-07-30 RX ADMIN — SODIUM CHLORIDE, PRESERVATIVE FREE 10 ML: 5 INJECTION INTRAVENOUS at 09:31

## 2023-07-30 RX ADMIN — ACETAMINOPHEN 650 MG: 325 TABLET ORAL at 00:50

## 2023-07-30 ASSESSMENT — PAIN DESCRIPTION - ORIENTATION: ORIENTATION: ANTERIOR;POSTERIOR

## 2023-07-30 ASSESSMENT — PAIN - FUNCTIONAL ASSESSMENT: PAIN_FUNCTIONAL_ASSESSMENT: PREVENTS OR INTERFERES SOME ACTIVE ACTIVITIES AND ADLS

## 2023-07-30 ASSESSMENT — PAIN SCALES - GENERAL
PAINLEVEL_OUTOF10: 7
PAINLEVEL_OUTOF10: 0
PAINLEVEL_OUTOF10: 6

## 2023-07-30 ASSESSMENT — PAIN DESCRIPTION - LOCATION: LOCATION: HEAD

## 2023-07-30 ASSESSMENT — PAIN SCALES - WONG BAKER: WONGBAKER_NUMERICALRESPONSE: 2

## 2023-07-30 ASSESSMENT — PAIN DESCRIPTION - DESCRIPTORS: DESCRIPTORS: ACHING

## 2023-07-30 NOTE — PLAN OF CARE
Problem: Discharge Planning  Goal: Discharge to home or other facility with appropriate resources  7/30/2023 1808 by Minda Hughes RN  Outcome: Progressing  7/30/2023 1535 by Minda Hughes RN  Outcome: Progressing     Problem: Pain  Goal: Verbalizes/displays adequate comfort level or baseline comfort level  7/30/2023 1808 by Minda Hughes RN  Outcome: Progressing  7/30/2023 1535 by Minda Hughes RN  Outcome: Progressing

## 2023-07-30 NOTE — PLAN OF CARE
Problem: Discharge Planning  Goal: Discharge to home or other facility with appropriate resources  7/30/2023 1535 by Amy Hoffman RN  Outcome: Progressing  7/30/2023 0405 by Apollo Lord RN  Outcome: Progressing     Problem: Pain  Goal: Verbalizes/displays adequate comfort level or baseline comfort level  7/30/2023 1535 by Amy Hoffman RN  Outcome: Progressing  7/30/2023 0405 by Apollo Lord RN  Outcome: Progressing

## 2023-07-31 VITALS
HEART RATE: 60 BPM | TEMPERATURE: 97.9 F | HEIGHT: 65 IN | OXYGEN SATURATION: 98 % | RESPIRATION RATE: 16 BRPM | WEIGHT: 178 LBS | DIASTOLIC BLOOD PRESSURE: 66 MMHG | SYSTOLIC BLOOD PRESSURE: 104 MMHG | BODY MASS INDEX: 29.66 KG/M2

## 2023-07-31 LAB
ANION GAP SERPL CALC-SCNC: 10 MMOL/L (ref 5–15)
BASOPHILS # BLD: 0 K/UL (ref 0–0.1)
BASOPHILS NFR BLD: 1 % (ref 0–1)
BUN SERPL-MCNC: 58 MG/DL (ref 6–20)
BUN/CREAT SERPL: 26 (ref 12–20)
CALCIUM SERPL-MCNC: 9 MG/DL (ref 8.5–10.1)
CHLORIDE SERPL-SCNC: 102 MMOL/L (ref 97–108)
CO2 SERPL-SCNC: 21 MMOL/L (ref 21–32)
CREAT SERPL-MCNC: 2.22 MG/DL (ref 0.55–1.02)
DIFFERENTIAL METHOD BLD: ABNORMAL
EKG ATRIAL RATE: 70 BPM
EKG DIAGNOSIS: NORMAL
EKG P AXIS: 32 DEGREES
EKG P-R INTERVAL: 202 MS
EKG Q-T INTERVAL: 396 MS
EKG QRS DURATION: 80 MS
EKG QTC CALCULATION (BAZETT): 427 MS
EKG R AXIS: -10 DEGREES
EKG T AXIS: 51 DEGREES
EKG VENTRICULAR RATE: 70 BPM
EOSINOPHIL # BLD: 0.1 K/UL (ref 0–0.4)
EOSINOPHIL NFR BLD: 3 % (ref 0–7)
ERYTHROCYTE [DISTWIDTH] IN BLOOD BY AUTOMATED COUNT: 15.3 % (ref 11.5–14.5)
GLUCOSE BLD STRIP.AUTO-MCNC: 87 MG/DL (ref 65–117)
GLUCOSE SERPL-MCNC: 80 MG/DL (ref 65–100)
HCT VFR BLD AUTO: 25.2 % (ref 35–47)
HGB BLD-MCNC: 8.2 G/DL (ref 11.5–16)
IMM GRANULOCYTES # BLD AUTO: 0 K/UL
IMM GRANULOCYTES NFR BLD AUTO: 0 %
LYMPHOCYTES # BLD: 1.6 K/UL (ref 0.8–3.5)
LYMPHOCYTES NFR BLD: 39 % (ref 12–49)
MCH RBC QN AUTO: 31.1 PG (ref 26–34)
MCHC RBC AUTO-ENTMCNC: 32.5 G/DL (ref 30–36.5)
MCV RBC AUTO: 95.5 FL (ref 80–99)
MONOCYTES # BLD: 0.2 K/UL (ref 0–1)
MONOCYTES NFR BLD: 6 % (ref 5–13)
NEUTS SEG # BLD: 2.2 K/UL (ref 1.8–8)
NEUTS SEG NFR BLD: 51 % (ref 32–75)
NRBC # BLD: 0 K/UL (ref 0–0.01)
NRBC BLD-RTO: 0 PER 100 WBC
PLATELET # BLD AUTO: 110 K/UL (ref 150–400)
PMV BLD AUTO: 9.5 FL (ref 8.9–12.9)
POTASSIUM SERPL-SCNC: 4 MMOL/L (ref 3.5–5.1)
RBC # BLD AUTO: 2.64 M/UL (ref 3.8–5.2)
RBC MORPH BLD: ABNORMAL
SERVICE CMNT-IMP: NORMAL
SODIUM SERPL-SCNC: 133 MMOL/L (ref 136–145)
WBC # BLD AUTO: 4.1 K/UL (ref 3.6–11)

## 2023-07-31 PROCEDURE — 82962 GLUCOSE BLOOD TEST: CPT

## 2023-07-31 PROCEDURE — 85025 COMPLETE CBC W/AUTO DIFF WBC: CPT

## 2023-07-31 PROCEDURE — 80048 BASIC METABOLIC PNL TOTAL CA: CPT

## 2023-07-31 PROCEDURE — 36415 COLL VENOUS BLD VENIPUNCTURE: CPT

## 2023-07-31 PROCEDURE — 6370000000 HC RX 637 (ALT 250 FOR IP): Performed by: NURSE PRACTITIONER

## 2023-07-31 PROCEDURE — 6370000000 HC RX 637 (ALT 250 FOR IP): Performed by: FAMILY MEDICINE

## 2023-07-31 PROCEDURE — 6360000002 HC RX W HCPCS: Performed by: FAMILY MEDICINE

## 2023-07-31 PROCEDURE — 6370000000 HC RX 637 (ALT 250 FOR IP): Performed by: INTERNAL MEDICINE

## 2023-07-31 PROCEDURE — 2580000003 HC RX 258: Performed by: INTERNAL MEDICINE

## 2023-07-31 RX ADMIN — ASPIRIN 81 MG 81 MG: 81 TABLET ORAL at 08:43

## 2023-07-31 RX ADMIN — SODIUM CHLORIDE, POTASSIUM CHLORIDE, SODIUM LACTATE AND CALCIUM CHLORIDE: 600; 310; 30; 20 INJECTION, SOLUTION INTRAVENOUS at 08:46

## 2023-07-31 RX ADMIN — HEPARIN SODIUM 5000 UNITS: 5000 INJECTION INTRAVENOUS; SUBCUTANEOUS at 06:51

## 2023-07-31 RX ADMIN — ACETAMINOPHEN 650 MG: 325 TABLET ORAL at 06:51

## 2023-07-31 RX ADMIN — FERROUS SULFATE TAB 325 MG (65 MG ELEMENTAL FE) 325 MG: 325 (65 FE) TAB at 08:43

## 2023-07-31 RX ADMIN — ATORVASTATIN CALCIUM 40 MG: 20 TABLET, FILM COATED ORAL at 08:43

## 2023-07-31 ASSESSMENT — PAIN SCALES - GENERAL: PAINLEVEL_OUTOF10: 7

## 2023-07-31 ASSESSMENT — PAIN DESCRIPTION - DESCRIPTORS: DESCRIPTORS: ACHING

## 2023-07-31 ASSESSMENT — PAIN DESCRIPTION - LOCATION: LOCATION: HEAD

## 2023-07-31 NOTE — PLAN OF CARE
Problem: Discharge Planning  Goal: Discharge to home or other facility with appropriate resources  7/31/2023 0153 by Katt Burris RN  Outcome: Progressing  7/30/2023 1808 by Eric Espinoza RN  Outcome: Progressing  7/30/2023 1535 by Eric Espinoza RN  Outcome: Progressing     Problem: Pain  Goal: Verbalizes/displays adequate comfort level or baseline comfort level  7/31/2023 0153 by Ktat Burris RN  Outcome: Progressing  7/30/2023 1808 by Eric Espinoza RN  Outcome: Progressing  7/30/2023 1535 by Eric Espinoza RN  Outcome: Progressing Unkempt, disheveled Becoming mildly irritable at times Raspy voice [place] some poverty of content noted [place], somewhat impoverished

## 2023-07-31 NOTE — DISCHARGE SUMMARY
Discharge Summary       PATIENT ID: Rm Gauthier  MRN: [id number]   YOB: 1955    DATE OF ADMISSION: 7/28/2023  9:13 PM    DATE OF DISCHARGE: 7/31/2023    PRIMARY CARE PROVIDER: Rain John MD     ATTENDING PHYSICIAN: Dr. Nabeel Lezama   DISCHARGING PROVIDER: Justine Castellanos MD    To contact this individual call [phone] and ask the  to page. If unavailable ask to be transferred the Adult Hospitalist Department. CONSULTATIONS: IP CONSULT TO HOSPITALIST  IP CONSULT TO NEPHROLOGY    PROCEDURES/SURGERIES: * No surgery found *    DIAGNOSES & HOSPITAL COURSE:     Per HPI:\"Yamilet Peterson is a 78 y.o. female medical history of DM 2, hypertension, peripheral artery disease, CKD IV, and past melanoma who presents the request of her nephrologist for worsening creatinine on labs done in clinic. The ED, patient vital signs stable. Labs are significant for hemoglobin 9.2, BUN 97, creatinine 5.15 (b/l 1.3-1.5), CO2 17, and UA with 30 protein, and small leukocyte esterase. Renal ultrasound shows findings consistent with medical renal disease, and no obstructing nephrolithiasis or hydronephrosis. In the ED, nephrology was consulted, and started a bicarb drip. Note suggests patient has prerenal azotemia due to diuretic use, and decreased p.o. intake. \"    #ANGELIKA on CKD IV - cr improved 2.2   # Metabolic acidosis - resolved   # Hyponatremia- improving 133  -creatinine 5.15 (b/l 2), Na 126 on poa  -appreciate nephrology input - no urgent need for RRT  -continue bicarb gtt  -renal US with no hydronephrosis  -daily weight, I&O  -hold bumex on discharge   - monitor renal function       #HTN  - discontinue home lisinopril due to ANGELIKA     #Anemia  -continue Fe2+, and OP procrit  - no signs of active bleeding  - hb low. Monitor hb, transfuse <7     DM II  -A1c 4.6.  C/w ISS     Chronic vascular disorder of intestine   - on Protonix  - followed by GI at [address] [place] [address]   - 2D echo 5/19/21: EF 60%, moderate

## 2023-07-31 NOTE — DISCHARGE INSTRUCTIONS
Discharge Instructions       PATIENT ID: Dev Gunn  MRN: [id number]   YOB: 1955    DATE OF ADMISSION: [unfilled]    DATE OF DISCHARGE: 7/31/2023    PRIMARY CARE PROVIDER: @PCP@     ATTENDING PHYSICIAN: [unfilled]  DISCHARGING PROVIDER: Mike Douglas MD    To contact this individual call [phone] and ask the  to page. If unavailable ask to be transferred the Adult Hospitalist Department. DISCHARGE DIAGNOSES Acute Kidney Injury     CONSULTATIONS: @IVY@    PROCEDURES/SURGERIES: * No surgery found *    PENDING TEST RESULTS:   At the time of discharge the following test results are still pending: none    FO[facility] UP APPOINTMENTS:   [facility]   PCP in 1 week  Nephrology in 2 days as scheduled     ADDITIONAL CARE RECOMMENDATIONS:   BMP in 1 week     DIET: cardiac diet and diabetic diet  Oral Nutritional Supplements:     ACTIVITY: activity as tolerated    Radiology      DISCHARGE MEDICATIONS:   See Medication Reconciliation Form    It is important that you take the medication exactly as they are prescribed. Keep your medication in the bottles provided by the pharmacist and keep a list of the medication names, dosages, and times to be taken in your wallet. Do not take other medications without consulting your doctor. NOTIFY YOUR PHYSICIAN FOR ANY OF THE FOLLOWING:   Fever over 101 degrees for 24 hours. Chest pain, shortness of breath, fever, chills, nausea, vomiting, diarrhea, change in mentation, falling, weakness, bleeding. Severe pain or pain not relieved by medications. Or, any other signs or symptoms that you may have questions about.       DISPOSITION:  X  Home With:   OT  PT  HH  RN       SNF/Inpatient Rehab/LTAC    Independent/assisted living    Hospice    Other:       Signed:   Mike Douglas MD  7/31/2023  11:20 AM

## 2023-07-31 NOTE — CARE COORDINATION
Care Management Initial Assessment       RUR: 12% Low Risk  Readmission? No  1st IM letter given? Yes - 7/29/2023  1st  letter given: No       07/31/23 1129   Service Assessment   Patient Orientation Alert and Oriented   Cognition Alert   History Provided By Patient   Primary Caregiver Self   Support Systems Family Members;Friends/Neighbors   Patient's Healthcare Decision Maker is: Patient Declined (Legal Next of Kin Remains as Decision Maker)   PCP Verified by CM Yes   Last Visit to PCP Within last 3 months   Prior Functional Level Independent in ADLs/IADLs   Current Functional Level Independent in ADLs/IADLs   Can patient return to prior living arrangement Yes   Ability to make needs known: Good   Family able to assist with home care needs: Yes   Would you like for me to discuss the discharge plan with any other family members/significant others, and if so, who? No   Financial Resources Medicare   Social/Functional History   Lives With Alone   Type of [address] [place] [address] Dr One level   [address] to enter with rails   Entrance Stairs - Number of Steps 4   Entrance Stairs - Rails Both   Bathroom Shower/Tub Tub/Shower unit   Bathroom Toilet Standard   Bathroom Equipment Tub transfer bench   Bathroom Accessibility Not accessible   Home Equipment Walker, rolling;Cane   [address] Responsibilities Yes   Meal Prep Responsibility Primary   Laundry Responsibility Primary   Cleaning Responsibility Primary   Bill Paying/Finance Responsibility Primary   Shopping Responsibility Primary   Ambulation Assistance Independent   Transfer Assistance Independent   Active  Yes   Mode of Transportation Car   Education bachelors degree   Occupation Retired   Discharge Planning   Type of [address] [place] [address] Prior To Admission None   Potential Assistance Needed N/A   DME Ordered?  No

## 2024-02-07 ENCOUNTER — HOSPITAL ENCOUNTER (OUTPATIENT)
Facility: HOSPITAL | Age: 69
Discharge: HOME | End: 2024-02-07
Attending: INTERNAL MEDICINE
Payer: MEDICARE

## 2024-02-07 VITALS
HEART RATE: 77 BPM | RESPIRATION RATE: 16 BRPM | TEMPERATURE: 97.7 F | DIASTOLIC BLOOD PRESSURE: 64 MMHG | SYSTOLIC BLOOD PRESSURE: 130 MMHG

## 2024-02-07 DIAGNOSIS — I87.2 VENOUS INSUFFICIENCY: ICD-10-CM

## 2024-02-07 DIAGNOSIS — L97.929 IDIOPATHIC CHRONIC VENOUS HYPERTENSION OF BOTH LOWER EXTREMITIES WITH ULCER (HCC): ICD-10-CM

## 2024-02-07 DIAGNOSIS — I73.9 PAD (PERIPHERAL ARTERY DISEASE) (HCC): Primary | ICD-10-CM

## 2024-02-07 DIAGNOSIS — L97.919 IDIOPATHIC CHRONIC VENOUS HYPERTENSION OF BOTH LOWER EXTREMITIES WITH ULCER (HCC): ICD-10-CM

## 2024-02-07 DIAGNOSIS — I87.313 IDIOPATHIC CHRONIC VENOUS HYPERTENSION OF BOTH LOWER EXTREMITIES WITH ULCER (HCC): ICD-10-CM

## 2024-02-07 PROCEDURE — 99213 OFFICE O/P EST LOW 20 MIN: CPT

## 2024-02-07 PROCEDURE — 29581 APPL MULTLAYER CMPRN SYS LEG: CPT

## 2024-02-07 RX ORDER — BACITRACIN ZINC AND POLYMYXIN B SULFATE 500; 1000 [USP'U]/G; [USP'U]/G
OINTMENT TOPICAL ONCE
OUTPATIENT
Start: 2024-02-07 | End: 2024-02-07

## 2024-02-07 RX ORDER — LIDOCAINE HYDROCHLORIDE 20 MG/ML
JELLY TOPICAL ONCE
OUTPATIENT
Start: 2024-02-07 | End: 2024-02-07

## 2024-02-07 RX ORDER — BETAMETHASONE DIPROPIONATE 0.5 MG/G
CREAM TOPICAL ONCE
Status: CANCELLED | OUTPATIENT
Start: 2024-02-07 | End: 2024-02-07

## 2024-02-07 RX ORDER — LIDOCAINE 40 MG/G
CREAM TOPICAL ONCE
Status: CANCELLED | OUTPATIENT
Start: 2024-02-07 | End: 2024-02-07

## 2024-02-07 RX ORDER — CLOBETASOL PROPIONATE 0.5 MG/G
OINTMENT TOPICAL ONCE
OUTPATIENT
Start: 2024-02-07 | End: 2024-02-07

## 2024-02-07 RX ORDER — LIDOCAINE 50 MG/G
OINTMENT TOPICAL ONCE
Status: CANCELLED | OUTPATIENT
Start: 2024-02-07 | End: 2024-02-07

## 2024-02-07 RX ORDER — CLOBETASOL PROPIONATE 0.5 MG/G
OINTMENT TOPICAL ONCE
Status: CANCELLED | OUTPATIENT
Start: 2024-02-07 | End: 2024-02-07

## 2024-02-07 RX ORDER — GENTAMICIN SULFATE 1 MG/G
OINTMENT TOPICAL ONCE
OUTPATIENT
Start: 2024-02-07 | End: 2024-02-07

## 2024-02-07 RX ORDER — LIDOCAINE HYDROCHLORIDE 40 MG/ML
SOLUTION TOPICAL ONCE
OUTPATIENT
Start: 2024-02-07 | End: 2024-02-07

## 2024-02-07 RX ORDER — LIDOCAINE HYDROCHLORIDE 20 MG/ML
JELLY TOPICAL ONCE
Status: CANCELLED | OUTPATIENT
Start: 2024-02-07 | End: 2024-02-07

## 2024-02-07 RX ORDER — IBUPROFEN 200 MG
TABLET ORAL ONCE
OUTPATIENT
Start: 2024-02-07 | End: 2024-02-07

## 2024-02-07 RX ORDER — GINSENG 100 MG
CAPSULE ORAL ONCE
OUTPATIENT
Start: 2024-02-07 | End: 2024-02-07

## 2024-02-07 RX ORDER — BETAMETHASONE DIPROPIONATE 0.5 MG/G
CREAM TOPICAL ONCE
OUTPATIENT
Start: 2024-02-07 | End: 2024-02-07

## 2024-02-07 RX ORDER — TRIAMCINOLONE ACETONIDE 1 MG/G
OINTMENT TOPICAL ONCE
OUTPATIENT
Start: 2024-02-07 | End: 2024-02-07

## 2024-02-07 RX ORDER — ROSUVASTATIN CALCIUM 40 MG/1
40 TABLET, COATED ORAL DAILY
COMMUNITY

## 2024-02-07 RX ORDER — LEVOCETIRIZINE DIHYDROCHLORIDE 5 MG/1
TABLET, FILM COATED ORAL
COMMUNITY
Start: 2021-06-01

## 2024-02-07 RX ORDER — LIDOCAINE 50 MG/G
OINTMENT TOPICAL ONCE
OUTPATIENT
Start: 2024-02-07 | End: 2024-02-07

## 2024-02-07 RX ORDER — GINSENG 100 MG
CAPSULE ORAL ONCE
Status: CANCELLED | OUTPATIENT
Start: 2024-02-07 | End: 2024-02-07

## 2024-02-07 RX ORDER — SODIUM CHLOR/HYPOCHLOROUS ACID 0.033 %
SOLUTION, IRRIGATION IRRIGATION ONCE
Status: CANCELLED | OUTPATIENT
Start: 2024-02-07 | End: 2024-02-07

## 2024-02-07 RX ORDER — LIDOCAINE HYDROCHLORIDE 40 MG/ML
SOLUTION TOPICAL ONCE
Status: CANCELLED | OUTPATIENT
Start: 2024-02-07 | End: 2024-02-07

## 2024-02-07 RX ORDER — BACITRACIN ZINC AND POLYMYXIN B SULFATE 500; 1000 [USP'U]/G; [USP'U]/G
OINTMENT TOPICAL ONCE
Status: CANCELLED | OUTPATIENT
Start: 2024-02-07 | End: 2024-02-07

## 2024-02-07 RX ORDER — SODIUM CHLOR/HYPOCHLOROUS ACID 0.033 %
SOLUTION, IRRIGATION IRRIGATION ONCE
OUTPATIENT
Start: 2024-02-07 | End: 2024-02-07

## 2024-02-07 RX ORDER — IBUPROFEN 200 MG
TABLET ORAL ONCE
Status: CANCELLED | OUTPATIENT
Start: 2024-02-07 | End: 2024-02-07

## 2024-02-07 RX ORDER — LISINOPRIL 20 MG/1
20 TABLET ORAL DAILY
COMMUNITY
Start: 2023-12-11

## 2024-02-07 RX ORDER — GENTAMICIN SULFATE 1 MG/G
OINTMENT TOPICAL ONCE
Status: CANCELLED | OUTPATIENT
Start: 2024-02-07 | End: 2024-02-07

## 2024-02-07 RX ORDER — BUMETANIDE 2 MG/1
2 TABLET ORAL 2 TIMES DAILY
COMMUNITY
Start: 2023-11-01 | End: 2024-02-07

## 2024-02-07 RX ORDER — PHENOL 1.4 %
1 AEROSOL, SPRAY (ML) MUCOUS MEMBRANE DAILY
COMMUNITY

## 2024-02-07 RX ORDER — LIDOCAINE 40 MG/G
CREAM TOPICAL ONCE
OUTPATIENT
Start: 2024-02-07 | End: 2024-02-07

## 2024-02-07 RX ORDER — TRAMADOL HYDROCHLORIDE 50 MG/1
TABLET ORAL
COMMUNITY

## 2024-02-07 RX ORDER — TRIAMCINOLONE ACETONIDE 1 MG/G
OINTMENT TOPICAL ONCE
Status: CANCELLED | OUTPATIENT
Start: 2024-02-07 | End: 2024-02-07

## 2024-02-07 RX ORDER — GLUCOSAMINE SULFATE 500 MG
500 CAPSULE ORAL DAILY
COMMUNITY

## 2024-02-07 ASSESSMENT — PAIN SCALES - GENERAL: PAINLEVEL_OUTOF10: 8

## 2024-02-07 ASSESSMENT — PAIN DESCRIPTION - ONSET: ONSET: GRADUAL

## 2024-02-07 ASSESSMENT — PAIN DESCRIPTION - PAIN TYPE: TYPE: CHRONIC PAIN

## 2024-02-07 ASSESSMENT — PAIN DESCRIPTION - DESCRIPTORS: DESCRIPTORS: ACHING

## 2024-02-07 ASSESSMENT — PAIN DESCRIPTION - LOCATION: LOCATION: OTHER (COMMENT)

## 2024-02-07 ASSESSMENT — PAIN DESCRIPTION - FREQUENCY: FREQUENCY: CONTINUOUS

## 2024-02-07 NOTE — PATIENT INSTRUCTIONS
KEEP THE WOUND COVERED AT ALL TIMES.     Physician Signature:_______________________    Date: ___________ Time:  ____________

## 2024-02-07 NOTE — FLOWSHEET NOTE
02/07/24 0952   Right Leg Edema Point of Measurement   Leg circumference 49 cm   Ankle circumference 30.5 cm   Left Leg Edema Point of Measurement   Leg circumference 49 cm   Ankle circumference 29 cm   Peripheral Vascular   RLE Edema +2   LLE Edema +1   RLE Neurovascular Assessment   Capillary Refill Less than/Equal to 3 seconds   Color Appropriate for Ethnicity   Temperature Cool   RLE Sensation  No sensation;Pain  (Pain with pressure in heel only)   R Pedal Pulse Doppler   LLE Neurovascular Assessment   Capillary Refill Less than/Equal to 3 seconds   Color Appropriate for Ethnicity   Temperature Cool   LLE Sensation  No sensation   L Pedal Pulse Doppler   Wound 02/07/24 Leg Left;Medial #1 pretibial   Date First Assessed/Time First Assessed: 02/07/24 0958   Present on Original Admission: Yes  Wound Approximate Age at First Assessment (Weeks): 2 weeks  Location: Leg  Wound Location Orientation: Left;Medial  Wound Description (Comments): #1 pretibial   Wound Image    Wound Cleansed Soap and water   Offloading for Diabetic Foot Ulcers Offloading not required   Wound Length (cm) 7 cm   Wound Width (cm) 10 cm   Wound Depth (cm) 0.1 cm   Wound Surface Area (cm^2) 70 cm^2   Wound Volume (cm^3) 7 cm^3   Wound Assessment Pink/red;Slough;Superficial   Drainage Amount Moderate (25-50%)   Drainage Description Serous   Odor None   Samanta-wound Assessment Blanchable erythema;Fragile   Margins Flat/open edges   Wound 02/07/24 Leg Right;Lateral #2 pretibial   Date First Assessed/Time First Assessed: 02/07/24 0959   Present on Original Admission: Yes  Wound Approximate Age at First Assessment (Weeks): 2 weeks  Location: Leg  Wound Location Orientation: Right;Lateral  Wound Description (Comments): #2 pretibial   Wound Image    Wound Cleansed Soap and water   Offloading for Diabetic Foot Ulcers Offloading not required   Wound Length (cm) 1.8 cm   Wound Width (cm) 1.1 cm   Wound Depth (cm) 0.1 cm   Wound Surface Area (cm^2) 1.98

## 2024-02-07 NOTE — WOUND CARE
quit      Past Medical History:   Diagnosis Date    Cancer (HCC)     melanoma back stage II 2005    CKD (chronic kidney disease), stage III (HCC) 3/1/2016    CKD (chronic kidney disease), stage IV (HCC) 3/1/2016    Diabetes (HCC)     HTN (hypertension) 11/2/2015    Hypertension     Idiopathic chronic venous hypertension of both lower extremities with ulcer (HCC) 02/07/2024    LBP (low back pain) 11/2/2015    Menopause     Microalbuminuria 11/21/2015    Obesity 11/2/2015    PAD (peripheral artery disease) ([facility]) 9/29/2016    Venous insufficiency 11/2/2015     Past Surgical History:   Procedure Laterality Date    APPENDECTOMY      BACK SURGERY      BREAST BIOPSY      unsure of which breast or when. Negative results    COLONOSCOPY N/A 2/11/2021    COLONOSCOPY performed by Pauly Hanson MD at [facility] ENDOSCOPY    FLEXIBLE SIGMOIDOSCOPY N/A 2/26/2021    SIGMOIDOSCOPY FLEXIBLE performed by Pauly Hanson MD at [facility] ENDOSCOPY    TOE AMPUTATION  01/13/2021    Left second toe amputation; Dr. Olmstead.    TONSILLECTOMY        Family History   Problem Relation Age of Onset    Dementia Father     Rheum Arthritis Mother      Social History     Socioeconomic History    Marital status: Single     Spouse name: None    Number of children: None    Years of education: None    Highest education level: None   Tobacco Use    Smoking status: Former     Types: Cigarettes    Smokeless tobacco: Never   Substance and Sexual Activity    Alcohol use: Yes     Alcohol/week: 0.0 standard drinks of alcohol    Drug use: No      Current Outpatient Medications   Medication Sig    diclofenac sodium (VOLTAREN) 1 % GEL Apply 2 g topically 4 times daily    levocetirizine (XYZAL) 5 MG tablet     lisinopril (PRINIVIL;ZESTRIL) 20 MG tablet Take 1 tablet by mouth daily    calcium carbonate 600 MG TABS tablet Take 1 tablet by mouth daily    Glucosamine 500 MG CAPS Take 500 mg by mouth daily    bumetanide (BUMEX) 2 MG tablet Take 1 tablet by mouth in the

## 2024-02-26 ENCOUNTER — OFFICE VISIT (OUTPATIENT)
Facility: CLINIC | Age: 69
End: 2024-02-26
Payer: MEDICARE

## 2024-02-26 DIAGNOSIS — I87.313 IDIOPATHIC CHRONIC VENOUS HYPERTENSION OF BOTH LOWER EXTREMITIES WITH ULCER (HCC): ICD-10-CM

## 2024-02-26 DIAGNOSIS — L97.919 IDIOPATHIC CHRONIC VENOUS HYPERTENSION OF BOTH LOWER EXTREMITIES WITH ULCER (HCC): ICD-10-CM

## 2024-02-26 DIAGNOSIS — L03.115 CELLULITIS OF RIGHT LOWER EXTREMITY: ICD-10-CM

## 2024-02-26 DIAGNOSIS — L97.929 IDIOPATHIC CHRONIC VENOUS HYPERTENSION OF BOTH LOWER EXTREMITIES WITH ULCER (HCC): ICD-10-CM

## 2024-02-26 DIAGNOSIS — N18.32 STAGE 3B CHRONIC KIDNEY DISEASE (HCC): ICD-10-CM

## 2024-02-26 DIAGNOSIS — E11.21 TYPE 2 DIABETES WITH NEPHROPATHY (HCC): Primary | ICD-10-CM

## 2024-02-26 PROCEDURE — 99306 1ST NF CARE HIGH MDM 50: CPT | Performed by: INTERNAL MEDICINE

## 2024-02-26 PROCEDURE — G8484 FLU IMMUNIZE NO ADMIN: HCPCS | Performed by: INTERNAL MEDICINE

## 2024-02-26 PROCEDURE — 3046F HEMOGLOBIN A1C LEVEL >9.0%: CPT | Performed by: INTERNAL MEDICINE

## 2024-02-26 PROCEDURE — 1124F ACP DISCUSS-NO DSCNMKR DOCD: CPT | Performed by: INTERNAL MEDICINE

## 2024-02-26 NOTE — PROGRESS NOTES
PLACE OF SERVICE:  [facility] [address], [place] [address]     H & P    2/26/2024      Chief Complaint:    Fall /Found on floor   AMS   Right leg cellulitis       HPI :   67yo woman h/o BLE edema, chronic BLE wounds, NIDDM2, CKD3b, alcohol use  admitted to [facility] ICU 02/13/24 under Hospitalist service for acute  metabolic encephalopathy with septic shock 2/2 Enterobacter cloacae bacteremia  and influenza A c/b rhabdomyolysis, ANGELIKA, RLE cellulitis with blisters and  bullae.  She was found on floor at home she cannot recall vomiting significantly.  She cannot for sure state but feels that she might of had a fall, patient has received debridement with unroofing of RLE blisters, leg irrigation, and skin biopsy (02/15/24).  Patient was also noted for transient A-fib, currently rate is controlled and sinus.  Her blood cultures x1 positive for Enterobacter cloace complex on 2/12. Sensitive to  Rocephin and received 5 days work. Culture senitive to levaquin.  She is on renal dosing PO levaquin 750mg q48h. Repeat cultures on 2/ 16 negative.  Patient is completing total 2 weeks of antibiotics.  Patient had an xray of the left foot 2/14 which showed minimally displaced  longitudinal fracture of the tarsal navicular, left midfoot degenerative change,  degenerative joint disease at the first MTP joint, lateral soft tissues swelling  at the ankle with subcutaneous calcifications medially which appear chronic.  Patient is advised to follow-up with Ortho.. Up to chair if able, soft boot for foot  is advised.  Patient was admitted for physical therapy and skilled nursing..  Patient is well-motivated ,she is alert she denies active pain.  Patient is afebrile.  Denies chest pain shortness breath denies cough wheezing.  Patient is poor historian history is completed after interviewing patient as well as reviewing all past medical records.  Total time consumed 45 minutes.    PMH:     Hypertension,

## 2024-02-28 ENCOUNTER — OFFICE VISIT (OUTPATIENT)
Facility: CLINIC | Age: 69
End: 2024-02-28
Payer: MEDICARE

## 2024-02-28 DIAGNOSIS — N18.30 ANEMIA DUE TO STAGE 3 CHRONIC KIDNEY DISEASE, UNSPECIFIED WHETHER STAGE 3A OR 3B CKD (HCC): ICD-10-CM

## 2024-02-28 DIAGNOSIS — L03.115 CELLULITIS OF RIGHT LOWER EXTREMITY: ICD-10-CM

## 2024-02-28 DIAGNOSIS — D63.1 ANEMIA DUE TO STAGE 3 CHRONIC KIDNEY DISEASE, UNSPECIFIED WHETHER STAGE 3A OR 3B CKD (HCC): ICD-10-CM

## 2024-02-28 DIAGNOSIS — N18.32 STAGE 3B CHRONIC KIDNEY DISEASE (HCC): ICD-10-CM

## 2024-02-28 DIAGNOSIS — E11.21 TYPE 2 DIABETES WITH NEPHROPATHY (HCC): Primary | ICD-10-CM

## 2024-02-28 PROBLEM — D69.6 THROMBOCYTOPENIA, UNSPECIFIED (HCC): Status: ACTIVE | Noted: 2024-02-28

## 2024-02-28 PROCEDURE — 1124F ACP DISCUSS-NO DSCNMKR DOCD: CPT | Performed by: INTERNAL MEDICINE

## 2024-02-28 PROCEDURE — G8484 FLU IMMUNIZE NO ADMIN: HCPCS | Performed by: INTERNAL MEDICINE

## 2024-02-28 PROCEDURE — 99309 SBSQ NF CARE MODERATE MDM 30: CPT | Performed by: INTERNAL MEDICINE

## 2024-02-28 PROCEDURE — 3046F HEMOGLOBIN A1C LEVEL >9.0%: CPT | Performed by: INTERNAL MEDICINE

## 2024-02-29 NOTE — PROGRESS NOTES
PLACE OF SERVICE:  [facility] [address], [place] [address]     SKILLED VISIT    2/28/2024        Chief Complaint:    Fall /Found on floor   AMS   Right leg cellulitis   CKD  Anemia   follow-up/monitoring progress, high risk for decompensation and rehospitalization.      HPI :   67yo woman h/o BLE edema, chronic BLE wounds, NIDDM2, CKD3b, alcohol use  admitted to [facility] ICU 02/13/24 under Hospitalist service for acute  metabolic encephalopathy with septic shock 2/2 Enterobacter cloacae bacteremia  and influenza A c/b rhabdomyolysis, ANGELIKA, RLE cellulitis with blisters and  bullae.  She was found on floor at home she cannot recall direct fall but she was thirsty and not feeling well prior to becoming lethargic and confused, she cannot for sure recall but feels that she might of had a fall, patient has received debridement with unroofing of RLE blisters,and skin biopsy (02/15/24).  Patient was also noted for transient A-fib, currently rate is controlled and sinus.  Her blood cultures x1 positive for Enterobacter cloace complex on 2/12. Sensitive to  Rocephin and received 5 days work. Culture senitive to levaquin.  She is on renal dosing PO levaquin 750mg q48h. Repeat cultures on 2/ 16 negative.  Patient is completing total 2 weeks of antibiotics.  Patient had an xray of the left foot 2/14 which showed minimally displaced  longitudinal fracture of the tarsal navicular, left midfoot degenerative change,  degenerative joint disease at the first MTP joint, lateral soft tissues swelling  at the ankle with subcutaneous calcifications medially which appear chronic.  Patient is advised to follow-up with Ortho.. Up to chair if able, soft boot for foot  is advised.    Patient is admitted for physical therapy and skilled nursing..  Patient is well-motivated ,she is alert she denies active pain.  Patient is afebrile.  Denies chest pain shortness breath denies cough wheezing.  Patient is more

## 2024-03-01 ENCOUNTER — OFFICE VISIT (OUTPATIENT)
Facility: CLINIC | Age: 69
End: 2024-03-01

## 2024-03-01 DIAGNOSIS — D63.1 ANEMIA DUE TO STAGE 3 CHRONIC KIDNEY DISEASE, UNSPECIFIED WHETHER STAGE 3A OR 3B CKD (HCC): ICD-10-CM

## 2024-03-01 DIAGNOSIS — R60.9 EDEMA, UNSPECIFIED TYPE: ICD-10-CM

## 2024-03-01 DIAGNOSIS — L03.115 CELLULITIS OF RIGHT LOWER EXTREMITY: Primary | ICD-10-CM

## 2024-03-01 DIAGNOSIS — N18.30 ANEMIA DUE TO STAGE 3 CHRONIC KIDNEY DISEASE, UNSPECIFIED WHETHER STAGE 3A OR 3B CKD (HCC): ICD-10-CM

## 2024-03-01 DIAGNOSIS — E11.21 TYPE 2 DIABETES WITH NEPHROPATHY (HCC): ICD-10-CM

## 2024-03-01 NOTE — PROGRESS NOTES
PLACE OF SERVICE:  [facility] [address], [place] [address]     SKILLED VISIT    3/1/2024        Chief Complaint:    Left arm edema and weeping  Fall /Found on floor   AMS   Right leg cellulitis   CKD  Anemia   high risk for decompensation and rehospitalization.      HPI :   67yo woman h/o BLE edema, chronic BLE wounds, NIDDM2, CKD3b, alcohol use  admitted to [facility] ICU 02/13/24 under Hospitalist service for acute  metabolic encephalopathy with septic shock 2/2 Enterobacter cloacae bacteremia  and influenza A c/b rhabdomyolysis, ANGELIKA, RLE cellulitis with blisters and  bullae.  She was found on floor at home she cannot recall direct fall but she was thirsty and not feeling well prior to becoming lethargic and confused, she cannot for sure recall but feels that she might of had a fall, patient has received debridement with unroofing of RLE blisters,and skin biopsy (02/15/24).  Patient was also noted for transient A-fib, currently rate is controlled and sinus.  Her blood cultures x1 positive for Enterobacter cloace complex on 2/12. Sensitive to  Rocephin and received 5 days work. Culture senitive to levaquin.  She is on renal dosing PO levaquin 750mg q48h. Repeat cultures on 2/ 16 negative.  Patient is completing total 2 weeks of antibiotics.  Patient had an xray of the left foot 2/14 which showed minimally displaced  longitudinal fracture of the tarsal navicular, left midfoot degenerative change,  degenerative joint disease at the first MTP joint, lateral soft tissues swelling  at the ankle with subcutaneous calcifications medially which appear chronic.  Patient is advised to follow-up with Ortho.. Up to chair if able, soft boot for foot  is advised.    Patient is admitted for physical therapy and skilled nursing..  Patient is well-motivated ,she is alert she denies active pain.  Patient is afebrile.  Denies chest pain shortness breath denies cough wheezing.  Patient is more awake

## 2024-03-04 ENCOUNTER — OFFICE VISIT (OUTPATIENT)
Facility: CLINIC | Age: 69
End: 2024-03-04
Payer: MEDICARE

## 2024-03-04 DIAGNOSIS — L03.115 CELLULITIS OF RIGHT LOWER EXTREMITY: Primary | ICD-10-CM

## 2024-03-04 DIAGNOSIS — N18.30 ANEMIA DUE TO STAGE 3 CHRONIC KIDNEY DISEASE, UNSPECIFIED WHETHER STAGE 3A OR 3B CKD (HCC): ICD-10-CM

## 2024-03-04 DIAGNOSIS — I95.1 POSTURAL HYPOTENSION: ICD-10-CM

## 2024-03-04 DIAGNOSIS — D63.1 ANEMIA DUE TO STAGE 3 CHRONIC KIDNEY DISEASE, UNSPECIFIED WHETHER STAGE 3A OR 3B CKD (HCC): ICD-10-CM

## 2024-03-04 DIAGNOSIS — R60.9 EDEMA, UNSPECIFIED TYPE: ICD-10-CM

## 2024-03-04 PROCEDURE — 99309 SBSQ NF CARE MODERATE MDM 30: CPT | Performed by: INTERNAL MEDICINE

## 2024-03-04 PROCEDURE — G8484 FLU IMMUNIZE NO ADMIN: HCPCS | Performed by: INTERNAL MEDICINE

## 2024-03-04 PROCEDURE — 1124F ACP DISCUSS-NO DSCNMKR DOCD: CPT | Performed by: INTERNAL MEDICINE

## 2024-03-05 NOTE — PROGRESS NOTES
PLACE OF SERVICE:  [facility] [address], [place] [address]     SKILLED VISIT    3/4/2024        Chief Complaint:    Dizzy feeling when upright sitting up   Left arm edema and weeping  B/l lymphedema   Fall /Found on floor   AMS   Right leg cellulitis   CKD  Anemia   high risk for decompensation and rehospitalization.      HPI :   69yo woman h/o BLE edema, chronic BLE wounds, NIDDM2, CKD3b, alcohol use  admitted to [facility] ICU 02/13/24 under Hospitalist service for acute  metabolic encephalopathy with septic shock 2/2 Enterobacter cloacae bacteremia  and influenza A c/b rhabdomyolysis, ANGELIKA, RLE cellulitis with blisters and  bullae.  She was found on floor at home she cannot recall direct fall but she was thirsty and not feeling well prior to becoming lethargic and confused, she cannot for sure recall but feels that she might of had a fall, patient has received debridement with unroofing of RLE blisters,and skin biopsy (02/15/24).  Patient was also noted for transient A-fib, currently rate is controlled and sinus.  Her blood cultures x1 positive for Enterobacter cloace complex on 2/12. Sensitive to  Rocephin and received 5 days work. Culture senitive to levaquin.  She is on renal dosing PO levaquin 750mg q48h. Repeat cultures on 2/ 16 negative.  Patient is completing total 2 weeks of antibiotics.  Patient had an xray of the left foot 2/14 which showed minimally displaced  longitudinal fracture of the tarsal navicular, left midfoot degenerative change,  degenerative joint disease at the first MTP joint, lateral soft tissues swelling  at the ankle with subcutaneous calcifications medially which appear chronic.  Patient is advised to follow-up with Ortho.. Up to chair if able, soft boot for foot  is advised.    Patient is admitted for physical therapy and skilled nursing..  Patient is well-motivated ,she is alert she denies active pain.  Patient is afebrile.  Denies chest pain

## 2024-03-14 ENCOUNTER — OFFICE VISIT (OUTPATIENT)
Facility: CLINIC | Age: 69
End: 2024-03-14
Payer: MEDICARE

## 2024-03-14 DIAGNOSIS — K92.2 ACUTE GASTROINTESTINAL HEMORRHAGE: Primary | ICD-10-CM

## 2024-03-14 DIAGNOSIS — N18.32 STAGE 3B CHRONIC KIDNEY DISEASE (HCC): ICD-10-CM

## 2024-03-14 DIAGNOSIS — E11.21 TYPE 2 DIABETES WITH NEPHROPATHY (HCC): ICD-10-CM

## 2024-03-14 DIAGNOSIS — I95.1 POSTURAL HYPOTENSION: ICD-10-CM

## 2024-03-14 DIAGNOSIS — D64.9 ANEMIA, UNSPECIFIED TYPE: ICD-10-CM

## 2024-03-14 DIAGNOSIS — I48.11 LONGSTANDING PERSISTENT ATRIAL FIBRILLATION (HCC): ICD-10-CM

## 2024-03-14 DIAGNOSIS — R60.9 EDEMA, UNSPECIFIED TYPE: ICD-10-CM

## 2024-03-14 PROCEDURE — 3046F HEMOGLOBIN A1C LEVEL >9.0%: CPT | Performed by: FAMILY MEDICINE

## 2024-03-14 PROCEDURE — G8484 FLU IMMUNIZE NO ADMIN: HCPCS | Performed by: FAMILY MEDICINE

## 2024-03-14 PROCEDURE — 1124F ACP DISCUSS-NO DSCNMKR DOCD: CPT | Performed by: FAMILY MEDICINE

## 2024-03-14 PROCEDURE — 99306 1ST NF CARE HIGH MDM 50: CPT | Performed by: FAMILY MEDICINE

## 2024-03-14 NOTE — PROGRESS NOTES
of chronic lower GI bleed recurrent, debility    ROS:   The following system review was negative:  Constitutional; Respiratory; Cardiovascular; Genitourinary; Gastrointestinal; Psychiatric; Ear-Nose-Throat; Musculoskeletal; Neurologic; Endocrine; Hematologic; Skin; Eyes; denies any    Medications: Reviewed in [facility] EMR and assessment /  plan    Social History / Family History: Family history-asked nothing offered of relevance to current illness  No tobacco abuse currently but has history of former tobacco use.  She is recovering from alcohol.  No street drug abuse.       Vitals:   Blood pressure 135/64 temperature 97.8 pulse 82 respirate 18 oxygen 98%      Exam:  Constitutional: No acute distress;   Eyes: Sclera clear, PERRLA;   Ears/nose/mouth/throat:mmm, OP clear, trachea midline;  Cardiovascular: RRR,nml S1 and S2, no rubs murmurs or gallops, no edema, no cyanosis;   Respiratory: Clear to auscultation, symmetric, no respiratory distress;  Gastrointestinal: Abdomen soft, NT, ND, no masses, normal bowel sounds;  Neurologic: Cranial nerves II through VII grossly intact, no speech or motor deficits A&O in time place and person  Skin: No rash, warm and dry;  Musculoskeletal: No erythema swelling or joint tenderness, extremities without cyanosis, neck supple, ROM intact spine and extremities;  Psychiatric: Not agitated.  Appropriate affect, mood, judgment and insight.  Genitourinary: No suprapubic tenderness or flank tenderness  Heme, lymph, immuno: No pallor;       Labs:    WBC 3.9 hemoglobin 5.8 hematocrit 18.2 platelets 120 on presentation to the hospital  Sodium 135 potassium 4.3 BUN 43 creatinine 2.19 upon presentation to the hospital    Assessment/Plans:   Acute GI bleed/anemia-patient is status post EGD.  GI recommends PPI twice daily.  Follow-up with GI in 6 to 8 weeks time.    Multiple wounds to lower extremity and sacrum-continue to follow-up with wound MD    [id number] controlled and stable on amiodarone.

## 2024-03-15 ENCOUNTER — OFFICE VISIT (OUTPATIENT)
Facility: CLINIC | Age: 69
End: 2024-03-15

## 2024-03-15 DIAGNOSIS — I48.11 LONGSTANDING PERSISTENT ATRIAL FIBRILLATION (HCC): ICD-10-CM

## 2024-03-15 DIAGNOSIS — D64.9 ANEMIA, UNSPECIFIED TYPE: ICD-10-CM

## 2024-03-15 DIAGNOSIS — N18.32 STAGE 3B CHRONIC KIDNEY DISEASE (HCC): ICD-10-CM

## 2024-03-15 DIAGNOSIS — E11.21 TYPE 2 DIABETES WITH NEPHROPATHY (HCC): ICD-10-CM

## 2024-03-15 DIAGNOSIS — K92.2 ACUTE GASTROINTESTINAL HEMORRHAGE: Primary | ICD-10-CM

## 2024-03-16 NOTE — PROGRESS NOTES
PLACE OF SERVICE:  [facility] [address], [place] [address]     SKILLED VISIT      Chief Complaint: Generalized deconditioning weakness/loss of function-ongoing rehab  Medication review and to follow-up progress    HPI  Patient seen today by bedside during morning rounds.  Working with therapy and improving gradually.  All medications reviewed today and found to be appropriate as per geriatric guidelines.  Patient motivated and continues to improve.  Vitals trended and stable.  Therapy notes reviewed.  All medications reviewed today and found to be appropriate.  Continues on amiodarone for rhythm control and A-fib.  No other acute nursing issues or concerns    PMH:   Reviewed    ROS:   The following system review was negative:  Constitutional; Respiratory; Cardiovascular; Genitourinary; Gastrointestinal; Psychiatric; Ear-Nose-Throat; Musculoskeletal; Neurologic; Endocrine; Hematologic; Skin; Eyes; denies any    Medications: Reviewed in [facility] EMR and assessment /  plan    Social History / Family History: Today           Vitals:   Blood pressure 136/75 temperature 97.7 pulse 69 respirate 18      Exam:  Constitutional: No acute distress;   Eyes: Sclera clear, PERRLA;   Ears/nose/mouth/throat:mmm, OP clear, trachea midline;  Cardiovascular: RRR,nml S1 and S2, no rubs murmurs or gallops, no edema, no cyanosis;   Respiratory: Clear to auscultation, symmetric, no respiratory distress;  Gastrointestinal: Abdomen soft, NT, ND, no masses, normal bowel sounds;  Neurologic: Cranial nerves II through VII grossly intact, no speech or motor deficits A&O in time place and person  Skin: No rash, warm and dry;  Musculoskeletal: No erythema swelling or joint tenderness, extremities without cyanosis, neck supple, ROM intact spine and extremities;  Psychiatric: Not agitated.  Appropriate affect, mood, judgment and insight.  Genitourinary: No suprapubic tenderness or flank tenderness  Heme, lymph, immuno:

## (undated) DEVICE — SYR 10ML LUER LOK 1/5ML GRAD --

## (undated) DEVICE — PACK,LAPAROTOMY,2 REINFORCED GOWNS: Brand: MEDLINE

## (undated) DEVICE — STERILE POLYISOPRENE POWDER-FREE SURGICAL GLOVES WITH EMOLLIENT COATING: Brand: PROTEXIS

## (undated) DEVICE — INFECTION CONTROL KIT SYS

## (undated) DEVICE — TUBING HYDR IRR --

## (undated) DEVICE — SURGICAL PROCEDURE PACK BASIN MAJ SET CUST NO CAUT

## (undated) DEVICE — POSITIONER HD REST FOAM CMFRT TCH

## (undated) DEVICE — PENCIL SMK EVAC 10 FT BLADE ELECTRD ROCKER FOR TELSCP

## (undated) DEVICE — STRAP,POSITIONING,KNEE/BODY,FOAM,4X60": Brand: MEDLINE

## (undated) DEVICE — REM POLYHESIVE ADULT PATIENT RETURN ELECTRODE: Brand: VALLEYLAB

## (undated) DEVICE — TRAY PREP DRY W/ PREM GLV 2 APPL 6 SPNG 2 UNDPD 1 OVERWRAP

## (undated) DEVICE — GAUZE,PACKING STRIP,PLAIN,2"X5YD,STRL,LF: Brand: CURAD

## (undated) DEVICE — TOWEL SURG W17XL27IN STD BLU COT NONFENESTRATED PREWASHED

## (undated) DEVICE — GAUZE,SPONGE,FLUFF,6"X6.75",STRL,5/TRAY: Brand: MEDLINE

## (undated) DEVICE — YANKAUER,POOLE TIP,STERILE,50/CS: Brand: MEDLINE

## (undated) DEVICE — FORCEPS BX L240CM JAW DIA2.8MM L CAP W/ NDL MIC MESH TOOTH